# Patient Record
Sex: FEMALE | Race: WHITE | Employment: OTHER | URBAN - NONMETROPOLITAN AREA
[De-identification: names, ages, dates, MRNs, and addresses within clinical notes are randomized per-mention and may not be internally consistent; named-entity substitution may affect disease eponyms.]

---

## 2019-12-20 ENCOUNTER — HOSPITAL ENCOUNTER (EMERGENCY)
Age: 59
Discharge: HOME | End: 2019-12-20
Attending: EMERGENCY MEDICINE
Payer: MEDICARE

## 2019-12-20 ENCOUNTER — APPOINTMENT (OUTPATIENT)
Dept: GENERAL RADIOLOGY | Age: 59
End: 2019-12-20
Payer: MEDICARE

## 2019-12-20 VITALS
DIASTOLIC BLOOD PRESSURE: 95 MMHG | BODY MASS INDEX: 31.28 KG/M2 | HEART RATE: 76 BPM | SYSTOLIC BLOOD PRESSURE: 176 MMHG | TEMPERATURE: 97.3 F | HEIGHT: 62 IN | OXYGEN SATURATION: 99 % | RESPIRATION RATE: 16 BRPM | WEIGHT: 170 LBS

## 2019-12-20 DIAGNOSIS — M62.838 SPASM OF MUSCLE: ICD-10-CM
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
## 2019-12-20 PROCEDURE — 99284 EMERGENCY DEPT VISIT MOD MDM: CPT

## 2019-12-20 PROCEDURE — 72040 X-RAY EXAM NECK SPINE 2-3 VW: CPT

## 2019-12-20 PROCEDURE — 6370000000 HC RX 637 (ALT 250 FOR IP): Performed by: EMERGENCY MEDICINE

## 2019-12-20 RX ORDER — OLMESARTAN MEDOXOMIL 40 MG/1
40 TABLET ORAL DAILY
COMMUNITY
Start: 2019-08-22 | End: 2020-06-16

## 2019-12-20 RX ORDER — NAPROXEN 500 MG/1
500 TABLET ORAL ONCE
Status: COMPLETED | OUTPATIENT
Start: 2019-12-20 | End: 2019-12-20

## 2019-12-20 RX ORDER — HYDROCODONE BITARTRATE AND ACETAMINOPHEN 5; 325 MG/1; MG/1
1 TABLET ORAL EVERY 6 HOURS PRN
Qty: 10 TABLET | Refills: 0 | Status: SHIPPED | OUTPATIENT
Start: 2019-12-20 | End: 2019-12-23

## 2019-12-20 RX ORDER — VENLAFAXINE HYDROCHLORIDE 75 MG/1
75 CAPSULE, EXTENDED RELEASE ORAL DAILY
COMMUNITY
Start: 2019-02-07 | End: 2020-06-16

## 2019-12-20 RX ORDER — ALENDRONATE SODIUM 70 MG/1
70 TABLET ORAL WEEKLY
COMMUNITY
Start: 2019-10-14 | End: 2020-08-11 | Stop reason: SDUPTHER

## 2019-12-20 RX ORDER — DOCUSATE SODIUM 100 MG/1
200 CAPSULE, LIQUID FILLED ORAL NIGHTLY
COMMUNITY
Start: 2018-09-06 | End: 2020-06-16

## 2019-12-20 RX ORDER — LAMOTRIGINE 200 MG/1
200 TABLET ORAL 2 TIMES DAILY
COMMUNITY
Start: 2019-09-24 | End: 2020-08-11 | Stop reason: SDUPTHER

## 2019-12-20 RX ORDER — ARIPIPRAZOLE 10 MG/1
5 TABLET ORAL DAILY
COMMUNITY
End: 2020-06-16

## 2019-12-20 RX ORDER — BENZTROPINE MESYLATE 1 MG/1
1 TABLET ORAL DAILY
COMMUNITY
Start: 2017-01-16 | End: 2020-06-16

## 2019-12-20 RX ORDER — LIDOCAINE 50 MG/G
1 PATCH TOPICAL DAILY
Qty: 10 PATCH | Refills: 0 | Status: SHIPPED | OUTPATIENT
Start: 2019-12-20 | End: 2019-12-30

## 2019-12-20 RX ORDER — LACOSAMIDE 100 MG/1
100 TABLET ORAL 2 TIMES DAILY
COMMUNITY
Start: 2019-10-24 | End: 2020-06-16

## 2019-12-20 RX ORDER — BUDESONIDE AND FORMOTEROL FUMARATE DIHYDRATE 160; 4.5 UG/1; UG/1
2 AEROSOL RESPIRATORY (INHALATION) 2 TIMES DAILY
COMMUNITY
Start: 2019-12-20 | End: 2020-07-14 | Stop reason: SDUPTHER

## 2019-12-20 RX ORDER — CYCLOBENZAPRINE HCL 10 MG
10 TABLET ORAL ONCE
Status: COMPLETED | OUTPATIENT
Start: 2019-12-20 | End: 2019-12-20

## 2019-12-20 RX ORDER — CYCLOBENZAPRINE HCL 10 MG
10 TABLET ORAL 3 TIMES DAILY PRN
COMMUNITY
Start: 2019-09-10 | End: 2019-12-20 | Stop reason: ALTCHOICE

## 2019-12-20 RX ORDER — METHYLPREDNISOLONE 4 MG/1
TABLET ORAL
Qty: 1 KIT | Refills: 0 | Status: SHIPPED | OUTPATIENT
Start: 2019-12-20 | End: 2020-06-16

## 2019-12-20 RX ORDER — TIZANIDINE 4 MG/1
4 TABLET ORAL EVERY 6 HOURS PRN
Qty: 40 TABLET | Refills: 0 | Status: SHIPPED | OUTPATIENT
Start: 2019-12-20 | End: 2020-06-16

## 2019-12-20 RX ORDER — TOPIRAMATE 100 MG/1
50 TABLET, FILM COATED ORAL 2 TIMES DAILY
COMMUNITY
Start: 2019-10-15 | End: 2020-06-16

## 2019-12-20 RX ORDER — LAMOTRIGINE 25 MG/1
25 TABLET ORAL 2 TIMES DAILY
COMMUNITY
Start: 2019-09-24 | End: 2020-08-11 | Stop reason: SDUPTHER

## 2019-12-20 RX ORDER — HYDROCODONE BITARTRATE AND ACETAMINOPHEN 5; 325 MG/1; MG/1
1 TABLET ORAL ONCE
Status: COMPLETED | OUTPATIENT
Start: 2019-12-20 | End: 2019-12-20

## 2019-12-20 RX ORDER — MELOXICAM 7.5 MG/1
15 TABLET ORAL DAILY
COMMUNITY
Start: 2019-08-26 | End: 2020-06-16

## 2019-12-20 RX ADMIN — HYDROCODONE BITARTRATE AND ACETAMINOPHEN 1 TABLET: 5; 325 TABLET ORAL at 20:08

## 2019-12-20 RX ADMIN — NAPROXEN 500 MG: 500 TABLET ORAL at 20:08

## 2019-12-20 RX ADMIN — CYCLOBENZAPRINE 10 MG: 10 TABLET, FILM COATED ORAL at 20:08

## 2019-12-20 ASSESSMENT — PAIN DESCRIPTION - LOCATION: LOCATION: BACK;NECK

## 2019-12-20 ASSESSMENT — PAIN SCALES - GENERAL
PAINLEVEL_OUTOF10: 4
PAINLEVEL_OUTOF10: 4

## 2019-12-20 ASSESSMENT — PAIN DESCRIPTION - ORIENTATION: ORIENTATION: UPPER

## 2019-12-20 ASSESSMENT — PAIN DESCRIPTION - PAIN TYPE: TYPE: ACUTE PAIN

## 2019-12-20 ASSESSMENT — PAIN DESCRIPTION - DESCRIPTORS: DESCRIPTORS: ACHING

## 2020-05-29 ENCOUNTER — HOSPITAL ENCOUNTER (EMERGENCY)
Age: 60
Discharge: HOME | End: 2020-05-29
Attending: EMERGENCY MEDICINE
Payer: MEDICARE

## 2020-05-29 ENCOUNTER — APPOINTMENT (OUTPATIENT)
Dept: CT IMAGING | Age: 60
End: 2020-05-29
Payer: MEDICARE

## 2020-05-29 VITALS
SYSTOLIC BLOOD PRESSURE: 173 MMHG | WEIGHT: 165 LBS | HEART RATE: 74 BPM | RESPIRATION RATE: 28 BRPM | DIASTOLIC BLOOD PRESSURE: 98 MMHG | TEMPERATURE: 98.4 F | BODY MASS INDEX: 30.36 KG/M2 | HEIGHT: 62 IN | OXYGEN SATURATION: 95 %

## 2020-05-29 LAB
ALBUMIN SERPL-MCNC: 4.2 GM/DL (ref 3.4–5)
ALP BLD-CCNC: 108 IU/L (ref 40–129)
ALT SERPL-CCNC: 9 U/L (ref 10–40)
AMPHETAMINES: NEGATIVE
ANION GAP SERPL CALCULATED.3IONS-SCNC: 15 MMOL/L (ref 4–16)
AST SERPL-CCNC: 11 IU/L (ref 15–37)
BACTERIA: ABNORMAL /HPF
BARBITURATE SCREEN URINE: NEGATIVE
BASOPHILS ABSOLUTE: 0 K/CU MM
BASOPHILS RELATIVE PERCENT: 0.5 % (ref 0–1)
BENZODIAZEPINE SCREEN, URINE: NEGATIVE
BILIRUB SERPL-MCNC: 0.2 MG/DL (ref 0–1)
BILIRUBIN URINE: NEGATIVE MG/DL
BLOOD, URINE: NEGATIVE
BUN BLDV-MCNC: 8 MG/DL (ref 6–23)
CALCIUM SERPL-MCNC: 10 MG/DL (ref 8.3–10.6)
CANNABINOID SCREEN URINE: ABNORMAL
CAST TYPE: NEGATIVE /HPF
CHLORIDE BLD-SCNC: 93 MMOL/L (ref 99–110)
CLARITY: ABNORMAL
CO2: 23 MMOL/L (ref 21–32)
COCAINE METABOLITE: NEGATIVE
COLOR: YELLOW
CREAT SERPL-MCNC: 0.8 MG/DL (ref 0.6–1.1)
CRYSTAL TYPE: NEGATIVE /HPF
DIFFERENTIAL TYPE: ABNORMAL
EOSINOPHILS ABSOLUTE: 0 K/CU MM
EOSINOPHILS RELATIVE PERCENT: 0 % (ref 0–3)
EPITHELIAL CELLS, UA: ABNORMAL /HPF
GFR AFRICAN AMERICAN: >60 ML/MIN/1.73M2
GFR NON-AFRICAN AMERICAN: >60 ML/MIN/1.73M2
GLUCOSE BLD-MCNC: 110 MG/DL (ref 70–99)
GLUCOSE, URINE: NEGATIVE MG/DL
HCT VFR BLD CALC: 39 % (ref 37–47)
HEMOGLOBIN: 13.2 GM/DL (ref 12.5–16)
IMMATURE NEUTROPHIL %: 0.3 % (ref 0–0.43)
KETONES, URINE: NEGATIVE MG/DL
LEUKOCYTE ESTERASE, URINE: ABNORMAL
LIPASE: 30 IU/L (ref 13–60)
LYMPHOCYTES ABSOLUTE: 1.5 K/CU MM
LYMPHOCYTES RELATIVE PERCENT: 19.9 % (ref 24–44)
MCH RBC QN AUTO: 29.4 PG (ref 27–31)
MCHC RBC AUTO-ENTMCNC: 33.8 % (ref 32–36)
MCV RBC AUTO: 86.9 FL (ref 78–100)
MONOCYTES ABSOLUTE: 0.5 K/CU MM
MONOCYTES RELATIVE PERCENT: 6.4 % (ref 0–4)
NITRITE URINE, QUANTITATIVE: POSITIVE
OPIATES, URINE: NEGATIVE
OXYCODONE: NEGATIVE
PDW BLD-RTO: 12.3 % (ref 11.7–14.9)
PH, URINE: 6.5 (ref 5–8)
PHENCYCLIDINE, URINE: ABNORMAL
PLATELET # BLD: 301 K/CU MM (ref 140–440)
PMV BLD AUTO: 8.6 FL (ref 7.5–11.1)
POTASSIUM SERPL-SCNC: 4 MMOL/L (ref 3.5–5.1)
PROTEIN UA: ABNORMAL MG/DL
RBC # BLD: 4.49 M/CU MM (ref 4.2–5.4)
RBC URINE: NEGATIVE /HPF (ref 0–6)
SEGMENTED NEUTROPHILS ABSOLUTE COUNT: 5.5 K/CU MM
SEGMENTED NEUTROPHILS RELATIVE PERCENT: 72.9 % (ref 36–66)
SODIUM BLD-SCNC: 131 MMOL/L (ref 135–145)
SPECIFIC GRAVITY UA: 1.02 (ref 1–1.03)
TOTAL IMMATURE NEUTOROPHIL: 0.02 K/CU MM
TOTAL PROTEIN: 7.2 GM/DL (ref 6.4–8.2)
UROBILINOGEN, URINE: 0.2 MG/DL (ref 0.2–1)
WBC # BLD: 7.5 K/CU MM (ref 4–10.5)
WBC UA: ABNORMAL /HPF (ref 0–5)

## 2020-05-29 PROCEDURE — 83690 ASSAY OF LIPASE: CPT

## 2020-05-29 PROCEDURE — 96365 THER/PROPH/DIAG IV INF INIT: CPT

## 2020-05-29 PROCEDURE — 87086 URINE CULTURE/COLONY COUNT: CPT

## 2020-05-29 PROCEDURE — 70450 CT HEAD/BRAIN W/O DYE: CPT

## 2020-05-29 PROCEDURE — 85025 COMPLETE CBC W/AUTO DIFF WBC: CPT

## 2020-05-29 PROCEDURE — 2580000003 HC RX 258: Performed by: EMERGENCY MEDICINE

## 2020-05-29 PROCEDURE — 6360000002 HC RX W HCPCS: Performed by: EMERGENCY MEDICINE

## 2020-05-29 PROCEDURE — 87186 SC STD MICRODIL/AGAR DIL: CPT

## 2020-05-29 PROCEDURE — 81001 URINALYSIS AUTO W/SCOPE: CPT

## 2020-05-29 PROCEDURE — 80307 DRUG TEST PRSMV CHEM ANLYZR: CPT

## 2020-05-29 PROCEDURE — 93005 ELECTROCARDIOGRAM TRACING: CPT | Performed by: EMERGENCY MEDICINE

## 2020-05-29 PROCEDURE — 99284 EMERGENCY DEPT VISIT MOD MDM: CPT

## 2020-05-29 PROCEDURE — 87077 CULTURE AEROBIC IDENTIFY: CPT

## 2020-05-29 PROCEDURE — 80053 COMPREHEN METABOLIC PANEL: CPT

## 2020-05-29 PROCEDURE — 96375 TX/PRO/DX INJ NEW DRUG ADDON: CPT

## 2020-05-29 RX ORDER — NORTRIPTYLINE HYDROCHLORIDE 25 MG/1
50 CAPSULE ORAL NIGHTLY
COMMUNITY
End: 2020-08-11 | Stop reason: SDUPTHER

## 2020-05-29 RX ORDER — CEPHALEXIN 500 MG/1
500 CAPSULE ORAL 2 TIMES DAILY
Qty: 14 CAPSULE | Refills: 0 | Status: SHIPPED | OUTPATIENT
Start: 2020-05-29 | End: 2020-06-05

## 2020-05-29 RX ORDER — NALOXONE HYDROCHLORIDE 0.4 MG/ML
0.4 INJECTION, SOLUTION INTRAMUSCULAR; INTRAVENOUS; SUBCUTANEOUS ONCE
Status: COMPLETED | OUTPATIENT
Start: 2020-05-29 | End: 2020-05-29

## 2020-05-29 RX ORDER — MECLIZINE HYDROCHLORIDE 25 MG/1
25 TABLET ORAL 3 TIMES DAILY PRN
COMMUNITY
End: 2020-07-14 | Stop reason: SDUPTHER

## 2020-05-29 RX ORDER — ONDANSETRON 4 MG/1
4 TABLET, ORALLY DISINTEGRATING ORAL EVERY 8 HOURS PRN
Qty: 15 TABLET | Refills: 0 | Status: SHIPPED | OUTPATIENT
Start: 2020-05-29 | End: 2020-06-16

## 2020-05-29 RX ADMIN — NALOXONE HYDROCHLORIDE 0.4 MG: 0.4 INJECTION, SOLUTION INTRAMUSCULAR; INTRAVENOUS; SUBCUTANEOUS at 14:09

## 2020-05-29 RX ADMIN — CEFTRIAXONE SODIUM 1 G: 1 INJECTION, POWDER, FOR SOLUTION INTRAMUSCULAR; INTRAVENOUS at 14:13

## 2020-05-29 SDOH — HEALTH STABILITY: MENTAL HEALTH: HOW OFTEN DO YOU HAVE A DRINK CONTAINING ALCOHOL?: PATIENT DECLINED

## 2020-05-29 ASSESSMENT — ENCOUNTER SYMPTOMS
NAUSEA: 1
VOMITING: 1
DIARRHEA: 0
CONSTIPATION: 0
BACK PAIN: 0
COUGH: 0
ABDOMINAL PAIN: 0
EYE REDNESS: 0
RHINORRHEA: 0
SORE THROAT: 0
SHORTNESS OF BREATH: 0

## 2020-05-29 NOTE — ED NOTES
Pt arrived to room 2 per [employer]. Pt states she has a history of seizures. States she has been having them more often lately. States last one was about 6 weeks ago. States she normally will vomit once after a seizure but states she usually doesn't feel bad for this long. States she has been having issues with nausea for about 3 months. Saline lock LAC per squad. Squad gave Zofran. Pt states she does feel better after the Zofran. Seizure pads on side rails X 2. Pt is alert and cooperative. Respirations are even and unlabored.  Pt was placed on cardiac monitor and continuous pulse oximetry and blood pressure monitoring on arrival to room     Lee Lisa RN  05/29/20 1411

## 2020-05-29 NOTE — ED PROVIDER NOTES
Past Medical History:   Diagnosis Date    [facility]) [facility] [address]     Seizures ([facility] [address]      Past Surgical History:   Procedure Laterality Date    BRAIN SURGERY      HYSTERECTOMY       History reviewed. No pertinent family history. Social History     Socioeconomic History    Marital status:      Spouse name: Not on file    Number of children: Not on file    Years of education: Not on file    Highest education level: Not on file   Occupational History    Not on file   Social Needs    Financial resource strain: Not on file    Food insecurity     Worry: Not on file     Inability: Not on file    Transportation needs     Medical: Not on file     Non-medical: Not on file   Tobacco Use    Smoking status: Current Every Day Smoker     Packs/day: 1.00    Smokeless tobacco: Never Used   Substance and Sexual Activity    Alcohol use: No     Frequency: Patient refused    Drug use: Yes     Types: Marijuana    Sexual activity: Yes     Partners: Male   Lifestyle    Physical activity     Days per week: Not on file     Minutes per session: Not on file    Stress: Not on file   Relationships    Social connections     Talks on phone: Not on file     Gets together: Not on file     Attends Restoration service: Not on file     Active member of club or organization: Not on file     Attends meetings of clubs or organizations: Not on file     Relationship status: Not on file    Intimate partner violence     Fear of current or ex partner: Not on file     Emotionally abused: Not on file     Physically abused: Not on file     Forced sexual activity: Not on file   Other Topics Concern    Not on file   Social History Narrative    Not on file     No current facility-administered medications for this encounter.       Current Outpatient Medications   Medication Sig Dispense Refill    meclizine (ANTIVERT) 25 MG tablet Take 25 mg by mouth 3 times daily as needed      nortriptyline (PAMELOR) 25 visit (if applicable):  Results for orders placed or performed during the hospital encounter of 05/29/20   CBC Auto Differential   Result Value Ref Range    WBC 7.5 4.0 - 10.5 K/CU MM    RBC 4.49 4.2 - 5.4 M/CU MM    Hemoglobin 13.2 12.5 - 16.0 GM/DL    Hematocrit 39.0 37 - 47 %    MCV 86.9 78 - 100 FL    MCH 29.4 27 - 31 PG    MCHC 33.8 32.0 - 36.0 %    RDW 12.3 11.7 - 14.9 %    Platelets 352 699 - 981 K/CU MM    MPV 8.6 7.5 - 11.1 FL    Differential Type AUTOMATED DIFFERENTIAL     Segs Relative 72.9 (H) 36 - 66 %    Lymphocytes % 19.9 (L) 24 - 44 %    Monocytes % 6.4 (H) 0 - 4 %    Eosinophils % 0.0 0 - 3 %    Basophils % 0.5 0 - 1 %    Segs Absolute 5.5 K/CU MM    Lymphocytes Absolute 1.5 K/CU MM    Monocytes Absolute 0.5 K/CU MM    Eosinophils Absolute 0.0 K/CU MM    Basophils Absolute 0.0 K/CU MM    Immature Neutrophil % 0.3 0 - 0.43 %    Total Immature Neutrophil 0.02 K/CU MM   Comprehensive Metabolic Panel w/ Reflex to MG   Result Value Ref Range    Sodium 131 (L) 135 - 145 MMOL/L    Potassium 4.0 3.5 - 5.1 MMOL/L    Chloride 93 (L) 99 - 110 mMol/L    CO2 23 21 - 32 MMOL/L    BUN 8 6 - 23 MG/DL    CREATININE 0.8 0.6 - 1.1 MG/DL    Glucose 110 (H) 70 - 99 MG/DL    Calcium 10.0 8.3 - 10.6 MG/DL    Alb 4.2 3.4 - 5.0 GM/DL    Total Protein 7.2 6.4 - 8.2 GM/DL    Total Bilirubin 0.2 0.0 - 1.0 MG/DL    ALT 9 (L) 10 - 40 U/L    AST 11 (L) 15 - 37 IU/L    Alkaline Phosphatase 108 40 - 129 IU/L    GFR Non-African American >60 >60 mL/min/1.73m2    GFR African American >60 >60 mL/min/1.73m2    Anion Gap 15 4 - 16   Lipase   Result Value Ref Range    Lipase 30 13 - 60 IU/L   Urinalysis, reflex to microscopic   Result Value Ref Range    Color, UA YELLOW YELLOW    Clarity, UA CLOUDY CLEAR    Glucose, Urine NEGATIVE NEGATIVE MG/DL    Bilirubin Urine NEGATIVE NEGATIVE MG/DL    Ketones, Urine NEGATIVE NEGATIVE MG/DL    Specific Gravity, UA 1.020 1.001 - 1.035    Blood, Urine NEGATIVE NEGATIVE    pH, Urine 6.5 5.0 - 8.0 recognition program.  Myra Dumont made to edit the dictations, but occasional words are mis-transcribed.           Zeynep Lomas MD  05/29/20 5910

## 2020-05-30 PROCEDURE — 93010 ELECTROCARDIOGRAM REPORT: CPT | Performed by: INTERNAL MEDICINE

## 2020-06-01 LAB
CULTURE: ABNORMAL
Lab: ABNORMAL
SPECIMEN: ABNORMAL
TOTAL COLONY COUNT: ABNORMAL

## 2020-06-03 LAB
EKG ATRIAL RATE: 77 BPM
EKG DIAGNOSIS: NORMAL
EKG P AXIS: 67 DEGREES
EKG P-R INTERVAL: 154 MS
EKG Q-T INTERVAL: 410 MS
EKG QRS DURATION: 74 MS
EKG QTC CALCULATION (BAZETT): 463 MS
EKG R AXIS: 44 DEGREES
EKG T AXIS: 82 DEGREES
EKG VENTRICULAR RATE: 77 BPM

## 2020-06-16 ENCOUNTER — OFFICE VISIT (OUTPATIENT)
Dept: INTERNAL MEDICINE CLINIC | Age: 60
End: 2020-06-16
Payer: MEDICARE

## 2020-06-16 VITALS
SYSTOLIC BLOOD PRESSURE: 130 MMHG | BODY MASS INDEX: 30.73 KG/M2 | OXYGEN SATURATION: 98 % | HEART RATE: 77 BPM | DIASTOLIC BLOOD PRESSURE: 80 MMHG | HEIGHT: 62 IN | WEIGHT: 167 LBS

## 2020-06-16 PROBLEM — E78.5 DYSLIPIDEMIA: Status: ACTIVE | Noted: 2020-06-16

## 2020-06-16 PROBLEM — J43.1 PANLOBULAR EMPHYSEMA (HCC): Status: ACTIVE | Noted: 2020-06-16

## 2020-06-16 PROBLEM — Z72.0 TOBACCO USE: Status: ACTIVE | Noted: 2020-06-16

## 2020-06-16 PROBLEM — F33.1 MODERATE EPISODE OF RECURRENT MAJOR DEPRESSIVE DISORDER (HCC): Status: ACTIVE | Noted: 2020-06-16

## 2020-06-16 PROBLEM — R73.03 PREDIABETES: Status: ACTIVE | Noted: 2020-06-16

## 2020-06-16 PROBLEM — E55.9 VITAMIN D DEFICIENCY: Status: ACTIVE | Noted: 2020-06-16

## 2020-06-16 PROBLEM — I10 ESSENTIAL HYPERTENSION: Status: ACTIVE | Noted: 2020-06-16

## 2020-06-16 PROBLEM — G40.309 NONINTRACTABLE GENERALIZED IDIOPATHIC EPILEPSY WITHOUT STATUS EPILEPTICUS (HCC): Status: ACTIVE | Noted: 2020-06-16

## 2020-06-16 PROCEDURE — G8427 DOCREV CUR MEDS BY ELIG CLIN: HCPCS | Performed by: INTERNAL MEDICINE

## 2020-06-16 PROCEDURE — 4004F PT TOBACCO SCREEN RCVD TLK: CPT | Performed by: INTERNAL MEDICINE

## 2020-06-16 PROCEDURE — 99204 OFFICE O/P NEW MOD 45 MIN: CPT | Performed by: INTERNAL MEDICINE

## 2020-06-16 PROCEDURE — 3023F SPIROM DOC REV: CPT | Performed by: INTERNAL MEDICINE

## 2020-06-16 PROCEDURE — 3017F COLORECTAL CA SCREEN DOC REV: CPT | Performed by: INTERNAL MEDICINE

## 2020-06-16 PROCEDURE — G8926 SPIRO NO PERF OR DOC: HCPCS | Performed by: INTERNAL MEDICINE

## 2020-06-16 PROCEDURE — G8417 CALC BMI ABV UP PARAM F/U: HCPCS | Performed by: INTERNAL MEDICINE

## 2020-06-16 ASSESSMENT — PATIENT HEALTH QUESTIONNAIRE - PHQ9
SUM OF ALL RESPONSES TO PHQ QUESTIONS 1-9: 2
2. FEELING DOWN, DEPRESSED OR HOPELESS: 1
1. LITTLE INTEREST OR PLEASURE IN DOING THINGS: 1
SUM OF ALL RESPONSES TO PHQ QUESTIONS 1-9: 2
SUM OF ALL RESPONSES TO PHQ9 QUESTIONS 1 & 2: 2

## 2020-06-16 NOTE — PROGRESS NOTES
Other Topics Concern    Not on file   Social History Narrative    Not on file       Family History:  History reviewed. No pertinent family history. Review of Systems:  Constitutional: no fevers, no chills, no night sweats, no weight loss, no weight gain, no fatigue   Pain assessment: no pain  Head: no headaches  Ears: no hearing loss, no tinnitus, no vertigo  Eyes: no blurry vision, no diplopia, no dryness, no itchiness  Mouth: no oral ulcers, no dry mouth, no sore throat  Nose: no nasal congestion, no epistaxis  Cardiac: no chest pain, no palpitations, no leg swelling, no orthopnea, no PND, no syncope  Pulmonary: no dyspnea, no cough, no wheezing, no hemoptysis  GI: no nausea, no vomiting, no diarrhea, no constipation, no abdominal pain, no hematochezia  : no dysuria, no frequency, no urgency, no hematuria, no frothy urine, no dyspareunia, no pelvic pain, no vaginal bleeding, no abnormal vaginal discharge  MSK: no arthralgias, no myalgias, no early morning stiffness, no Raynaud's   Neuro: no focal neurological deficits, seizures  Sleep: no snoring, no daytime somnolence   Psych: depression, no suicidal ideation      Physical Exam:  VITALS:   /80   Pulse 77   Ht 5' 2\" (1.575 m)   Wt 167 lb (75.8 kg)   SpO2 98%   BMI 30.54 kg/m²     PHYSICAL EXAMINATION:  General: alert, awake, and oriented to time, place, person, and situation. Not in acute distress   Skin:  no suspicious rashes, no jaundice  Head: normocephalic/atraumatic  Eyes: anicteric sclera, well-injected conjunctiva. Pupils are equally round and reactive to light.  Extraocular movements are intact   Nose/Sinuses: no sinus tenderness, septum midline, mucosa appears normal   Oropharynx: lips, teeth, and tongue normal. Non-erythematous pharynx, no oral ulcers, moist mucous membranes, no tonsillar exudates   Ears: external ears normal  Neck: supple, no cervical lymphadenopathy, thyroid symmetric and not enlarged, no bruits   Heart: regular

## 2020-06-16 NOTE — PATIENT INSTRUCTIONS
Patient Education        Seizure: Care Instructions  Your Care Instructions     Seizures are caused by abnormal patterns of electrical signals in the brain. They are different for each person. Seizures can affect movement, speech, vision, or awareness. Some people have only slight shaking of a hand and do not pass out. Other people may pass out and have violent shaking of the whole body. Some people appear to stare into space. They are awake, but they can't respond normally. Later, they may not remember what happened. You may need tests to identify the type and cause of the seizures. A seizure may occur only once, or you may have them more than one time. Taking medicines as directed and following up with your doctor may help keep you from having more seizures. The doctor has checked you carefully, but problems can develop later. If you notice any problems or new symptoms, get medical treatment right away. Follow-up care is a key part of your treatment and safety. Be sure to make and go to all appointments, and call your doctor if you are having problems. It's also a good idea to know your test results and keep a list of the medicines you take. How can you care for yourself at home? · Be safe with medicines. Take your medicines exactly as prescribed. Call your doctor if you think you are having a problem with your medicine. · Do not do any activity that could be dangerous to you or others until your doctor says it is safe to do so. For example, do not drive a car, operate machinery, swim, or climb ladders. · Be sure that anyone treating you for any health problem knows that you have had a seizure and what medicines you are taking for it. · Identify and avoid things that may make you more likely to have a seizure. These may include lack of sleep, alcohol or drug use, stress, or not eating. · Make sure you go to your follow-up appointment. When should you call for help?    [id number] anytime you think you may seizure you have. How is it diagnosed? Diagnosing epilepsy can be hard. Your doctor will ask questions to find out what happened just before, during, and right after a seizure. Your doctor will examine you. You'll have some tests, such as an electroencephalogram. This information can help your doctor decide what kind of seizures you have and if you have epilepsy. How is it treated? You can take medicines to control and reduce seizures. Which type you use depends on the type of seizure. You and your doctor will need to find the right combination, schedule, and dose of medicine. If medicine alone doesn't help, your doctor may suggest a special diet or surgery to help reduce seizures. How can you care for yourself at home? To control your seizures, you need to follow your treatment plan. If you take medicine to control seizures, you must take it exactly as prescribed. The medicine works only if you take the right amount on the schedule your doctor sets up. Following this schedule keeps the right level of medicine in your body. Even missing just a few doses can allow seizures to happen. You might be on a special ketogenic diet. If so, you'll need to follow the diet exactly for it to help prevent seizures. As you follow your treatment plan, also try to figure out and avoid things that may make you more likely to have a seizure. These may include:  · Not getting enough sleep. · Using drugs or alcohol. · Being stressed. · Skipping meals. If you keep having seizures despite treatment, keep a record of them. Note the date, time of day, and any details about the seizure that you can remember. Your doctor can use this information to plan or adjust your medicine or other treatment. The record can also help your doctor find out what kinds of seizures you are having. If you have epilepsy:  · Be sure that any doctor who treats you knows that you have epilepsy.  And let the doctor know what medicines you take, if differently. Some people have only a few seizures. Others get them more often. If you know what triggers a seizure, you may be able to avoid having one. You can take medicines to control and reduce seizures. You and your doctor will need to find the right combination, schedule, and dose of medicine. This may take time and careful changes. Seizures may get worse and happen more often over time. Follow-up care is a key part of your treatment and safety. Be sure to make and go to all appointments, and call your doctor if you are having problems. It's also a good idea to know your test results and keep a list of the medicines you take. How can you care for yourself at home? · Be safe with medicines. Take your medicines exactly as prescribed. Call your doctor if you think you are having a problem with your medicine. · Make a treatment plan with your doctor. Be sure to follow your plan. · Try to identify and avoid things that may make you more likely to have a seizure. These may include:  ? Not getting enough sleep. ? Using drugs or alcohol. ? Being emotionally stressed. ? Skipping meals. · Keep a record of any seizures you have. Note the date, time of day, and any details about the seizure that you can remember. Your doctor can use this information to plan or adjust your medicine or other treatment. · Be sure that any doctor treating you for another condition knows that you have epilepsy. Each doctor should know what medicines you are taking, if any. · Wear a medical ID bracelet. You can buy this at most drugstores. If you have a seizure that leaves you unconscious or unable to speak for yourself, this bracelet will let those who are treating you know that you have epilepsy. · Talk to your doctor about whether it is safe for you to do certain activities, such as drive or swim. When should you call for help? [id number] anytime you think you may need emergency care.  For example, call if:  · A seizure does not stop as it normally does. · You have new symptoms such as:  ? Numbness, tingling, or weakness on one side of your body or face. ? Vision changes. ? Trouble speaking or thinking clearly. Call your doctor now or seek immediate medical care if:  · You have a fever. · You have a severe headache. Watch closely for changes in your health, and be sure to contact your doctor if:  · The normal pattern or features of your seizures change. Where can you learn more? Go to https://[employer] org and sign in to your [employer] account. Enter H216 in the [employer] box to learn more about \"Epilepsy: Care Instructions. \"     If you do not have an account, please click on the \"Sign Up Now\" link. Current as of: November 20, 2019               Content Version: 12.5  © 9479-1301 Healthwise, Incorporated. Care instructions adapted under license by [facility] ([facility]). If you have questions about a medical condition or this instruction, always ask your healthcare professional. [place] [address] any warranty or liability for your use of this information.

## 2020-06-18 ENCOUNTER — TELEPHONE (OUTPATIENT)
Dept: INTERNAL MEDICINE CLINIC | Age: 60
End: 2020-06-18

## 2020-06-30 ENCOUNTER — TELEPHONE (OUTPATIENT)
Dept: ORTHOPEDIC SURGERY | Age: 60
End: 2020-06-30

## 2020-07-01 ENCOUNTER — OFFICE VISIT (OUTPATIENT)
Dept: ORTHOPEDIC SURGERY | Age: 60
End: 2020-07-01
Payer: MEDICARE

## 2020-07-01 VITALS — RESPIRATION RATE: 16 BRPM | WEIGHT: 165 LBS | BODY MASS INDEX: 30.36 KG/M2 | HEIGHT: 62 IN

## 2020-07-01 PROCEDURE — 3017F COLORECTAL CA SCREEN DOC REV: CPT | Performed by: ORTHOPAEDIC SURGERY

## 2020-07-01 PROCEDURE — G8427 DOCREV CUR MEDS BY ELIG CLIN: HCPCS | Performed by: ORTHOPAEDIC SURGERY

## 2020-07-01 PROCEDURE — 4004F PT TOBACCO SCREEN RCVD TLK: CPT | Performed by: ORTHOPAEDIC SURGERY

## 2020-07-01 PROCEDURE — 99203 OFFICE O/P NEW LOW 30 MIN: CPT | Performed by: ORTHOPAEDIC SURGERY

## 2020-07-01 PROCEDURE — G8417 CALC BMI ABV UP PARAM F/U: HCPCS | Performed by: ORTHOPAEDIC SURGERY

## 2020-07-01 ASSESSMENT — ENCOUNTER SYMPTOMS
SHORTNESS OF BREATH: 0
COLOR CHANGE: 0

## 2020-07-01 NOTE — PROGRESS NOTES
Subjective:      Patient ID: Izabella Owens is a 62 y.o. female. Patient here for a new patient consult per Jesenia Walker MD for evaluation of left wrist pain. Onset has been about 1 year but more persistent for the last 5-6 months. She does have Epilepsy and has frequent falls and injures herself often. She was treated with an EXOS brace per a physician before moving here which has helped with her symptoms. She rates her pain a 7/10 and pain is mainly along the base of the thumb. She reports no prior known fractures and no surgeries on this wrist.     Provider needs to conduct a hands on physical today due to patients injury    She comes in today for her first visit with me in regards to her left wrist pain. She states that she has been dealing with this pain for about a year with no relief. She describes the pain as a sharp, stabbing pain along the radial aspect of her left wrist.  She is now having difficulty with her daily activities due to the pain. Patient denies any new injury to the involved extremity/ joint, denies numbness or tingling in the involved extremity and denies fever or chills. Review of Systems   Constitutional: Negative for activity change, chills and fever. Respiratory: Negative for shortness of breath. Cardiovascular: Negative for chest pain. Musculoskeletal: Positive for arthralgias and myalgias. Negative for gait problem and joint swelling. Skin: Negative for color change, pallor, rash and wound. Neurological: Positive for weakness. Negative for numbness. Past Medical History:   Diagnosis Date    [facility]ch[facility] disease) ([facility]     Seizures ([facility])        Objective:   Physical Exam  Constitutional:       Appearance: She is well-developed. HENT:      Head: Normocephalic and atraumatic. Eyes:      Pupils: Pupils are equal, round, and reactive to light. Neck:      Musculoskeletal: Normal range of motion.    Pulmonary:      Effort: Pulmonary effort is normal.   Musculoskeletal: Normal range of motion. General: Tenderness present. No deformity. Right elbow: Normal.     Left elbow: Normal.      Right wrist: She exhibits normal range of motion, no tenderness, no bony tenderness, no swelling, no effusion, no crepitus, no deformity and no laceration. Left wrist: She exhibits tenderness and bony tenderness. She exhibits normal range of motion, no swelling, no effusion, no crepitus, no deformity and no laceration. Skin:     General: Skin is warm and dry. Capillary Refill: Capillary refill takes less than 2 seconds. Coloration: Skin is not pale. Findings: No erythema or rash. Neurological:      Mental Status: She is alert and oriented to person, place, and time. Left wrist-Skin intact with no erythema, ecchymosis or lacerations present. Moderate tenderness over the radial styloid  Positive Finkelstein's test   strength 4/5. Xr Wrist Left (min 3 Views)    Result Date: 7/1/2020  XRAY X-ray 4 views of the left wrist obtained and reviewed by me today in the office demonstrates age appropriate bone density throughout with normal joint spaces and normal overall alignment, no subluxation or dislocation noted, no acute osseous abnormalities, no bony prominences, no loose bodies. Impression: Normal left wrist with no acute process. Assessment:      Left de Quervain's tendinitis      Plan:      I discussed with her today her x-ray findings which are normal.  I explained to her that she does have tendinitis in the left wrist.  I explained to her that we could either continue with a cortisone injection and bracing or could proceed with surgical treatment. She does not want to proceed with injection and would like to have definitive surgical treatment. I discussed with her today performing left first dorsal extensor compartment tendon release for de Quervain's tendinitis.   I explained risks, benefits, possible complications of the procedure and answered all questions for the patient. I explained postoperative rehabilitation protocol and expectations with the patient today. The patient understands and consents to the procedure. Patient will follow up with their primary care physician prior to surgical treatment for preoperative clearance. We will schedule surgery at soonest convenience. Continue weight-bearing as tolerated. Continue range of motion exercises as instructed. Ice and elevate as needed. Tylenol or Motrin for pain. Follow up in 2 weeks postop. She would like to have her surgery at [facility].         Tirso [address], DO

## 2020-07-01 NOTE — PATIENT INSTRUCTIONS
Left wrist tendon release Otilio Westbrook's) discussed   Will proceed with surgery   No meds list provided   Consent to be signed day of surgery   Obtain any clearances if needed, as discussed   Follow up for pre-op testing/surgery as discussed     Call office with any questions (Maryan surgery scheduler)

## 2020-07-02 ENCOUNTER — TELEPHONE (OUTPATIENT)
Dept: ORTHOPEDIC SURGERY | Age: 60
End: 2020-07-02

## 2020-07-02 NOTE — TELEPHONE ENCOUNTER
Called patient's insurance it was all automated.  No prior [place] is required for CPT code 90195 with Dx Code: M65.4  Ref# [id number]

## 2020-07-14 ENCOUNTER — TELEPHONE (OUTPATIENT)
Dept: INTERNAL MEDICINE CLINIC | Age: 60
End: 2020-07-14

## 2020-07-14 ENCOUNTER — VIRTUAL VISIT (OUTPATIENT)
Dept: INTERNAL MEDICINE CLINIC | Age: 60
End: 2020-07-14
Payer: MEDICARE

## 2020-07-14 PROBLEM — R42 VERTIGO: Status: ACTIVE | Noted: 2020-07-14

## 2020-07-14 PROBLEM — G40.319 INTRACTABLE GENERALIZED IDIOPATHIC EPILEPSY WITHOUT STATUS EPILEPTICUS (HCC): Status: ACTIVE | Noted: 2020-06-16

## 2020-07-14 PROCEDURE — 99213 OFFICE O/P EST LOW 20 MIN: CPT | Performed by: INTERNAL MEDICINE

## 2020-07-14 RX ORDER — OLMESARTAN MEDOXOMIL 40 MG/1
40 TABLET ORAL DAILY
Qty: 30 TABLET | Refills: 2
Start: 2020-07-14 | End: 2020-08-11 | Stop reason: SDUPTHER

## 2020-07-14 RX ORDER — ALBUTEROL SULFATE 90 UG/1
2 AEROSOL, METERED RESPIRATORY (INHALATION) EVERY 6 HOURS PRN
Qty: 1 INHALER | Refills: 3 | Status: SHIPPED | OUTPATIENT
Start: 2020-07-14 | End: 2020-08-03 | Stop reason: SDUPTHER

## 2020-07-14 RX ORDER — NICOTINE 21 MG/24HR
1 PATCH, TRANSDERMAL 24 HOURS TRANSDERMAL DAILY
Qty: 42 PATCH | Refills: 0 | Status: SHIPPED | OUTPATIENT
Start: 2020-07-14 | End: 2020-08-11 | Stop reason: SDUPTHER

## 2020-07-14 RX ORDER — BUDESONIDE AND FORMOTEROL FUMARATE DIHYDRATE 160; 4.5 UG/1; UG/1
2 AEROSOL RESPIRATORY (INHALATION) 2 TIMES DAILY
Qty: 1 INHALER | Refills: 3 | Status: SHIPPED | OUTPATIENT
Start: 2020-07-14 | End: 2020-08-03 | Stop reason: SDUPTHER

## 2020-07-14 RX ORDER — MECLIZINE HYDROCHLORIDE 25 MG/1
25 TABLET ORAL 3 TIMES DAILY PRN
Qty: 90 TABLET | Refills: 0 | Status: SHIPPED | OUTPATIENT
Start: 2020-07-14 | End: 2020-08-11 | Stop reason: SDUPTHER

## 2020-07-14 ASSESSMENT — PATIENT HEALTH QUESTIONNAIRE - PHQ9
SUM OF ALL RESPONSES TO PHQ QUESTIONS 1-9: 1
1. LITTLE INTEREST OR PLEASURE IN DOING THINGS: 0
SUM OF ALL RESPONSES TO PHQ QUESTIONS 1-9: 1
2. FEELING DOWN, DEPRESSED OR HOPELESS: 1
SUM OF ALL RESPONSES TO PHQ9 QUESTIONS 1 & 2: 1

## 2020-07-14 NOTE — TELEPHONE ENCOUNTER
TTC patient to schedule 1mfu with dr. Belkys Beard from today's appt  Unable to leave message due to voicemail not set up

## 2020-07-14 NOTE — PROGRESS NOTES
TELEHEALTH EVALUATION -- Audio/Visual (During BWJTV-86 public health emergency)      Génesis Banks  1960    Patient location: Patient Claudia Guillen is a 62 y.o. pleasant lady evaluated via telephone on 7/14/20      Consent:  She and/or health care decision maker is aware that that she may receive a bill for this telephone service, depending on her insurance coverage, and has provided verbal consent to proceed: Yes      History of Present Illness:  Génesis Liz is a 62 y.o. pleasant lady who has requested an audio/video evaluation for the following concern(s): seizures. She has a past medical history significant for:  HTN, on Olmesartan 40mg QD  HL (,  on 4/4/13), not on statin therapy  Prediabetes (HbA1C 6.0% on 4/4/13), not on meds   Epilepsy (s/p L anterior temporal lobectomy in 1996), on Lamotrigine 046DZ BID, Eslicarbazepine 6506AK QHS   Depression, on Nortriptyline 50mg QHS  COPD, on Albuterol PRN, Symbicort   Osteoporosis (DEXA not in chart), on Fosamax 70mg weekly  Vertigo, on Meclizine 25mg TID PRN   Vitamin D deficiency (level 27 on 4/4/13), not on supplementation   S/p hysterectomy (cervix status unknown)  Polysubstance abuse (marijuana, PCP, cocaine)   Current smoker     # Patient had seizure in May 2020 and April 2020 ~ 6 weeks apart. In the ED on 5/29/2020 [facility] unremarkable. Basic labs with mild hyponatremia (Na 131). Had UTI s/p Rocephin once then PO Keflex on discharge. UDS was positive for marijuana and PCP. Patient stopped taking Vimpat as she did not know it was an AED. Patient diagnosed with epilepsy at age 25. Used to see Epilepsy specialist at [facility] in [place]. Patient does not drive due to h/o seizures. I referred her to Neurology. She has not heard back. # Patient was on Abilify, Topamax, Venlafaxine and many other meds that she self-discontinued. She reports she is only depressed but does not have bipolar.   Patient's mood continues to be down.   ? Has tardive dyskinesia but not taking Benztropine. Has h/o non-compliance with pills. She is taking Nortriptyline only. I referred to Psychiatry. She has not heard back. She is interested in seeing someone at [facility]. # She takes Meclizine for vertigo PRN. This helps. No falls or nausea. # Patient has intention to quit smoking. Trying to quit on her own. Has smoker's cough. Willing to try patches. Her COPD is stable on her inhalers. She is tolerating Albuterol and Symbicort. # Patient denies doing any other drugs other than weed and reports that her UDS positive because it might have been mixed with her marijuana. .. # Taking Olmesartan for HTN. BP last /80. Tolerating well. Electrolytes stable. No CP, palpitations, dizziness. # Patient has not had labs done recently. She has prediabetes and dyslipidemia. Not on meds. # Patient is not on vitamin D supplements despite low vitamin D level years ago. # Patient scheduled for L sided de Quervain's tendinitis tendon release on 7/30/2020. Has pre-admission appt on 7/23. Health maintenance:   Health Maintenance Due   Topic Date Due    Hepatitis C screen  1960    Pneumococcal 0-64 years Vaccine (1 of 1 - PPSV23) 05/08/1967    HIV screen  05/08/1976    Cervical cancer screen  05/08/1982    Breast cancer screen  05/08/2011    Shingles Vaccine (1 of 2) 05/08/2011    Colon cancer screen colonoscopy  05/08/2011    Annual Wellness Visit (AWV)  12/20/2019       Past Medical History:  Past Medical History:   Diagnosis Date    [facility] obstructive [facility] disease) [facility]     Seizures ([facility]        Past Surgical History:  Past Surgical History:   Procedure Laterality Date    BRAIN SURGERY      s/p L anterior temporal lobectomy in 1996    HYSTERECTOMY         Allergies:   Allergies   Allergen Reactions    Varenicline Other (See Comments)     Having bad dreams and chest pain    Chantix [Varenicline Tartrate] Palpitations Chest pain       Medications:  Current outpatient prescriptions:  Outpatient Medications Marked as Taking for the 7/14/20 encounter (Virtual Visit) with Zen Whalen MD   Medication Sig Dispense Refill    olmesartan (BENICAR) 40 MG tablet Take 1 tablet by mouth daily 30 tablet 2    meclizine (ANTIVERT) 25 MG tablet Take 1 tablet by mouth 3 times daily as needed for Dizziness 90 tablet 0    nicotine (NICODERM CQ) 21 MG/24HR Place 1 patch onto the skin daily 42 patch 0    budesonide-formoterol (SYMBICORT) 160-4.5 MCG/ACT AERO Inhale 2 puffs into the lungs 2 times daily 1 Inhaler 3    albuterol sulfate HFA (PROVENTIL HFA) 108 (90 Base) MCG/ACT inhaler Inhale 2 puffs into the lungs every 6 hours as needed for Wheezing 1 Inhaler 3    nortriptyline (PAMELOR) 25 MG capsule Take 50 mg by mouth nightly      eslicarbazepine (APTIOM) 400 MG TABS tablet Take 400 mg by mouth daily Takes 1200 total with the 400mg and 786FK      eslicarbazepine acetate (APTIOM) 800 MG tablet Take 800 mg by mouth daily      lamoTRIgine (LAMICTAL) 200 MG tablet Take 200 mg by mouth 2 times daily      lamoTRIgine (LAMICTAL) 25 MG tablet Take 25 mg by mouth 2 times daily         Social History:  Social History     Socioeconomic History    Marital status:      Spouse name: Not on file    Number of children: Not on file    Years of education: Not on file    Highest education level: Not on file   Occupational History    Not on file   Social Needs    Financial resource strain: Not on file    Food insecurity     Worry: Not on file     Inability: Not on file   [employer] needs     Medical: Not on file     Non-medical: Not on file   Tobacco Use    Smoking status: Current Every Day Smoker     Packs/day: 1.00    Smokeless tobacco: Never Used   Substance and Sexual Activity    Alcohol use: No     Frequency: Patient refused    Drug use: Yes     Types: Marijuana, Other-see comments, Cocaine     Comment: PCP    Sexual activity: Yes     Partners: Male   Lifestyle    Physical activity     Days per week: Not on file     Minutes per session: Not on file    Stress: Not on file   Relationships    Social connections     Talks on phone: Not on file     Gets together: Not on file     Attends Hindu service: Not on file     Active member of club or organization: Not on file     Attends meetings of clubs or organizations: Not on file     Relationship status: Not on file    Intimate partner violence     Fear of current or ex partner: Not on file     Emotionally abused: Not on file     Physically abused: Not on file     Forced sexual activity: Not on file   Other Topics Concern    Not on file   Social History Narrative    Not on file       Family History:  History reviewed. No pertinent family history.       Review of Systems:  Constitutional: no fevers, no chills, no night sweats, no weight loss, no weight gain, no fatigue   Pain assessment: no pain  Head: no headaches  Ears: no hearing loss, no tinnitus, no vertigo  Eyes: no blurry vision, no diplopia, no dryness, no itchiness  Mouth: no oral ulcers, no dry mouth, no sore throat  Nose: no nasal congestion, no epistaxis  Cardiac: no chest pain, no palpitations, no leg swelling, no orthopnea, no PND, no syncope  Pulmonary: no dyspnea, no cough, no wheezing, no hemoptysis  GI: no nausea, no vomiting, no diarrhea, no constipation, no abdominal pain, no hematochezia  : no dysuria, no frequency, no urgency, no hematuria, no frothy urine, no dyspareunia, no pelvic pain, no vaginal bleeding, no abnormal vaginal discharge  MSK: no arthralgias, no myalgias, no early morning stiffness, no Raynaud's   Neuro: no focal neurological deficits, seizures  Sleep: no snoring, no daytime somnolence   Psych: depression, no suicidal ideation      Physical Exam:  Due to this being a TeleHealth encounter, evaluation of the following organ systems is limited: Vitals/Constitutional/EENT/Resp/CV/GI//MSK/Neuro/Skin/Heme-Lymph-Imm. Labs   Reviewed with patient     Imaging   Reviewed with patient      Assessment/Plan:     1. Intractable generalized idiopathic epilepsy without status epilepticus ([facility]  No seizures since May 2020  Needs to establish with Neurology as at one point she was having breakthrough seizures (not sure if due to poor medication compliance or failure of current AEDs)  Continue Lamotrigine 045MR BID and Eslicarbazepine 3384VL mg QHS for now  - External Referral To Neurology    2. Essential hypertension  Stable  Continue Olmesartan 40mg QD     3. Vertigo  Stable  Continue Meclizine 25mg TID PRN     4. Tobacco use  Smoking cessation strongly encouraged  Start nicotine patches   No Chantix due to seizures    5. Panlobular emphysema (HCC)  Stable  Continue Albuterol PRN  Continue Symbicort BID   Interested in establishing with Pulm so will send referral   - Viktor Youssef, CNP, Pulmonology, [facility]    6. Moderate episode of recurrent major depressive disorder ([facility]  Stable  However she will try establishing with [facility]  Discussed her chronic med Nortriptyline which she has been on before establishing with me, as I am concerned for decreased seizure thresholds. She wants to continue it for now until she establishes with Psych   Does not want therapy at the moment       Care discussed with patient and questions answered. Patient verbalizes understanding and agrees with plan. Discussed with patient the importance of continuity of care. I encouraged patient to schedule next appointment within 4 weeks with me. I reviewed and reconciled the medications this visit. I reviewed and updated the past medical, surgical, social, and family history during this visit.        I affirm this is a Patient Initiated Episode with an Established Patient who has not had a related appointment within my department in the past 7 days or scheduled within the next 24 hours. Total Time: minutes: 11-20 minutes    Note: not billable if this call serves to triage the patient into an appointment for the relevant concern      Pursuant to the emergency declaration under the [address] [place] [address], On license of [facility] waiver authority and the [employer] Act, this Virtual Visit was conducted, with patient's consent, to reduce the patient's risk of exposure to COVID-19 and provide continuity of care for an established patient. Services were provided through a telephone synchronous discussion virtually to substitute for in-person clinic visit.       Favio Pires MD  Internal Medicine  7/14/2020   9:55 AM

## 2020-07-16 ENCOUNTER — TELEPHONE (OUTPATIENT)
Dept: INTERNAL MEDICINE CLINIC | Age: 60
End: 2020-07-16

## 2020-07-16 NOTE — TELEPHONE ENCOUNTER
Patient called in requesting information regarding medication, patient has surgery on the 30th and would like to know if she needs to stop any of her medications.  Please advise

## 2020-07-16 NOTE — TELEPHONE ENCOUNTER
Patient states she has an appointment with Juancho Toscano on 8/4/2020 in [facility], but the patient states it is difficult for her and would like to know if the appointment can be changed to the [place] office.

## 2020-07-16 NOTE — TELEPHONE ENCOUNTER
Please schedule patient for pre-op appointment with me next week. During recent appointment she didnt mention that she needed clearance from me, and I didnt receive any paperwork for this.

## 2020-07-20 ENCOUNTER — ANESTHESIA EVENT (OUTPATIENT)
Dept: OPERATING ROOM | Age: 60
End: 2020-07-20
Payer: MEDICARE

## 2020-07-21 ASSESSMENT — COPD QUESTIONNAIRES: CAT_SEVERITY: MODERATE

## 2020-07-21 NOTE — ANESTHESIA PRE PROCEDURE
Department of Anesthesiology  Preprocedure Note       Name:  Hayley Knox   Age:  62 y.o.  :  1960                                          MRN:  [id number]         Date:  2020      Surgeon: Deanna Salinas):  Chito Painting DO    Procedure: Procedure(s):  LEFT WRIST TENDON RELEASE    Medications prior to admission:   Prior to Admission medications    Medication Sig Start Date End Date Taking?  Authorizing Provider   olmesartan (BENICAR) 40 MG tablet Take 1 tablet by mouth daily 20   Lisa Salazar MD   meclizine (ANTIVERT) 25 MG tablet Take 1 tablet by mouth 3 times daily as needed for Dizziness 20   Lisa Salazar MD   nicotine (NICODERM CQ) 21 MG/24HR Place 1 patch onto the skin daily 20  Lisa Salazar MD   budesonide-formoterol [facility]) 160-4.5 MCG/ACT AERO Inhale 2 puffs into the lungs 2 times daily 20   Lisa Salazar MD   albuterol sulfate HFA (PROVENTIL HFA) 108 (90 Base) MCG/ACT inhaler Inhale 2 puffs into the lungs every 6 hours as needed for Wheezing 20  Lisa Salazar MD   nortriptyline (PAMELOR) 25 MG capsule Take 50 mg by mouth nightly    Historical Provider, MD   alendronate (FOSAMAX) 70 MG tablet Take 70 mg by mouth once a week 10/14/19   Historical Provider, MD   eslicarbazepine (APTIOM) 400 MG TABS tablet Take 400 mg by mouth daily Takes 1200 total with the 400mg and 800mg 19   Historical Provider, MD   eslicarbazepine acetate (APTIOM) 800 MG tablet Take 800 mg by mouth daily 19   Historical Provider, MD   lamoTRIgine (LAMICTAL) 200 MG tablet Take 200 mg by mouth 2 times daily 19  Historical Provider, MD   lamoTRIgine (LAMICTAL) 25 MG tablet Take 25 mg by mouth 2 times daily 19  Historical Provider, MD       Current medications:    Current Outpatient Medications   Medication Sig Dispense Refill    olmesartan (BENICAR) 40 MG tablet Take 1 tablet by mouth daily 30 tablet 2    meclizine (ANTIVERT) 25 MG tablet Take 1 tablet by mouth 3 times daily as needed for Dizziness 90 tablet 0    nicotine (NICODERM CQ) 21 MG/24HR Place 1 patch onto the skin daily 42 patch 0    budesonide-formoterol (SYMBICORT) 160-4.5 MCG/ACT AERO Inhale 2 puffs into the lungs 2 times daily 1 Inhaler 3    albuterol sulfate HFA (PROVENTIL HFA) 108 (90 Base) MCG/ACT inhaler Inhale 2 puffs into the lungs every 6 hours as needed for Wheezing 1 Inhaler 3    nortriptyline (PAMELOR) 25 MG capsule Take 50 mg by mouth nightly      alendronate (FOSAMAX) 70 MG tablet Take 70 mg by mouth once a week      eslicarbazepine (APTIOM) 400 MG TABS tablet Take 400 mg by mouth daily Takes 1200 total with the 400mg and 214GL      eslicarbazepine acetate (APTIOM) 800 MG tablet Take 800 mg by mouth daily      lamoTRIgine (LAMICTAL) 200 MG tablet Take 200 mg by mouth 2 times daily      lamoTRIgine (LAMICTAL) 25 MG tablet Take 25 mg by mouth 2 times daily       No current facility-administered medications for this encounter. Allergies:     Allergies   Allergen Reactions    Varenicline Other (See Comments)     Having bad dreams and chest pain    Chantix [Varenicline Tartrate] Palpitations     Chest pain       Problem List:    Patient Active Problem List   Diagnosis Code    Intractable generalized idiopathic epilepsy without status epilepticus ([facility] G40.319    Prediabetes R73.03    Essential hypertension I10    [facility]lob[facility] emph[facility]sema ([facility] J43.1    Tobacco use Z72.0    Vitamin D deficiency E55.9    Moderate episode of recurrent major depressive disorder ([facility] F33.1    Dyslipidemia E78.5    Vertigo R42       Past Medical History:        Diagnosis Date    COPD (chronic obstructive [facility] disease) [facility]     Seizures ([facility]        Past Surgical History:        Procedure Laterality Date    BRAIN SURGERY      s/p L anterior temporal lobectomy in 1996    HYSTERECTOMY         Social History:    Social History     Tobacco Use    Smoking status: Current Every Day Smoker     Packs/day: 1.50     Years: 40.00     Pack years: 60.00     Types: Cigarettes    Smokeless tobacco: Never Used   Substance Use Topics    Alcohol use: No     Frequency: Patient refused     Comment: per pt on 7/23/2020\"quit drinking age 40- use to drink on weekends\"                                Ready to quit: Not Answered  Counseling given: Not Answered      Vital Signs (Current): There were no vitals filed for this visit.                                            BP Readings from Last 3 Encounters:   06/16/20 130/80   05/29/20 (!) 173/98   12/20/19 (!) 176/95       NPO Status:                                                                                 BMI:   Wt Readings from Last 3 Encounters:   07/01/20 165 lb (74.8 kg)   06/16/20 167 lb (75.8 kg)   05/29/20 165 lb (74.8 kg)     There is no height or weight on file to calculate BMI.    CBC:   Lab Results   Component Value Date    WBC 6.5 07/07/2020    RBC 4.41 07/07/2020    HGB 13.5 07/07/2020    HCT 39.1 07/07/2020    MCV 88.6 07/07/2020    RDW 14.2 07/07/2020     07/07/2020       CMP:   Lab Results   Component Value Date     07/23/2020    K 4.8 07/23/2020    CL 89 07/23/2020    CO2 25 07/23/2020    BUN 11 07/07/2020    CREATININE 0.8 07/07/2020    GFRAA >60 07/07/2020    AGRATIO 1.6 07/07/2020    LABGLOM >60 07/07/2020    GLUCOSE 97 07/07/2020    PROT 7.4 07/07/2020    CALCIUM 9.8 07/07/2020    BILITOT 0.3 07/07/2020    ALKPHOS 128 07/07/2020    AST 16 07/07/2020    ALT 13 07/07/2020         COVID-19 Screening (If Applicable): No results found for: COVID19      Anesthesia Evaluation  Patient summary reviewed and Nursing notes reviewed  Airway:         Dental:          Pulmonary:   (+) pneumonia:  COPD: moderate,  shortness of breath:  current smoker (1.5 ppd cigarettes and daily marijuana)                          ROS comment:   PFT's 2/2020  Pre  FEV1,  1.81L  Post FEV1, 1.55L,  65% of predicted Lung diffusion capacity 2020  There is no significant bronchodilator response. The TLC is normal. The RV is elevated and RV/TLC is borderline/elevated  indicating air trapping. The diffusing capacity is mildly reduced (79%). Smoker cigarettes and marijuana  Counseled on smoking cessation. PAT Airway. Limited exam. COVID 19 precautions. Patient wearing mask  Head/Neck movement: full  History of difficult intubation:  None  Teeth: Upper denture  Able to lie flat     LUNGS:  Diminished matty LL, no crackles or wheezing      COVID 19 screening  Denies recent fever or known COVID 19 exposure. Instructed to quarantine until surgery and report any new respiratory or fever symptoms to surgeon. Aware COVID testing must be completed before DOS. COVID swab:  2020       Cardiovascular:    (+) hypertension:,       ECG reviewed                     ROS comment:  CARDIOVASCULAR:  Normal apical impulse, regular rate and rhythm, normal S1 and S2, no S3 or S4, and no murmur noted    CP or SOB: NO   Exercise: NO       Stress test 2019 evaluation for CP  EF 82%  1. SPECT Perfusion Study: Normal.   2. There is no scintigraphic evidence for inducible ischemia. 3. No evidence of scarred myocardium. 4. Left ventricle is normal in size. The left ventricle systolic function is hyperdynamic. 5. Right ventricle is normal in size. The right ventricle systolic function is normal.   6. This is a low risk scan. Echo 2019  EF 54%  LEFT VENTRICLE  The left ventricle is small. Left ventricular systolic function is normal.  Normal left ventricular diastolic function. Mitral annular lateral E/e': 16.2. Mitral annular septal E/e': 13.6. Wall Motion:  All scored segments are normal.    EK2020  Normal sinus rhythm   Septal infarct , age undetermined    Denies hx MI       Neuro/Psych:   (+) seizures (Epilepsy, onset . type: GM,  last:  2020. Medications: aptiom 1200 mg q daily, lamictal 225 mg bid. Reports smokes marijuana daily for seizure control  ):, neuromuscular disease (Hx radiculopathy, Lumbosacral Region. Left wrist DeQuervains dz, wearing brace):, psychiatric history ( Hx cocaine use, UDS + marijuana, 7/23/2020 . Reports quit drinking age 40. ):depression/anxiety              ROS comment: Seizures temporal lobe seizures- left anterior temporal lobectomy underwent in 1996      Dizziness on antivert  GI/Hepatic/Renal:   (+) renal disease (hx UTI, last 5/2020):,           Endo/Other:    (+) electrolyte abnormalities (hx hyponatremia prob 2/2 seizure meds. Baseline 120-133MMOL/L,   PAT NA+ 124, recheck ordered DOS), . Pt had PAT visit.         ROS comment: S/p hysterectomy Abdominal:           Vascular:                              Anesthesia Plan        STEWART Bell - CNP   7/21/2020

## 2020-07-22 ENCOUNTER — TELEPHONE (OUTPATIENT)
Dept: INTERNAL MEDICINE CLINIC | Age: 60
End: 2020-07-22

## 2020-07-22 NOTE — PROGRESS NOTES
Attempted to call pt to remind of pretesting for tomorrow- voice mailbox not set up and office did not have another # for her

## 2020-07-23 ENCOUNTER — HOSPITAL ENCOUNTER (OUTPATIENT)
Dept: PREADMISSION TESTING | Age: 60
Discharge: HOME | End: 2020-07-27
Payer: MEDICARE

## 2020-07-23 VITALS
OXYGEN SATURATION: 100 % | TEMPERATURE: 97.3 F | DIASTOLIC BLOOD PRESSURE: 104 MMHG | BODY MASS INDEX: 30.91 KG/M2 | HEIGHT: 62 IN | SYSTOLIC BLOOD PRESSURE: 178 MMHG | RESPIRATION RATE: 16 BRPM | HEART RATE: 78 BPM | WEIGHT: 168 LBS

## 2020-07-23 LAB
AMPHETAMINES: NEGATIVE
ANION GAP SERPL CALCULATED.3IONS-SCNC: 10 MMOL/L (ref 4–16)
BARBITURATE SCREEN URINE: NEGATIVE
BENZODIAZEPINE SCREEN, URINE: NEGATIVE
CANNABINOID SCREEN URINE: ABNORMAL
CHLORIDE BLD-SCNC: 89 MMOL/L (ref 99–110)
CO2: 25 MMOL/L (ref 21–32)
COCAINE METABOLITE: NEGATIVE
OPIATES, URINE: NEGATIVE
OXYCODONE: NEGATIVE
PHENCYCLIDINE, URINE: NEGATIVE
POTASSIUM SERPL-SCNC: 4.8 MMOL/L (ref 3.5–5.1)
SODIUM BLD-SCNC: 124 MMOL/L (ref 135–145)

## 2020-07-23 PROCEDURE — U0002 COVID-19 LAB TEST NON-CDC: HCPCS

## 2020-07-23 PROCEDURE — 80307 DRUG TEST PRSMV CHEM ANLYZR: CPT

## 2020-07-23 PROCEDURE — 36415 COLL VENOUS BLD VENIPUNCTURE: CPT

## 2020-07-23 PROCEDURE — 80051 ELECTROLYTE PANEL: CPT

## 2020-07-24 RX ORDER — CEPHALEXIN 250 MG/1
250 CAPSULE ORAL 4 TIMES DAILY
Qty: 4 CAPSULE | Refills: 0 | OUTPATIENT
Start: 2020-07-24 | End: 2020-07-24 | Stop reason: SDUPTHER

## 2020-07-24 RX ORDER — SODIUM CHLORIDE, SODIUM LACTATE, POTASSIUM CHLORIDE, CALCIUM CHLORIDE 600; 310; 30; 20 MG/100ML; MG/100ML; MG/100ML; MG/100ML
INJECTION, SOLUTION INTRAVENOUS CONTINUOUS
Status: CANCELLED | OUTPATIENT
Start: 2020-07-24

## 2020-07-24 RX ORDER — HYDROCODONE BITARTRATE AND ACETAMINOPHEN 5; 325 MG/1; MG/1
1 TABLET ORAL EVERY 4 HOURS PRN
Qty: 5 TABLET | Refills: 0 | Status: SHIPPED | OUTPATIENT
Start: 2020-07-24 | End: 2020-07-27

## 2020-07-24 RX ORDER — HYDROCODONE BITARTRATE AND ACETAMINOPHEN 5; 325 MG/1; MG/1
1 TABLET ORAL EVERY 4 HOURS PRN
Qty: 5 TABLET | Refills: 0 | OUTPATIENT
Start: 2020-07-24 | End: 2020-07-24 | Stop reason: SDUPTHER

## 2020-07-24 RX ORDER — CEPHALEXIN 250 MG/1
250 CAPSULE ORAL 4 TIMES DAILY
Qty: 4 CAPSULE | Refills: 0 | Status: SHIPPED | OUTPATIENT
Start: 2020-07-24 | End: 2020-07-25

## 2020-07-24 ASSESSMENT — LIFESTYLE VARIABLES: SMOKING_STATUS: 1

## 2020-07-24 ASSESSMENT — ENCOUNTER SYMPTOMS: SHORTNESS OF BREATH: 1

## 2020-07-28 LAB
SARS-COV-2: NOT DETECTED
SOURCE: NORMAL

## 2020-07-29 NOTE — PROGRESS NOTES
Spoke with pt. Via telephone. Pt. will arrive at the ER entrance at 0730 on 7/30/2020. Pt.informed that they may have one visitor come with them. The visitor must be free of covid symptoms. Both of them must wear a mask upon entering the hospital.  If they do not have a mask, one will be given to them at the . The masks must be worn the whole time they are in the building. The visitor must stay in the assigned room in Same Day Surgery. The visitor may not go to the vending machines, cafeteria, or walk around in the hospital. Pt. Will be NPO after MN tonight, including no gum, candy, or nicotine products. Pt. will take Lamectol  with a tiny sip of water the morning of the procedure. If the patient uses inhalers or cpap, they will bring them with them to the hospital.  Pt. will shower with soap and water and will not use any lotions, creams, ointments on their skin. Pt. will wear no jewelry or metal. Covid-19 test was completed on 7/23/2020  and results are negative. Pt. Has been self-quarantining since their Covid-19 testing. Pt. Has had no cough, sore throat, fever, or any other unusual s/s that the physician should be made aware of before surgery. Pt. Had no further questions and/or concerns at this time. [facility] phone number was given should any further questions arise. [phone].

## 2020-07-30 ENCOUNTER — ANESTHESIA (OUTPATIENT)
Dept: OPERATING ROOM | Age: 60
End: 2020-07-30
Payer: MEDICARE

## 2020-07-30 ENCOUNTER — HOSPITAL ENCOUNTER (OUTPATIENT)
Age: 60
Setting detail: OUTPATIENT SURGERY
Discharge: HOME | End: 2020-07-30
Attending: ORTHOPAEDIC SURGERY | Admitting: ORTHOPAEDIC SURGERY
Payer: MEDICARE

## 2020-07-30 VITALS
TEMPERATURE: 97.1 F | HEART RATE: 72 BPM | BODY MASS INDEX: 28.71 KG/M2 | DIASTOLIC BLOOD PRESSURE: 95 MMHG | HEIGHT: 62 IN | SYSTOLIC BLOOD PRESSURE: 166 MMHG | WEIGHT: 156 LBS | RESPIRATION RATE: 14 BRPM | OXYGEN SATURATION: 100 %

## 2020-07-30 VITALS
RESPIRATION RATE: 22 BRPM | SYSTOLIC BLOOD PRESSURE: 117 MMHG | OXYGEN SATURATION: 100 % | DIASTOLIC BLOOD PRESSURE: 79 MMHG

## 2020-07-30 LAB
AMPHETAMINES: NEGATIVE
ANION GAP SERPL CALCULATED.3IONS-SCNC: 10 MMOL/L (ref 4–16)
BARBITURATE SCREEN URINE: NEGATIVE
BENZODIAZEPINE SCREEN, URINE: NEGATIVE
CANNABINOID SCREEN URINE: NEGATIVE
CHLORIDE BLD-SCNC: 94 MMOL/L (ref 99–110)
CO2: 24 MMOL/L (ref 21–32)
COCAINE METABOLITE: NEGATIVE
OPIATES, URINE: NEGATIVE
OXYCODONE: NEGATIVE
PHENCYCLIDINE, URINE: NEGATIVE
POTASSIUM SERPL-SCNC: 4.3 MMOL/L (ref 3.5–5.1)
SODIUM BLD-SCNC: 128 MMOL/L (ref 135–145)

## 2020-07-30 PROCEDURE — 2580000003 HC RX 258: Performed by: ORTHOPAEDIC SURGERY

## 2020-07-30 PROCEDURE — 7100000010 HC PHASE II RECOVERY - FIRST 15 MIN: Performed by: ORTHOPAEDIC SURGERY

## 2020-07-30 PROCEDURE — 80051 ELECTROLYTE PANEL: CPT

## 2020-07-30 PROCEDURE — 7100000011 HC PHASE II RECOVERY - ADDTL 15 MIN: Performed by: ORTHOPAEDIC SURGERY

## 2020-07-30 PROCEDURE — 6360000002 HC RX W HCPCS: Performed by: ORTHOPAEDIC SURGERY

## 2020-07-30 PROCEDURE — 80307 DRUG TEST PRSMV CHEM ANLYZR: CPT

## 2020-07-30 PROCEDURE — 6360000002 HC RX W HCPCS: Performed by: NURSE ANESTHETIST, CERTIFIED REGISTERED

## 2020-07-30 PROCEDURE — 3700000001 HC ADD 15 MINUTES (ANESTHESIA): Performed by: ORTHOPAEDIC SURGERY

## 2020-07-30 PROCEDURE — 2500000003 HC RX 250 WO HCPCS: Performed by: ORTHOPAEDIC SURGERY

## 2020-07-30 PROCEDURE — 2709999900 HC NON-CHARGEABLE SUPPLY: Performed by: ORTHOPAEDIC SURGERY

## 2020-07-30 PROCEDURE — 3600000013 HC SURGERY LEVEL 3 ADDTL 15MIN: Performed by: ORTHOPAEDIC SURGERY

## 2020-07-30 PROCEDURE — 3600000003 HC SURGERY LEVEL 3 BASE: Performed by: ORTHOPAEDIC SURGERY

## 2020-07-30 PROCEDURE — 6370000000 HC RX 637 (ALT 250 FOR IP): Performed by: ORTHOPAEDIC SURGERY

## 2020-07-30 PROCEDURE — 25000 INCISION OF TENDON SHEATH: CPT | Performed by: ORTHOPAEDIC SURGERY

## 2020-07-30 PROCEDURE — 25118 EXCISE WRIST TENDON SHEATH: CPT | Performed by: ORTHOPAEDIC SURGERY

## 2020-07-30 PROCEDURE — 3700000000 HC ANESTHESIA ATTENDED CARE: Performed by: ORTHOPAEDIC SURGERY

## 2020-07-30 RX ORDER — HYDROCODONE BITARTRATE AND ACETAMINOPHEN 5; 325 MG/1; MG/1
1 TABLET ORAL ONCE
Status: COMPLETED | OUTPATIENT
Start: 2020-07-30 | End: 2020-07-30

## 2020-07-30 RX ORDER — CEFAZOLIN SODIUM 2 G/50ML
2 SOLUTION INTRAVENOUS
Status: COMPLETED | OUTPATIENT
Start: 2020-07-30 | End: 2020-07-30

## 2020-07-30 RX ORDER — OXYCODONE HYDROCHLORIDE 5 MG/1
5 TABLET ORAL EVERY 4 HOURS PRN
Status: DISCONTINUED | OUTPATIENT
Start: 2020-07-30 | End: 2020-07-30 | Stop reason: HOSPADM

## 2020-07-30 RX ORDER — PROPOFOL 10 MG/ML
INJECTION, EMULSION INTRAVENOUS PRN
Status: DISCONTINUED | OUTPATIENT
Start: 2020-07-30 | End: 2020-07-30 | Stop reason: SDUPTHER

## 2020-07-30 RX ORDER — SODIUM CHLORIDE 0.9 % (FLUSH) 0.9 %
10 SYRINGE (ML) INJECTION EVERY 12 HOURS SCHEDULED
Status: DISCONTINUED | OUTPATIENT
Start: 2020-07-30 | End: 2020-07-30 | Stop reason: HOSPADM

## 2020-07-30 RX ORDER — OXYCODONE HYDROCHLORIDE 5 MG/1
10 TABLET ORAL EVERY 4 HOURS PRN
Status: DISCONTINUED | OUTPATIENT
Start: 2020-07-30 | End: 2020-07-30 | Stop reason: HOSPADM

## 2020-07-30 RX ORDER — SODIUM CHLORIDE 9 MG/ML
INJECTION, SOLUTION INTRAVENOUS CONTINUOUS
Status: DISCONTINUED | OUTPATIENT
Start: 2020-07-30 | End: 2020-07-30 | Stop reason: HOSPADM

## 2020-07-30 RX ORDER — SODIUM CHLORIDE 0.9 % (FLUSH) 0.9 %
10 SYRINGE (ML) INJECTION PRN
Status: DISCONTINUED | OUTPATIENT
Start: 2020-07-30 | End: 2020-07-30 | Stop reason: HOSPADM

## 2020-07-30 RX ORDER — LIDOCAINE HYDROCHLORIDE 20 MG/ML
INJECTION, SOLUTION INTRAVENOUS PRN
Status: DISCONTINUED | OUTPATIENT
Start: 2020-07-30 | End: 2020-07-30 | Stop reason: SDUPTHER

## 2020-07-30 RX ORDER — LIDOCAINE HYDROCHLORIDE 10 MG/ML
INJECTION, SOLUTION EPIDURAL; INFILTRATION; INTRACAUDAL; PERINEURAL
Status: COMPLETED | OUTPATIENT
Start: 2020-07-30 | End: 2020-07-30

## 2020-07-30 RX ORDER — MIDAZOLAM HYDROCHLORIDE 1 MG/ML
INJECTION INTRAMUSCULAR; INTRAVENOUS PRN
Status: DISCONTINUED | OUTPATIENT
Start: 2020-07-30 | End: 2020-07-30 | Stop reason: SDUPTHER

## 2020-07-30 RX ORDER — SODIUM CHLORIDE, SODIUM LACTATE, POTASSIUM CHLORIDE, CALCIUM CHLORIDE 600; 310; 30; 20 MG/100ML; MG/100ML; MG/100ML; MG/100ML
INJECTION, SOLUTION INTRAVENOUS CONTINUOUS
Status: DISCONTINUED | OUTPATIENT
Start: 2020-07-30 | End: 2020-07-30 | Stop reason: HOSPADM

## 2020-07-30 RX ORDER — ACETAMINOPHEN 500 MG
1000 TABLET ORAL ONCE
Status: COMPLETED | OUTPATIENT
Start: 2020-07-30 | End: 2020-07-30

## 2020-07-30 RX ADMIN — ACETAMINOPHEN 1000 MG: 500 TABLET, FILM COATED ORAL at 08:21

## 2020-07-30 RX ADMIN — SODIUM CHLORIDE: 9 INJECTION, SOLUTION INTRAVENOUS at 08:25

## 2020-07-30 RX ADMIN — PROPOFOL 50 MG: 10 INJECTION, EMULSION INTRAVENOUS at 11:10

## 2020-07-30 RX ADMIN — PROPOFOL 50 MG: 10 INJECTION, EMULSION INTRAVENOUS at 11:06

## 2020-07-30 RX ADMIN — MIDAZOLAM 2 MG: 1 INJECTION INTRAMUSCULAR; INTRAVENOUS at 11:03

## 2020-07-30 RX ADMIN — HYDROCODONE BITARTRATE AND ACETAMINOPHEN 1 TABLET: 5; 325 TABLET ORAL at 12:06

## 2020-07-30 RX ADMIN — LIDOCAINE HYDROCHLORIDE 100 MG: 20 INJECTION, SOLUTION INTRAVENOUS at 11:06

## 2020-07-30 RX ADMIN — PROPOFOL 50 MG: 10 INJECTION, EMULSION INTRAVENOUS at 11:16

## 2020-07-30 RX ADMIN — Medication 10 ML: at 08:25

## 2020-07-30 RX ADMIN — SODIUM CHLORIDE: 9 INJECTION, SOLUTION INTRAVENOUS at 11:03

## 2020-07-30 RX ADMIN — CEFAZOLIN SODIUM 2 G: 2 SOLUTION INTRAVENOUS at 11:03

## 2020-07-30 RX ADMIN — PROPOFOL 50 MG: 10 INJECTION, EMULSION INTRAVENOUS at 11:13

## 2020-07-30 ASSESSMENT — PULMONARY FUNCTION TESTS
PIF_VALUE: 1
PIF_VALUE: 0
PIF_VALUE: 1
PIF_VALUE: 0
PIF_VALUE: 1

## 2020-07-30 ASSESSMENT — LIFESTYLE VARIABLES: SMOKING_STATUS: 1

## 2020-07-30 ASSESSMENT — PAIN SCALES - GENERAL
PAINLEVEL_OUTOF10: 1
PAINLEVEL_OUTOF10: 10
PAINLEVEL_OUTOF10: 0

## 2020-07-30 ASSESSMENT — COPD QUESTIONNAIRES: CAT_SEVERITY: MILD

## 2020-07-30 ASSESSMENT — ENCOUNTER SYMPTOMS: SHORTNESS OF BREATH: 1

## 2020-07-30 NOTE — H&P
alendronate (FOSAMAX) 70 MG tablet Take 70 mg by mouth once a week      eslicarbazepine (APTIOM) 400 MG TABS tablet Take 400 mg by mouth nightly Takes 1200 total with the 400mg and 814QS      eslicarbazepine acetate (APTIOM) 800 MG tablet Take 800 mg by mouth nightly       lamoTRIgine (LAMICTAL) 200 MG tablet Take 200 mg by mouth 2 times daily      lamoTRIgine (LAMICTAL) 25 MG tablet Take 25 mg by mouth 2 times daily       Allergies   Allergen Reactions    Varenicline Other (See Comments)     Having bad dreams and chest pain( Chantix)    Chantix [Varenicline Tartrate] Palpitations     Chest pain     Past Surgical History:   Procedure Laterality Date    BRAIN SURGERY      s/p L anterior temporal lobectomy in 1996\"did surgery to try an treat the seizures\"   [address]    \"not sure if they took the ovaries\"   [facility]     . [facility]  Family History   Problem Relation Age of Onset    Heart Disease Father         cabg          Objective:   Physical Exam  Constitutional:       Appearance: She is well-developed. HENT:      Head: Normocephalic and atraumatic. Eyes:      Pupils: Pupils are equal, round, and reactive to light. Neck:      Musculoskeletal: Normal range of motion. Pulmonary:      Effort: Pulmonary effort is normal.   Musculoskeletal: Normal range of motion. General: Tenderness present. No deformity. Right elbow: Normal.     Left elbow: Normal.      Right wrist: She exhibits normal range of motion, no tenderness, no bony tenderness, no swelling, no effusion, no crepitus, no deformity and no laceration. Left wrist: She exhibits tenderness and bony tenderness. She exhibits normal range of motion, no swelling, no effusion, no crepitus, no deformity and no laceration. Skin:     General: Skin is warm and dry. Capillary Refill: Capillary refill takes less than 2 seconds. Coloration: Skin is not pale. Findings: No erythema or rash.    Neurological: Mental Status: She is alert and oriented to person, place, and time. Left wrist-Skin intact with no erythema, ecchymosis or lacerations present. Moderate tenderness over the radial styloid  Positive Finkelstein's test   strength 4/5.     Xr Wrist Left (min 3 Views)     Result Date: 7/1/2020  XRAY X-ray 4 views of the left wrist obtained and reviewed by me today in the office demonstrates age appropriate bone density throughout with normal joint spaces and normal overall alignment, no subluxation or dislocation noted, no acute osseous abnormalities, no bony prominences, no loose bodies. Impression: Normal left wrist with no acute process.      BP (!) 157/94   Pulse 74   Temp 97.6 °F (36.4 °C) (Temporal)   Resp 16   Ht 5' 2\" (1.575 m)   Wt 156 lb (70.8 kg)   SpO2 100%   BMI 28.53 kg/m²        Assessment:      Left de Quervain's tendinitis                Plan:      I discussed with her today her x-ray findings which are normal.  I explained to her that she does have tendinitis in the left wrist.  I explained to her that we could either continue with a cortisone injection and bracing or could proceed with surgical treatment. She does not want to proceed with injection and would like to have definitive surgical treatment. I discussed with her today performing left first dorsal extensor compartment tendon release for de Quervain's tendinitis. I explained risks, benefits, possible complications of the procedure and answered all questions for the patient. I explained postoperative rehabilitation protocol and expectations with the patient today. The patient understands and consents to the procedure. Patient will follow up with their primary care physician prior to surgical treatment for preoperative clearance. We will schedule surgery at soonest convenience. Continue weight-bearing as tolerated. Continue range of motion exercises as instructed. Ice and elevate as needed.   Tylenol or Motrin for pain.  Follow up in 2 weeks postop. She would like to have her surgery at [place]. The patient was counseled at length about the risks of richard Covid-19 during their perioperative period and any recovery window from their procedure. The patient was made aware that richard Covid-19  may worsen their prognosis for recovering from their procedure  and lend to a higher morbidity and/or mortality risk. All material risks, benefits, and reasonable alternatives including postponing the procedure were discussed. The patient does wish to proceed with the procedure at this time.       Pt seen and examined, No change in H+P.       ROSAURA OLIVEIRA DO

## 2020-07-30 NOTE — ANESTHESIA PRE PROCEDURE
Department of Anesthesiology  Preprocedure Note       Name:  Richard Pérez   Age:  62 y.o.  :  1960                                          MRN:  [id number]         Date:  2020      Surgeon: Jeannine Ga):  Kayla Mao DO    Procedure: Procedure(s):  LEFT WRIST TENDON RELEASE    Medications prior to admission:   Prior to Admission medications    Medication Sig Start Date End Date Taking?  Authorizing Provider   olmesartan (BENICAR) 40 MG tablet Take 1 tablet by mouth daily 20  Yes Mag Luevano MD   meclizine (ANTIVERT) 25 MG tablet Take 1 tablet by mouth 3 times daily as needed for Dizziness 20  Yes Mag Luevano MD   budesonide-formoterol [facility]) 160-4.5 MCG/ACT AERO Inhale 2 puffs into the lungs 2 times daily 20  Yes Mag Luevano MD   nortriptyline (PAMELOR) 25 MG capsule Take 50 mg by mouth nightly   Yes Historical Provider, MD   alendronate (FOSAMAX) 70 MG tablet Take 70 mg by mouth once a week 10/14/19  Yes Historical Provider, MD   eslicarbazepine (APTIOM) 400 MG TABS tablet Take 400 mg by mouth nightly Takes 1200 total with the 400mg and 800mg 19  Yes Historical Provider, MD   eslicarbazepine acetate (APTIOM) 800 MG tablet Take 800 mg by mouth nightly  19  Yes Historical Provider, MD   nicotine (NICODERM CQ) 21 MG/24HR Place 1 patch onto the skin daily 20  Mag Luevano MD   albuterol sulfate HFA (PROVENTIL HFA) 108 (90 Base) MCG/ACT inhaler Inhale 2 puffs into the lungs every 6 hours as needed for Wheezing 20  Mag Luevano MD   lamoTRIgine (LAMICTAL) 200 MG tablet Take 200 mg by mouth 2 times daily 19  Historical Provider, MD   lamoTRIgine (LAMICTAL) 25 MG tablet Take 25 mg by mouth 2 times daily 19  Historical Provider, MD       Current medications:    Current Facility-Administered Medications   Medication Dose Route Frequency Provider Last Rate Last Dose    sodium chloride flush 0.9 % injection 10 mL  10 mL Intravenous 2 times per day Serena Dress, DO   10 mL at 07/30/20 0825    sodium chloride flush 0.9 % injection 10 mL  10 mL Intravenous PRN Serena Dress, DO        ceFAZolin (ANCEF) 2 g in dextrose 3 % 50 mL IVPB (duplex)  2 g Intravenous On Call to Angel Jasen 10, DO        0.9 % sodium chloride infusion   Intravenous Continuous Serena Dress,  mL/hr at 07/30/20 0825         Allergies:     Allergies   Allergen Reactions    Varenicline Other (See Comments)     Having bad dreams and chest pain( Chantix)    Chantix [Varenicline Tartrate] Palpitations     Chest pain       Problem List:    Patient Active Problem List   Diagnosis Code    Intractable generalized idiopathic epilepsy without status epilepticus ([facility] [address] G40.319    Prediabetes R73.03    Essential hypertension I10    Panlobular emphysema ([facility] [address] J43.1    Tobacco use Z72.0    Vitamin D deficiency E55.9    Moderate episode of recurrent major depressive disorder ([facility] [address] [facility] F33.1    Dyslipidemia E78.5    Vertigo R42       Past Medical History:        Diagnosis Date    COPD (chronic obstructive pulmonary disease) ([facility] [address] [facility]     Full dentures     upper plate only    H/O dizziness     \"take Meclazine at least twice a day for this\"    History of drug abuse ([facility]     History of UTI     \"last one May 2020\"    Hypertension     \"the top # use to be over 200 \"- on medication since 2018\"    Seizures ([facility] [address]     hx of epilepsy- last seizure week 7/13/2020- hx grand mal seizures    Wears glasses        Past Surgical History:        Procedure Laterality Date    BRAIN SURGERY      s/p L anterior temporal lobectomy in 1996\"did surgery to try an treat the seizures\"   [address]    \"not sure if they took the ovaries\"    TUBAL LIGATION  1995       Social History:    Social History     Tobacco Use    Smoking status: Current Every Day Smoker     Packs/day: 1.50     Years: 40.00     Pack years: 60.00     Types: Cigarettes    Smokeless tobacco: Never Used   Substance Use Topics    Alcohol use: No     Frequency: Patient refused     Comment: per pt on 7/23/2020\"quit drinking age 40- use to drink on weekends\"                                Ready to quit: Not Answered  Counseling given: Not Answered      Vital Signs (Current):   Vitals:    07/30/20 0738 07/30/20 0755   BP:  (!) 157/94   Pulse:  74   Resp:  16   Temp:  36.4 °C (97.6 °F)   TempSrc:  Temporal   SpO2:  100%   Weight: 156 lb (70.8 kg)    Height: 5' 2\" (1.575 m)                                               BP Readings from Last 3 Encounters:   07/30/20 (!) 157/94   07/23/20 (!) 178/104   06/16/20 130/80       NPO Status: Time of last liquid consumption: 2059                        Time of last solid consumption: 1900                        Date of last liquid consumption: 07/29/20                        Date of last solid food consumption: 07/29/20    BMI:   Wt Readings from Last 3 Encounters:   07/30/20 156 lb (70.8 kg)   07/23/20 168 lb (76.2 kg)   07/01/20 165 lb (74.8 kg)     Body mass index is 28.53 kg/m².     CBC:   Lab Results   Component Value Date    WBC 6.5 07/07/2020    RBC 4.41 07/07/2020    HGB 13.5 07/07/2020    HCT 39.1 07/07/2020    MCV 88.6 07/07/2020    RDW 14.2 07/07/2020     07/07/2020       CMP:   Lab Results   Component Value Date     07/30/2020    K 4.3 07/30/2020    CL 94 07/30/2020    CO2 24 07/30/2020    BUN 11 07/07/2020    CREATININE 0.8 07/07/2020    GFRAA >60 07/07/2020    AGRATIO 1.6 07/07/2020    LABGLOM >60 07/07/2020    GLUCOSE 97 07/07/2020    PROT 7.4 07/07/2020    CALCIUM 9.8 07/07/2020    BILITOT 0.3 07/07/2020    ALKPHOS 128 07/07/2020    AST 16 07/07/2020    ALT 13 07/07/2020         COVID-19 Screening (If Applicable):   Lab Results   Component Value Date    COVID19 NOT DETECTED 07/23/2020         Anesthesia Evaluation  Patient summary reviewed and Nursing notes reviewed  Airway: Mallampati: II  TM distance: <3 FB     Mouth opening: > = 3 FB Dental:    (+) upper dentures      Pulmonary:   (+) pneumonia: no interval change and resolved,  COPD: mild,  shortness of breath:  current smoker (1.5 ppd cigarettes and daily marijuana)          Patient did not smoke on day of surgery. ROS comment:   PFT's 2/2020  Pre  FEV1,  1.81L  Post FEV1, 1.55L,  65% of predicted                  Lung diffusion capacity 2/2020  There is no significant bronchodilator response. The TLC is normal. The RV is elevated and RV/TLC is borderline/elevated  indicating air trapping. The diffusing capacity is mildly reduced (79%). Smoker cigarettes and marijuana  Counseled on smoking cessation. PAT Airway. Limited exam. COVID 19 precautions. Patient wearing mask  Head/Neck movement: full  History of difficult intubation:  None  Teeth: Upper denture  Able to lie flat     LUNGS:  Diminished matty LL, no crackles or wheezing      COVID 19 screening  Denies recent fever or known COVID 19 exposure. Instructed to quarantine until surgery and report any new respiratory or fever symptoms to surgeon. Aware COVID testing must be completed before DOS. COVID swab:  7/23/2020       Cardiovascular:  Exercise tolerance: good (>4 METS),   (+) hypertension: mild,       ECG reviewed               Beta Blocker:  Not on Beta Blocker      ROS comment:  CARDIOVASCULAR:  Normal apical impulse, regular rate and rhythm, normal S1 and S2, no S3 or S4, and no murmur noted    CP or SOB: NO   Exercise: NO       Stress test 1/2019 evaluation for CP  EF 82%  1. SPECT Perfusion Study: Normal.   2. There is no scintigraphic evidence for inducible ischemia. 3. No evidence of scarred myocardium. 4. Left ventricle is normal in size. The left ventricle systolic function is hyperdynamic. 5. Right ventricle is normal in size. The right ventricle systolic function is normal.   6. This is a low risk scan. Echo 1/2019  EF 54%  LEFT VENTRICLE  The left ventricle is small.   Left ventricular systolic function is normal.  Normal left ventricular diastolic function. Mitral annular lateral E/e': 16.2. Mitral annular septal E/e': 13.6. Wall Motion:  All scored segments are normal.    EK2020  Normal sinus rhythm   Septal infarct , age undetermined    Denies hx MI       Neuro/Psych:   (+) seizures (Epilepsy, onset . type: GM,  last:  2020. Medications: aptiom 1200 mg q daily, lamictal 225 mg bid. Reports smokes marijuana daily for seizure control  ): poorly controlled and no interval change, neuromuscular disease (Hx radiculopathy, Lumbosacral Region. Left wrist DeQuervains dz, wearing brace):, psychiatric history ( Hx cocaine use, UDS + marijuana, 2020 . Reports quit drinking age 40. ):depression/anxiety              ROS comment: Seizures temporal lobe seizures- left anterior temporal lobectomy underwent in       Dizziness on antivert     \"small seizure 2 weeks ago\" GI/Hepatic/Renal:   (+) renal disease (hx UTI, last 2020):,           Endo/Other:    (+) electrolyte abnormalities (hx hyponatremia prob 2/2 seizure meds. Baseline 120-133MMOL/L,   PAT NA+ 124, recheck ordered DOS), . Pt had PAT visit. ROS comment: S/p hysterectomy Abdominal:           Vascular:                                        Anesthesia Plan      MAC     ASA 3     (Covid -     Na 128+ uds -)  Induction: intravenous. MIPS: Postoperative opioids intended and Prophylactic antiemetics administered. Anesthetic plan and risks discussed with patient. Plan discussed with CRNA.     Attending anesthesiologist reviewed and agrees with Pre Eval content            STEWART Guillaume - CRNA   2020

## 2020-07-30 NOTE — BRIEF OP NOTE
Brief Postoperative Note      Patient: Nelda Muhammad  YOB: 1960  MRN: [id number]    Date of Procedure: 7/30/2020    Pre-Op Diagnosis: Brandie Jef Quervain's disease    Post-Op Diagnosis: Same       Procedure(s):  LEFT WRIST TENDON RELEASE    Surgeon(s):  Tressa Rowan DO    Assistant:  First Assistant: Nicola Madrid RN    Anesthesia: Monitor Anesthesia Care    Estimated Blood Loss (mL): Minimal    Complications: None    Specimens:   * No specimens in log *    Implants:  * No implants in log *      Drains: * No LDAs found *    Findings: L dequervains tenosynovitis    Electronically signed by Jeannette Urias DO on 7/30/2020 at 11:38 AM

## 2020-07-30 NOTE — ANESTHESIA POSTPROCEDURE EVALUATION
Department of Anesthesiology  Postprocedure Note    Patient: Dhara Weiss  MRN: [id number]  YOB: 1960  Date of evaluation: 7/30/2020  Time:  2:09 PM     Procedure Summary     Date:  07/30/20 Room / Location:  [address]    Anesthesia Start:  1103 Anesthesia Stop:  1143    Procedure:  LEFT WRIST TENDON RELEASE (Left Wrist) Diagnosis:  Otilio Alter Quervain's disease)    Surgeon:  Kishan Mena DO Responsible Provider:  STEWART Linn CRNA    Anesthesia Type:  MAC ASA Status:  3          Anesthesia Type: MAC    Vianey Phase I: Vianey Score: 10    Vianey Phase II: Vianey Score: 10    Last vitals: Reviewed and per EMR flowsheets.        Anesthesia Post Evaluation    Patient location during evaluation: bedside  Patient participation: complete - patient participated  Level of consciousness: awake and alert  Pain score: 0  Airway patency: patent  Nausea & Vomiting: no nausea and no vomiting  Complications: no  Cardiovascular status: blood pressure returned to baseline  Respiratory status: acceptable  Hydration status: euvolemic

## 2020-07-31 NOTE — OP NOTE
this point, a  timeout was performed and all in attendance were in agreement. I marked out the planned surgical incision in longitudinal fashion  centered over the first dorsal extensor compartment, centered over the  APL tendon approximately 2 cm proximal to the radial styloid. I then  injected approximately 3 mL of 1% plain lidocaine around the incision  site. I then exsanguinate the left upper extremity with the use of an  Esmarch and tourniquet was inflated to 200 mmHg. I then made a  longitudinal incision overlying the previously marked incision. I then  carried gentle dissection through the subcutaneous tissue identifying  the radial sensory nerve which was retracted dorsal and then placed a  Weitlaner retractor for further visualization. I did encounter a  significant amount of inflamed synovium overlying the extensor  retinaculum of the first dorsal compartment. I then used scissors and  pickups to perform a tenosynovectomy removing all inflamed tissue off of  the extensor retinaculum. Once I identified the APL tendon entering and exiting through the  extensor retinaculum. I then incised through the retinaculum with the  use of scissors exposing the APL tendon. I then found that the APL and  EPB tendons basically converged together within the extensor  retinaculum. I then used scissors to debride the dorsal and volar lips  off of the radius ensuring to protect the radial sensory nerve as well  as the radial artery. Once I completely released the first dorsal  extensor compartment, I was able to flex and extend the thumb with  excellent excursion of both the APL and EPB tendons with no longer any  impingement. The wound was then irrigated with sterile normal saline. I then closed  subcutaneous tissue using 2-0 Vicryl suture, followed by skin closure  with 3-0 nylon. Tourniquet was then deflated for a total of 20 minutes,  and adequate hemostasis was maintained.   I then applied a sterile soft  dressing to the left upper extremity. The patient was then awakened  from anesthesia and transported to PACU in stable condition. She  appeared to have tolerated the procedure well. PROGNOSIS:  At this point, she will be discharged to home with a short  course of oral antibiotics as well as pain medication. She can have  immediate range of motion of the left hand, but is to have no lifting,  pushing or pulling with the left hand. I will see her back in the  office in two weeks for suture removal and continue to monitor her  progress in the outpatient setting for resolution of her symptoms.         Tirso 97, DO    D: 07/30/2020 11:48:09       T: 07/30/2020 11:58:54     [email or web address]  Job#: [id number]     Doc#: [id number]    CC:

## 2020-08-03 ENCOUNTER — TELEPHONE (OUTPATIENT)
Dept: ORTHOPEDIC SURGERY | Age: 60
End: 2020-08-03

## 2020-08-03 ENCOUNTER — INITIAL CONSULT (OUTPATIENT)
Dept: PULMONOLOGY | Age: 60
End: 2020-08-03
Payer: MEDICARE

## 2020-08-03 VITALS
DIASTOLIC BLOOD PRESSURE: 72 MMHG | HEART RATE: 81 BPM | SYSTOLIC BLOOD PRESSURE: 146 MMHG | OXYGEN SATURATION: 100 % | TEMPERATURE: 98.3 F | WEIGHT: 158 LBS | HEIGHT: 62 IN | BODY MASS INDEX: 29.08 KG/M2

## 2020-08-03 PROCEDURE — G8427 DOCREV CUR MEDS BY ELIG CLIN: HCPCS | Performed by: NURSE PRACTITIONER

## 2020-08-03 PROCEDURE — 3023F SPIROM DOC REV: CPT | Performed by: NURSE PRACTITIONER

## 2020-08-03 PROCEDURE — 99203 OFFICE O/P NEW LOW 30 MIN: CPT | Performed by: NURSE PRACTITIONER

## 2020-08-03 PROCEDURE — G8417 CALC BMI ABV UP PARAM F/U: HCPCS | Performed by: NURSE PRACTITIONER

## 2020-08-03 PROCEDURE — G8926 SPIRO NO PERF OR DOC: HCPCS | Performed by: NURSE PRACTITIONER

## 2020-08-03 RX ORDER — ALBUTEROL SULFATE 90 UG/1
2 AEROSOL, METERED RESPIRATORY (INHALATION) EVERY 6 HOURS PRN
Qty: 1 INHALER | Refills: 3 | Status: SHIPPED | OUTPATIENT
Start: 2020-08-03 | End: 2020-08-11 | Stop reason: SDUPTHER

## 2020-08-03 RX ORDER — BUDESONIDE AND FORMOTEROL FUMARATE DIHYDRATE 160; 4.5 UG/1; UG/1
2 AEROSOL RESPIRATORY (INHALATION) 2 TIMES DAILY
Qty: 1 INHALER | Refills: 5 | Status: SHIPPED | OUTPATIENT
Start: 2020-08-03 | End: 2020-08-11 | Stop reason: SDUPTHER

## 2020-08-03 ASSESSMENT — SLEEP AND FATIGUE QUESTIONNAIRES
HOW LIKELY ARE YOU TO NOD OFF OR FALL ASLEEP WHEN YOU ARE A PASSENGER IN A CAR FOR AN HOUR WITHOUT A BREAK: 0
HOW LIKELY ARE YOU TO NOD OFF OR FALL ASLEEP WHILE SITTING AND TALKING TO SOMEONE: 0
NECK CIRCUMFERENCE (INCHES): 12.5
HOW LIKELY ARE YOU TO NOD OFF OR FALL ASLEEP IN A CAR, WHILE STOPPED FOR A FEW MINUTES IN TRAFFIC: 0
HOW LIKELY ARE YOU TO NOD OFF OR FALL ASLEEP WHILE SITTING AND READING: 1
HOW LIKELY ARE YOU TO NOD OFF OR FALL ASLEEP WHILE SITTING INACTIVE IN A PUBLIC PLACE: 0
HOW LIKELY ARE YOU TO NOD OFF OR FALL ASLEEP WHILE SITTING QUIETLY AFTER LUNCH WITHOUT ALCOHOL: 0
ESS TOTAL SCORE: 4
HOW LIKELY ARE YOU TO NOD OFF OR FALL ASLEEP WHILE WATCHING TV: 1
HOW LIKELY ARE YOU TO NOD OFF OR FALL ASLEEP WHILE LYING DOWN TO REST IN THE AFTERNOON WHEN CIRCUMSTANCES PERMIT: 2

## 2020-08-03 NOTE — PROGRESS NOTES
Subjective:   CHIEF COMPLAINT / HPI:       Max Camacho is a 62 y.o. female with history of COPD, polysubstance abuse, HTN, seizure disorder, depression, presents to the pulmonary clinic today for evaluation of COPD at request of her PCP, Dr. Roberta Arredondo. She is currently on Symbicort which she is using 2 puffs twice day and rinsing her mouth out after use. She has an albuterol rescue inhaler which she uses rarely. She denies ant recent hospitalizations, visits to ED or urgent  systems for respiratory illness in the pas year. Her former pulmonologist was at [facility], Dr. Noemy Guy. Last PFT was 2020 at [facility] demonstrated moderate obstructive disease with no significant response to bronchodilators, however notation from RT states difficulty in repeating second half of testing due to patient coughing and cooperation. She states that she is social distancing when in public and wearing her mask when she remembers - has short term memory loss. She is using hand  or washing hands frequently. She denies any known recent contacts with person with respiratory infection, positive for COVID-19 or under investigation for possible COVID-19 exposure.     Influenza immunization: refuses  Pneumococcal immunization: refuses  Smoker 1.5 packs/day for 40 years, quit 3 days ago   PCP Ann Koo MD    Past Medical History:  Past Medical History:   Diagnosis Date    COPD (chronic obstructive pulmonary disease) ([facility] [address]     Full dentures     upper plate only    H/O dizziness     \"take Meclazine at least twice a day for this\"    History of drug abuse ([address]     History of UTI     \"last one May 2020\"    Hypertension     \"the top # use to be over 200 \"- on medication since 2018\"    Seizures ([address]     hx of epilepsy- last seizure week 2020- hx grand mal seizures    Wears glasses        Current Medications:      Current Outpatient Medications:     SYMBICORT 160-4.5 MCG/ACT AERO, Inhale 2 puffs into the lungs 2 times daily, Disp: 1 Inhaler, Rfl: 5    albuterol sulfate HFA (PROVENTIL HFA) 108 (90 Base) MCG/ACT inhaler, Inhale 2 puffs into the lungs every 6 hours as needed for Wheezing, Disp: 1 Inhaler, Rfl: 3    olmesartan (BENICAR) 40 MG tablet, Take 1 tablet by mouth daily, Disp: 30 tablet, Rfl: 2    meclizine (ANTIVERT) 25 MG tablet, Take 1 tablet by mouth 3 times daily as needed for Dizziness, Disp: 90 tablet, Rfl: 0    nicotine (NICODERM CQ) 21 MG/24HR, Place 1 patch onto the skin daily, Disp: 42 patch, Rfl: 0    nortriptyline (PAMELOR) 25 MG capsule, Take 50 mg by mouth nightly, Disp: , Rfl:     alendronate (FOSAMAX) 70 MG tablet, Take 70 mg by mouth once a week, Disp: , Rfl:     eslicarbazepine (APTIOM) 400 MG TABS tablet, Take 400 mg by mouth nightly Takes 1200 total with the 400mg and 800mg, Disp: , Rfl:     eslicarbazepine acetate (APTIOM) 800 MG tablet, Take 800 mg by mouth nightly , Disp: , Rfl:     lamoTRIgine (LAMICTAL) 200 MG tablet, Take 200 mg by mouth 2 times daily, Disp: , Rfl:     lamoTRIgine (LAMICTAL) 25 MG tablet, Take 25 mg by mouth 2 times daily, Disp: , Rfl:     Allergies   Allergen Reactions    Varenicline Other (See Comments)     Having bad dreams and chest pain( Chantix)    Chantix [Varenicline Tartrate] Palpitations     Chest pain       Social History:    Social History     Socioeconomic History    Marital status:      Spouse name: None    Number of children: None    Years of education: None    Highest education level: None   Occupational History    None   Social Needs    Financial resource strain: None    Food insecurity     Worry: None     Inability: None    Transportation needs     Medical: None     Non-medical: None   Tobacco Use    Smoking status: Current Every Day Smoker     Packs/day: 1.50     Years: 40.00     Pack years: 60.00     Types: Cigarettes    Smokeless tobacco: Never Used    Tobacco comment: On patch since 07/31/20    Substance and Sexual Activity    Alcohol use: No     Frequency: Patient refused     Comment: per pt on 7/23/2020\"quit drinking age 40- use to drink on weekends\"    Drug use: Yes     Types: Marijuana, Other-see comments, Cocaine     Comment: PCP/ per pt on 7/23/2020\"last used marijuana 7/22/2020, last did cocaine  spring 2020\"    Sexual activity: Yes     Partners: Male   Lifestyle    Physical activity     Days per week: None     Minutes per session: None    Stress: None   Relationships    Social connections     Talks on phone: None     Gets together: None     Attends Restorationist service: None     Active member of club or organization: None     Attends meetings of clubs or organizations: None     Relationship status: None    Intimate partner violence     Fear of current or ex partner: None     Emotionally abused: None     Physically abused: None     Forced sexual activity: None   Other Topics Concern    None   Social History Narrative    None       Family History:    Family History   Problem Relation Age of Onset    Heart Disease Father         cabg         REVIEW OF SYSTEMS:    CONSTITUTIONAL:  negative for fevers, chills, diaphoresis, activity change, appetite change, night sweats and unexpected weight change.    HEENT:  negative for hearing loss,  sinus pressure, nasal congestion, epistaxis and snoring  RESPIRATORY: + dry cough, negative wheeze, negative SOB  CARDIOVASCULAR:  Negative for chest pain, palpitations, exertional chest pressure/discomfort, edema, syncope  GASTROINTESTINAL: negative for nausea, vomiting, diarrhea, constipation, blood in stool and abdominal pain  GENITOURINARY:  negative for frequency, dysuria and hematuria  HEMATOLOGIC/LYMPHATIC:  negative for easy bruising, bleeding and lymphadenopathy  ALLERGIC/IMMUNOLOGIC:  negative for recurrent infections, angioedema, anaphylaxis and drug reaction  MUSCULOSKELETAL:  negative for  pain, joint swelling, decreased range of motion and muscle weakness  NEURO: negative for headache, AMS, decrease sensations  SKIN: Negative for rashes or lesions      Objective:   VITALS:   Vitals:    08/03/20 1351   BP: (!) 146/72   Pulse: 81   Temp: 98.3 °F (36.8 °C)   SpO2: 100%   Weight: 158 lb (71.7 kg)   Height: 5' 2\" (1.575 m)     Neck circumference: 12.5(in)  Inches  [place] - Total score: 4  MALLAMPATI: 2  Body mass index is 28.9 kg/m². PHYSICAL EXAM:    CONSTITUTIONAL:  awake, alert, cooperative, no apparent distress, and appears stated age  HEENT:  Supple and nontender,  trachea midline, no adenopathy, thyroid normal, no JVD, no wheezing or stridor over neck  CHEST: Chest expansion equal and symmetrical, no intercostal retraction, no increased work of breathing  LUNGS: Bilateral breath sounds clear and equal with good air movement, no forced expiratory wheeze, no rales, no rhonchi. Cough present. CARDIOVASCULAR: Normal S1 and S2 , no murmurs or gallops ,no pericardial rubs  ABDOMEN:  normal bowel sounds, non-distended and no masses palpated, and no tenderness to palpation. LYMPHADENOPATHY:  no axillary or supraclavicular adenopathy.  No cervical adenopathy  EXTREMITIES: No edema, no inflammation, no tenderness, no clubbing of digits  NEURO: Oriented X 3, No focal deficits  SKIN: warm and dry    LAB:CBC with Differential:    Lab Results   Component Value Date    WBC 6.5 07/07/2020    RBC 4.41 07/07/2020    HGB 13.5 07/07/2020    HCT 39.1 07/07/2020     07/07/2020    MCV 88.6 07/07/2020    MCH 30.7 07/07/2020    MCHC 34.6 07/07/2020    RDW 14.2 07/07/2020    SEGSPCT 72.9 05/29/2020    LYMPHOPCT 19.9 05/29/2020    MONOPCT 6.4 05/29/2020    BASOPCT 0.5 05/29/2020    MONOSABS 0.5 05/29/2020    LYMPHSABS 1.5 05/29/2020    EOSABS 0.0 05/29/2020    BASOSABS 0.0 05/29/2020    DIFFTYPE AUTOMATED DIFFERENTIAL 05/29/2020     BMP:    Lab Results   Component Value Date     07/30/2020    K 4.3 07/30/2020    CL 94 07/30/2020    CO2 24 07/30/2020    BUN 11 07/07/2020    CREATININE 0.8 07/07/2020    CALCIUM 9.8 07/07/2020    GFRAA >60 07/07/2020    LABGLOM >60 07/07/2020    GLUCOSE 97 07/07/2020     Hepatic Function Panel:    Lab Results   Component Value Date    ALKPHOS 128 07/07/2020    ALT 13 07/07/2020    AST 16 07/07/2020    PROT 7.4 07/07/2020    BILITOT 0.3 07/07/2020     ABG:  No results found for: Soniya Lombard, PHART, THGBART, LXD5PKM, PO2ART, QQE0TDA    RADIOLOGY:  1/10/2020 ribs and CXR [facility]  No rib fractures, no pneumothorax, no acute process    1/7/2020 - portable CXR [facility]  No acute process    PFT: 2/5/2020 [facility]  FVC 84 % predicted, FEV1 65 % predicted, FEV1/FVC 77% predicted, FEF 25-75% 35% predicted, , , DLCO 79  Following administration of bronchodilator there was no significant response to bronchodilator. Overall testing indicates moderate obstructive disease with decreased diffusion capacity and air trapping    Assessment      1. Panlobular emphysema ([address]  PFT completed in [facility] 2/5/2020, demonstrates moderate obstructive disease with air trapping. She states that sometimes she will forget to take her Symbicort in the morning and does not take until the afternoon. We set a timer on her phone to remind her to take her Symbicort. I will make no other changes in her bronchodilator therapy as she appears to be doing well on current medications. I will refill both her Symbicort and her Albuterol rescue inhaler. I will repeat her PFT in Feb 2020. 2. Personal history of nicotine dependence   Prior CT completed at [facility] clinic according to [facility], no known results in charting in recent year. We will repeat CT chest when we get PFT. She is currently using nicotine patches and has not had cigarette in 3 days. She states this time she is more motivate to quit. I reviewed with her the risks to worsening cardiopulmonary disease if she continues to smoke    3.  Preop testing  She is aware that she will need to have pre testing 7 days prior to his PFT    4. Healthcare maintenance  She has refused  in the past. We have discussed the need to maintain yearly flu immunization, pneumococcal vaccination. We have discussed Coronavirus precaution including: social distancing when needing to be in public, handwashing practice, wiping items touched in public such as gas pumps, door handles, shopping carts, etc. Self monitoring for infection - fever, chills, cough, SOB. Should they develop symptoms they should call office for further instructions. Return in about 6 months (around 2/3/2021) for CT Chest, PFT, Follow Up. This dictation was performed with a verbal recognition program and it was checked for errors. It is possible that there are still dictated errors within this office note. Any errors should be brought immediately to my attention for correction. All efforts were made to ensure that this office note is accurate.        Electronically signed by STEWART Zelaya CNP on 8/3/2020 at 2:40 PM

## 2020-08-03 NOTE — TELEPHONE ENCOUNTER
Pt lvm at our office stating that she would like to get a pain med refill. She states she is in a lot of pain. Please advise.      Pt phone [phone]

## 2020-08-03 NOTE — PATIENT INSTRUCTIONS
Patient Education        Chronic Obstructive Pulmonary Disease (COPD): Care Instructions  Your Care Instructions     Chronic obstructive pulmonary disease (COPD) is a general term for a group of lung diseases, including emphysema and chronic bronchitis. People with COPD have decreased airflow in and out of the lungs, which makes it hard to breathe. The airways also can get clogged with thick mucus. Cigarette smoking is a major cause of COPD. Although there is no cure for COPD, you can slow its progress. Following your treatment plan and taking care of yourself can help you feel better and live longer. Follow-up care is a key part of your treatment and safety. Be sure to make and go to all appointments, and call your doctor if you are having problems. It's also a good idea to know your test results and keep a list of the medicines you take. How can you care for yourself at home? Staying healthy  · Do not smoke. This is the most important step you can take to prevent more damage to your lungs. If you need help quitting, talk to your doctor about stop-smoking programs and medicines. These can increase your chances of quitting for good. · Avoid colds and flu. Get a pneumococcal vaccine shot. If you have had one before, ask your doctor whether you need a second dose. Get the flu vaccine every fall. If you must be around people with colds or the flu, wash your hands often. · Avoid secondhand smoke, air pollution, and high altitudes. Also avoid cold, dry air and hot, humid air. Stay at home with your windows closed when air pollution is bad. Medicines and oxygen therapy  · Take your medicines exactly as prescribed. Call your doctor if you think you are having a problem with your medicine. You may be taking medicines such as:  ? Bronchodilators. These help open your airways and make breathing easier. They are either short-acting (work for 6 to 9 hours) or long-acting (work for 24 hours).  You inhale most bronchodilators, so they start to act quickly. Always carry your quick-relief inhaler with you in case you need it while you are away from home. ? Corticosteroids (prednisone, budesonide). These reduce airway inflammation. They come in pill or inhaled form. You must take these medicines every day for them to work well. · Ask your doctor or pharmacist if a spacer is right for you. A spacer may help you get more inhaled medicine to your lungs. If you use one, ask how to use it properly. · Do not take any vitamins, over-the-counter medicine, or herbal products without talking to your doctor first.  · If your doctor prescribed antibiotics, take them as directed. Do not stop taking them just because you feel better. You need to take the full course of antibiotics. · If you use oxygen therapy, use the flow rate your doctor has recommended. Don't change it without talking to your doctor first. Oxygen therapy boosts the amount of oxygen in your blood and helps you breathe easier. Activity  · Get regular exercise. Walking is an easy way to get exercise. Start out slowly, and walk a little more each day. · Pay attention to your breathing. You are exercising too hard if you can't talk while you exercise. · Take short rest breaks when doing household chores and other activities. · Learn breathing methods--such as breathing through pursed lips--to help you become less short of breath. · If your doctor has not set you up with a pulmonary rehabilitation program, ask if rehab is right for you. Rehab includes exercise programs, education about your disease and how to manage it, help with diet and other changes, and emotional support. Diet  · Eat regular, healthy meals. Use bronchodilators about 1 hour before you eat to make it easier to eat. Eat several small meals instead of three large ones. Drink beverages at the end of the meal. Avoid foods that are hard to chew.   · Eat foods that contain protein so you don't lose muscle mass. · Talk with your doctor if you gain too much weight or if you lose weight without trying. Mental health  · Talk to your family, friends, or a therapist about your feelings. Some people feel frightened, angry, hopeless, helpless, and even guilty. Talking openly about bad feelings can help you cope. If these feelings last, talk to your doctor. When should you call for help? [id number] anytime you think you may need emergency care. For example, call if:  · You have severe trouble breathing. Call your doctor now or seek immediate medical care if:  · You have new or worse trouble breathing. · You cough up blood. · You have a fever. Watch closely for changes in your health, and be sure to contact your doctor if:  · You cough more deeply or more often, especially if you notice more mucus or a change in the color of your mucus. · You have new or worse swelling in your legs or belly. · You are not getting better as expected. Where can you learn more? Go to https://[employer]. org and sign in to your [employer] account. Enter R326 in the Potential box to learn more about \"Chronic Obstructive Pulmonary Disease (COPD): Care Instructions. \"     If you do not have an account, please click on the \"Sign Up Now\" link. Current as of: February 24, 2020               Content Version: 12.5  © 4840-2059 [employer], [employer]. Care instructions adapted under license by [facility] ([facility]). If you have questions about a medical condition or this instruction, always ask your healthcare professional. [place] [address] any warranty or liability for your use of this information.

## 2020-08-11 ENCOUNTER — OFFICE VISIT (OUTPATIENT)
Dept: INTERNAL MEDICINE CLINIC | Age: 60
End: 2020-08-11
Payer: MEDICARE

## 2020-08-11 ENCOUNTER — HOSPITAL ENCOUNTER (OUTPATIENT)
Age: 60
Discharge: HOME | End: 2020-08-11
Payer: MEDICARE

## 2020-08-11 VITALS
TEMPERATURE: 96.9 F | HEIGHT: 62 IN | SYSTOLIC BLOOD PRESSURE: 120 MMHG | BODY MASS INDEX: 32.39 KG/M2 | DIASTOLIC BLOOD PRESSURE: 70 MMHG | WEIGHT: 176 LBS

## 2020-08-11 PROBLEM — M81.0 AGE-RELATED OSTEOPOROSIS WITHOUT CURRENT PATHOLOGICAL FRACTURE: Status: ACTIVE | Noted: 2020-08-11

## 2020-08-11 LAB
ALBUMIN SERPL-MCNC: 4.5 GM/DL (ref 3.4–5)
ANION GAP SERPL CALCULATED.3IONS-SCNC: 13 MMOL/L (ref 4–16)
BUN BLDV-MCNC: 16 MG/DL (ref 6–23)
CALCIUM SERPL-MCNC: 9.8 MG/DL (ref 8.3–10.6)
CHLORIDE BLD-SCNC: 94 MMOL/L (ref 99–110)
CO2: 24 MMOL/L (ref 21–32)
CREAT SERPL-MCNC: 0.9 MG/DL (ref 0.6–1.1)
GFR AFRICAN AMERICAN: >60 ML/MIN/1.73M2
GFR NON-AFRICAN AMERICAN: >60 ML/MIN/1.73M2
GLUCOSE BLD-MCNC: 102 MG/DL (ref 70–99)
PHOSPHORUS: 4.3 MG/DL (ref 2.5–4.9)
POTASSIUM SERPL-SCNC: 4.5 MMOL/L (ref 3.5–5.1)
SODIUM BLD-SCNC: 131 MMOL/L (ref 135–145)
T4 FREE: 0.85 NG/DL (ref 0.9–1.8)
TSH HIGH SENSITIVITY: 2.58 UIU/ML (ref 0.27–4.2)

## 2020-08-11 PROCEDURE — G8926 SPIRO NO PERF OR DOC: HCPCS | Performed by: INTERNAL MEDICINE

## 2020-08-11 PROCEDURE — 36415 COLL VENOUS BLD VENIPUNCTURE: CPT

## 2020-08-11 PROCEDURE — 1036F TOBACCO NON-USER: CPT | Performed by: INTERNAL MEDICINE

## 2020-08-11 PROCEDURE — 99214 OFFICE O/P EST MOD 30 MIN: CPT | Performed by: INTERNAL MEDICINE

## 2020-08-11 PROCEDURE — G8427 DOCREV CUR MEDS BY ELIG CLIN: HCPCS | Performed by: INTERNAL MEDICINE

## 2020-08-11 PROCEDURE — 3023F SPIROM DOC REV: CPT | Performed by: INTERNAL MEDICINE

## 2020-08-11 PROCEDURE — 84439 ASSAY OF FREE THYROXINE: CPT

## 2020-08-11 PROCEDURE — 80069 RENAL FUNCTION PANEL: CPT

## 2020-08-11 PROCEDURE — 84443 ASSAY THYROID STIM HORMONE: CPT

## 2020-08-11 PROCEDURE — G8417 CALC BMI ABV UP PARAM F/U: HCPCS | Performed by: INTERNAL MEDICINE

## 2020-08-11 PROCEDURE — 3017F COLORECTAL CA SCREEN DOC REV: CPT | Performed by: INTERNAL MEDICINE

## 2020-08-11 RX ORDER — OLMESARTAN MEDOXOMIL 40 MG/1
40 TABLET ORAL DAILY
Qty: 30 TABLET | Refills: 2 | Status: SHIPPED | OUTPATIENT
Start: 2020-08-11 | End: 2020-10-19 | Stop reason: SDUPTHER

## 2020-08-11 RX ORDER — ALENDRONATE SODIUM 70 MG/1
70 TABLET ORAL WEEKLY
Qty: 12 TABLET | Refills: 1 | Status: SHIPPED | OUTPATIENT
Start: 2020-08-11 | End: 2020-10-19 | Stop reason: SDUPTHER

## 2020-08-11 RX ORDER — NORTRIPTYLINE HYDROCHLORIDE 25 MG/1
50 CAPSULE ORAL NIGHTLY
Qty: 60 CAPSULE | Refills: 2 | Status: SHIPPED | OUTPATIENT
Start: 2020-08-11 | End: 2020-10-06 | Stop reason: SDUPTHER

## 2020-08-11 RX ORDER — LAMOTRIGINE 200 MG/1
200 TABLET ORAL 2 TIMES DAILY
Qty: 60 TABLET | Refills: 2 | Status: SHIPPED | OUTPATIENT
Start: 2020-08-11 | End: 2020-10-19 | Stop reason: SDUPTHER

## 2020-08-11 RX ORDER — MECLIZINE HYDROCHLORIDE 25 MG/1
25 TABLET ORAL 3 TIMES DAILY PRN
Qty: 90 TABLET | Refills: 2 | Status: SHIPPED | OUTPATIENT
Start: 2020-08-11 | End: 2020-10-19 | Stop reason: SDUPTHER

## 2020-08-11 RX ORDER — NICOTINE 21 MG/24HR
1 PATCH, TRANSDERMAL 24 HOURS TRANSDERMAL DAILY
Qty: 42 PATCH | Refills: 0 | Status: SHIPPED | OUTPATIENT
Start: 2020-08-11 | End: 2020-12-18

## 2020-08-11 RX ORDER — BUDESONIDE AND FORMOTEROL FUMARATE DIHYDRATE 160; 4.5 UG/1; UG/1
2 AEROSOL RESPIRATORY (INHALATION) 2 TIMES DAILY
Qty: 1 INHALER | Refills: 5 | Status: SHIPPED | OUTPATIENT
Start: 2020-08-11 | End: 2020-11-24 | Stop reason: SDUPTHER

## 2020-08-11 RX ORDER — ALBUTEROL SULFATE 90 UG/1
2 AEROSOL, METERED RESPIRATORY (INHALATION) EVERY 6 HOURS PRN
Qty: 1 INHALER | Refills: 5 | Status: SHIPPED | OUTPATIENT
Start: 2020-08-11 | End: 2021-02-08 | Stop reason: SDUPTHER

## 2020-08-11 RX ORDER — ESLICARBAZEPINE ACETATE 400 MG/1
400 TABLET ORAL NIGHTLY
Qty: 30 TABLET | Refills: 2 | Status: SHIPPED | OUTPATIENT
Start: 2020-08-11 | End: 2020-10-19 | Stop reason: SDUPTHER

## 2020-08-11 RX ORDER — LAMOTRIGINE 25 MG/1
25 TABLET ORAL 2 TIMES DAILY
Qty: 60 TABLET | Refills: 2 | Status: SHIPPED | OUTPATIENT
Start: 2020-08-11 | End: 2020-10-19 | Stop reason: SDUPTHER

## 2020-08-11 ASSESSMENT — PATIENT HEALTH QUESTIONNAIRE - PHQ9
SUM OF ALL RESPONSES TO PHQ QUESTIONS 1-9: 1
2. FEELING DOWN, DEPRESSED OR HOPELESS: 1
SUM OF ALL RESPONSES TO PHQ9 QUESTIONS 1 & 2: 1
1. LITTLE INTEREST OR PLEASURE IN DOING THINGS: 0
SUM OF ALL RESPONSES TO PHQ QUESTIONS 1-9: 1

## 2020-08-11 NOTE — PROGRESS NOTES
at [facility]. Waiting to hear back. # She takes Meclizine for vertigo PRN. This helps. No falls or nausea. # Patient has intention to quit smoking. Trying to quit on her own. Has smoker's cough. She is on patches now. # Her COPD is stable on her inhalers. She is tolerating Albuterol and Symbicort. No SOB, cough, wheezing. # Patient denies doing any other drugs other than weed and reports that her UDS positive because it might have been mixed with her marijuana. Ricka Distad Ricka Distad ? ? ? # Taking Olmesartan for HTN. BP last /80. Tolerating well. Electrolytes stable. No CP, palpitations, dizziness. # Patient has not had labs done recently. She has prediabetes and dyslipidemia. Not on meds. # Patient is not on vitamin D supplements despite low vitamin D level years ago. Taking Fosamax for OP. DEXA not in chart. # Patient is s/p L carpal tunnel release on 7/30. Patient with hyponatremia at the time. # Has difficulty losing weight. She is gaining weight. # Complaining of SOB. TTE at [facility] in 2019 unremarkable. Stress test unremarkable. PFT shows obstruction wo BD response.        Health maintenance:   Health Maintenance Due   Topic Date Due    Hepatitis C screen  1960    Pneumococcal 0-64 years Vaccine (1 of 1 - PPSV23) 05/08/1967    HIV screen  05/08/1976    Cervical cancer screen  05/08/1982    Breast cancer screen  05/08/2011    Shingles Vaccine (1 of 2) 05/08/2011    Colon cancer screen colonoscopy  05/08/2011    Annual Wellness Visit (AWV)  12/20/2019       Past Medical History:  Past Medical History:   Diagnosis Date    COPD (chronic obstructive pulmonary disease) ([address]     Full dentures     upper plate only    H/O dizziness     \"take Meclazine at least twice a day for this\"    History of drug abuse ([address]     History of UTI     \"last one May 2020\"    Hypertension     \"the top # use to be over 200 \"- on medication since 2018\"    Seizures ([facility]     hx of epilepsy- last seizure week 7/13/2020- hx grand mal seizures    Wears glasses        Past Surgical History:  Past Surgical History:   Procedure Laterality Date    BRAIN SURGERY      s/p L anterior temporal lobectomy in 1996\"did surgery to try an treat the seizures\"   [address]    \"not sure if they took the ovaries\"   [facility] WRIST SURGERY Left 7/30/2020    LEFT WRIST TENDON RELEASE performed by Serena Welch DO at  [address]:   Allergies   Allergen Reactions    Varenicline Other (See Comments)     Having bad dreams and chest pain( Chantix)    Chantix [Varenicline Tartrate] Palpitations     Chest pain       Medications:  Current outpatient prescriptions:  Outpatient Medications Marked as Taking for the 8/11/20 encounter (Office Visit) with Tish Mccarthy MD   Medication Sig Dispense Refill    lamoTRIgine (LAMICTAL) 25 MG tablet Take 1 tablet by mouth 2 times daily 60 tablet 2    lamoTRIgine (LAMICTAL) 200 MG tablet Take 1 tablet by mouth 2 times daily 60 tablet 2    eslicarbazepine acetate (APTIOM) 800 MG tablet Take 800 mg by mouth nightly 30 tablet 2    eslicarbazepine (APTIOM) 400 MG TABS tablet Take 1 tablet by mouth nightly Takes 1200 total with the 400mg and 800mg 30 tablet 2    alendronate (FOSAMAX) 70 MG tablet Take 1 tablet by mouth once a week 12 tablet 1    nortriptyline (PAMELOR) 25 MG capsule Take 2 capsules by mouth nightly 60 capsule 2    meclizine (ANTIVERT) 25 MG tablet Take 1 tablet by mouth 3 times daily as needed for Dizziness 90 tablet 2    olmesartan (BENICAR) 40 MG tablet Take 1 tablet by mouth daily 30 tablet 2    albuterol sulfate HFA (PROVENTIL HFA) 108 (90 Base) MCG/ACT inhaler Inhale 2 puffs into the lungs every 6 hours as needed for Wheezing 1 Inhaler 5    SYMBICORT 160-4.5 MCG/ACT AERO Inhale 2 puffs into the lungs 2 times daily 1 Inhaler 5    nicotine (NICODERM CQ) 21 MG/24HR Place 1 patch onto the skin daily 42 patch 0       Social History:  Social History Socioeconomic History    Marital status:      Spouse name: Not on file    Number of children: Not on file    Years of education: Not on file    Highest education level: Not on file   Occupational History    Not on file   Social Needs    Financial resource strain: Not on file    Food insecurity     Worry: Not on file     Inability: Not on file    Transportation needs     Medical: Not on file     Non-medical: Not on file   Tobacco Use    Smoking status: Former Smoker     Packs/day: 1.50     Years: 40.00     Pack years: 60.00     Types: Cigarettes    Smokeless tobacco: Never Used    Tobacco comment: On patch since 07/31/20    Substance and Sexual Activity    Alcohol use: No     Frequency: Patient refused     Comment: per pt on 7/23/2020\"quit drinking age 40- use to drink on weekends\"    Drug use: Yes     Types: Marijuana, Other-see comments, Cocaine     Comment: PCP/ per pt on 7/23/2020\"last used marijuana 7/22/2020, last did cocaine  spring 2020\"    Sexual activity: Yes     Partners: Male   Lifestyle    Physical activity     Days per week: Not on file     Minutes per session: Not on file    Stress: Not on file   Relationships    Social connections     Talks on phone: Not on file     Gets together: Not on file     Attends Tenriism service: Not on file     Active member of club or organization: Not on file     Attends meetings of clubs or organizations: Not on file     Relationship status: Not on file    Intimate partner violence     Fear of current or ex partner: Not on file     Emotionally abused: Not on file     Physically abused: Not on file     Forced sexual activity: Not on file   Other Topics Concern    Not on file   Social History Narrative    Not on file       Family History:  Family History   Problem Relation Age of Onset    Heart Disease Father         cabg         Review of Systems:  Constitutional: no fevers, no chills, no night sweats, no weight loss, no weight gain, no fatigue   Pain assessment: no pain  Head: no headaches  Ears: no hearing loss, no tinnitus, no vertigo  Eyes: no blurry vision, no diplopia, no dryness, no itchiness  Mouth: no oral ulcers, no dry mouth, no sore throat  Nose: no nasal congestion, no epistaxis  Cardiac: no chest pain, no palpitations, no leg swelling, no orthopnea, no PND, no syncope  Pulmonary: no dyspnea, no cough, no wheezing, no hemoptysis  GI: no nausea, no vomiting, no diarrhea, no constipation, no abdominal pain, no hematochezia  : no dysuria, no frequency, no urgency, no hematuria, no frothy urine, no dyspareunia, no pelvic pain, no vaginal bleeding, no abnormal vaginal discharge  MSK: no arthralgias, no myalgias, no early morning stiffness, no Raynaud's   Neuro: no focal neurological deficits, seizures  Sleep: no snoring, no daytime somnolence   Psych: depression, no suicidal ideation      Physical Exam:  VITALS:   /70   Temp 96.9 °F (36.1 °C) (Infrared)   Ht 5' 2\" (1.575 m)   Wt 176 lb (79.8 kg)   BMI 32.19 kg/m²     PHYSICAL EXAMINATION:  General: alert, awake, and oriented to time, place, person, and situation. Not in acute distress   Skin: LUE sutures CDI wo wound dehiscence, but having keloid   Head: normocephalic/atraumatic  Eyes: anicteric sclera, well-injected conjunctiva. Pupils are equally round and reactive to light. Extraocular movements are intact   Nose: no septal deviation evident  Sinuses: no sinus tenderness  Ears: external ears normal  Neck: supple, no cervical lymphadenopathy, thyroid symmetric and not enlarged, no bruits   Heart: regular rate and rhythm, regular S1/S2, no S3/S4, no audible murmurs, no audible friction rub  Lungs: clear to auscultation bilaterally, no audible crackles, no audible wheezes, no audible rhonchi    Abdomen: normal bowel sounds, soft abdomen, non-tender, no palpable masses  Extremities: no edema, warm, no cyanosis, no clubbing.  Good capillary refill   MSK: no tenderness across spinous processes, full ROM in all 4 extremities. No joint swelling or tenderness   Peripheral vascular: 2+ pulses symmetric (radial)  Neuro: gait normal, CN II-XII intact, motor power 5/5 in all 4 extremities, sensation intact and symmetric    Labs   Reviewed with patient     Imaging   Reviewed with patient      Assessment/Plan:     1. Intractable generalized idiopathic epilepsy without status epilepticus ([facility]  Having breakthrough seizures  Needs to set up with Neurology ASAP. She is to call them again, if to no avail I will send new referral different office  Continue for now Lamotrigine 337JD BID and Eslicarbazepine [id number] QHS     2. Moderate episode of recurrent major depressive disorder (HCC)  Stable  Discussed her chronic med Nortriptyline which she has been on before establishing with me, as I am concerned for decreased seizure thresholds. She wants to continue it for now until she establishes with Psych   Does not want therapy at the moment   Continue Nortriptyline 50mg QHS   - External Referral To Psychiatry    3. Vertigo  Stable  Continue Meclizine 25mg TID PRN     4. Essential hypertension  Stable  Continue Olmesartan 40mg QD     5. Panlobular emphysema (HCC)  Stable   Continue Albuterol PRN  Continue Symbicort BID     6. Age-related osteoporosis without current pathological fracture  No DEXA on records  Continue Fosamax 70mg weekly     7. Tobacco use  Smoking cessation strongly encouraged  Continue nicotine patches     8. Weight gain  - TSH without Reflex; Future  - FREE T4; Future    9. Hyponatremia  ? 2/2 AEDs  - RENAL FUNCTION PANEL; Future      Care discussed with patient and questions answered. Patient verbalizes understanding and agrees with plan. Discussed with patient the importance of continuity of care. I encouraged patient to schedule next appointment within 2 months with me. Patient prefers to be reached by Phone call at [phone] for future medical correspondence.   Encouraged to

## 2020-08-12 ENCOUNTER — TELEPHONE (OUTPATIENT)
Dept: INTERNAL MEDICINE CLINIC | Age: 60
End: 2020-08-12

## 2020-08-12 NOTE — TELEPHONE ENCOUNTER
Please inform patient that her thyroid seems to be underactive based on 1 out of 2 blood tests. Will not start meds as of yet, but will place order for repeat prior to next appt in 2 months (please help schedule lab appt as well). Also, sodium level has improved but continues to be lower than normal range. No changes to be made at the moment to her meds.       Zen Whalen MD  Internal Medicine  8/12/2020  12:55 PM

## 2020-08-18 ENCOUNTER — TELEPHONE (OUTPATIENT)
Dept: INTERNAL MEDICINE CLINIC | Age: 60
End: 2020-08-18

## 2020-08-18 NOTE — TELEPHONE ENCOUNTER
I heard back from Dr. Deonte Arguelles office.  Patient needs referral to Epileptologist. Will send new referral

## 2020-08-24 ENCOUNTER — OFFICE VISIT (OUTPATIENT)
Dept: PSYCHOLOGY | Age: 60
End: 2020-08-24
Payer: MEDICARE

## 2020-08-24 PROCEDURE — 1036F TOBACCO NON-USER: CPT | Performed by: PSYCHOLOGIST

## 2020-08-24 PROCEDURE — 90791 PSYCH DIAGNOSTIC EVALUATION: CPT | Performed by: PSYCHOLOGIST

## 2020-08-24 NOTE — PROGRESS NOTES
Behavioral Health Consultation  Trever White Psy.D. Psychologist    Time spent with Patient: 30 minutes  Visit number: 1  Reason for Consult:  depression  Referring Provider: Danuta Richardson MD  [facility] [facility] [address] [facility], [address] [place] [address]    Informed consent:  Pt provided informed consent for the behavioral health program. Discussed with patient model of service to include the limits of confidentiality (i.e. abuse reporting, suicide intervention, etc.) and focused intervention approach. Pt indicated understanding. S:  ----------------------------------------------------------------------------------------------------------------------    Presenting problem:  depression  \"I had surgery brain surgery in  and ever since then I've been depressed and angry. \" 6 mos ago \"I just quit all my meds because I was feeling weird. \"  Stated, \"I'm having these things and they keep telling me it's seizures, but it's completely different. \" Explained she is now \"remembering everything\" through the seizures. Also reported, \"I cry real easily and that's how I know I need to get back on my pills. \"       Endorsed depressed mood, anhedonia, anergia, amotivation, poor sleep, fluctuating appetite, diminished concentration, and corrosive self-image. \"A lot of things bother me from a long time ago still. \"  Denied any SI/HI, planning, or intent. Social:   Moved to [address] [facility] with her boyfriend    Oldest of 4 children born to her parents. Parents are both . Has three adult children. Substance Use:   EtOH:   Cannabis:   Tobacco:    Other:     Psychiatric tx hx:    Psych meds: lamictal 225mg bid  Outpatient psychotherapy: was being seen for 3 years, last seen 4 mos ago    Inpatient psych hospitalizations: denied     Goals:  \"I don't want to be so depressed. \"     O:  ----------------------------------------------------------------------------------------------------------------------  MSE:    Orientation:  oriented to person, place, time, and general circumstances  Appearance and behavior:  alert, cooperative  Speech:  spontaneous, normal rate and normal volume  Mood: depressed and anxious   Thought Content:  intact, hopelessness and helplessness  Thought Process:  linear, goal directed and coherent  Interest/Pleasure: Loss of Pleasure/Fun  Sleep disturbance: Yes  Motivation: Poor  Energy: Tired/Fatigued  Morbid ideation No  Suicide Assessment: no suicidal ideation    A:  ----------------------------------------------------------------------------------------------------------------------  Diagnosis:    1. Moderate episode of recurrent major depressive disorder (HCC)         PHQ Scores 8/11/2020 7/14/2020 6/16/2020   PHQ2 Score 1 1 2   PHQ9 Score 1 1 2     Interpretation of Total Score Depression Severity: 1-4 = Minimal depression, 5-9 = Mild depression, 10-14 = Moderate depression, 15-19 = Moderately severe depression, 20-27 = Severe depression    P:  ----------------------------------------------------------------------------------------------------------------------     [x]Discussed potential treatments for   1. Moderate episode of recurrent major depressive disorder (HCC)       [x]Conducted functional assessment  [x]Established rapport  [x]Supportive interventions   [x][place]-setting to identify pt's primary goals for [facility] visit / overall health  [x]Provided psychoeducation/handout on   1. Moderate episode of recurrent major depressive disorder ([facility]            Other:   []     Pt Behavioral Change Plan: 1. Return to Dr. Stover May in 2 week(s)    2.                 Feedback provided to pt's PCP via EPIC and/or oral report

## 2020-09-01 ENCOUNTER — HOSPITAL ENCOUNTER (OUTPATIENT)
Age: 60
Discharge: HOME | End: 2020-09-01
Payer: MEDICARE

## 2020-09-01 ENCOUNTER — VIRTUAL VISIT (OUTPATIENT)
Dept: INTERNAL MEDICINE CLINIC | Age: 60
End: 2020-09-01
Payer: MEDICARE

## 2020-09-01 LAB
ALBUMIN SERPL-MCNC: 4.6 GM/DL (ref 3.4–5)
ANION GAP SERPL CALCULATED.3IONS-SCNC: 11 MMOL/L (ref 4–16)
BASOPHILS ABSOLUTE: 0.1 K/CU MM
BASOPHILS RELATIVE PERCENT: 0.9 % (ref 0–1)
BUN BLDV-MCNC: 15 MG/DL (ref 6–23)
CALCIUM SERPL-MCNC: 9.9 MG/DL (ref 8.3–10.6)
CHLORIDE BLD-SCNC: 86 MMOL/L (ref 99–110)
CO2: 25 MMOL/L (ref 21–32)
CREAT SERPL-MCNC: 0.8 MG/DL (ref 0.6–1.1)
DIFFERENTIAL TYPE: ABNORMAL
EOSINOPHILS ABSOLUTE: 0 K/CU MM
EOSINOPHILS RELATIVE PERCENT: 0.1 % (ref 0–3)
GFR AFRICAN AMERICAN: >60 ML/MIN/1.73M2
GFR NON-AFRICAN AMERICAN: >60 ML/MIN/1.73M2
GLUCOSE BLD-MCNC: 106 MG/DL (ref 70–99)
HCT VFR BLD CALC: 36.8 % (ref 37–47)
HEMOGLOBIN: 12.8 GM/DL (ref 12.5–16)
IMMATURE NEUTROPHIL %: 1.2 % (ref 0–0.43)
IRON: 81 UG/DL (ref 37–145)
LYMPHOCYTES ABSOLUTE: 2.2 K/CU MM
LYMPHOCYTES RELATIVE PERCENT: 30.1 % (ref 24–44)
MAGNESIUM: 1.8 MG/DL (ref 1.8–2.4)
MCH RBC QN AUTO: 30.5 PG (ref 27–31)
MCHC RBC AUTO-ENTMCNC: 34.8 % (ref 32–36)
MCV RBC AUTO: 87.6 FL (ref 78–100)
MONOCYTES ABSOLUTE: 0.9 K/CU MM
MONOCYTES RELATIVE PERCENT: 11.4 % (ref 0–4)
PCT TRANSFERRIN: 26 % (ref 10–44)
PDW BLD-RTO: 12.2 % (ref 11.7–14.9)
PHOSPHORUS: 4.3 MG/DL (ref 2.5–4.9)
PLATELET # BLD: 269 K/CU MM (ref 140–440)
PMV BLD AUTO: 8.1 FL (ref 7.5–11.1)
POTASSIUM SERPL-SCNC: 5.2 MMOL/L (ref 3.5–5.1)
RBC # BLD: 4.2 M/CU MM (ref 4.2–5.4)
SEGMENTED NEUTROPHILS ABSOLUTE COUNT: 4.2 K/CU MM
SEGMENTED NEUTROPHILS RELATIVE PERCENT: 56.3 % (ref 36–66)
SODIUM BLD-SCNC: 122 MMOL/L (ref 135–145)
TOTAL IMMATURE NEUTOROPHIL: 0.09 K/CU MM
TOTAL IRON BINDING CAPACITY: 309 UG/DL (ref 250–450)
UNSATURATED IRON BINDING CAPACITY: 228 UG/DL (ref 110–370)
WBC # BLD: 7.4 K/CU MM (ref 4–10.5)

## 2020-09-01 PROCEDURE — 99442 PR PHYS/QHP TELEPHONE EVALUATION 11-20 MIN: CPT | Performed by: INTERNAL MEDICINE

## 2020-09-01 PROCEDURE — 83550 IRON BINDING TEST: CPT

## 2020-09-01 PROCEDURE — 80069 RENAL FUNCTION PANEL: CPT

## 2020-09-01 PROCEDURE — 85025 COMPLETE CBC W/AUTO DIFF WBC: CPT

## 2020-09-01 PROCEDURE — 83735 ASSAY OF MAGNESIUM: CPT

## 2020-09-01 PROCEDURE — 36415 COLL VENOUS BLD VENIPUNCTURE: CPT

## 2020-09-01 PROCEDURE — 83540 ASSAY OF IRON: CPT

## 2020-09-01 RX ORDER — LANOLIN ALCOHOL/MO/W.PET/CERES
400 CREAM (GRAM) TOPICAL 2 TIMES DAILY
Qty: 60 TABLET | Refills: 0 | Status: SHIPPED | OUTPATIENT
Start: 2020-09-01 | End: 2020-10-19

## 2020-09-01 ASSESSMENT — PATIENT HEALTH QUESTIONNAIRE - PHQ9
SUM OF ALL RESPONSES TO PHQ QUESTIONS 1-9: 0
SUM OF ALL RESPONSES TO PHQ9 QUESTIONS 1 & 2: 0
SUM OF ALL RESPONSES TO PHQ QUESTIONS 1-9: 0
2. FEELING DOWN, DEPRESSED OR HOPELESS: 0
1. LITTLE INTEREST OR PLEASURE IN DOING THINGS: 0

## 2020-09-01 NOTE — PROGRESS NOTES
specialist at [facility] in [facility] [address] does not [address] due to h/o seizures. I referred her to Neurology. She has heard back but waiting for appt (Khadra Cabral). She is now only taking Lamotrigine and Eslicarbazepine. # Patient was on Abilify, Topamax, Venlafaxine and many other meds that she self-discontinued. She reports she is only depressed but does not have bipolar. Patient's mood continues to be down. ? Has tardive dyskinesia but not taking Benztropine. Has h/o non-compliance with pills. She is taking Nortriptyline only. I referred to Psychiatry. She is interested in seeing someone at [facility]. Waiting to hear back. # She takes Meclizine for vertigo PRN. This helps. No falls or nausea.   # Patient has intention to quit smoking. Trying to quit on her own. Has smoker's cough. She is on patches now. # Her COPD is stable on her inhalers. She is tolerating Albuterol and Symbicort. No SOB, cough, wheezing. # Patient denies doing any other drugs other than weed and reports that her UDS positive because it might have been mixed with her marijuana. [place] Sorrow ? ? ? # Taking Olmesartan for HTN. BP last /80. Tolerating well. Electrolytes stable. No CP, palpitations, dizziness.   # Patient has not had labs done recently. She has prediabetes and dyslipidemia. Not on meds. # Patient is not on vitamin D supplements despite low vitamin D level years ago. Taking Fosamax for OP. DEXA not in chart. # Patient is s/p L carpal tunnel release on 7/30. Patient with hyponatremia at the time. # Has difficulty losing weight. She is gaining weight. # Complaining of SOB. TTE at [facility] in 2019 unremarkable. Stress test unremarkable. PFT shows obstruction wo BD response.        Health maintenance:   Health Maintenance Due   Topic Date Due    Hepatitis C screen  1960    Pneumococcal 0-64 years Vaccine (1 of 1 - PPSV23) 05/08/1967    HIV screen  05/08/1976    Cervical cancer screen  05/08/1982    Breast cancer screen 05/08/2011    Shingles Vaccine (1 of 2) 05/08/2011    Colon cancer screen colonoscopy  05/08/2011    Annual Wellness Visit (AWV)  12/20/2019    Flu vaccine (1) 09/01/2020       Past Medical History:  Past Medical History:   Diagnosis Date    COPD (chronic obstructive pulmonary disease) ([facility]     Full dentures     upper plate only    H/O dizziness     \"take Meclazine at least twice a day for this\"    History of drug abuse ([facility]     History of UTI     \"last one May 2020\"    Hypertension     \"the top # use to be over 200 \"- on medication since 2018\"    Seizures ([facility]     hx of epilepsy- last seizure week 7/13/2020- hx grand mal seizures    Wears glasses        Past Surgical History:  Past Surgical History:   Procedure Laterality Date    BRAIN SURGERY      s/p L anterior temporal lobectomy in 1996\"did surgery to try an treat the seizures\"   [address]    \"not sure if they took the ovaries\"   [address] [facility] Left 7/30/2020    LEFT WRIST TENDON RELEASE performed by [facility]  at [address] [facility]       Allergies:   Allergies   Allergen Reactions    Varenicline Other (See Comments)     Having bad dreams and chest pain( Chantix)    Chantix [Varenicline Tartrate] Palpitations     Chest pain       Medications:  Current outpatient prescriptions:  Outpatient Medications Marked as Taking for the 9/1/20 encounter (Virtual Visit) with Herman Keen MD   Medication Sig Dispense Refill    magnesium oxide (MAG-OX) 400 (240 Mg) MG tablet Take 1 tablet by mouth 2 times daily 60 tablet 0    lamoTRIgine (LAMICTAL) 25 MG tablet Take 1 tablet by mouth 2 times daily 60 tablet 2    lamoTRIgine (LAMICTAL) 200 MG tablet Take 1 tablet by mouth 2 times daily 60 tablet 2    eslicarbazepine acetate (APTIOM) 800 MG tablet Take 800 mg by mouth nightly 30 tablet 2    eslicarbazepine (APTIOM) 400 MG TABS tablet Take 1 tablet by mouth nightly Takes 1200 total with the 400mg and 800mg 30 tablet 2  alendronate (FOSAMAX) 70 MG tablet Take 1 tablet by mouth once a week 12 tablet 1    nortriptyline (PAMELOR) 25 MG capsule Take 2 capsules by mouth nightly 60 capsule 2    meclizine (ANTIVERT) 25 MG tablet Take 1 tablet by mouth 3 times daily as needed for Dizziness 90 tablet 2    olmesartan (BENICAR) 40 MG tablet Take 1 tablet by mouth daily 30 tablet 2    albuterol sulfate HFA (PROVENTIL HFA) 108 (90 Base) MCG/ACT inhaler Inhale 2 puffs into the lungs every 6 hours as needed for Wheezing 1 Inhaler 5    SYMBICORT 160-4.5 MCG/ACT AERO Inhale 2 puffs into the lungs 2 times daily 1 Inhaler 5    nicotine (NICODERM CQ) 21 MG/24HR Place 1 patch onto the skin daily 42 patch 0       Social History:  Social History     Socioeconomic History    Marital status:      Spouse name: Not on file    Number of children: Not on file    Years of education: Not on file    Highest education level: Not on file   Occupational History    Not on file   Social Needs    Financial resource strain: Not on file    Food insecurity     Worry: Not on file     Inability: Not on file    Transportation needs     Medical: Not on file     Non-medical: Not on file   Tobacco Use    Smoking status: Former Smoker     Packs/day: 1.50     Years: 40.00     Pack years: 60.00     Types: Cigarettes    Smokeless tobacco: Never Used    Tobacco comment: On patch since 07/31/20    Substance and Sexual Activity    Alcohol use: No     Frequency: Patient refused     Comment: per pt on 7/23/2020\"quit drinking age 40- use to drink on weekends\"    Drug use: Yes     Types: Marijuana, Other-see comments, Cocaine     Comment: PCP/ per pt on 7/23/2020\"last used marijuana 7/22/2020, last did cocaine  spring 2020\"    Sexual activity: Yes     Partners: Male   Lifestyle    Physical activity     Days per week: Not on file     Minutes per session: Not on file    Stress: Not on file   Relationships    Social connections     Talks on phone: Not on file     Gets together: Not on file     Attends Sabianism service: Not on file     Active member of club or organization: Not on file     Attends meetings of clubs or organizations: Not on file     Relationship status: Not on file    Intimate partner violence     Fear of current or ex partner: Not on file     Emotionally abused: Not on file     Physically abused: Not on file     Forced sexual activity: Not on file   Other Topics Concern    Not on file   Social History Narrative    Not on file       Family History:  Family History   Problem Relation Age of Onset    Heart Disease Father         cabg         Review of Systems:  Constitutional: no fevers, no chills, no night sweats, no weight loss, no weight gain, no fatigue   Pain assessment: no pain  Head: no headaches  Ears: no hearing loss, no tinnitus, no vertigo  Eyes: no blurry vision, no diplopia, no dryness, no itchiness  Mouth: no oral ulcers, no dry mouth, no sore throat  Nose: no nasal congestion, no epistaxis  Cardiac: no chest pain, no palpitations, no leg swelling, no orthopnea, no PND, no syncope  Pulmonary: no dyspnea, no cough, no wheezing, no hemoptysis  GI: no nausea, no vomiting, no diarrhea, no constipation, no abdominal pain, no hematochezia  : no dysuria, no frequency, no urgency, no hematuria, no frothy urine, no dyspareunia, no pelvic pain, no vaginal bleeding, no abnormal vaginal discharge  MSK: no arthralgias, leg cramps, no early morning stiffness, no Raynaud's   Neuro: no focal neurological deficits, seizures  Sleep: no snoring, no daytime somnolence   Psych: depression, no suicidal ideation      Physical Exam:  Due to this being a TeleHealth encounter, evaluation of the following organ systems is limited: Vitals/Constitutional/EENT/Resp/CV/GI//MSK/Neuro/Skin/Heme-Lymph-Imm. Labs   Reviewed with patient     Imaging   Reviewed with patient      Assessment/Plan:     1.  Leg cramps  Will treat with MagOx BID for now as patient wanting something to help until labs are done   - CBC Auto Differential; Future  - IRON AND TIBC; Future  - RENAL FUNCTION PANEL; Future  - MAGNESIUM; Future  - CALCIUM IONIZED SERUM; Future    2. Iron deficiency  ? cause for RLS   - IRON AND TIBC; Future      Care discussed with patient and questions answered. Patient verbalizes understanding and agrees with plan. Discussed with patient the importance of continuity of care. I encouraged patient to schedule next appointment within 1 month (already scheduled) with me. I reviewed and reconciled the medications this visit. I reviewed and updated the past medical, surgical, social, and family history during this visit. I affirm this is a Patient Initiated Episode with an Established Patient who has not had a related appointment within my department in the past 7 days or scheduled within the next 24 hours. Total Time: minutes: 11-20 minutes    Note: not billable if this call serves to triage the patient into an appointment for the relevant concern      Pursuant to the emergency declaration under the [facility], [facility] waiver authority and the [employer] and [employer] Act, this Virtual Visit was conducted, with patient's consent, to reduce the patient's risk of exposure to COVID-19 and provide continuity of care for an established patient. Services were provided through a telephone synchronous discussion virtually to substitute for in-person clinic visit.       Kaitlynn Odell MD  Internal Medicine  9/1/2020   11:09 AM

## 2020-09-02 ENCOUNTER — TELEPHONE (OUTPATIENT)
Dept: INTERNAL MEDICINE CLINIC | Age: 60
End: 2020-09-02

## 2020-09-02 NOTE — TELEPHONE ENCOUNTER
No major abnormalities other than low sodium and potassium slightly elevated (but these are not new for her). Continue magnesium supplements. She can also start vitamin B complex over the counter. She can also start Benadryl 25mg over the counter at bedtime which can help with muscle cramps. She is to drink plenty of fluid with electrolytes in it. She can apply heating pad OR ice to her legs. Try these measures for 2-3 nights. If no improvement, she is to let me know.        Doe Ferrer MD  Internal Medicine  9/2/2020  2:36 PM

## 2020-09-02 NOTE — TELEPHONE ENCOUNTER
Pt wants to know results of blood work and wants to let you know she is having trouble sleeping. Pt stated she \"cant stand it\" Please review and advise.

## 2020-09-14 ENCOUNTER — OFFICE VISIT (OUTPATIENT)
Dept: PSYCHOLOGY | Age: 60
End: 2020-09-14
Payer: MEDICARE

## 2020-09-14 PROCEDURE — 1036F TOBACCO NON-USER: CPT | Performed by: PSYCHOLOGIST

## 2020-09-14 PROCEDURE — 90832 PSYTX W PT 30 MINUTES: CPT | Performed by: PSYCHOLOGIST
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
## 2020-09-16 ENCOUNTER — OFFICE VISIT (OUTPATIENT)
Dept: ORTHOPEDIC SURGERY | Age: 60
End: 2020-09-16

## 2020-09-16 VITALS
OXYGEN SATURATION: 90 % | RESPIRATION RATE: 15 BRPM | BODY MASS INDEX: 32.39 KG/M2 | HEART RATE: 87 BPM | HEIGHT: 62 IN | WEIGHT: 176 LBS

## 2020-09-16 PROCEDURE — 99024 POSTOP FOLLOW-UP VISIT: CPT | Performed by: ORTHOPAEDIC SURGERY

## 2020-09-16 ASSESSMENT — ENCOUNTER SYMPTOMS
COLOR CHANGE: 0
SHORTNESS OF BREATH: 0

## 2020-09-16 NOTE — PROGRESS NOTES
Subjective:      Patient ID: Noa Harding is a 62 y.o. female. Pt returns to the office today for post op check of the left wrist tendon release DOS 7/30/20. Pt states with in the last 3 days she has noticed a pain increase on the lateral aspect of the left wrist. Pain increase when lifting objects. She notices the pain more when she is cooking. Denies any numbness or tingling in the left wrist.     She comes in today for her 6-week postop recheck after left de Quervain's release. She states that overall she is feeling much better since the surgery. She states that she no longer has any pain along the radial aspect of her left wrist.  She states that she has been having some pain along the ulnar aspect since she has been using her left arm more now. Patient denies any new injury to the involved extremity/ joint, denies numbness or tingling in the involved extremity and denies fever or chills. Review of Systems   Constitutional: Negative for activity change, chills and fever. Respiratory: Negative for shortness of breath. Cardiovascular: Negative for chest pain. Musculoskeletal: Negative for arthralgias, gait problem, joint swelling and myalgias. Skin: Negative for color change, pallor, rash and wound. Neurological: Negative for weakness and numbness. Past Medical History:   Diagnosis Date    [facility]chronic obstructive pulmonary disease) [facility]     Full dentures     upper plate only    H/O dizziness     \"take Meclazine at least twice a day for this\"    History of drug abuse ([facility]     History of UTI     \"last one May 2020\"    Hypertension     \"the top # use to be over 200 \"- on medication since 2018\"    Seizures ([address]     hx of epilepsy- last seizure week 7/13/2020- hx grand mal seizures    Wears glasses        Objective:   Physical Exam  Constitutional:       Appearance: She is well-developed. HENT:      Head: Normocephalic and atraumatic.    Eyes:      Pupils: Pupils are equal, round, and reactive to light. Neck:      Musculoskeletal: Normal range of motion. Pulmonary:      Effort: Pulmonary effort is normal.   Musculoskeletal: Normal range of motion. General: No tenderness or deformity. Right elbow: Normal.     Left elbow: Normal.      Right wrist: She exhibits normal range of motion, no tenderness, no bony tenderness, no swelling, no effusion, no crepitus, no deformity and no laceration. Left wrist: She exhibits normal range of motion, no tenderness, no bony tenderness, no swelling, no effusion, no crepitus, no deformity and no laceration. Skin:     General: Skin is warm and dry. Capillary Refill: Capillary refill takes less than 2 seconds. Coloration: Skin is not pale. Findings: No erythema or rash. Neurological:      Mental Status: She is alert and oriented to person, place, and time. Left wrist-Incision clean, dry, intact, with no erythema, no drainage, and no signs of infection. Negative Finkelstein's test   strength 5/5. Assessment:      Left de Quervain's release, 6 weeks      Plan:       I discussed with her today that she is progressing extremely well. I discussed with the patient today that their are symptoms are normal and should improve with time. Continue weight-bearing as tolerated. Continue range of motion exercises as instructed. Ice and elevate as needed. Tylenol or Motrin for pain. Follow up as needed.             Kali Graham,

## 2020-09-16 NOTE — PATIENT INSTRUCTIONS
Continue weight-bearing as tolerated. Continue range of motion exercises as instructed. Ice and elevate as needed. Tylenol or Motrin for pain.   Follow up as needed

## 2020-09-23 ENCOUNTER — OFFICE VISIT (OUTPATIENT)
Dept: ORTHOPEDIC SURGERY | Age: 60
End: 2020-09-23

## 2020-09-23 VITALS
WEIGHT: 176 LBS | HEIGHT: 62 IN | RESPIRATION RATE: 15 BRPM | BODY MASS INDEX: 32.39 KG/M2 | OXYGEN SATURATION: 97 % | HEART RATE: 74 BPM

## 2020-09-23 PROCEDURE — 99024 POSTOP FOLLOW-UP VISIT: CPT | Performed by: ORTHOPAEDIC SURGERY

## 2020-09-23 ASSESSMENT — ENCOUNTER SYMPTOMS
SHORTNESS OF BREATH: 0
COLOR CHANGE: 0

## 2020-09-23 NOTE — PROGRESS NOTES
Pt is here today for left wrist tendon release DOS 7/30/20. Pt states she is having pain along the lateal aspect of her wrist. Pt states pain increase anytime she tries to lift anything up. Pt states she has a difficult time scrubbing the dishes. Pt denies doing anything to help with the pain.  Pt denies any injury to the left wrist.

## 2020-09-23 NOTE — PATIENT INSTRUCTIONS
Continue weight-bearing as tolerated. Continue range of motion exercises as instructed. Ice and elevate as needed. Tylenol or Motrin for pain. Follow up as needed    Voltaren cream to use over the counter  Take more potassium to help with leg pain   Take calcium and vitamin D     Patient Education        Blasfarooq Jaramilloeman Disease: Exercises  Introduction  Here are some examples of exercises for you to try. The exercises may be suggested for a condition or for rehabilitation. Start each exercise slowly. Ease off the exercises if you start to have pain. You will be told when to start these exercises and which ones will work best for you. How to do the exercises  Thumb lifts   1. Place your hand on a flat surface, with your palm up. 2. Lift your thumb away from your palm to make a \"C\" shape. 3. Hold for about 6 seconds. 4. Repeat 8 to 12 times. Passive thumb MP flexion   1. Hold your hand in front of you, and turn your hand so your little finger faces down and your thumb faces up. (Your hand should be in the position used for shaking someone's hand.) You may also rest your hand on a flat surface. 2. Use the fingers on your other hand to bend your thumb down at the point where your thumb connects to your palm. 3. Hold for at least 15 to 30 seconds. 4. Repeat 2 to 4 times. Finkelstein stretch   1. Hold your arms out in front of you. (Your hand should be in the position used for shaking someone's hand.)  2. Bend your thumb toward your palm. 3. Use your other hand to gently stretch your thumb and wrist downward until you feel the stretch on the thumb side of your wrist.  4. Hold for at least 15 to 30 seconds. 5. Repeat 2 to 4 times. Resisted ulnar deviation   For this exercise, you will need elastic exercise material, such as surgical tubing or Thera-Band. 1. Sit leaning forward with your legs slightly spread and your elbow on your thigh.   2. Grasp one end of the band with your palm down, and step on the other end with the foot opposite the hand holding the band. 3. Slowly bend your wrist sideways and away from your knee. 4. Repeat 8 to 12 times. Follow-up care is a key part of your treatment and safety. Be sure to make and go to all appointments, and call your doctor if you are having problems. It's also a good idea to know your test results and keep a list of the medicines you take. Where can you learn more? Go to https://FashionspacepeRuxter.CultureIQ. org and sign in to your [employer]Fig account. Enter W815 in the MyAcademicProgram box to learn more about \"De Quervain's Disease: Exercises. \"     If you do not have an account, please click on the \"Sign Up Now\" link. Current as of: March 2, 2020               Content Version: 12.5  © 7523-7596 [employer]. Care instructions adapted under license by [facility] ([facility]). If you have questions about a medical condition or this instruction, always ask your healthcare professional. [place] [address] any warranty or liability for your use of this information. Patient Education        De Quervain's Tendon Release: What to Expect at [address] (say \"Gayathri\") tendon release is surgery to reduce pressure on a tendon that runs along the side of the wrist near the thumb. The doctor made a cut, called an incision, in the skin on the side of your wrist near the base of your thumb. The surgery opens the tissue over the swollen part of the tendon. This allows the tendon to move freely without pain. Your wrist and thumb will be sore and swollen at first. You may feel numbness or tingling near the incision that the doctor made. This feeling will probably start to get better in a few days, but it may take several months to go away. Your doctor will take out your stitches 1 to 2 weeks after surgery.  Your doctor or your physical or occupational therapist may recommend that you wear a splint on your hand for 1 to 4 weeks after surgery. It may take 6 to 12 weeks for your hand to heal completely. After you heal, you may be able to move your wrist and thumb without pain. How soon you can return to work depends on your job. If you can do your job without using your hand, you may be able to go back after 1 or 2 days. But if your job requires you to do repeated hand or wrist movements, put pressure on your hand or wrist, or lift things, you may need to take 6 to 12 weeks off work. Your doctor can help you decide how much time you will need to take off work. This care sheet gives you a general idea about how long it will take for you to recover. But each person recovers at a different pace. Follow the steps below to get better as quickly as possible. How can you care for yourself at home? Activity  · Rest when you feel tired. Getting enough sleep will help you recover. · Try to walk each day. Start by walking a little more than you did the day before. Bit by bit, increase the amount you walk. · For 1 to 2 weeks after surgery, avoid using your hand. This includes lifting things heavier than 1 to 2 pounds or doing repeated arm or hand movements, such as typing, using a computer mouse, washing windows, vacuuming, or chopping food. Do not use power tools, and avoid other activities that make your hand vibrate. · Ask your doctor when you can drive again. · You may be able to go back to work 1 or 2 days after surgery. It depends on the type of work you do and how you feel. · You may shower, but do not get your hand wet until your doctor says it is okay. Keep the bandage dry by covering it with plastic. Do not take a bath, swim, use a hot tub, or soak your hand until your doctor says it is okay. Diet  · You can eat your normal diet. If your stomach is upset, try bland, low-fat foods like plain rice, broiled chicken, toast, and yogurt. Medicines  · Your doctor will tell you if and when you can restart your medicines.  He or she will also give you instructions about taking any new medicines. · If you take aspirin or some other blood thinner, ask your doctor if and when to start taking it again. Make sure that you understand exactly what your doctor wants you to do. · Take pain medicines exactly as directed. ? If the doctor gave you a prescription medicine for pain, take it as prescribed. ? If you are not taking a prescription pain medicine, ask your doctor if you can take an over-the-counter medicine. · If you think your pain medicine is making you sick to your stomach:  ? Take your medicine after meals (unless your doctor has told you not to). ? Ask your doctor for a different pain medicine. · If your doctor prescribed antibiotics, take them as directed. Do not stop taking them just because you feel better. You need to take the full course of antibiotics. Incision care  · Leave the bandage on your hand until your doctor says it is okay to remove it. · After your doctor says you can take off your bandage, wash the area daily with warm, soapy water, and pat it dry. Don't use hydrogen peroxide or alcohol, which can slow healing. You may cover the area with a gauze bandage if it weeps or rubs against clothing. Change the bandage every day. · Keep the area clean and dry. Exercise  · Gently bend and straighten your fingers throughout the day to keep them flexible and help reduce swelling. · You may need wrist and hand therapy. This can help you regain movement, strength, and  in your wrist and hand. To get the best results, you need to do the exercises correctly and as often and as long as your doctor or your physical or occupational therapist tells you to. Ice and elevation  · Put ice or a cold pack on your hand and wrist for 10 to 20 minutes at a time. Try to do this every 1 to 2 hours for the next 3 days (when you are awake) or until the swelling goes down. Put a thin cloth between the ice and your skin.   · Prop up your wrist on a pillow anytime you sit or lie down for the first 2 or 3 days. Try to keep your wrist above the level of your heart. This will help reduce swelling. · Wear a sling to support your hand, if directed by your doctor. Make sure to move your arm and shoulder often if you wear a sling. This will help prevent your elbow and shoulder from getting stiff. Follow-up care is a key part of your treatment and safety. Be sure to make and go to all appointments, and call your doctor if you are having problems. It's also a good idea to know your test results and keep a list of the medicines you take. When should you call for help? [id number] anytime you think you may need emergency care. For example, call if:  · You passed out (lost consciousness). · You have chest pain, are short of breath, or cough up blood. Call your doctor now or seek immediate medical care if:  · You have pain that does not get better after you take pain medicine. · You have loose stitches, or your incision comes open. · Bright red blood has soaked through the bandage over your incision. · You have signs of infection, such as:  ? Increased pain, swelling, warmth, or redness. ? Red streaks leading from the incision. ? Pus draining from the incision. ? A fever. · Your hand or fingers are cool or pale or change color. · You have tingling or numbness in your hand or fingers. · You cannot move your fingers. · Your cast or splint feels too tight. Watch closely for any changes in your health, and be sure to contact your doctor if:  · You do not get better as expected. Where can you learn more? Go to https://EmpiriboxpeCoveroo.Pulse. org and sign in to your [employer] account. Enter G293 in the [employer] box to learn more about \"De Quervain's Tendon Release: What to Expect at Home. \"     If you do not have an account, please click on the \"Sign Up Now\" link.   Current as of: March 2, 2020               Content Version: 12.5  © 3575-1482 [employer]. Care instructions adapted under license by [facility] ([facility]). If you have questions about a medical condition or this instruction, always ask your healthcare professional. [address] any warranty or liability for your use of this information.

## 2020-09-23 NOTE — PROGRESS NOTES
Subjective:      Patient ID: Clare Potts is a 62 y.o. female. Pt is here today for left wrist tendon release DOS 7/30/20. Pt states she is having pain along the lateal aspect of her wrist. Pt states pain increase anytime she tries to lift anything up. Pt states she has a difficult time scrubbing the dishes. Pt denies doing anything to help with the pain. Pt denies any injury to the left wrist.    She comes in today for her 7-week postop recheck after left de Quervain's release. She states that overall she is feeling much better since the surgery. She states that she no longer has any pain along the radial aspect of her left wrist.  She states that she has been having some pain along the ulnar aspect since she has been using her left arm more now. Patient denies any new injury to the involved extremity/ joint, denies numbness or tingling in the involved extremity and denies fever or chills. She comes in today for recheck of her left wrist pain. She states that she is not currently having any pain with motion of the left wrist but at the end of the day she will get continued dull and aching pain along the ulnar aspect of her left wrist.          Review of Systems   Constitutional: Negative for activity change, chills and fever. Respiratory: Negative for shortness of breath. Cardiovascular: Negative for chest pain. Musculoskeletal: Negative for arthralgias, gait problem, joint swelling and myalgias. Skin: Negative for color change, pallor, rash and wound. Neurological: Negative for weakness and numbness.        Past Medical History:   Diagnosis Date    [facility]     Full dentures     upper plate only    H/O dizziness     \"take Meclazine at least twice a day for this\"    History of drug abuse ([facility]     History of UTI     \"last one May 2020\"    Hypertension     \"the top # use to be over 200 \"- on medication since 2018\"    Seizures ([facility]     hx of epilepsy- last seizure week 7/13/2020- hx grand mal seizures    Wears glasses        Objective:   Physical Exam  Constitutional:       Appearance: She is well-developed. HENT:      Head: Normocephalic and atraumatic. Eyes:      Pupils: Pupils are equal, round, and reactive to light. Neck:      Musculoskeletal: Normal range of motion. Pulmonary:      Effort: Pulmonary effort is normal.   Musculoskeletal: Normal range of motion. General: No tenderness or deformity. Right elbow: Normal.     Left elbow: Normal.      Right wrist: She exhibits normal range of motion, no tenderness, no bony tenderness, no swelling, no effusion, no crepitus, no deformity and no laceration. Left wrist: She exhibits normal range of motion, no tenderness, no bony tenderness, no swelling, no effusion, no crepitus, no deformity and no laceration. Skin:     General: Skin is warm and dry. Capillary Refill: Capillary refill takes less than 2 seconds. Coloration: Skin is not pale. Findings: No erythema or rash. Neurological:      Mental Status: She is alert and oriented to person, place, and time. Left wrist-Incision clean, dry, intact, with no erythema, no drainage, and no signs of infection. Negative Finkelstein's test   strength 5/5. Assessment:      Left de Quervain's release, 7 weeks  Left distal ulna pain      Plan:       I discussed with her today that she is progressing extremely well. I discussed with the patient today that their are symptoms are normal and should improve with time. I reassured her again today that she does not appear to have tendinitis in the ulnar aspect of her left wrist but appears to have vague pain as she is beginning to lift with her left hand again.   At this point I recommend that she try over-the-counter Voltaren cream, continue with Advil and try heat versus ice on the left wrist.  She is to try this for the next month if this does not help we could consider a compounded cream.  The last resort would be to consider MRI for further evaluation. Continue weight-bearing as tolerated. Continue range of motion exercises as instructed. Ice and elevate as needed. Tylenol or Motrin for pain. Follow up as needed.             Yoko Del Rosario DO

## 2020-10-05 ENCOUNTER — OFFICE VISIT (OUTPATIENT)
Dept: PSYCHOLOGY | Age: 60
End: 2020-10-05
Payer: MEDICARE

## 2020-10-05 PROCEDURE — 1036F TOBACCO NON-USER: CPT | Performed by: PSYCHOLOGIST

## 2020-10-05 PROCEDURE — 90832 PSYTX W PT 30 MINUTES: CPT | Performed by: PSYCHOLOGIST

## 2020-10-05 NOTE — PROGRESS NOTES
Behavioral Health Consultation  Lidia Groves Psy.D. Psychologist      Time spent with Patient: 30 minutes  Visit number: 3  Reason for Consult:  depression and anxiety  Referring Provider: Ej Lockett MD  [address] [facility] [place]  [facility] [address] [facility], [address] [place] [address]    S:  ----------------------------------------------------------------------------------------------------------------------  depression and anxiety  \"Not much has changed. \" Endorsed depressed mood, anhedonia, anergia, amotivation, poor sleep, fluctuating appetite, diminished concentration, and corrosive self-image. Continues to ruminate on family and social situations. \"I think a lot of this is that I'm worried about my epilepsy. \"     O:  ----------------------------------------------------------------------------------------------------------------------  MSE:  Orientation:  oriented to person, place, time, and general circumstances  Appearance and behavior:  alert, cooperative  Speech:  spontaneous, normal rate and normal volume  Mood: depressed and anxious   Thought Content:  intact, hopelessness and helplessness  Thought Process:  linear, goal directed and coherent  Interest/Pleasure: Loss of Pleasure/Fun  Sleep disturbance: Yes  Motivation: Poor  Energy: Tired/Fatigued  Morbid ideation No  Suicide Assessment: no suicidal ideation    A:  ----------------------------------------------------------------------------------------------------------------------  Diagnosis:    1.  Moderate episode of recurrent major depressive disorder (HCC)         PHQ Scores 9/1/2020 8/11/2020 7/14/2020 6/16/2020   PHQ2 Score 0 1 1 2   PHQ9 Score 0 1 1 2     Interpretation of Total Score Depression Severity: 1-4 = Minimal depression, 5-9 = Mild depression, 10-14 = Moderate depression, 15-19 = Moderately severe depression, 20-27 = Severe depression    P:  ----------------------------------------------------------------------------------------------------------------------    General:   [x] Gleason-setting to identify pt's primary goals for [facility] visit / overall health   [x] Provided psychoeducation/handout on:   1. Moderate episode of recurrent major depressive disorder (HCC)        [x]  Supportive interventions    Cognitive:   [x] Trained in strategies for increasing balanced thinking   [x] Cognitive strategies to target current mental health sx    [x] Identified and challenged maladaptive thoughts    Behavioral:   [x] Discussed and set plan for behavioral activation   [x] Discussed and problem-solved barriers in adhering to behavioral change plan   [x] Motivational Interviewing to increase patient confidence and compliance with       adhering to behavioral change plan   [x] Discussed potential barriers to change   [x] Discussed self-care (sleep, nutrition, rewarding activities, social support, exercise)    Other:   []   []   []   []    Recommendations to patient:    1. Return to Dr. Junaid Petersen in 2-3 week(s)    2.                Feedback provided to pt's PCP via [employer] and/or oral report

## 2020-10-06 RX ORDER — NORTRIPTYLINE HYDROCHLORIDE 25 MG/1
50 CAPSULE ORAL NIGHTLY
Qty: 60 CAPSULE | Refills: 2 | Status: SHIPPED | OUTPATIENT
Start: 2020-10-06 | End: 2020-11-24 | Stop reason: SDUPTHER

## 2020-10-19 ENCOUNTER — TELEPHONE (OUTPATIENT)
Dept: INTERNAL MEDICINE CLINIC | Age: 60
End: 2020-10-19

## 2020-10-19 ENCOUNTER — OFFICE VISIT (OUTPATIENT)
Dept: INTERNAL MEDICINE CLINIC | Age: 60
End: 2020-10-19
Payer: MEDICARE

## 2020-10-19 VITALS
WEIGHT: 183 LBS | SYSTOLIC BLOOD PRESSURE: 170 MMHG | HEART RATE: 81 BPM | TEMPERATURE: 97.3 F | OXYGEN SATURATION: 94 % | DIASTOLIC BLOOD PRESSURE: 108 MMHG | BODY MASS INDEX: 33.47 KG/M2

## 2020-10-19 DIAGNOSIS — R42 VERTIGO: ICD-10-CM

## 2020-10-19 DIAGNOSIS — M81.0 AGE-RELATED OSTEOPOROSIS WITHOUT CURRENT PATHOLOGICAL FRACTURE: ICD-10-CM

## 2020-10-19 PROBLEM — R06.09 EXERTIONAL DYSPNEA: Status: ACTIVE | Noted: 2020-10-19

## 2020-10-19 PROBLEM — G25.81 RESTLESS LEGS: Status: ACTIVE | Noted: 2020-10-19

## 2020-10-19 PROCEDURE — 99214 OFFICE O/P EST MOD 30 MIN: CPT | Performed by: INTERNAL MEDICINE

## 2020-10-19 PROCEDURE — G8427 DOCREV CUR MEDS BY ELIG CLIN: HCPCS | Performed by: INTERNAL MEDICINE

## 2020-10-19 PROCEDURE — 1036F TOBACCO NON-USER: CPT | Performed by: INTERNAL MEDICINE

## 2020-10-19 PROCEDURE — G8417 CALC BMI ABV UP PARAM F/U: HCPCS | Performed by: INTERNAL MEDICINE

## 2020-10-19 PROCEDURE — 3017F COLORECTAL CA SCREEN DOC REV: CPT | Performed by: INTERNAL MEDICINE

## 2020-10-19 PROCEDURE — G8484 FLU IMMUNIZE NO ADMIN: HCPCS | Performed by: INTERNAL MEDICINE

## 2020-10-19 RX ORDER — OLMESARTAN MEDOXOMIL 40 MG/1
40 TABLET ORAL DAILY
Qty: 30 TABLET | Refills: 2 | Status: SHIPPED | OUTPATIENT
Start: 2020-10-19 | End: 2020-11-03

## 2020-10-19 RX ORDER — LAMOTRIGINE 25 MG/1
25 TABLET ORAL 2 TIMES DAILY
Qty: 60 TABLET | Refills: 2 | Status: SHIPPED | OUTPATIENT
Start: 2020-10-19 | End: 2021-02-23

## 2020-10-19 RX ORDER — ALENDRONATE SODIUM 70 MG/1
70 TABLET ORAL WEEKLY
Qty: 12 TABLET | Refills: 1 | Status: SHIPPED | OUTPATIENT
Start: 2020-10-19 | End: 2020-11-24 | Stop reason: SDUPTHER

## 2020-10-19 RX ORDER — MECLIZINE HYDROCHLORIDE 25 MG/1
25 TABLET ORAL 3 TIMES DAILY PRN
Qty: 90 TABLET | Refills: 2 | Status: SHIPPED | OUTPATIENT
Start: 2020-10-19 | End: 2020-11-24 | Stop reason: SDUPTHER

## 2020-10-19 RX ORDER — ESLICARBAZEPINE ACETATE 400 MG/1
400 TABLET ORAL NIGHTLY
Qty: 30 TABLET | Refills: 2 | Status: SHIPPED | OUTPATIENT
Start: 2020-10-19 | End: 2021-09-09

## 2020-10-19 RX ORDER — LANOLIN ALCOHOL/MO/W.PET/CERES
400 CREAM (GRAM) TOPICAL 2 TIMES DAILY
Qty: 60 TABLET | Refills: 3 | Status: CANCELLED | OUTPATIENT
Start: 2020-10-19

## 2020-10-19 RX ORDER — ROPINIROLE 0.25 MG/1
0.25 TABLET, FILM COATED ORAL NIGHTLY
Qty: 30 TABLET | Refills: 3 | Status: SHIPPED | OUTPATIENT
Start: 2020-10-19 | End: 2020-11-03

## 2020-10-19 RX ORDER — LAMOTRIGINE 200 MG/1
200 TABLET ORAL 2 TIMES DAILY
Qty: 60 TABLET | Refills: 2 | Status: SHIPPED | OUTPATIENT
Start: 2020-10-19 | End: 2021-09-09 | Stop reason: ALTCHOICE

## 2020-10-19 ASSESSMENT — PATIENT HEALTH QUESTIONNAIRE - PHQ9
SUM OF ALL RESPONSES TO PHQ QUESTIONS 1-9: 0
1. LITTLE INTEREST OR PLEASURE IN DOING THINGS: 0
SUM OF ALL RESPONSES TO PHQ QUESTIONS 1-9: 0
SUM OF ALL RESPONSES TO PHQ QUESTIONS 1-9: 0
2. FEELING DOWN, DEPRESSED OR HOPELESS: 0
SUM OF ALL RESPONSES TO PHQ9 QUESTIONS 1 & 2: 0

## 2020-10-19 NOTE — PROGRESS NOTES
10/19/20    Génesis Banks  1960    Chief Complaint   Patient presents with    Seizures     2 Month follow up. History of Present Illness:  Melody Gloriajean Boxer is a 62 y.o. pleasant lady presenting today with a chief complaint of restless legs. She has a past medical history significant for:  HTN, on Olmesartan 40mg QD  HL (LDL 87, TG 83 on 7/7/2020), not on statin therapy  Prediabetes (HbA1C 5.4% on 7/7/2020), not on meds   Epilepsy (s/p L anterior temporal lobectomy in 1996), on Lamotrigine 157HA BID, Eslicarbazepine [id number] QHS   Depression, on Nortriptyline 50mg QHS   COPD, on Albuterol PRN, Symbicort   Osteoporosis (Tharon Flax in chart), on Fosamax 70mg weekly  Vertigo, on Meclizine 25mg TID PRN   Vitamin D deficiency (level 27 on 4/4/13), not on supplementation   S/p hysterectomy (cervix status unknown)  S/p L carpal tunnel release (7/30/2020)   Polysubstance abuse (marijuana, PCP, cocaine)   Current smoker     # Over the past few weeks, patient has been having leg cramps that improve with walking. Stopping her from sleep. Mostly what's bothersome is restless legs. No diarrhea or vomiting. No change in medications lately. Labs recently unremarkable except slightly low free T4. K 5.2. She is taking OTC RLS meds. Mag did not help. # Patient had seizure in May 2020 and April 2020 ~ 6 weeks apart. In the ED on 5/29/2020 [facility] unremarkable. Basic labs with mild hyponatremia (Na 131). Had UTI s/p Rocephin once then PO Keflex on discharge. UDS was positive for marijuana and PCP. Patient had another seizure in June.   Patient had stopped taking Vimpat as she did not know it was an AED. Patient was diagnosed with epilepsy at age 25. Used to see Epilepsy specialist at [facility] in [facility] [address] does not drive due to h/o seizures.  I referred her to Neurology but she does not have an appt until Dec.   She is now only taking Lamotrigine and Eslicarbazepine.   # Patient was on Abilify, Topamax, Venlafaxine and many other meds that she self-discontinued. She reports she is only depressed but does not have bipolar. Patient's mood continues to be down. ? Has tardive dyskinesia but not taking Benztropine. Has h/o non-compliance with pills. She is taking Nortriptyline only. I referred to Psychiatry. She is interested in seeing someone at [facility]. Waiting to hear back. She is seeing Dr. Lady Mcclendon. # She takes Meclizine for vertigo PRN. This helps. No falls or nausea.   # Patient has intention to quit smoking. Trying to quit on her own. Has smoker's cough. She is on patches now but they sometimes give her local reaction. # COPD: she is tolerating Albuterol and Symbicort. Has some baseline SOB, but no cough or wheezing. Albuterol does not help with her SOB. Has difficulty climbing a flight of stairs. # Patient denies doing any other drugs other than weed and reports that her UDS positive because it might have been mixed with her marijuana as per patient. Gio Rathke Gio Rathke ? ? ? # Taking Olmesartan for HTN. BP last /80. Today /108. No CP, palpitations, dizziness.   # Patient has not had labs done recently. She has prediabetes and dyslipidemia. Not on meds. # Patient is not on vitamin D supplements despite low vitamin D level years ago. Taking Fosamax for OP. DEXA not in chart.   # Patient is s/p L carpal tunnel release on 7/30.   Patient with hyponatremia at the time. Chronic due to AEDs. # Has difficulty losing weight. She is gaining weight. # Complaining of worsening exertional dyspnea. TTE at [facility] in 2019 unremarkable. Stress test unremarkable. PFT shows obstruction wo BD response.       Health maintenance:   Health Maintenance Due   Topic Date Due    Hepatitis C screen  1960    Pneumococcal 0-64 years Vaccine (1 of 1 - PPSV23) 05/08/1967    HIV screen  05/08/1976    Cervical cancer screen  05/08/1982    Breast cancer screen  05/08/2011    Shingles Vaccine (1 of 2) 05/08/2011    Colon cancer screen colonoscopy  05/08/2011    Annual Wellness Visit (AWV)  12/20/2019    Flu vaccine (1) 09/01/2020       Past Medical History:  Past Medical History:   Diagnosis Date    COPD [facility]ch[facility] obstructive pulmonary disease) ([facility]     Full dentures     upper plate only    H/O dizziness     \"take Meclazine at least twice a day for this\"    History of drug abuse ([facility]     History of UTI     \"last one May 2020\"    Hypertension     \"the top # use to be over 200 \"- on medication since 2018\"    Seizures ([facility]     hx of epilepsy- last seizure week 7/13/2020- hx grand mal seizures    Wears glasses        Past Surgical History:  Past Surgical History:   Procedure Laterality Date    BRAIN SURGERY      s/p L anterior temporal lobectomy in 1996\"did surgery to try an treat the seizures\"   [address]    \"not sure if they took the ovaries\"   [facility] Left 7/30/2020    LEFT WRIST TENDON RELEASE performed by David Jennings DO at  [facility] [address]:   Allergies   Allergen Reactions    Varenicline Other (See Comments)     Having bad dreams and chest pain( Chantix)    Chantix [Varenicline Tartrate] Palpitations     Chest pain       Medications:  Current outpatient prescriptions:  Outpatient Medications Marked as Taking for the 10/19/20 encounter (Office Visit) with Phan Mckeon MD   Medication Sig Dispense Refill    eslicarbazepine (APTIOM) 400 MG TABS tablet Take 1 tablet by mouth nightly Takes 1200 total with the 400mg and 800mg 30 tablet 2    eslicarbazepine acetate (APTIOM) 800 MG tablet Take 800 mg by mouth nightly 30 tablet 2    lamoTRIgine (LAMICTAL) 200 MG tablet Take 1 tablet by mouth 2 times daily 60 tablet 2    lamoTRIgine (LAMICTAL) 25 MG tablet Take 1 tablet by mouth 2 times daily 60 tablet 2    olmesartan (BENICAR) 40 MG tablet Take 1 tablet by mouth daily 30 tablet 2    rOPINIRole (REQUIP) 0.25 MG tablet Take 1 tablet by mouth nightly 30 tablet 3    nortriptyline (PAMELOR) 25 MG capsule Take 2 capsules by mouth nightly 60 capsule 2    meclizine (ANTIVERT) 25 MG tablet Take 1 tablet by mouth 3 times daily as needed for Dizziness 90 tablet 2    albuterol sulfate HFA (PROVENTIL HFA) 108 (90 Base) MCG/ACT inhaler Inhale 2 puffs into the lungs every 6 hours as needed for Wheezing 1 Inhaler 5    SYMBICORT 160-4.5 MCG/ACT AERO Inhale 2 puffs into the lungs 2 times daily 1 Inhaler 5       Social History:  Social History     Socioeconomic History    Marital status:      Spouse name: Not on file    Number of children: Not on file    Years of education: Not on file    Highest education level: Not on file   Occupational History    Not on file   Social Needs    Financial resource strain: Not on file    Food insecurity     Worry: Not on file     Inability: Not on file    Transportation needs     Medical: Not on file     Non-medical: Not on file   Tobacco Use    Smoking status: Former Smoker     Packs/day: 1.50     Years: 40.00     Pack years: 60.00     Types: Cigarettes    Smokeless tobacco: Never Used    Tobacco comment: On patch since 07/31/20    Substance and Sexual Activity    Alcohol use: No     Frequency: Patient refused     Comment: per pt on 7/23/2020\"quit drinking age 40- use to drink on weekends\"    Drug use: Yes     Types: Marijuana, Other-see comments, Cocaine     Comment: PCP/ per pt on 7/23/2020\"last used marijuana 7/22/2020, last did cocaine  spring 2020\"    Sexual activity: Yes     Partners: Male   Lifestyle    Physical activity     Days per week: Not on file     Minutes per session: Not on file    Stress: Not on file   Relationships    Social connections     Talks on phone: Not on file     Gets together: Not on file     Attends Mandaen service: Not on file     Active member of club or organization: Not on file     Attends meetings of clubs or organizations: Not on file     Relationship status: Not on file    Intimate partner violence     Fear of current or ex partner: Not on file     Emotionally abused: Not on file     Physically abused: Not on file     Forced sexual activity: Not on file   Other Topics Concern    Not on file   Social History Narrative    Not on file       Family History:  Family History   Problem Relation Age of Onset    Heart Disease Father         cabg         Review of Systems:  Constitutional: no fevers, no chills, no night sweats, no weight loss, no weight gain, no fatigue   Pain assessment: no pain  Head: no headaches  Ears: no hearing loss, no tinnitus, no vertigo  Eyes: no blurry vision, no diplopia, no dryness, no itchiness  Mouth: no oral ulcers, no dry mouth, no sore throat  Nose: no nasal congestion, no epistaxis  Cardiac: no chest pain, no palpitations, no leg swelling, no orthopnea, no PND, no syncope  Pulmonary: no dyspnea, no cough, no wheezing, no hemoptysis  GI: no nausea, no vomiting, no diarrhea, no constipation, no abdominal pain, no hematochezia  : no dysuria, no frequency, no urgency, no hematuria, no frothy urine, no dyspareunia, no pelvic pain, no vaginal bleeding, no abnormal vaginal discharge  MSK: no arthralgias, leg cramps, no early morning stiffness, no Raynaud's   Neuro: no focal neurological deficits, seizures  Sleep: no snoring, no daytime somnolence   Psych: depression, no suicidal ideation      Physical Exam:  VITALS:   BP (!) 170/108   Pulse 81   Temp 97.3 °F (36.3 °C)   Wt 183 lb (83 kg)   SpO2 94%   BMI 33.47 kg/m²     PHYSICAL EXAMINATION:  General: alert, awake, and oriented to time, place, person, and situation. Not in acute distress   Skin:  no suspicious rashes, no jaundice  Head: normocephalic/atraumatic  Eyes: anicteric sclera, well-injected conjunctiva. Pupils are equally round and reactive to light.  Extraocular movements are intact   Nose: no septal deviation evident  Sinuses: no sinus tenderness  Ears: external ears normal  Neck: supple, no cervical lymphadenopathy, thyroid symmetric and not enlarged, no bruits   Heart: regular rate and rhythm, regular S1/S2, no S3/S4, no audible murmurs, no audible friction rub  Lungs: clear to auscultation bilaterally, no audible crackles, no audible wheezes, no audible rhonchi    Abdomen: normal bowel sounds, soft abdomen, non-tender, no palpable masses  Extremities: no edema, warm, no cyanosis, no clubbing. Good capillary refill   MSK: no tenderness across spinous processes, full ROM in all 4 extremities. No joint swelling or tenderness   Peripheral vascular: 2+ pulses symmetric (radial)  Neuro: gait normal, CN II-XII intact, motor power 5/5 in all 4 extremities, sensation intact and symmetric    Labs   Reviewed with patient     Imaging   Reviewed with patient      Assessment/Plan:     1. Exertional dyspnea  Work up done at [facility]: TTE, stress test unremarkable  PFT with obstruction wo BD response  Not improving with inhalers  Cardiac work up first. Will be getting CT and PFT   She follows with Pulmonology   - [facility], Anitha kay    2. Restless legs  Start Ropinirole 0.25mg QHS     3. Intractable generalized idiopathic epilepsy without status epilepticus ([address]  Stable  Continue Aptiom 1200mg QHS  Continue Lamotrigine 225mg BID  Dec has first appt with Neurology   I have previously discussed that TCA can lower seizure threshold     4. Essential hypertension  Uncontrolled  DASH diet discussed  FU in 2 weeks  Continue Olmesartan 40mg QD for now     5. Abnormal thyroid function test  Free T4 low, TSH normal Aug 2020  - TSH without Reflex; Future  - FREE T4; Future    6. Hyperkalemia  K 5.2 last month  Recommended she DC OTC Potassium gluconate   - RENAL FUNCTION PANEL; Future      Care discussed with patient and questions answered. Patient verbalizes understanding and agrees with plan. Discussed with patient the importance of continuity of care. I encouraged patient to schedule next appointment within 2 weeks with me. Patient prefers to be reached by Phone call at [phone] for future medical correspondence. Encouraged to activate MyChart. I reviewed and reconciled the medications this visit. I reviewed and updated the past medical, surgical, social, and family history during this visit. After visit summary provided.        Tico Bonilla MD  Internal Medicine  10/19/2020   11:25 AM

## 2020-10-19 NOTE — TELEPHONE ENCOUNTER
Patient called and stated she thought you were going to send all of her active medications to the pharmacy, so they would be under your name. Patient stated the following refills were not sent. Meclizine 25 mg which she reports taking 3 x's per day and Fosamax 70 mg which she takes 1 x per week. If you want to send these scripts please send them to [facility].

## 2020-10-19 NOTE — TELEPHONE ENCOUNTER
She did not inform me that she needed refills on those 2 but I will send them to [address] [place] [address]

## 2020-10-20 ENCOUNTER — TELEPHONE (OUTPATIENT)
Dept: INTERNAL MEDICINE CLINIC | Age: 60
End: 2020-10-20

## 2020-10-20 NOTE — TELEPHONE ENCOUNTER
Patient called and states she was seen for today and that BP was high. Patient scheduled the 1 month follow up but is unsure what other changes that she is to do between now and the follow up apt.

## 2020-11-03 ENCOUNTER — OFFICE VISIT (OUTPATIENT)
Dept: INTERNAL MEDICINE CLINIC | Age: 60
End: 2020-11-03
Payer: MEDICARE

## 2020-11-03 ENCOUNTER — TELEPHONE (OUTPATIENT)
Dept: BARIATRICS/WEIGHT MGMT | Age: 60
End: 2020-11-03

## 2020-11-03 VITALS
HEART RATE: 75 BPM | TEMPERATURE: 97.2 F | BODY MASS INDEX: 33.47 KG/M2 | DIASTOLIC BLOOD PRESSURE: 108 MMHG | OXYGEN SATURATION: 100 % | WEIGHT: 183 LBS | SYSTOLIC BLOOD PRESSURE: 168 MMHG

## 2020-11-03 PROBLEM — E66.9 OBESITY (BMI 30-39.9): Status: ACTIVE | Noted: 2020-11-03

## 2020-11-03 PROCEDURE — 99214 OFFICE O/P EST MOD 30 MIN: CPT | Performed by: INTERNAL MEDICINE

## 2020-11-03 PROCEDURE — 3017F COLORECTAL CA SCREEN DOC REV: CPT | Performed by: INTERNAL MEDICINE

## 2020-11-03 PROCEDURE — 90686 IIV4 VACC NO PRSV 0.5 ML IM: CPT | Performed by: INTERNAL MEDICINE

## 2020-11-03 PROCEDURE — G8427 DOCREV CUR MEDS BY ELIG CLIN: HCPCS | Performed by: INTERNAL MEDICINE

## 2020-11-03 PROCEDURE — G8482 FLU IMMUNIZE ORDER/ADMIN: HCPCS | Performed by: INTERNAL MEDICINE

## 2020-11-03 PROCEDURE — G0008 ADMIN INFLUENZA VIRUS VAC: HCPCS | Performed by: INTERNAL MEDICINE

## 2020-11-03 PROCEDURE — G8417 CALC BMI ABV UP PARAM F/U: HCPCS | Performed by: INTERNAL MEDICINE

## 2020-11-03 PROCEDURE — 1036F TOBACCO NON-USER: CPT | Performed by: INTERNAL MEDICINE

## 2020-11-03 RX ORDER — OLMESARTAN MEDOXOMIL AND HYDROCHLOROTHIAZIDE 40/25 40; 25 MG/1; MG/1
1 TABLET ORAL DAILY
Qty: 30 TABLET | Refills: 0 | Status: SHIPPED | OUTPATIENT
Start: 2020-11-03 | End: 2020-11-24 | Stop reason: SDUPTHER

## 2020-11-03 RX ORDER — ROPINIROLE 0.5 MG/1
0.5 TABLET, FILM COATED ORAL EVERY EVENING
Qty: 30 TABLET | Refills: 3 | Status: SHIPPED | OUTPATIENT
Start: 2020-11-03 | End: 2020-11-24 | Stop reason: SDUPTHER

## 2020-11-03 ASSESSMENT — PATIENT HEALTH QUESTIONNAIRE - PHQ9
1. LITTLE INTEREST OR PLEASURE IN DOING THINGS: 0
SUM OF ALL RESPONSES TO PHQ QUESTIONS 1-9: 0
SUM OF ALL RESPONSES TO PHQ QUESTIONS 1-9: 0
2. FEELING DOWN, DEPRESSED OR HOPELESS: 0
SUM OF ALL RESPONSES TO PHQ9 QUESTIONS 1 & 2: 0
SUM OF ALL RESPONSES TO PHQ QUESTIONS 1-9: 0

## 2020-11-03 NOTE — PROGRESS NOTES
11/3/20    Génesis Banks  1960    Chief Complaint   Patient presents with    2 Week Follow-Up       History of Present Illness:  Génesis Juarez is a 62 y.o. pleasant lady presenting today with a chief complaint of uncontrolled HTN, RLS. She has a past medical history significant for:  HTN, on Olmesartan 40mg QD  HL (LDL 87, TG 83 on 7/7/2020), not on statin therapy  Prediabetes (HbA1C 5.4% on 7/7/2020), not on meds   Epilepsy (s/p L anterior temporal lobectomy in 1996), on Lamotrigine 587KF BID, Eslicarbazepine 4780GS QHS   Depression, on Nortriptyline 50mg QHS   COPD, on Albuterol PRN, Symbicort   Osteoporosis (Oscar Moreno in chart), on Fosamax 70mg weekly  Vertigo, on Meclizine 25mg TID PRN   Vitamin D deficiency (level 27 on 4/4/13), not on supplementation   RLS, on Ropinirole 0.25mg QHS   S/p hysterectomy (cervix status unknown)  S/p L carpal tunnel release (7/30/2020)   Polysubstance abuse (marijuana, PCP, cocaine)   Current smoker     # Over the past few weeks, patient has been having leg cramps that improve with walking. Stopping her from sleep. Mostly what's bothersome is restless legs. No diarrhea or vomiting. No change in medications lately. Labs recently unremarkable except slightly low free T4. K 5.2. She is taking OTC RLS meds. Mag did not help. I started her on low dose Ropinirole and reports the last few days have been worsening after it was working initially. # Patient had seizure in May 2020 and April 2020 ~ 6 weeks apart. In the ED on 5/29/2020 [facility] unremarkable. Basic labs with mild hyponatremia (Na 131). Had UTI s/p Rocephin once then PO Keflex on discharge. UDS was positive for marijuana and PCP. Patient had another seizure in June.   Patient had stopped taking Vimpat as she did not know it was an AED. Patient was diagnosed with epilepsy at age 25. Used to see Epilepsy specialist at [facility] in [facility] [address] does not drive due to h/o seizures.  I referred her to Neurology to flu shot today. # Patient yesterday started to have nausea, vomiting, diarrhea, abdominal cramps. Today feels better. Health maintenance:   Health Maintenance Due   Topic Date Due    Hepatitis C screen  1960    Pneumococcal 0-64 years Vaccine (1 of 1 - PPSV23) 05/08/1967    HIV screen  05/08/1976    Cervical cancer screen  05/08/1982    Breast cancer screen  05/08/2011    Shingles Vaccine (1 of 2) 05/08/2011    Colon cancer screen colonoscopy  05/08/2011    Annual Wellness Visit (AWV)  12/20/2019    Flu vaccine (1) 09/01/2020       Past Medical History:  Past Medical History:   Diagnosis Date    [facility]) [facility]     Full dentures     upper plate only    H/O dizziness     \"take Meclazine at least twice a day for this\"    History of drug abuse ([facility]     History of UTI     \"last one May 2020\"    Hypertension     \"the top # use to be over 200 \"- on medication since 2018\"    Seizures ([facility] [address] [facility]     hx of epilepsy- last seizure week 7/13/2020- hx grand mal seizures    Wears glasses        Past Surgical History:  Past Surgical History:   Procedure Laterality Date    BRAIN SURGERY      s/p L anterior temporal lobectomy in 1996\"did surgery to try an treat the seizures\"   [address]    \"not sure if they took the ovaries\"   [address]    WRIST SURGERY Left 7/30/2020    LEFT [facility]       Allergies:   Allergies   Allergen Reactions    Varenicline Other (See Comments)     Having bad dreams and chest pain( Chantix)    Chantix [Varenicline Tartrate] Palpitations     Chest pain       Medications:  Current outpatient prescriptions:  Outpatient Medications Marked as Taking for the 11/3/20 encounter (Office Visit) with Mary Adler MD   Medication Sig Dispense Refill    olmesartan-hydroCHLOROthiazide (BENICAR HCT) 40-25 MG per tablet Take 1 tablet by mouth daily 30 tablet 0    rOPINIRole (REQUIP) 0.5 MG tablet Take 1 tablet by mouth every evening 30 tablet 3    eslicarbazepine (APTIOM) 400 MG TABS tablet Take 1 tablet by mouth nightly Takes 1200 total with the 400mg and 800mg 30 tablet 2    eslicarbazepine acetate (APTIOM) 800 MG tablet Take 800 mg by mouth nightly 30 tablet 2    lamoTRIgine (LAMICTAL) 200 MG tablet Take 1 tablet by mouth 2 times daily 60 tablet 2    lamoTRIgine (LAMICTAL) 25 MG tablet Take 1 tablet by mouth 2 times daily 60 tablet 2    meclizine (ANTIVERT) 25 MG tablet Take 1 tablet by mouth 3 times daily as needed for Dizziness 90 tablet 2    alendronate (FOSAMAX) 70 MG tablet Take 1 tablet by mouth once a week 12 tablet 1    nortriptyline (PAMELOR) 25 MG capsule Take 2 capsules by mouth nightly 60 capsule 2    SYMBICORT 160-4.5 MCG/ACT AERO Inhale 2 puffs into the lungs 2 times daily 1 Inhaler 5       Social History:  Social History     Socioeconomic History    Marital status:      Spouse name: Not on file    Number of children: Not on file    Years of education: Not on file    Highest education level: Not on file   Occupational History    Not on file   Social Needs    Financial resource strain: Not on file    Food insecurity     Worry: Not on file     Inability: Not on file    Transportation needs     Medical: Not on file     Non-medical: Not on file   Tobacco Use    Smoking status: Former Smoker     Packs/day: 1.50     Years: 40.00     Pack years: 60.00     Types: Cigarettes    Smokeless tobacco: Never Used    Tobacco comment: On patch since 07/31/20    Substance and Sexual Activity    Alcohol use: No     Frequency: Patient refused     Comment: per pt on 7/23/2020\"quit drinking age 40- use to drink on weekends\"    Drug use: Yes     Types: Marijuana, Other-see comments, Cocaine     Comment: PCP/ per pt on 7/23/2020\"last used marijuana 7/22/2020, last did cocaine  spring 2020\"    Sexual activity: Yes     Partners: Male   Lifestyle    Physical activity     Days per week: Not on file     Minutes per session: Not on file    Stress: Not on file   Relationships    Social connections     Talks on phone: Not on file     Gets together: Not on file     Attends Shinto service: Not on file     Active member of club or organization: Not on file     Attends meetings of clubs or organizations: Not on file     Relationship status: Not on file    Intimate partner violence     Fear of current or ex partner: Not on file     Emotionally abused: Not on file     Physically abused: Not on file     Forced sexual activity: Not on file   Other Topics Concern    Not on file   Social History Narrative    Not on file       Family History:  Family History   Problem Relation Age of Onset    Heart Disease Father         cabg         Review of Systems:  Constitutional: no fevers, no chills, no night sweats, no weight loss, no weight gain, no fatigue   Pain assessment: no pain  Head: no headaches  Ears: no hearing loss, no tinnitus, no vertigo  Eyes: no blurry vision, no diplopia, no dryness, no itchiness  Mouth: no oral ulcers, no dry mouth, no sore throat  Nose: no nasal congestion, no epistaxis  Cardiac: no chest pain, no palpitations, no leg swelling, no orthopnea, no PND, no syncope  Pulmonary: no dyspnea, no cough, no wheezing, no hemoptysis  GI: no nausea, no vomiting, no diarrhea, no constipation, no abdominal pain, no hematochezia  : no dysuria, no frequency, no urgency, no hematuria, no frothy urine, no dyspareunia, no pelvic pain, no vaginal bleeding, no abnormal vaginal discharge  MSK: no arthralgias, leg cramps, no early morning stiffness, no Raynaud's   Neuro: no focal neurological deficits, seizures  Sleep: no snoring, no daytime somnolence   Psych: depression, no suicidal ideation      Physical Exam:  VITALS:   BP (!) 168/108   Pulse 75   Temp 97.2 °F (36.2 °C) (Infrared)   Wt 183 lb (83 kg)   SpO2 100%   BMI 33.47 kg/m²     PHYSICAL EXAMINATION:  General: alert, awake, and oriented to time, place, person, and situation. Not in acute distress   Skin:  no suspicious rashes, no jaundice  Head: normocephalic/atraumatic  Eyes: anicteric sclera, well-injected conjunctiva. Pupils are equally round and reactive to light. Extraocular movements are intact   Nose: no septal deviation evident  Sinuses: no sinus tenderness  Ears: external ears normal  Neck: supple, no cervical lymphadenopathy, thyroid symmetric and not enlarged, no bruits   Heart: regular rate and rhythm, regular S1/S2, no S3/S4, no audible murmurs, no audible friction rub  Lungs: clear to auscultation bilaterally, no audible crackles, no audible wheezes, no audible rhonchi    Abdomen: normal bowel sounds, soft abdomen, non-tender, no palpable masses  Extremities: no edema, warm, no cyanosis, no clubbing. Good capillary refill   MSK: no tenderness across spinous processes, full ROM in all 4 extremities. No joint swelling or tenderness   Peripheral vascular: 2+ pulses symmetric (radial)  Neuro: gait normal, CN II-XII intact, motor power 5/5 in all 4 extremities, sensation intact and symmetric    Labs   Reviewed with patient     Imaging   Reviewed with patient      Assessment/Plan:     1. Essential hypertension  Uncontrolled  Continue DASH diet  DC Olmesartan 40mg QD and switch to Olemsartan/HCTZ 40/25mg QD  Recheck RFP in 2 weeks due to h/o hyponatremia due to AEDs    2. Restless legs  Uncontrolled  Increase Ropinirole to 0.5mg QHS    3. Obesity (BMI 30-39. 9)  I declined prescribing Adipex   Patient to see [address] [place] [address] Weight Management [facility]    4. Need for influenza vaccination  - INFLUENZA, QUADV, 3 YRS AND OLDER, IM PF, PREFILL SYR OR SDV, 0.5ML (AFLURIA QUADV, PF)      Care discussed with patient and questions answered. Patient verbalizes understanding and agrees with plan. Discussed with patient the importance of continuity of care.  I encouraged patient to schedule

## 2020-11-03 NOTE — PROGRESS NOTES
Vaccine Information Sheet, \"Influenza - Inactivated\"  given to Clementina Lofton, or parent/legal guardian of  Clementina Lofton and verbalized understanding. Patient responses:    Have you ever had a reaction to a flu vaccine? No  Do you have any current illness? No  Have you ever had [place] Syndrome? No  Do you have a serious allergy to any of the follow: Neomycin, Polymyxin, Thimerosal, eggs or egg products? No    Flu vaccine given per order. Please see immunization tab. Risks and benefits explained. Current VIS given.

## 2020-11-09 ENCOUNTER — OFFICE VISIT (OUTPATIENT)
Dept: PSYCHOLOGY | Age: 60
End: 2020-11-09
Payer: MEDICARE

## 2020-11-09 PROCEDURE — 90832 PSYTX W PT 30 MINUTES: CPT | Performed by: PSYCHOLOGIST

## 2020-11-09 PROCEDURE — 1036F TOBACCO NON-USER: CPT | Performed by: PSYCHOLOGIST

## 2020-11-09 NOTE — PROGRESS NOTES
Behavioral Health Consultation  Delia Carr Psy.D. Psychologist      Time spent with Patient: 30 minutes  Visit number: 4  Reason for Consult:  depression and anxiety  Referring Provider: Karel Keen MD  [facility]  [facility], [address] [place] [address]    S:  ----------------------------------------------------------------------------------------------------------------------  depression and anxiety  \"It's all the same still. \" Feeling increasingly lonely and isolated. Considering moving back home and out of her boyfriend's home. Endorsed depressed mood, anhedonia, anergia, amotivation, poor sleep, fluctuating appetite, diminished concentration, and corrosive self-image. Feeling ambivalent about wether or not to move back home. O:  ----------------------------------------------------------------------------------------------------------------------  MSE:  Orientation:  oriented to person, place, time, and general circumstances  Appearance and behavior:  alert, cooperative  Speech:  spontaneous, normal rate and normal volume  Mood: depressed and anxious   Thought Content:  intact, hopelessness and helplessness  Thought Process:  linear, goal directed and coherent  Interest/Pleasure: Loss of Pleasure/Fun  Sleep disturbance: Yes  Motivation: Poor  Energy: Tired/Fatigued  Morbid ideation No  Suicide Assessment: no suicidal ideation    A:  ----------------------------------------------------------------------------------------------------------------------  Diagnosis:    1.  Moderate episode of recurrent major depressive disorder [facility])         PHQ Scores 11/3/2020 10/19/2020 9/1/2020 8/11/2020 7/14/2020 6/16/2020   PHQ2 Score 0 0 0 1 1 2   PHQ9 Score 0 0 0 1 1 2     Interpretation of Total Score Depression Severity: 1-4 = Minimal depression, 5-9 = Mild depression, 10-14 = Moderate depression, 15-19 = Moderately severe depression, 20-27 = Severe depression    P:  ----------------------------------------------------------------------------------------------------------------------    General:   [x] [place]-setting to identify pt's primary goals for [facility] visit / overall health   [x] Provided psychoeducation/handout on:   1. Moderate episode of recurrent major depressive disorder (HCC)        [x]  Supportive interventions    Cognitive:   [x] Trained in strategies for increasing balanced thinking   [x] Cognitive strategies to target current mental health sx    [x] Identified and challenged maladaptive thoughts    Behavioral:   [x] Discussed and set plan for behavioral activation   [x] Discussed and problem-solved barriers in adhering to behavioral change plan   [x] Motivational Interviewing to increase patient confidence and compliance with       adhering to behavioral change plan   [x] Discussed potential barriers to change   [x] Discussed self-care (sleep, nutrition, rewarding activities, social support, exercise)    Other:   []   []   []   []    Recommendations to patient:    1. Return to Dr. Junaid Petersen in 2-3 week(s)    2. This note will not be viewable in [facility] for the following reason(s). This is a Psychotherapy Note.           Feedback provided to pt's PCP via EPIC and/or oral report

## 2020-11-17 ENCOUNTER — INITIAL CONSULT (OUTPATIENT)
Dept: CARDIOLOGY CLINIC | Age: 60
End: 2020-11-17
Payer: MEDICARE

## 2020-11-17 ENCOUNTER — TELEPHONE (OUTPATIENT)
Dept: CARDIOLOGY CLINIC | Age: 60
End: 2020-11-17

## 2020-11-17 VITALS
SYSTOLIC BLOOD PRESSURE: 122 MMHG | BODY MASS INDEX: 33.86 KG/M2 | TEMPERATURE: 97.1 F | OXYGEN SATURATION: 99 % | HEIGHT: 62 IN | DIASTOLIC BLOOD PRESSURE: 82 MMHG | WEIGHT: 184 LBS | HEART RATE: 81 BPM

## 2020-11-17 PROBLEM — R06.02 SHORTNESS OF BREATH: Status: ACTIVE | Noted: 2020-10-19

## 2020-11-17 PROBLEM — E66.09 CLASS 1 OBESITY DUE TO EXCESS CALORIES WITH BODY MASS INDEX (BMI) OF 33.0 TO 33.9 IN ADULT: Status: ACTIVE | Noted: 2020-11-17

## 2020-11-17 PROBLEM — Z72.0 NICOTINE ABUSE: Status: ACTIVE | Noted: 2020-11-17

## 2020-11-17 PROBLEM — I73.9 INTERMITTENT CLAUDICATION (HCC): Status: ACTIVE | Noted: 2020-11-17

## 2020-11-17 PROCEDURE — 99204 OFFICE O/P NEW MOD 45 MIN: CPT | Performed by: INTERNAL MEDICINE

## 2020-11-17 NOTE — PATIENT INSTRUCTIONS
Nuclear Stress Test-This is a 2 part test you will come in the morning and they will give you a time to return in the afternoon for the 2nd part. WHAT TO EXPECT:   ? You will need to confirm the test or it could be cancelled. ? This test will take approximately 2 hours: 1 hour in the AM &    1 hour in the PM. You will be given a time by the Technologist after the first part is completed to come back. ? You will be given a medication, through an IV in the hand, this will safely simulate exercise. This IV is also needed to inject the radioactive isotope unless you are able toe walk the treadmill. ? You will receive an injection in the AM & PM before the pictures. ? Using a special camera, you will have one set of pictures of your heart taken in the AM and a set of pictures in the PM.     PREPARATION FOR TEST:  ? Eat a light breakfast such as water or juice and toast.  ? If you are DIABETIC: Eat a normal breakfast with NO CAFFEINE and take your insulin as normal.   ? AVOID ALL FOODS & DRINKS containing CAFFEINE 12 HOURS PRIOR TO THE TEST: Including coffee, Tea, Robert and other soft drinks even those labeled  caffeine free or decaffeinated.  ? HOLD THESE MEDICATIONS Persantine & Theophylline (Theodur)  24 hours prior & bring your inhaler with you. Please hold on to these instructions the  will call you within 1-9 business days when we receive authorization from your insurance. Echocardiogram    WHAT TO EXPECT:   ? This test will take approximately 45 minutes. ? It is an ultrasound of the heart. ? It can look at the valves and chambers inside the heart   ? There is no special instructions for this test.     If you are unable to keep this appointment, please notify us 24 hours prior to test at [phone].

## 2020-11-17 NOTE — PROGRESS NOTES
Chen De Luna MD                                  CARDIOLOGY  NOTE       Chief Complaint:    Chief Complaint   Patient presents with    New Patient     Referral from Dr. Lois Caruso for dyspnea. Patient admits SOB with exertion, denies chest pain, palpitations, edema and dizziness. HPI:     Génesis is a 62y.o. year old female who presents to establish care for shortness of breath with exertion. Patient has a history of hypertension, diet-controlled hyperlipidemia, chronic nicotine abuse, recently diagnosed COPD, chronic dizziness. As per patient she has been feeling fatigued and short of breath for the past several months. Patient denies any dependent edema, denies any PND or orthopnea. She mentions that she can walk up to half a block before she gets short of breath. She can hardly ambulate 1 flight of stairs before she has to catch breath. She denies any sick contacts fever chills. Patient also complains of bilateral lower extremity intermittent claudication with ambulation. He denies any history of open ulcers. Patient denies any chest pain  No Palpitations     EK2020 sinus rhythm without any ST-T changes.       Current Outpatient Medications   Medication Sig Dispense Refill    olmesartan-hydroCHLOROthiazide (BENICAR HCT) 40-25 MG per tablet Take 1 tablet by mouth daily 30 tablet 0    rOPINIRole (REQUIP) 0.5 MG tablet Take 1 tablet by mouth every evening 30 tablet 3    magnesium oxide (MAG-OX) 400 (241.3 Mg) MG TABS tablet TAKE ONE TABLET BY MOUTH TWO TIMES A DAY 60 tablet 0    eslicarbazepine (APTIOM) 400 MG TABS tablet Take 1 tablet by mouth nightly Takes 1200 total with the 400mg and 800mg 30 tablet 2    eslicarbazepine acetate (APTIOM) 800 MG tablet Take 800 mg by mouth nightly 30 tablet 2    lamoTRIgine (LAMICTAL) 200 MG tablet Take 1 tablet by mouth 2 times daily 60 tablet 2    lamoTRIgine (LAMICTAL) 25 MG tablet Take 1 tablet by mouth 2 times daily 60 tablet 2    meclizine (ANTIVERT) 25 MG tablet Take 1 tablet by mouth 3 times daily as needed for Dizziness 90 tablet 2    alendronate (FOSAMAX) 70 MG tablet Take 1 tablet by mouth once a week 12 tablet 1    nortriptyline (PAMELOR) 25 MG capsule Take 2 capsules by mouth nightly 60 capsule 2    SYMBICORT 160-4.5 MCG/ACT AERO Inhale 2 puffs into the lungs 2 times daily 1 Inhaler 5    albuterol sulfate HFA (PROVENTIL HFA) 108 (90 Base) MCG/ACT inhaler Inhale 2 puffs into the lungs every 6 hours as needed for Wheezing 1 Inhaler 5    nicotine (NICODERM CQ) 21 MG/24HR Place 1 patch onto the skin daily 42 patch 0     No current facility-administered medications for this visit. Allergies:     Varenicline and Chantix [varenicline tartrate]    Patient History:    Past Medical History:   Diagnosis Date    COPD [facility]chronic obstructive [facility] disease) ([facility] [address]     Full dentures     upper plate only    H/O dizziness     \"take Meclazine at least twice a day for this\"    History of drug abuse ([facility] [facility]     History of UTI     \"last one May 2020\"    Hypertension     \"the top # use to be over 200 \"- on medication since 2018\"    Seizures ([facility] [address]     hx of epilepsy- last seizure week 7/13/2020- hx grand mal seizures    Wears glasses      Past Surgical History:   Procedure Laterality Date    BRAIN SURGERY      s/p L anterior temporal lobectomy in 1996\"did surgery to try an treat the seizures\"   [address]    \"not sure if they took the ovaries\"   [address]    WRIST SURGERY Left 7/30/2020    LEFT WRIST TENDON RELEASE performed [facility] Daria [facility]     Family History   Problem Relation Age of Onset    Heart Disease Father         cabg     Social History     Tobacco Use    Smoking status: Former Smoker     Packs/day: 1.50     Years: 40.00     Pack years: 60.00     Types: Cigarettes    Smokeless tobacco: Never Used    Tobacco comment: On patch since 07/31/20    Substance Use Topics    Alcohol use:  No Frequency: Patient refused     Comment: per pt on 7/23/2020\"quit drinking age 40- use to drink on weekends\"        Review of Systems:     · Constitutional:  No Fever or Weight Loss + intermittent claudication  · Eyes: No Decreased Vision  · ENT: No Headaches, Hearing Loss or Vertigo  · Cardiovascular: No Chest Pain,  +++ Shortness of breath, No Palpitations. No Edema   · Respiratory: No cough or wheezing . No Respiratory distress   · Gastrointestinal: No abdominal pain, appetite loss, blood in stools, constipation, diarrhea or heartburn  · Genitourinary: No dysuria, trouble voiding, or hematuria  · Musculoskeletal:  denies any new  joint aches , or pain   · Integumentary: No rash or pruritis  · Neurological: No TIA or stroke symptoms  · Psychiatric: No anxiety or depression  · Endocrine: No malaise, fatigue or temperature intolerance  · Hematologic/Lymphatic: No bleeding problems, blood clots or swollen lymph nodes  · Allergic/Immunologic: No nasal congestion or hives        Objective:      Physical Exam:    /82 (Site: Left Upper Arm, Position: Sitting, Cuff Size: Medium Adult)   Pulse 81   Temp 97.1 °F (36.2 °C) (Temporal)   Ht 5' 2\" (1.575 m)   Wt 184 lb (83.5 kg)   SpO2 99%   BMI 33.65 kg/m²   Wt Readings from Last 3 Encounters:   11/17/20 184 lb (83.5 kg)   11/03/20 183 lb (83 kg)   10/19/20 183 lb (83 kg)     Body mass index is 33.65 kg/m². Vitals:    11/17/20 1050   BP: 122/82   Pulse: 81   Temp: 97.1 °F (36.2 °C)   SpO2: 99%        General Appearance and Constitutional: Conversant, Well developed, Well nourished, No acute distress, Non-toxic appearance. HEENT:  Normocephalic, Atraumatic, Bilateral external ears normal, Oropharynx moist, No oral exudates,   Nose normal.   Neck- Normal range of motion, No tenderness, Supple  Eyes:  Conjunctiva normal, No discharge. Respiratory:  Normal breath sounds, No respiratory distress, No wheezing, No Rales, No Ronchi. No chest tenderness. Cardiovascular: S1-S2, no added heart sounds, No Mumurs appreciated. No gallops, rubs. No Pedal Edema   GI:  Bowel sounds normal, Soft, No tenderness,  :  No costovertebral angle tenderness   Musculoskeletal:  No gross deformities. Back- No tenderness  Integument:  Well hydrated, no rash   Lymphatic:  No lymphadenopathy noted   Neurologic:  Alert & oriented x 3, Normal motor function, normal sensory function, no focal deficits noted   Psychiatric:  Speech and behavior appropriate       Medical decision making and Data review:    DATA:    No results found for: TROPONINT  BNP:  No results found for: PROBNP  PT/INR:  No results found for: PTINR  Lab Results   Component Value Date    LABA1C 5.4 07/07/2020    LABA1C 6.0 04/04/2013     Lab Results   Component Value Date    CHOL 196 04/04/2013    TRIG 126 04/04/2013    HDL 98 (H) 07/07/2020    LDLCALC 87 07/07/2020     Lab Results   Component Value Date    WBC 7.4 09/01/2020    HGB 12.8 09/01/2020    HCT 36.8 (L) 09/01/2020    MCV 87.6 09/01/2020     09/01/2020     TSH:   Lab Results   Component Value Date    TSH 3.367 04/04/2013     Lab Results   Component Value Date    AST 16 07/07/2020    ALT 13 07/07/2020    BILITOT 0.3 07/07/2020    ALKPHOS 128 07/07/2020         All labs, medications and tests reviewed by myself including data and history from outside source , patient and available family . 1. Shortness of breath    2. Intermittent claudication ([address]    3. Nicotine abuse    4. Essential hypertension    5. Class 1 obesity due to excess calories with body mass index (BMI) of 33.0 to 33.9 in adult, unspecified whether serious comorbidity present         Impression and Plan:      1. Shortness of breath: ?  Angina equivalent. Possible COPD normal ischemic heart disease. Will obtain echocardiogram to rule out any structural heart disease. Will obtain Lexiscan stress MPI to rule out ischemic heart disease.   2. Essential hypertension: Blood pressures well

## 2020-11-17 NOTE — LETTER
Lauren Sanderson  1960  [id number]    Have you had any Chest Pain that is not new? - No    Have you had any Shortness of Breath - Yes  If Yes - When on exertion    Have you had any dizziness - No    Have you had any palpitations that are not new?  - No     Do you have any edema - denies    Do you have a surgery or procedure scheduled in the near future - No

## 2020-11-18 ENCOUNTER — NURSE ONLY (OUTPATIENT)
Dept: INTERNAL MEDICINE CLINIC | Age: 60
End: 2020-11-18
Payer: MEDICARE

## 2020-11-18 LAB
ALBUMIN SERPL-MCNC: 4.3 G/DL (ref 3.4–5)
ANION GAP SERPL CALCULATED.3IONS-SCNC: 11 MMOL/L (ref 3–16)
BASOPHILS ABSOLUTE: 0.1 K/UL (ref 0–0.2)
BASOPHILS RELATIVE PERCENT: 1.2 %
BUN BLDV-MCNC: 15 MG/DL (ref 7–20)
CALCIUM SERPL-MCNC: 9.7 MG/DL (ref 8.3–10.6)
CHLORIDE BLD-SCNC: 94 MMOL/L (ref 99–110)
CO2: 27 MMOL/L (ref 21–32)
CREAT SERPL-MCNC: 0.9 MG/DL (ref 0.6–1.1)
EOSINOPHILS ABSOLUTE: 0.1 K/UL (ref 0–0.6)
EOSINOPHILS RELATIVE PERCENT: 1 %
GFR AFRICAN AMERICAN: >60
GFR NON-AFRICAN AMERICAN: >60
GLUCOSE BLD-MCNC: 106 MG/DL (ref 70–99)
HCT VFR BLD CALC: 36.3 % (ref 36–48)
HEMOGLOBIN: 12.4 G/DL (ref 12–16)
IRON SATURATION: 31 % (ref 15–50)
IRON: 79 UG/DL (ref 37–145)
LYMPHOCYTES ABSOLUTE: 1.9 K/UL (ref 1–5.1)
LYMPHOCYTES RELATIVE PERCENT: 29.6 %
MAGNESIUM: 2.1 MG/DL (ref 1.8–2.4)
MCH RBC QN AUTO: 30.7 PG (ref 26–34)
MCHC RBC AUTO-ENTMCNC: 34.3 G/DL (ref 31–36)
MCV RBC AUTO: 89.6 FL (ref 80–100)
MONOCYTES ABSOLUTE: 0.5 K/UL (ref 0–1.3)
MONOCYTES RELATIVE PERCENT: 7.5 %
NEUTROPHILS ABSOLUTE: 3.9 K/UL (ref 1.7–7.7)
NEUTROPHILS RELATIVE PERCENT: 60.7 %
PDW BLD-RTO: 12.2 % (ref 12.4–15.4)
PHOSPHORUS: 4 MG/DL (ref 2.5–4.9)
PLATELET # BLD: 315 K/UL (ref 135–450)
PMV BLD AUTO: 6.6 FL (ref 5–10.5)
POTASSIUM SERPL-SCNC: 4.7 MMOL/L (ref 3.5–5.1)
RBC # BLD: 4.05 M/UL (ref 4–5.2)
SODIUM BLD-SCNC: 132 MMOL/L (ref 136–145)
TOTAL IRON BINDING CAPACITY: 257 UG/DL (ref 260–445)
TSH SERPL DL<=0.05 MIU/L-ACNC: 1.7 UIU/ML (ref 0.27–4.2)
WBC # BLD: 6.5 K/UL (ref 4–11)

## 2020-11-18 PROCEDURE — 36415 COLL VENOUS BLD VENIPUNCTURE: CPT | Performed by: INTERNAL MEDICINE

## 2020-11-20 LAB
CALCIUM IONIZED, CALC AT PH 7.4: 1.27 MMOL/L (ref 1.11–1.3)
CALCIUM IONIZED: 1.25 MMOL/L (ref 1.11–1.3)

## 2020-11-24 ENCOUNTER — OFFICE VISIT (OUTPATIENT)
Dept: INTERNAL MEDICINE CLINIC | Age: 60
End: 2020-11-24
Payer: MEDICARE

## 2020-11-24 VITALS
DIASTOLIC BLOOD PRESSURE: 86 MMHG | OXYGEN SATURATION: 100 % | TEMPERATURE: 96.8 F | BODY MASS INDEX: 34.02 KG/M2 | SYSTOLIC BLOOD PRESSURE: 125 MMHG | WEIGHT: 186 LBS | HEART RATE: 72 BPM

## 2020-11-24 PROCEDURE — 3017F COLORECTAL CA SCREEN DOC REV: CPT | Performed by: INTERNAL MEDICINE

## 2020-11-24 PROCEDURE — 3023F SPIROM DOC REV: CPT | Performed by: INTERNAL MEDICINE

## 2020-11-24 PROCEDURE — 99214 OFFICE O/P EST MOD 30 MIN: CPT | Performed by: INTERNAL MEDICINE

## 2020-11-24 PROCEDURE — 1036F TOBACCO NON-USER: CPT | Performed by: INTERNAL MEDICINE

## 2020-11-24 PROCEDURE — G8417 CALC BMI ABV UP PARAM F/U: HCPCS | Performed by: INTERNAL MEDICINE

## 2020-11-24 PROCEDURE — G8926 SPIRO NO PERF OR DOC: HCPCS | Performed by: INTERNAL MEDICINE

## 2020-11-24 PROCEDURE — G8482 FLU IMMUNIZE ORDER/ADMIN: HCPCS | Performed by: INTERNAL MEDICINE

## 2020-11-24 PROCEDURE — G8427 DOCREV CUR MEDS BY ELIG CLIN: HCPCS | Performed by: INTERNAL MEDICINE

## 2020-11-24 RX ORDER — ROPINIROLE 0.5 MG/1
0.5 TABLET, FILM COATED ORAL EVERY EVENING
Qty: 30 TABLET | Refills: 3 | Status: SHIPPED | OUTPATIENT
Start: 2020-11-24 | End: 2021-03-02 | Stop reason: SDUPTHER

## 2020-11-24 RX ORDER — ALENDRONATE SODIUM 70 MG/1
70 TABLET ORAL WEEKLY
Qty: 12 TABLET | Refills: 1 | Status: SHIPPED | OUTPATIENT
Start: 2020-11-24 | End: 2021-03-02 | Stop reason: SDUPTHER

## 2020-11-24 RX ORDER — OLMESARTAN MEDOXOMIL AND HYDROCHLOROTHIAZIDE 40/25 40; 25 MG/1; MG/1
1 TABLET ORAL DAILY
Qty: 30 TABLET | Refills: 3 | Status: SHIPPED | OUTPATIENT
Start: 2020-11-24 | End: 2021-02-23

## 2020-11-24 RX ORDER — NORTRIPTYLINE HYDROCHLORIDE 25 MG/1
50 CAPSULE ORAL NIGHTLY
Qty: 60 CAPSULE | Refills: 3 | Status: SHIPPED | OUTPATIENT
Start: 2020-11-24 | End: 2021-03-02 | Stop reason: SDUPTHER

## 2020-11-24 RX ORDER — MECLIZINE HYDROCHLORIDE 25 MG/1
25 TABLET ORAL 3 TIMES DAILY PRN
Qty: 90 TABLET | Refills: 3 | Status: SHIPPED | OUTPATIENT
Start: 2020-11-24 | End: 2021-03-02 | Stop reason: SDUPTHER

## 2020-11-24 RX ORDER — BUDESONIDE AND FORMOTEROL FUMARATE DIHYDRATE 160; 4.5 UG/1; UG/1
2 AEROSOL RESPIRATORY (INHALATION) 2 TIMES DAILY
Qty: 1 INHALER | Refills: 5 | Status: SHIPPED | OUTPATIENT
Start: 2020-11-24 | End: 2021-03-05 | Stop reason: ALTCHOICE

## 2020-11-24 ASSESSMENT — PATIENT HEALTH QUESTIONNAIRE - PHQ9
2. FEELING DOWN, DEPRESSED OR HOPELESS: 0
SUM OF ALL RESPONSES TO PHQ9 QUESTIONS 1 & 2: 0
SUM OF ALL RESPONSES TO PHQ QUESTIONS 1-9: 0
1. LITTLE INTEREST OR PLEASURE IN DOING THINGS: 0

## 2020-11-24 NOTE — PROGRESS NOTES
11/24/20    Génesis Banks  1960    Chief Complaint   Patient presents with    Hypertension       History of Present Illness:  Génesis Velazquez is a 62 y.o. pleasant lady presenting today with a chief complaint of HTN. She has a past medical history significant for:  HTN, on Olmesartan/HCTZ 40/25mg QD  HL (LDL 87, TG 83 on 7/7/2020), not on statin therapy  Prediabetes (HbA1C 5.4% on 7/7/2020), not on meds   Epilepsy (s/p L anterior temporal lobectomy in 1996), on Lamotrigine 056AK BID, Eslicarbazepine 8130OI QHS   Depression, on Nortriptyline 50mg QHS   COPD, on Albuterol PRN, Symbicort   Osteoporosis (Rani Alilson in chart), on Fosamax 70mg weekly  Vertigo, on Meclizine 25mg TID PRN   Vitamin D deficiency (level 27 on 4/4/13), not on supplementation   RLS, on Ropinirole 0.5mg QHS   S/p hysterectomy (cervix status unknown)  S/p L carpal tunnel release (7/30/2020)   Polysubstance abuse (marijuana, PCP, cocaine)   Current smoker     # Over the past few weeks, patient has been having leg cramps that improve with walking. Stopping her from sleep. Mostly what's bothersome is restless legs.   No diarrhea or vomiting. No change in medications lately. Labs recently unremarkable  She is taking OTC RLS meds. Mag did not help. I started her on low dose Ropinirole and increased it and reports improvement. # Patient had seizure in May 2020 and April 2020 ~ 6 weeks apart. In the ED on 5/29/2020 [facility] unremarkable. Basic labs with mild hyponatremia (Na 131). Had UTI s/p Rocephin once then PO Keflex on discharge. UDS was positive for marijuana and PCP. Patient had another seizure in June.   Patient had stopped taking Vimpat as she did not know it was an AED. Patient was diagnosed with epilepsy at age 25. Used to see Epilepsy specialist at [facility] in [facility] [address] does not drive due to h/o seizures. I referred her to Neurology Epilepsy but she does not have an appt until Dec 9.    She is now only taking Lamotrigine and Eslicarbazepine.   Na has improved. Seeing Epilepsy specialist in December. # Patient was on Abilify, Topamax, Venlafaxine and many other meds that she self-discontinued. She reports she is only depressed but does not have bipolar. Patient's mood continues to be down. ? Has tardive dyskinesia but not taking Benztropine. Has h/o non-compliance with pills. She is taking Nortriptyline only. I referred to Psychiatry. She is interested in seeing someone at [facility]. Waiting to hear back. She is seeing Dr. Paul Garcia  # She takes Meclizine for vertigo PRN. This helps. No falls or nausea.   # Patient has intention to quit smoking. Trying to quit on her own. Has smoker's cough. She is on patches now but they sometimes give her local reaction.   # COPD: she is tolerating Albuterol and Symbicort. Has some baseline SOB, but no cough or wheezing. Albuterol does not help with her SOB. Has difficulty climbing a flight of stairs.   # Patient denies doing any other drugs other than weed and reports that her UDS positive because it might have been mixed with her marijuana as per patient. Rawleigh Billing Rawleigh Billing ? ? ? # Taking Olmesartan for HTN. BP last /108. She has tried to cut back on salt. No CP, palpitations, dizziness. Increased her BP meds. BP at [facility] office was controlled, and today in my office 125/86. # Patient has not had labs done recently. She has prediabetes and dyslipidemia. Not on meds. # Patient is not on vitamin D supplements despite low vitamin D level years ago. Taking Fosamax for OP. DEXA not in chart.   # Patient is s/p L carpal tunnel release on 7/30.   Patient with hyponatremia at the time. Chronic due to AEDs.   # Has difficulty losing weight. She is gaining weight. Wanting to be placed on weight loss pills. She is agreeable to being evaluated by [facility]. # Complaining of worsening exertional dyspnea. TTE at [facility] in 2019 unremarkable. Stress test unremarkable. PFT shows obstruction wo BD response.  She is seeing  Nelson Conklin from Cardiology. # UTD on flu shot this season. Health maintenance:   Health Maintenance Due   Topic Date Due    Hepatitis C screen  1960    HIV screen  05/08/1976    Cervical cancer screen  05/08/1982    Breast cancer screen  05/08/2011    Shingles Vaccine (1 of 2) 05/08/2011    Colon cancer screen colonoscopy  05/08/2011    Annual Wellness Visit (AWV)  12/20/2019       Past Medical History:  Past Medical History:   Diagnosis Date    COPD [facility] obstructive [facility] disease) [facility]     Full dentures     upper plate only    H/O dizziness     \"take Meclazine at least twice a day for this\"    History of drug abuse ([facility]     History of UTI     \"last one May 2020\"    Hypertension     \"the top # use to be over 200 \"- on medication since 2018\"    Seizures ([facility] [address]     hx of epilepsy- last seizure week 7/13/2020- hx grand mal seizures    Wears glasses        Past Surgical History:  Past Surgical History:   Procedure Laterality Date    BRAIN SURGERY      s/p L anterior temporal lobectomy in 1996\"did surgery to try an treat the seizures\"   [address]    \"not sure if they took the ovaries\"   [facility] Left 7/30/2020    LEFT WRIST TENDON RELEASE [facility]       Allergies:   Allergies   Allergen Reactions    Varenicline Other (See Comments)     Having bad dreams and chest pain( Chantix)    Chantix [Varenicline Tartrate] Palpitations     Chest pain       Medications:  Current outpatient prescriptions:  Outpatient Medications Marked as Taking for the 11/24/20 encounter (Office Visit) with Olaf Pate MD   Medication Sig Dispense Refill    olmesartan-hydroCHLOROthiazide (BENICAR HCT) 40-25 MG per tablet Take 1 tablet by mouth daily 30 tablet 3    rOPINIRole (REQUIP) 0.5 MG tablet Take 1 tablet by mouth every evening 30 tablet 3    meclizine (ANTIVERT) 25 MG tablet Take 1 tablet by mouth 3 times daily as needed for Dizziness 90 tablet 3    alendronate (FOSAMAX) 70 MG tablet Take 1 tablet by mouth once a week 12 tablet 1    nortriptyline (PAMELOR) 25 MG capsule Take 2 capsules by mouth nightly 60 capsule 3    SYMBICORT 160-4.5 MCG/ACT AERO Inhale 2 puffs into the lungs 2 times daily 1 Inhaler 5    eslicarbazepine (APTIOM) 400 MG TABS tablet Take 1 tablet by mouth nightly Takes 1200 total with the 400mg and 800mg 30 tablet 2    eslicarbazepine acetate (APTIOM) 800 MG tablet Take 800 mg by mouth nightly 30 tablet 2    lamoTRIgine (LAMICTAL) 200 MG tablet Take 1 tablet by mouth 2 times daily 60 tablet 2    lamoTRIgine (LAMICTAL) 25 MG tablet Take 1 tablet by mouth 2 times daily 60 tablet 2       Social History:  Social History     Socioeconomic History    Marital status:      Spouse name: Not on file    Number of children: Not on file    Years of education: Not on file    Highest education level: Not on file   Occupational History    Not on file   Social Needs    Financial resource strain: Not on file    Food insecurity     Worry: Not on file     Inability: Not on file    Transportation needs     Medical: Not on file     Non-medical: Not on file   Tobacco Use    Smoking status: Former Smoker     Packs/day: 1.50     Years: 40.00     Pack years: 60.00     Types: Cigarettes    Smokeless tobacco: Never Used    Tobacco comment: On patch since 07/31/20    Substance and Sexual Activity    Alcohol use: No     Frequency: Patient refused     Comment: per pt on 7/23/2020\"quit drinking age 40- use to drink on weekends\"    Drug use: Yes     Types: Marijuana, Other-see comments, Cocaine     Comment: PCP/ per pt on 7/23/2020\"last used marijuana 7/22/2020, last did cocaine  spring 2020\"    Sexual activity: Yes     Partners: Male   Lifestyle    Physical activity     Days per week: Not on file     Minutes per session: Not on file    Stress: Not on file   Relationships    Social connections     Talks on phone: Not on file     Gets together: Not on file     Attends Scientologist service: Not on file     Active member of club or organization: Not on file     Attends meetings of clubs or organizations: Not on file     Relationship status: Not on file    Intimate partner violence     Fear of current or ex partner: Not on file     Emotionally abused: Not on file     Physically abused: Not on file     Forced sexual activity: Not on file   Other Topics Concern    Not on file   Social History Narrative    Not on file       Family History:  Family History   Problem Relation Age of Onset    Heart Disease Father         cabg         Review of Systems:  Constitutional: no fevers, no chills, no night sweats, no weight loss, no weight gain, no fatigue   Pain assessment: no pain  Head: no headaches  Ears: no hearing loss, no tinnitus, no vertigo  Eyes: no blurry vision, no diplopia, no dryness, no itchiness  Mouth: no oral ulcers, no dry mouth, no sore throat  Nose: no nasal congestion, no epistaxis  Cardiac: no chest pain, no palpitations, no leg swelling, no orthopnea, no PND, no syncope  Pulmonary: no dyspnea, no cough, no wheezing, no hemoptysis  GI: no nausea, no vomiting, no diarrhea, no constipation, no abdominal pain, no hematochezia  : no dysuria, no frequency, no urgency, no hematuria, no frothy urine, no dyspareunia, no pelvic pain, no vaginal bleeding, no abnormal vaginal discharge  MSK: no arthralgias, leg cramps, no early morning stiffness, no Raynaud's   Neuro: no focal neurological deficits, seizures  Sleep: no snoring, no daytime somnolence   Psych: depression, no suicidal ideation      Physical Exam:  VITALS:   /86 (Site: Right Upper Arm)   Pulse 72   Temp 96.8 °F (36 °C) (Infrared)   Wt 186 lb (84.4 kg)   SpO2 100%   BMI 34.02 kg/m²     PHYSICAL EXAMINATION:  General: alert, awake, and oriented to time, place, person, and situation.  Not in acute distress   Skin:  no suspicious rashes, no jaundice  Head: normocephalic/atraumatic  Eyes: anicteric sclera, well-injected conjunctiva. Pupils are equally round and reactive to light. Extraocular movements are intact   Nose: no septal deviation evident  Sinuses: no sinus tenderness  Ears: external ears normal  Neck: supple, no cervical lymphadenopathy, thyroid symmetric and not enlarged, no bruits   Heart: regular rate and rhythm, regular S1/S2, no S3/S4, no audible murmurs, no audible friction rub  Lungs: clear to auscultation bilaterally, no audible crackles, no audible wheezes, no audible rhonchi    Abdomen: normal bowel sounds, soft abdomen, non-tender, no palpable masses  Extremities: no edema, warm, no cyanosis, no clubbing. Good capillary refill   MSK: no tenderness across spinous processes, full ROM in all 4 extremities. No joint swelling or tenderness   Peripheral vascular: 2+ pulses symmetric (radial)  Neuro: gait normal, CN II-XII intact, motor power 5/5 in all 4 extremities, sensation intact and symmetric    Labs   Reviewed with patient     Imaging   Reviewed with patient      Assessment/Plan:     1. Essential hypertension  Stable  Continue Olmesartan/HCTZ 40/25mg QD    2. Restless legs  Stable  Continue Ropinirole 0.5mg QHS     3. Intractable generalized idiopathic epilepsy without status epilepticus ([facility] [address]  Stable  Following with Epilepsy soon  Continue for now Lamotrigine 834CO BID, Eslicarbazepine 5151BC QHS     4. Vertigo  Stable  Continue Meclizine 25mg TID PRN     5. Age-related osteoporosis without current pathological fracture  DEXA not in chart  Stable   Continue Fosamax 70mg weekly    6. Moderate episode of recurrent major depressive disorder (HCC)  Stable  Continue Nortriptyline 50mg QHS  Patient aware of low seizure threshold     7. Panlobular emphysema (HCC)  Stable  Continue Symbicort     8. Obesity (BMI 30-39.9)  - [facility] [employer], [facility] discussed with patient and questions answered.   Patient verbalizes understanding and agrees with plan. Discussed with patient the importance of continuity of care. I encouraged patient to schedule next appointment within 3 months with me. Patient prefers to be reached by Phone call at [phone] for future medical correspondence. Encouraged to activate MyChart. I reviewed and reconciled the medications this visit. I reviewed and updated the past medical, surgical, social, and family history during this visit. After visit summary provided.        Latrice Snow MD  Internal Medicine  11/24/2020   11:03 AM

## 2020-11-30 ENCOUNTER — PROCEDURE VISIT (OUTPATIENT)
Dept: CARDIOLOGY CLINIC | Age: 60
End: 2020-11-30
Payer: MEDICARE

## 2020-11-30 LAB
LV EF: 75 %
LVEF MODALITY: NORMAL

## 2020-11-30 PROCEDURE — 93018 CV STRESS TEST I&R ONLY: CPT | Performed by: INTERNAL MEDICINE

## 2020-11-30 PROCEDURE — 93016 CV STRESS TEST SUPVJ ONLY: CPT | Performed by: INTERNAL MEDICINE

## 2020-11-30 PROCEDURE — A9500 TC99M SESTAMIBI: HCPCS | Performed by: INTERNAL MEDICINE

## 2020-11-30 PROCEDURE — 93017 CV STRESS TEST TRACING ONLY: CPT | Performed by: INTERNAL MEDICINE

## 2020-11-30 PROCEDURE — 78452 HT MUSCLE IMAGE SPECT MULT: CPT | Performed by: INTERNAL MEDICINE

## 2020-12-01 ENCOUNTER — PROCEDURE VISIT (OUTPATIENT)
Dept: CARDIOLOGY CLINIC | Age: 60
End: 2020-12-01
Payer: MEDICARE

## 2020-12-01 LAB
LV EF: 58 %
LVEF MODALITY: NORMAL

## 2020-12-01 PROCEDURE — 93306 TTE W/DOPPLER COMPLETE: CPT | Performed by: INTERNAL MEDICINE

## 2020-12-01 PROCEDURE — 93922 UPR/L XTREMITY ART 2 LEVELS: CPT | Performed by: INTERNAL MEDICINE

## 2020-12-02 ENCOUNTER — TELEPHONE (OUTPATIENT)
Dept: CARDIOLOGY CLINIC | Age: 60
End: 2020-12-02

## 2020-12-02 NOTE — TELEPHONE ENCOUNTER
Notified pt of results.        [facility] physician Dr. Alvy Bamberger .     No infarct or ischemia noted.     Normal tracer uptake in all segments of myocardium on stress and rest     images. Mild breast tissue attenuation noted     No infarct or ischemia noted.     Normal EF 75 % with normal ventricular contractility. Summary   Left ventricular systolic function is normal with an ejection fraction of   55-60%. Grade I diastolic dysfunction. No significant valvular disease. Normal pulmonary artery pressure with a RVSP of 30mmHg   No evidence of pericardial effusion. Summary          No evidence of significant occlusive arterial disease.     Normal bilateral lower extremity ankle brachial indices.

## 2020-12-03 ENCOUNTER — TELEPHONE (OUTPATIENT)
Dept: CARDIOLOGY CLINIC | Age: 60
End: 2020-12-03

## 2020-12-03 ENCOUNTER — TELEPHONE (OUTPATIENT)
Dept: INTERNAL MEDICINE CLINIC | Age: 60
End: 2020-12-03

## 2020-12-03 NOTE — TELEPHONE ENCOUNTER
I will take it off her list. Please make sure pharmacy is aware that patient is not currently on magnesium. Please inform patient that she is OK without magnesium since her level normalized.  Also, she needs appt with me in 3 months

## 2020-12-03 NOTE — TELEPHONE ENCOUNTER
Patient thought we discontinued magnesium. Please review. She has not been taking it for apx one month but pharmacy recently refilled. I do not see anything in the chart stating that we discontinued it.

## 2020-12-03 NOTE — TELEPHONE ENCOUNTER
No evidence of significant occlusive arterial disease.     Normal bilateral lower extremity ankle brachial indices. ECHO     Left ventricular systolic function is normal with an ejection fraction of   55-60%. Grade I diastolic dysfunction. No significant valvular disease. Normal pulmonary artery pressure with a RVSP of 30mmHg   No evidence of pericardial effusion. Pt notified of test results; pt voiced understanding.

## 2020-12-07 ENCOUNTER — OFFICE VISIT (OUTPATIENT)
Dept: PSYCHOLOGY | Age: 60
End: 2020-12-07
Payer: MEDICARE

## 2020-12-07 PROCEDURE — 90832 PSYTX W PT 30 MINUTES: CPT | Performed by: PSYCHOLOGIST

## 2020-12-07 PROCEDURE — 1036F TOBACCO NON-USER: CPT | Performed by: PSYCHOLOGIST

## 2020-12-07 NOTE — PROGRESS NOTES
= Mild depression, 10-14 = Moderate depression, 15-19 = Moderately severe depression, 20-27 = Severe depression    P:  ----------------------------------------------------------------------------------------------------------------------    General:   [x] Atlas-setting to identify pt's primary goals for [facility] visit / overall health   [x] Provided psychoeducation/handout on:   1. Moderate episode of recurrent major depressive disorder (HCC)        [x]  Supportive interventions    Cognitive:   [x] Trained in strategies for increasing balanced thinking   [x] Cognitive strategies to target current mental health sx    [x] Identified and challenged maladaptive thoughts    Behavioral:   [x] Discussed and set plan for behavioral activation   [x] Discussed and problem-solved barriers in adhering to behavioral change plan   [x] Motivational Interviewing to increase patient confidence and compliance with       adhering to behavioral change plan   [x] Discussed potential barriers to change   [x] Discussed self-care (sleep, nutrition, rewarding activities, social support, exercise)    Other:   []   []   []   []    Recommendations to patient:     1. Return to Dr. Valery Polk in 2-3 week(s)    2. This note will not be viewable in [facility] for the following reason(s). This is a Psychotherapy Note.             Feedback provided to pt's PCP via EPIC and/or oral report

## 2020-12-14 PROBLEM — E87.1 CHRONIC HYPONATREMIA: Status: ACTIVE | Noted: 2017-02-22

## 2020-12-14 PROBLEM — H53.2 DIPLOPIA: Status: ACTIVE | Noted: 2020-03-13

## 2020-12-14 PROBLEM — R56.9 SEIZURE (HCC): Status: ACTIVE | Noted: 2018-08-30

## 2020-12-14 PROBLEM — G43.809 VESTIBULAR MIGRAINE: Status: ACTIVE | Noted: 2020-04-21

## 2020-12-16 ENCOUNTER — TELEPHONE (OUTPATIENT)
Dept: FAMILY MEDICINE CLINIC | Age: 60
End: 2020-12-16

## 2020-12-17 ENCOUNTER — OFFICE VISIT (OUTPATIENT)
Dept: BARIATRICS/WEIGHT MGMT | Age: 60
End: 2020-12-17
Payer: MEDICARE

## 2020-12-17 VITALS
HEART RATE: 74 BPM | HEIGHT: 62 IN | TEMPERATURE: 97.4 F | DIASTOLIC BLOOD PRESSURE: 80 MMHG | BODY MASS INDEX: 33.64 KG/M2 | WEIGHT: 182.8 LBS | SYSTOLIC BLOOD PRESSURE: 122 MMHG | OXYGEN SATURATION: 98 %

## 2020-12-17 PROCEDURE — G8427 DOCREV CUR MEDS BY ELIG CLIN: HCPCS | Performed by: NURSE PRACTITIONER

## 2020-12-17 PROCEDURE — 1036F TOBACCO NON-USER: CPT | Performed by: NURSE PRACTITIONER

## 2020-12-17 PROCEDURE — 3017F COLORECTAL CA SCREEN DOC REV: CPT | Performed by: NURSE PRACTITIONER

## 2020-12-17 PROCEDURE — 99203 OFFICE O/P NEW LOW 30 MIN: CPT | Performed by: NURSE PRACTITIONER

## 2020-12-17 PROCEDURE — G8417 CALC BMI ABV UP PARAM F/U: HCPCS | Performed by: NURSE PRACTITIONER

## 2020-12-17 PROCEDURE — G8482 FLU IMMUNIZE ORDER/ADMIN: HCPCS | Performed by: NURSE PRACTITIONER

## 2020-12-17 NOTE — PROGRESS NOTES
The patient first recognized that she had a weight problem about 3 years ago. The patient's lowest weight in the last five years was 130 lbs, and the highest weight in the last five years was 182.8 lbs. Weight Loss Program History:  The patient states she has tried many. Subjective     Chief Complaint   Patient presents with    New Patient     NP New medication has surgical benefits       Vitals:    12/17/20 1042   BP: 122/80   Pulse: 74   Temp: 97.4 °F (36.3 °C)   SpO2: 98%     Wt Readings from Last 3 Encounters:   12/17/20 182 lb 12.8 oz (82.9 kg)   11/24/20 186 lb (84.4 kg)   11/17/20 184 lb (83.5 kg)     The patient first recognized that she had a weight problem about 3 years ago. The patient's lowest weight in the last five years was 130 lbs, and the highest weight in the last five years was 182.8 lbs. Weight Loss Program History:  The patient states she has tried many. The most weight lost ever with diet and exercise was 0 Lbs, but unfortunately only kept them of for none at all. These attempts have been temporarily successful with weight loss, but have failed to sustain adequate weight loss. No prior weight loss medications. Hx of seizures, on lamacital and aptiom. Hx of drug use but states only marijuana, however cocaine in drug screen. Water intake is low. Pop 3-4 cans per day. Exercise, none. Génesis's life is affected by weight related to below comorbid conditions. The patient has also tried self directed diet and exercise in attempts to sustain weight loss.      Comorbid Conditions:  Significant diseases effecting this patient are   Past Medical History:   Diagnosis Date    COPD (chronic obstructive pulmonary disease) ([address]     Full dentures     upper plate only    H/O dizziness     \"take Meclazine at least twice a day for this\"    H/O Doppler JERMAINE's ultrasound 12/01/2020     No evidence of significant occlusive arterial disease of lower EXT.    H/O echocardiogram 12/01/2020    EF 55-60%, Normal study.  History of drug abuse ([facility] [address]     History of nuclear stress test 11/30/2020    Normal study.  History of UTI     \"last one May 2020\"    Hypertension     \"the top # use to be over 200 \"- on medication since 2018\"    Seizures ([facility] [address]     hx of epilepsy- last seizure week 7/13/2020- hx grand mal seizures    Wears glasses    . Thoroughly reviewed the patient's medical history, family history, social history and review of systems with the patient today in the office. Please see medical record for pertinent positives. Allergies:   Allergies   Allergen Reactions    Varenicline Other (See Comments)     Having bad dreams and chest pain( Chantix)    Chantix [Varenicline Tartrate] Palpitations     Chest pain       Past Surgical History:  Past Surgical History:   Procedure Laterality Date    BRAIN SURGERY      s/p L anterior temporal lobectomy in 1996\"did surgery to try an treat the seizures\"   [address]    \"not sure if they took the ovaries\"   [address]    WRIST SURGERY Left 7/30/2020    LEFT WRIST TENDON RELEASE performed by Harvel Olszewski, DO at [address] [place] [address] History:  Family History   Problem Relation Age of Onset    Heart Disease Father         cabg       Social History:  Social History     Socioeconomic History    Marital status:      Spouse name: Not on file    Number of children: Not on file    Years of education: Not on file    Highest education level: Not on file   Occupational History    Not on file   Social Needs    Financial resource strain: Not on file    Food insecurity     Worry: Not on file     Inability: Not on file    Transportation needs     Medical: Not on file     Non-medical: Not on file   Tobacco Use    Smoking status: Former Smoker     Packs/day: 1.50     Years: 40.00     Pack years: 60.00     Types: Cigarettes    Smokeless tobacco: Never Used    Tobacco comment: On patch since 07/31/20 Substance and Sexual Activity    Alcohol use: No     Frequency: Patient refused     Comment: per pt on 7/23/2020\"quit drinking age 40- use to drink on weekends\"    Drug use: Yes     Types: Marijuana, Other-see comments, Cocaine     Comment: PCP/ per pt on 7/23/2020\"last used marijuana 7/22/2020, last did cocaine  spring 2020\"    Sexual activity: Yes     Partners: Male   Lifestyle    Physical activity     Days per week: Not on file     Minutes per session: Not on file    Stress: Not on file   Relationships    Social connections     Talks on phone: Not on file     Gets together: Not on file     Attends Faith service: Not on file     Active member of club or organization: Not on file     Attends meetings of clubs or organizations: Not on file     Relationship status: Not on file    Intimate partner violence     Fear of current or ex partner: Not on file     Emotionally abused: Not on file     Physically abused: Not on file     Forced sexual activity: Not on file   Other Topics Concern    Not on file   Social History Narrative    Not on file       Review of Systems - History obtained from the patient.     Review of Systems - General ROS: negative for - chills, fever, hot flashes or night sweats  ENT ROS: negative for - headaches, oral lesions, sore throat, vertigo or visual changes  Allergy and Immunology ROS: negative for - hives or nasal congestion  Endocrine ROS: negative for - hair pattern changes, mood swings or polydipsia/polyuria  Respiratory ROS: no cough, shortness of breath, or wheezing  Cardiovascular ROS: no chest pain or dyspnea on exertion  Gastrointestinal ROS: no abdominal pain, change in bowel habits, or black or bloody stools  Genito-Urinary ROS: no dysuria, incontinence, trouble voiding, or hematuria  Musculoskeletal ROS: Negative for joint pain or myalgia  Neurological ROS: negative for - behavioral changes, dizziness, headaches, impaired coordination/balance, numbness/tingling or seizures  Dermatological ROS: negative for - eczema or nail changes  Psychological ROS: negative for - anxiety, depression or suicidal ideation     Objective     Physical Exam   Constitutional: Oriented to person, place, and time and well-developed, well-nourished, and in no distress. No distress. Obese   HENT:   Head: Normocephalic and atraumatic. Eyes: Conjunctivae are normal. Pupils are equal, round, and reactive to light. Neck: Normal range of motion. Neck supple. Cardiovascular: Normal rate, regular rhythm, normal heart sounds and intact distal pulses. No murmur heard. Pulmonary/Chest: Effort normal and breath sounds normal. No respiratory distress. Abdominal: Soft. Bowel sounds are normal. Exhibits no distension. There is no tenderness. Large. Musculoskeletal: Exhibits no edema or tenderness. Lymphadenopathy: Deferred. Neurological: She is alert and oriented to person, place, and time. No cranial nerve deficit. Skin: Skin is warm. She is not diaphoretic. Psychiatric: Mood, memory, affect and judgment normal.     Assessment  and Plan    Plan    1. Morbid obesity due to excess calories ([address]  - See below. 2. Seizure ([address]  - Hx of seizures, weight loss medications contraindicated. - Ultimately will need to change overall eating behaviors to have success in continued management with weight loss. - Will continue to journal food items and discussed the below recommendations to patient. - All questions answered and overall appears very receptive.   - No weight loss medications offered given seizure hx. Recommend non-surgical diet program discussed in detail. Patient was encouraged to journal all food intake and meet with RD. I spent 30 minutes with patient and more than 50% of the office visit today was spent in face to face counseling regarding diet and exercise, and program options in detail. No orders of the defined types were placed in this encounter.     No orders of the defined types were placed in this encounter. Follow Up:  No follow-ups on file. Bala Lowery CNPThe most weight lost ever with diet and exercise was 0 Lbs, but unfortunately only kept them of for none at all. These attempts have been temporarily successful with weight loss, but have failed to sustain adequate weight loss.

## 2020-12-18 ENCOUNTER — OFFICE VISIT (OUTPATIENT)
Dept: CARDIOLOGY CLINIC | Age: 60
End: 2020-12-18
Payer: MEDICARE

## 2020-12-18 VITALS
WEIGHT: 184 LBS | HEIGHT: 62 IN | BODY MASS INDEX: 33.86 KG/M2 | RESPIRATION RATE: 18 BRPM | SYSTOLIC BLOOD PRESSURE: 128 MMHG | TEMPERATURE: 97.3 F | DIASTOLIC BLOOD PRESSURE: 84 MMHG | HEART RATE: 68 BPM

## 2020-12-18 PROCEDURE — 1036F TOBACCO NON-USER: CPT | Performed by: INTERNAL MEDICINE

## 2020-12-18 PROCEDURE — 3017F COLORECTAL CA SCREEN DOC REV: CPT | Performed by: INTERNAL MEDICINE

## 2020-12-18 PROCEDURE — 99213 OFFICE O/P EST LOW 20 MIN: CPT | Performed by: INTERNAL MEDICINE

## 2020-12-18 PROCEDURE — G8482 FLU IMMUNIZE ORDER/ADMIN: HCPCS | Performed by: INTERNAL MEDICINE

## 2020-12-18 PROCEDURE — G8417 CALC BMI ABV UP PARAM F/U: HCPCS | Performed by: INTERNAL MEDICINE

## 2020-12-18 PROCEDURE — G8427 DOCREV CUR MEDS BY ELIG CLIN: HCPCS | Performed by: INTERNAL MEDICINE

## 2020-12-18 NOTE — PROGRESS NOTES
Tara Pinto MD                                  CARDIOLOGY  NOTE       Chief Complaint:    Chief Complaint   Patient presents with   Jahaira Solum Results     Patient here for results. She denies chest pain, dizziness, edema or palpitations. She states she has shortness of breath with exertion.  Shortness of Breath    Hypertension      Denies chest pain  Denies palpitations  Ch dyspnea     MPI 11/30/2020    Nina Nguyen .    No infarct or ischemia noted.    Normal tracer uptake in all segments of myocardium on stress and rest    images. Mild breast tissue attenuation noted    No infarct or ischemia noted.    Normal EF 75 % with normal ventricular contractility.        Recommendation    Medical Management    Follow up     Echocardiogram 12/1/2020     Summary   Left ventricular systolic function is normal with an ejection fraction of   55-60%. Grade I diastolic dysfunction. No significant valvular disease. Normal pulmonary artery pressure with a RVSP of 30mmHg   No evidence of pericardial effusion. JERMAINE 12/1/2020      Procedure       Type of Study:        Extremities Arteries:Lower Extremities Arterial Duplex, VL LOWER EXTREMITY    ARTERIES BILATERAL, Lower Arterial Plethysmography, VL ANKLE ART BRACHIAL    INDICES EXTREMITY BILATERAL.       Indications for Study:Claudication.       Conclusions        Summary        No evidence of significant occlusive arterial disease.    Normal bilateral lower extremity ankle brachial indices. Recent HPI:     Génesis is a 62y.o. year old female who presents to establish care for shortness of breath with exertion. Patient has a history of hypertension, diet-controlled hyperlipidemia, chronic nicotine abuse, recently diagnosed COPD, chronic dizziness. As per patient she has been feeling fatigued and short of breath for the past several months. Patient denies any dependent edema, denies any PND or orthopnea.   She mentions that she can walk up to half a block before she gets short of breath. She can hardly ambulate 1 flight of stairs before she has to catch breath. She denies any sick contacts fever chills. Patient also complains of bilateral lower extremity intermittent claudication with ambulation. He denies any history of open ulcers. Patient denies any chest pain  No Palpitations     EK2020 sinus rhythm without any ST-T changes. Current Outpatient Medications   Medication Sig Dispense Refill    olmesartan-hydroCHLOROthiazide (BENICAR HCT) 40-25 MG per tablet Take 1 tablet by mouth daily 30 tablet 3    rOPINIRole (REQUIP) 0.5 MG tablet Take 1 tablet by mouth every evening 30 tablet 3    meclizine (ANTIVERT) 25 MG tablet Take 1 tablet by mouth 3 times daily as needed for Dizziness 90 tablet 3    alendronate (FOSAMAX) 70 MG tablet Take 1 tablet by mouth once a week 12 tablet 1    nortriptyline (PAMELOR) 25 MG capsule Take 2 capsules by mouth nightly 60 capsule 3    SYMBICORT 160-4.5 MCG/ACT AERO Inhale 2 puffs into the lungs 2 times daily 1 Inhaler 5    eslicarbazepine (APTIOM) 400 MG TABS tablet Take 1 tablet by mouth nightly Takes 1200 total with the 400mg and 800mg 30 tablet 2    eslicarbazepine acetate (APTIOM) 800 MG tablet Take 800 mg by mouth nightly 30 tablet 2    lamoTRIgine (LAMICTAL) 200 MG tablet Take 1 tablet by mouth 2 times daily 60 tablet 2    lamoTRIgine (LAMICTAL) 25 MG tablet Take 1 tablet by mouth 2 times daily 60 tablet 2    albuterol sulfate HFA (PROVENTIL HFA) 108 (90 Base) MCG/ACT inhaler Inhale 2 puffs into the lungs every 6 hours as needed for Wheezing 1 Inhaler 5     No current facility-administered medications for this visit.         Allergies:     Varenicline and Chantix [varenicline tartrate]    Patient History:    Past Medical History:   Diagnosis Date    COPD (chronic obstructive pulmonary disease) ([address]     Full dentures     upper plate only    H/O dizziness     \"take Meclazine at least twice a day for this\"    H/O Doppler JERMAINE's ultrasound 12/01/2020     No evidence of significant occlusive arterial disease of lower EXT.    H/O echocardiogram 12/01/2020    EF 55-60%, Normal study.  History of drug abuse ([facility]     History of nuclear stress test 11/30/2020    Normal study.  History of UTI     \"last one May 2020\"    Hypertension     \"the top # use to be over 200 \"- on medication since 2018\"    Seizures ([facility]     hx of epilepsy- last seizure week 7/13/2020- hx grand mal seizures    Wears glasses      Past Surgical History:   Procedure Laterality Date    BRAIN SURGERY      s/p L anterior temporal lobectomy in 1996\"did surgery to try an treat the seizures\"   [address]    \"not sure if they took the ovaries\"   [address]    WRIST SURGERY Left 7/30/2020    LEFT WRIST TENDON RELEASE performed [facility] Darcie Tirado DO [facility]     Family History   Problem Relation Age of Onset    Heart Disease Father         cabg     Social History     Tobacco Use    Smoking status: Former Smoker     Packs/day: 1.50     Years: 40.00     Pack years: 60.00     Types: Cigarettes    Smokeless tobacco: Never Used   Substance Use Topics    Alcohol use: No     Frequency: Patient refused     Comment: per pt on 7/23/2020\"quit drinking age 40- use to drink on weekends\"        Review of Systems:     · Constitutional:  No Fever or Weight Loss + intermittent claudication  · Eyes: No Decreased Vision  · ENT: No Headaches, Hearing Loss or Vertigo  · Cardiovascular: No Chest Pain,  + Shortness of breath, No Palpitations. No Edema   · Respiratory: No cough or wheezing .  No Respiratory distress   · Gastrointestinal: No abdominal pain, appetite loss, blood in stools, constipation, diarrhea or heartburn  · Genitourinary: No dysuria, trouble voiding, or hematuria  · Musculoskeletal:  denies any new  joint aches , or pain   · Integumentary: No rash or pruritis  · Neurological: No TIA or stroke symptoms  · Psychiatric: No anxiety or depression  · Endocrine: No malaise, fatigue or temperature intolerance  · Hematologic/Lymphatic: No bleeding problems, blood clots or swollen lymph nodes  · Allergic/Immunologic: No nasal congestion or hives        Objective:      Physical Exam:    /84 (Site: Left Upper Arm, Position: Sitting, Cuff Size: Medium Adult)   Pulse 68   Temp 97.3 °F (36.3 °C)   Resp 18   Ht 5' 2\" (1.575 m)   Wt 184 lb (83.5 kg)   BMI 33.65 kg/m²   Wt Readings from Last 3 Encounters:   12/18/20 184 lb (83.5 kg)   12/17/20 182 lb 12.8 oz (82.9 kg)   11/24/20 186 lb (84.4 kg)     Body mass index is 33.65 kg/m². Vitals:    12/18/20 1027   BP: 128/84   Pulse: 68   Resp: 18   Temp: 97.3 °F (36.3 °C)        General Appearance and Constitutional: Conversant, Well developed, Well nourished, No acute distress, Non-toxic appearance. HEENT:  Normocephalic, Atraumatic, Bilateral external ears normal, Oropharynx moist, No oral exudates,   Nose normal.   Neck- Normal range of motion, No tenderness, Supple  Eyes:  Conjunctiva normal, No discharge. Respiratory:  Normal breath sounds, No respiratory distress, No wheezing, No Rales, No Ronchi. No chest tenderness. Cardiovascular: S1-S2, no added heart sounds, No Mumurs appreciated. No gallops, rubs. No Pedal Edema   GI:  Bowel sounds normal, Soft, No tenderness,  :  No costovertebral angle tenderness   Musculoskeletal:  No gross deformities.  Back- No tenderness  Integument:  Well hydrated, no rash   Lymphatic:  No lymphadenopathy noted   Neurologic:  Alert & oriented x 3, Normal motor function, normal sensory function, no focal deficits noted   Psychiatric:  Speech and behavior appropriate       Medical decision making and Data review:    DATA:    No results found for: TROPONINT  BNP:  No results found for: PROBNP  PT/INR:  No results found for: PTINR  Lab Results   Component Value Date    LABA1C 5.4 07/07/2020    LABA1C 6.0 04/04/2013     Lab and patient verbalizes understanding. Call for any problems, questions, or concerns.

## 2021-01-06 ENCOUNTER — TELEPHONE (OUTPATIENT)
Dept: BARIATRICS/WEIGHT MGMT | Age: 61
End: 2021-01-06

## 2021-01-06 NOTE — TELEPHONE ENCOUNTER
[facility] - Non-Surgical Weight Loss Program    REASON FOR VISIT: New Patient    Chief Complaint:  No chief complaint on file. SUBJECTIVE:  Pt was called to start Non-Surgical [facility] to Success Program 55-65 gm net carb per day. Pt instructed on label reading, healthy food choices, meal planning. All handouts mailed to pt. Verbalized limited understanding. Will need reinforcement. The patient is a 62 y.o. female being seen for obesity, enrolled in Non-Surgical Weight Loss Program; Génesis's,  ,  , Current There is no height or weight on file to calculate BMI. The patient's PCP is Liat Pereira MD   The patient first recognized that she had a weight problem about  5 years ago. The patient's lowest weight in the last five years was 130 lbs, and the highest weightin the last five years was 180 lbs. Comorbid Conditions:  Significant diseases affecting this patient are   Past Medical History:   Diagnosis Date    COPD (chronic obstructive pulmonary disease) ([address]     Full dentures     upper plate only    H/O dizziness     \"take Meclazine at least twice a day for this\"    H/O Doppler JERMAINE's ultrasound 12/01/2020     No evidence of significant occlusive arterial disease of lower EXT.    H/O echocardiogram 12/01/2020    EF 55-60%, Normal study.  History of drug abuse ([address]     History of nuclear stress test 11/30/2020    Normal study.  History of UTI     \"last one May 2020\"    Hypertension     \"the top # use to be over 200 \"- on medication since 2018\"    Seizures ([facility]     hx of epilepsy- last seizure week 7/13/2020- hx grand mal seizures    Wears glasses    . Review of Systems - [unfilled]  Otherwise per HPI. Allergies:   Allergies   Allergen Reactions    Varenicline Other (See Comments)     Having bad dreams and chest pain( Chantix)    Chantix [Varenicline Tartrate] Palpitations     Chest pain       Past Surgical History:     Past Surgical History: Procedure Laterality Date    BRAIN SURGERY      s/p L anterior temporal lobectomy in 1996\"did surgery to try an treat the seizures\"   [address]    \"not sure if they took the ovaries\"   [address]    WRIST SURGERY Left 7/30/2020    LEFT WRIST TENDON RELEASE performed by Krzysztof Nuñez DO at [facility]       Family History:  Family History   Problem Relation Age of Onset    Heart Disease Father         cabg       Social History:  Social History     Socioeconomic History    Marital status:      Spouse name: Not on file    Number of children: Not on file    Years of education: Not on file    Highest education level: Not on file   Occupational History    Not on file   Social Needs    Financial resource strain: Not on file    Food insecurity     Worry: Not on file     Inability: Not on file    Transportation needs     Medical: Not on file     Non-medical: Not on file   Tobacco Use    Smoking status: Former Smoker     Packs/day: 1.50     Years: 40.00     Pack years: 60.00     Types: Cigarettes    Smokeless tobacco: Never Used   Substance and Sexual Activity    Alcohol use: No     Frequency: Patient refused     Comment: per pt on 7/23/2020\"quit drinking age 40- use to drink on weekends\"    Drug use: Yes     Types: Marijuana, Other-see comments, Cocaine     Comment: PCP/ per pt on 7/23/2020\"last used marijuana 7/22/2020, last did cocaine  spring 2020\"    Sexual activity: Yes     Partners: Male   Lifestyle    Physical activity     Days per week: Not on file     Minutes per session: Not on file    Stress: Not on file   Relationships    Social connections     Talks on phone: Not on file     Gets together: Not on file     Attends Confucianist service: Not on file     Active member of club or organization: Not on file     Attends meetings of clubs or organizations: Not on file     Relationship status: Not on file    Intimate partner violence     Fear of current or ex partner: Not on file     Emotionally abused: Not on file     Physically abused: Not on file     Forced sexual activity: Not on file   Other Topics Concern    Not on file   Social History Narrative    Not on file         OBJECTIVE:  Physical Exam   @VS@       NUTRITION DIAGNOSIS: Overweight / Obesity   Problem: Increased adiposity compared to reference standard orestablished norm   Etiology: Excess intake compared to output over time   S/S: Ht: 62\" Wt: 184 lbs BMI: 33.65    NUTRITION INTERVENTIONS:    Individualized treatment goals to address nutrition diagnosis:   Instructed on [facility] to Success diet for weight loss   Provided sample menus, food lists and On-line Modules   Encouraged Physical activity as approved by physician    MONITORING/ EVALUATION/ PLAN:   Pt verbalized understanding of all materials covered   Pt asked pertinent questions throughout the session - expectcompliance with nutrition guidelines presented   Provided pt with contact information should questions arise prior to next visit   Will f/u with pt weekly  Xin Braden MS, RDN, LD  1/6/2021

## 2021-01-08 ENCOUNTER — HOSPITAL ENCOUNTER (OUTPATIENT)
Age: 61
Discharge: HOME | End: 2021-01-08
Payer: MEDICARE

## 2021-01-08 PROCEDURE — U0002 COVID-19 LAB TEST NON-CDC: HCPCS

## 2021-01-08 PROCEDURE — C9803 HOPD COVID-19 SPEC COLLECT: HCPCS

## 2021-01-10 LAB
SARS-COV-2: NOT DETECTED
SOURCE: NORMAL

## 2021-01-11 ENCOUNTER — OFFICE VISIT (OUTPATIENT)
Dept: PSYCHOLOGY | Age: 61
End: 2021-01-11
Payer: MEDICARE

## 2021-01-11 DIAGNOSIS — F33.1 MODERATE EPISODE OF RECURRENT MAJOR DEPRESSIVE DISORDER (HCC): Primary | ICD-10-CM

## 2021-01-11 PROCEDURE — 1036F TOBACCO NON-USER: CPT | Performed by: PSYCHOLOGIST

## 2021-01-11 PROCEDURE — 90832 PSYTX W PT 30 MINUTES: CPT | Performed by: PSYCHOLOGIST

## 2021-01-11 NOTE — PROGRESS NOTES
Behavioral Health Consultation  Lidia Land Psy.D. Psychologist      Time spent with Patient: 30 minutes  Visit number: 6  Reason for Consult:  depression and anxiety  Referring Provider: Jemal Wan MD  [address]  [facility] [address] [facility],  [address] [place] [address]    S:  ----------------------------------------------------------------------------------------------------------------------  depression and anxiety  \"Things are pretty terrible. \" Explained holidays were \"really bad. \" Stated boyfriend did not give her any gifts for [employer]. Endorsed depressed mood, anhedonia, anergia, amotivation, poor sleep, fluctuating appetite, diminished concentration, and corrosive self-image. O:  ----------------------------------------------------------------------------------------------------------------------  MSE:  Orientation:  oriented to person, place, time, and general circumstances  Appearance and behavior:  alert, cooperative  Speech:  spontaneous, normal rate and normal volume  Mood: depressed and anxious   Thought Content:  intact, hopelessness and helplessness  Thought Process:  linear, goal directed and coherent  Interest/Pleasure: Loss of Pleasure/Fun  Sleep disturbance: Yes  Motivation: Poor  Energy: Tired/Fatigued  Morbid ideation No  Suicide Assessment: no suicidal ideation    A:  ----------------------------------------------------------------------------------------------------------------------  Diagnosis:    1.  Moderate episode of recurrent major depressive disorder [facility])         PHQ Scores 11/24/2020 11/3/2020 10/19/2020 9/1/2020 8/11/2020 7/14/2020 6/16/2020   PHQ2 Score 0 0 0 0 1 1 2   PHQ9 Score 0 0 0 0 1 1 2     Interpretation of Total Score Depression Severity: 1-4 = Minimal depression, 5-9 = Mild depression, 10-14 = Moderate depression, 15-19 = Moderately severe depression, 20-27 = Severe depression    P:  ----------------------------------------------------------------------------------------------------------------------    General:   [x] [place]-setting to identify pt's primary goals for [facility] visit / overall health   [x] Provided psychoeducation/handout on:   1. Moderate episode of recurrent major depressive disorder (HCC)        [x]  Supportive interventions    Cognitive:   [x] Trained in strategies for increasing balanced thinking   [x] Cognitive strategies to target current mental health sx    [x] Identified and challenged maladaptive thoughts    Behavioral:   [x] Discussed and set plan for behavioral activation   [x] Discussed and problem-solved barriers in adhering to behavioral change plan   [x] Motivational Interviewing to increase patient confidence and compliance with       adhering to behavioral change plan   [x] Discussed potential barriers to change   [x] Discussed self-care (sleep, nutrition, rewarding activities, social support, exercise)    Other:   []   []   []   []    Recommendations to patient:     1. Return to Dr. Nathalie Doyle in 2-3 week(s)    2. This note will not be viewable in [facility] for the following reason(s). This is a Psychotherapy Note.           Feedback provided to pt's PCP via EPIC and/or oral report

## 2021-01-14 ENCOUNTER — TELEPHONE (OUTPATIENT)
Dept: BARIATRICS/WEIGHT MGMT | Age: 61
End: 2021-01-14

## 2021-01-15 ENCOUNTER — HOSPITAL ENCOUNTER (OUTPATIENT)
Dept: CARDIAC REHAB | Age: 61
Discharge: HOME | End: 2021-01-15
Payer: MEDICARE

## 2021-01-15 ENCOUNTER — HOSPITAL ENCOUNTER (OUTPATIENT)
Dept: CT IMAGING | Age: 61
Discharge: HOME | End: 2021-01-15
Payer: MEDICARE

## 2021-01-15 DIAGNOSIS — J43.1 PANLOBULAR EMPHYSEMA (HCC): ICD-10-CM

## 2021-01-15 DIAGNOSIS — Z87.891 PERSONAL HISTORY OF NICOTINE DEPENDENCE: ICD-10-CM

## 2021-01-15 PROCEDURE — 94727 GAS DIL/WSHOT DETER LNG VOL: CPT

## 2021-01-15 PROCEDURE — 71271 CT THORAX LUNG CANCER SCR C-: CPT

## 2021-01-15 PROCEDURE — 94729 DIFFUSING CAPACITY: CPT

## 2021-01-15 PROCEDURE — 94060 EVALUATION OF WHEEZING: CPT

## 2021-01-22 ENCOUNTER — TELEPHONE (OUTPATIENT)
Dept: BARIATRICS/WEIGHT MGMT | Age: 61
End: 2021-01-22

## 2021-01-22 NOTE — TELEPHONE ENCOUNTER
Pt called to f/u on progress in NS program. No answer and no VM to leave message. Pt will need to reschedule apt if desired.   Tiffany Ashley MS, RDN, LD  1/22/2021

## 2021-01-22 NOTE — TELEPHONE ENCOUNTER
Génesis Nava  called saying she was suppose to have received a call this afternoon from Tan Quiles, she did have an appt and in the notes it states they could not leave a vm. She doesn't have vm set up on her phone but this number is her correct number and she would like to hear back from Tan Quiles. She isnt sure why the call didn't go through as she was not on the phone all afternoon.

## 2021-02-05 ENCOUNTER — TELEPHONE (OUTPATIENT)
Dept: BARIATRICS/WEIGHT MGMT | Age: 61
End: 2021-02-05

## 2021-02-05 NOTE — TELEPHONE ENCOUNTER
Pt called to f/u on progress in NS program. Pt has no understanding of meal plan. Multiple questions about why she is required to eat foods she can't. Pt reinstructed on healthy food choices, focus on lean proteins, vegetables, minimal starchy foods. Pt still does not seem to grasp any basic concepts of food choices. Perseverating on having eat foods she can't chew. Multiple attempts to explain. Meal plan provided and reviewed. Will f/u in 2 weeks for further instruction and questions.   Keturah Ortega MS, RDN, LD  2/5/2021

## 2021-02-06 NOTE — PROGRESS NOTES
SUBJECTIVE:    Lorrie Mike presents to the pulmonary clinic today for follow-up, she was last seen in clinic for consultation on 2/8/2021. Since she was last seen in clinic she is also seeing cardiology and her PCP both who have requested that she be evaluated for possible sleep apnea. Génesis states that he usually goes to bed around 10 Pm, sleeps generally 4-6 hours a night. States awakens during the night occasionally due to nocturia or restlessness, and falls back to sleep within 10 minutes after getting up. States in the morning she is fatigued and does not feel like she has slept well. She is sleepy during the day but does not nap. Family reports snoring. Denies any symptoms of CHF. States she does not have restless leg syndrome. Sleep is not interrupted due to chronic pain. There has no been witness apnea. Iverson Denver did recently complete a pulmonary function test on 1/15/2021 which demonstrated moderate obstructive disease, possible restrictive disease and mild diffusion defect. She is currently on Symbicort 2 puffs twice a day with spacer along with an albuterol rescue inhaler. She states sometimes she uses her rescue inhaler more than others. She states at most she uses her inhaler 2-3 times a day but most times uses once or twice. She denies any recent bronchial infections, she has not required visits to the emergency room, walk-in clinic, urgent care or hospitalization for respiratory related illness since she was last seen in the clinic. She states overall she feels she is doing very well with the use of current bronchodilator therapy. She states when she is out from home she has been social distancing wherever able, wearing a mask in public, washing her hands frequently or using hand . She denies any fever, chills, lightheadedness, dizziness, sinus pressure or pain, loss of sensation of taste or smell, nausea vomiting or abdominal complaints.   She denies any recent contact with persons known COVID-19 or under investigation for possible COVID-19 exposure. She did complete a COVID-19 prescreening testing on 1/8/2021 which was negative. Past medical history and medications have been reviewed  Family and social history have been reviewed     ROS:  Constitution:  HEENT: Negative for ear, throat pain  Cardiovascular: Negative for chest pain, syncope, edema  Pulmonary: No cough, negative shortness of breath, negative wheeze  Musculoskeletal: Negative for DVT, myalgias, arthralgias    OBJECTIVE:  /78   Pulse 76   Temp 97.9 °F (36.6 °C)   Ht 5' 2\" (1.575 m)   Wt 190 lb (86.2 kg)   SpO2 99%   BMI 34.75 kg/m²      Physical Exam:  Constitutional:  She appears well developed and well-nourished. Neck:  Supple, No palpable lymphadenopathy, No JVD  Cardiovascular:  S1, S2 Normal, Regular rhythm, no murmurs or gallops, No pericardial  rubs. Pulmonary: Bilateral breath sounds clear and equal to auscultation, good air movement, no forced expiratory wheeze, no rales, no rhonchi, no cough noted during assessment, no use of accessory muscles to support respiratory effort  Abdomen: Not examined  Extremities: no edema, No DVT  Neurologic: Oriented x3, No focal deficits    Radiology:     1/15/2021 CT lung screening  0.4 cm perifissural lymph node along the right major fissure, mild to moderate emphysema, minimal to mild bronchial wall thickening potentially due to reactive airways disease of bronchitis, minimal coronary artherosclerotic calcifications      PFT:   1/15/2021  FVC 74% predicted/76% predicted, FEV1 63% / 64% predicted, FEF 25 to 75% 46% predicted/74% predicted, % predicted, TLC 91% predicted, DLCO 49% predicted.   No significant response noted to bronchodilator administration post test was noted  Overall testing shows moderate obstructive airway disease, probable restrictive disease, moderate to severe diffusion defect    ESS: 5  Neck circumstance: 15  Mallampati: 2    ASSESSMENT:    1. Fatigue, unspecified type    2. Hypersomnia    3. Snoring    4. Panlobular emphysema ([address]    5. Personal history of nicotine dependence     6. SOBOE (shortness of breath on exertion)          PLAN:   Dwayne Escobedo has done very well since I last saw her on her initial consult 8/3/2020. She continues to use her Symbicort twice a day and rinsing her mouth well after use. There are no indications of oral candidiasis on exam.  She is using her albuterol inhaler 2-3 times a day 7 days other days she uses only once to twice. Her pulmonary function test was completed 1/15/2021 which demonstrated moderate obstructive disease with possible restrictive disease. At this time I will make no changes to her bronchodilator therapy we will repeat her pulmonary function test in January 2022. Based on assessment and exam patient demonstrates high pretest probability of having KYLE. We will precede with sleep study. I have discussed with Génesis:   [x]  Sleep hygiene/ relaxation methods & CBTi principles review with patient   [x]  Avoid supine/back sleep until sleep study   [x]  Driving precautions   [x]  Medical consequences of untreated KYLE   [x]  Weight loss recommendations   [x]  Diet recommendations   [x]  Exercise    We have discussed the need to maintain yearly flu immunization, pneumococcal vaccination. We have discussed Coronavirus precaution including handwashing practice, wiping items touched in public such as gas pumps, door handles, shopping carts, etc. Self monitoring for infection - fever, chills, cough, SOB. Should they develop symptoms they should call office for further instructions. Return in about 3 months (around 5/8/2021) for Follow Up COPD, 6 Minute Walk Test.      This dictation was performed with a verbal recognition program and it was checked for errors. It is possible that there are still dictated errors within this office note.   Any errors should be brought immediately to my attention for correction. All efforts were made to ensure that this office note is accurate.

## 2021-02-08 ENCOUNTER — OFFICE VISIT (OUTPATIENT)
Dept: PULMONOLOGY | Age: 61
End: 2021-02-08
Payer: MEDICARE

## 2021-02-08 VITALS
OXYGEN SATURATION: 99 % | BODY MASS INDEX: 34.96 KG/M2 | WEIGHT: 190 LBS | SYSTOLIC BLOOD PRESSURE: 124 MMHG | HEART RATE: 76 BPM | HEIGHT: 62 IN | TEMPERATURE: 97.9 F | DIASTOLIC BLOOD PRESSURE: 78 MMHG

## 2021-02-08 DIAGNOSIS — G47.10 HYPERSOMNIA: ICD-10-CM

## 2021-02-08 DIAGNOSIS — R06.02 SOBOE (SHORTNESS OF BREATH ON EXERTION): ICD-10-CM

## 2021-02-08 DIAGNOSIS — J43.1 PANLOBULAR EMPHYSEMA (HCC): ICD-10-CM

## 2021-02-08 DIAGNOSIS — R53.83 FATIGUE, UNSPECIFIED TYPE: Primary | ICD-10-CM

## 2021-02-08 DIAGNOSIS — Z87.891 PERSONAL HISTORY OF NICOTINE DEPENDENCE: ICD-10-CM

## 2021-02-08 DIAGNOSIS — R06.83 SNORING: ICD-10-CM

## 2021-02-08 PROCEDURE — 99214 OFFICE O/P EST MOD 30 MIN: CPT | Performed by: NURSE PRACTITIONER

## 2021-02-08 PROCEDURE — 3017F COLORECTAL CA SCREEN DOC REV: CPT | Performed by: NURSE PRACTITIONER

## 2021-02-08 PROCEDURE — G8926 SPIRO NO PERF OR DOC: HCPCS | Performed by: NURSE PRACTITIONER

## 2021-02-08 PROCEDURE — 1036F TOBACCO NON-USER: CPT | Performed by: NURSE PRACTITIONER

## 2021-02-08 PROCEDURE — G8417 CALC BMI ABV UP PARAM F/U: HCPCS | Performed by: NURSE PRACTITIONER

## 2021-02-08 PROCEDURE — G8482 FLU IMMUNIZE ORDER/ADMIN: HCPCS | Performed by: NURSE PRACTITIONER

## 2021-02-08 PROCEDURE — 3023F SPIROM DOC REV: CPT | Performed by: NURSE PRACTITIONER

## 2021-02-08 PROCEDURE — G8427 DOCREV CUR MEDS BY ELIG CLIN: HCPCS | Performed by: NURSE PRACTITIONER

## 2021-02-08 RX ORDER — MAGNESIUM OXIDE 400 MG/1
400 TABLET ORAL 2 TIMES DAILY
COMMUNITY
End: 2021-03-02 | Stop reason: SDUPTHER

## 2021-02-08 RX ORDER — MECLIZINE HYDROCHLORIDE 25 MG/1
TABLET ORAL
COMMUNITY
End: 2021-03-02

## 2021-02-08 RX ORDER — CLOBAZAM 10 MG/1
TABLET ORAL
COMMUNITY
Start: 2021-01-27 | End: 2021-06-09

## 2021-02-08 NOTE — PATIENT INSTRUCTIONS
Stop using Symbicort    Start using Breztri with spacer as I showed you 2 puffs in morning and 2 puffs in the evening    Continue using Albuterol 2 puffs with spacer as demonstrated to you today    Current weight loss management

## 2021-02-09 RX ORDER — ALBUTEROL SULFATE 90 UG/1
2 AEROSOL, METERED RESPIRATORY (INHALATION) EVERY 6 HOURS PRN
Qty: 1 INHALER | Refills: 5 | Status: SHIPPED | OUTPATIENT
Start: 2021-02-09 | End: 2021-02-23

## 2021-02-11 ENCOUNTER — HOSPITAL ENCOUNTER (OUTPATIENT)
Dept: CARDIAC REHAB | Age: 61
Discharge: HOME | End: 2021-02-11
Payer: MEDICARE

## 2021-02-11 ENCOUNTER — HOSPITAL ENCOUNTER (OUTPATIENT)
Age: 61
Discharge: HOME | End: 2021-02-11
Payer: MEDICARE

## 2021-02-11 LAB
ALBUMIN SERPL-MCNC: 4.4 GM/DL (ref 3.4–5)
ALP BLD-CCNC: 107 IU/L (ref 40–129)
ALT SERPL-CCNC: 12 U/L (ref 10–40)
ANION GAP SERPL CALCULATED.3IONS-SCNC: 4 MMOL/L (ref 4–16)
AST SERPL-CCNC: 14 IU/L (ref 15–37)
BILIRUB SERPL-MCNC: 0.2 MG/DL (ref 0–1)
BUN BLDV-MCNC: 28 MG/DL (ref 6–23)
CALCIUM SERPL-MCNC: 10 MG/DL (ref 8.3–10.6)
CHLORIDE BLD-SCNC: 97 MMOL/L (ref 99–110)
CO2: 34 MMOL/L (ref 21–32)
CREAT SERPL-MCNC: 1.1 MG/DL (ref 0.6–1.1)
GFR AFRICAN AMERICAN: >60 ML/MIN/1.73M2
GFR NON-AFRICAN AMERICAN: 51 ML/MIN/1.73M2
GLUCOSE FASTING: 112 MG/DL (ref 70–99)
POTASSIUM SERPL-SCNC: 4.7 MMOL/L (ref 3.5–5.1)
SODIUM BLD-SCNC: 135 MMOL/L (ref 135–145)
TOTAL PROTEIN: 7.6 GM/DL (ref 6.4–8.2)

## 2021-02-11 PROCEDURE — 36415 COLL VENOUS BLD VENIPUNCTURE: CPT

## 2021-02-11 PROCEDURE — 80175 DRUG SCREEN QUAN LAMOTRIGINE: CPT

## 2021-02-11 PROCEDURE — 94761 N-INVAS EAR/PLS OXIMETRY MLT: CPT

## 2021-02-11 PROCEDURE — 80053 COMPREHEN METABOLIC PANEL: CPT

## 2021-02-11 NOTE — PROGRESS NOTES
RESP CARE 6\" WALK TEST NOTE: PT ON ROOM AIR @ REST Sa02 95% AMBULATED IN HALLWAY AFTER 3\" Sa02 80% PLACED ON 2L NC Sa02 85% INCREASED TO 3L NC CONTINUED AMBULATING Sa02 MAINTAINED 90'S

## 2021-02-12 LAB — LAMOTRIGINE LEVEL: 5.1 UG/ML (ref 2.5–15)

## 2021-02-14 ENCOUNTER — HOSPITAL ENCOUNTER (OUTPATIENT)
Dept: MRI IMAGING | Age: 61
Discharge: HOME | End: 2021-02-14
Payer: MEDICARE

## 2021-02-14 DIAGNOSIS — R42 DIZZINESS AND GIDDINESS: ICD-10-CM

## 2021-02-14 PROCEDURE — 70553 MRI BRAIN STEM W/O & W/DYE: CPT

## 2021-02-14 PROCEDURE — 6360000004 HC RX CONTRAST MEDICATION: Performed by: INTERNAL MEDICINE

## 2021-02-14 PROCEDURE — A9579 GAD-BASE MR CONTRAST NOS,1ML: HCPCS | Performed by: INTERNAL MEDICINE

## 2021-02-14 RX ADMIN — GADOTERIDOL 14 ML: 279.3 INJECTION, SOLUTION INTRAVENOUS at 10:14

## 2021-02-18 ENCOUNTER — VIRTUAL VISIT (OUTPATIENT)
Dept: BARIATRICS/WEIGHT MGMT | Age: 61
End: 2021-02-18

## 2021-02-18 DIAGNOSIS — E66.9 OBESITY (BMI 30.0-34.9): Primary | ICD-10-CM

## 2021-02-18 PROCEDURE — 99999 PR OFFICE/OUTPT VISIT,PROCEDURE ONLY: CPT

## 2021-02-18 NOTE — PROGRESS NOTES
Pt was called to f/u on progress in NS program. Pt reports she has lost 5 lbs down to 185 lbs. Mainly working to reduce portions of all foods - especially potatoes. Is planning trip with brother. Not sure how long she will be gone.  Encouraged pt to call the office when she returns for f/u in program.  Jacobo Kim MS, RDN, LD  2/18/2021

## 2021-02-23 DIAGNOSIS — G40.319 INTRACTABLE GENERALIZED IDIOPATHIC EPILEPSY WITHOUT STATUS EPILEPTICUS (HCC): ICD-10-CM

## 2021-02-23 DIAGNOSIS — G25.81 RESTLESS LEGS: ICD-10-CM

## 2021-02-23 DIAGNOSIS — J43.1 PANLOBULAR EMPHYSEMA (HCC): ICD-10-CM

## 2021-02-23 DIAGNOSIS — I10 ESSENTIAL HYPERTENSION: ICD-10-CM

## 2021-02-23 RX ORDER — LAMOTRIGINE 25 MG/1
25 TABLET ORAL 2 TIMES DAILY
Qty: 60 TABLET | Refills: 3 | Status: SHIPPED | OUTPATIENT
Start: 2021-02-23 | End: 2021-06-09

## 2021-02-23 RX ORDER — OLMESARTAN MEDOXOMIL AND HYDROCHLOROTHIAZIDE 40/25 40; 25 MG/1; MG/1
TABLET ORAL
Qty: 30 TABLET | Refills: 3 | Status: SHIPPED | OUTPATIENT
Start: 2021-02-23 | End: 2021-03-02 | Stop reason: SDUPTHER

## 2021-02-23 RX ORDER — ROPINIROLE 0.5 MG/1
0.5 TABLET, FILM COATED ORAL 3 TIMES DAILY
Qty: 30 TABLET | Refills: 3 | Status: SHIPPED | OUTPATIENT
Start: 2021-02-23 | End: 2021-03-02

## 2021-02-23 RX ORDER — ALBUTEROL SULFATE 90 UG/1
2 AEROSOL, METERED RESPIRATORY (INHALATION) EVERY 6 HOURS PRN
Qty: 18 G | Refills: 5 | Status: SHIPPED | OUTPATIENT
Start: 2021-02-23 | End: 2021-05-10 | Stop reason: SDUPTHER

## 2021-02-27 ASSESSMENT — SLEEP AND FATIGUE QUESTIONNAIRES
HOW LIKELY ARE YOU TO NOD OFF OR FALL ASLEEP WHILE WATCHING TV: 2
HOW LIKELY ARE YOU TO NOD OFF OR FALL ASLEEP WHILE LYING DOWN TO REST IN THE AFTERNOON WHEN CIRCUMSTANCES PERMIT: 0
HOW LIKELY ARE YOU TO NOD OFF OR FALL ASLEEP WHILE SITTING INACTIVE IN A PUBLIC PLACE: 0
HOW LIKELY ARE YOU TO NOD OFF OR FALL ASLEEP IN A CAR, WHILE STOPPED FOR A FEW MINUTES IN TRAFFIC: 0
HOW LIKELY ARE YOU TO NOD OFF OR FALL ASLEEP WHILE SITTING QUIETLY AFTER LUNCH WITHOUT ALCOHOL: 0
HOW LIKELY ARE YOU TO NOD OFF OR FALL ASLEEP WHEN YOU ARE A PASSENGER IN A CAR FOR AN HOUR WITHOUT A BREAK: 1
ESS TOTAL SCORE: 5

## 2021-03-01 ENCOUNTER — OFFICE VISIT (OUTPATIENT)
Dept: PSYCHOLOGY | Age: 61
End: 2021-03-01
Payer: MEDICARE

## 2021-03-01 DIAGNOSIS — F33.1 MODERATE EPISODE OF RECURRENT MAJOR DEPRESSIVE DISORDER (HCC): Primary | ICD-10-CM

## 2021-03-01 PROCEDURE — 1036F TOBACCO NON-USER: CPT | Performed by: PSYCHOLOGIST

## 2021-03-01 PROCEDURE — 90832 PSYTX W PT 30 MINUTES: CPT | Performed by: PSYCHOLOGIST

## 2021-03-01 NOTE — PROGRESS NOTES
Behavioral Health Consultation  Lidia Lira Psy.D. Psychologist      Time spent with Patient: 30 minutes  Visit number: 7  Reason for Consult:  depression and anxiety  Referring Provider: Denton Ny MD  [address]  [address] [facility],  [address] [facility]    S:  ----------------------------------------------------------------------------------------------------------------------  depression and anxiety  Today pt presented with slurred and slow speech. Poor ambulation. Poor balance w assistance of walker, requiring assistance from provider and ultimately wheelchair. Dropped purse while searching for paperwork. Oriented to person, place, time, day of week, month, and date. Unable to ID president. Unable to ID year on first trial.     \"I'm just falling everywhere. \" Started Clobazam 10mg 3 weeks ago from [address] at [facility]. Stated has fallen 3x in past 2 weeks. Endorsed depressed mood, anhedonia, anergia, amotivation, poor sleep, fluctuating appetite, diminished concentration, and corrosive self-image. O:  ----------------------------------------------------------------------------------------------------------------------  MSE:  Orientation:  Oriented to person, place, time, day of week, month, and date. Unable to ID president. Unable to ID year on first trial.   Appearance and behavior:  ambulation was poor and imbalanced. Requiring assistnace of waslker and then wheelchair to enter exam room. Speech: slurred, spontaneous, normal rate and normal volume  Mood: depressed and anxious   Thought Content:  intact, hopelessness and helplessness  Thought Process:  linear, goal directed and coherent  Interest/Pleasure:  Loss of Pleasure/Fun  Sleep disturbance: Yes  Motivation: Poor  Energy: Tired/Fatigued  Morbid ideation No  Suicide Assessment: no suicidal ideation    A:  ----------------------------------------------------------------------------------------------------------------------  Diagnosis:    1. Moderate episode of recurrent major depressive disorder [facility])         PHQ Scores 11/24/2020 11/3/2020 10/19/2020 9/1/2020 8/11/2020 7/14/2020 6/16/2020   PHQ2 Score 0 0 0 0 1 1 2   PHQ9 Score 0 0 0 0 1 1 2     Interpretation of Total Score Depression Severity: 1-4 = Minimal depression, 5-9 = Mild depression, 10-14 = Moderate depression, 15-19 = Moderately severe depression, 20-27 = Severe depression    P:  ----------------------------------------------------------------------------------------------------------------------    Called neurologist to discuss presentation today. Suspect d/t Clobazam. Left message w clinical staff and return number given. Ensured pt was not driving. General:   [x] [place]-setting to identify pt's primary goals for [facility] visit / overall health   [x] Provided psychoeducation/handout on:   1. Moderate episode of recurrent major depressive disorder (HCC)        [x]  Supportive interventions    Cognitive:   [x] Trained in strategies for increasing balanced thinking   [x] Cognitive strategies to target current mental health sx    [x] Identified and challenged maladaptive thoughts    Behavioral:   [x] Discussed and set plan for behavioral activation   [x] Discussed and problem-solved barriers in adhering to behavioral change plan   [x] Motivational Interviewing to increase patient confidence and compliance with       adhering to behavioral change plan   [x] Discussed potential barriers to change   [x] Discussed self-care (sleep, nutrition, rewarding activities, social support, exercise)    Other:   []   []   []   []    Recommendations to patient:     1. Return to Dr. Jovana Lira in 2-3 week(s)    2. This note will not be viewable in [employer] for the following reason(s). This is a Psychotherapy Note.         Feedback provided to pt's PCP via [facility] and/or oral report

## 2021-03-01 NOTE — Clinical Note
I called neurologist to discuss presentation today. Suspect d/t Clobazam. Left message w clinical staff and return number given. I've been seeing Génesis for some time now and she has never presented in this way.

## 2021-03-02 ENCOUNTER — OFFICE VISIT (OUTPATIENT)
Dept: INTERNAL MEDICINE CLINIC | Age: 61
End: 2021-03-02
Payer: MEDICARE

## 2021-03-02 VITALS
HEIGHT: 62 IN | DIASTOLIC BLOOD PRESSURE: 80 MMHG | BODY MASS INDEX: 34.04 KG/M2 | TEMPERATURE: 97.8 F | SYSTOLIC BLOOD PRESSURE: 130 MMHG | WEIGHT: 185 LBS | OXYGEN SATURATION: 97 % | HEART RATE: 76 BPM

## 2021-03-02 DIAGNOSIS — G25.81 RESTLESS LEGS: ICD-10-CM

## 2021-03-02 DIAGNOSIS — G40.319 INTRACTABLE GENERALIZED IDIOPATHIC EPILEPSY WITHOUT STATUS EPILEPTICUS (HCC): ICD-10-CM

## 2021-03-02 DIAGNOSIS — I10 ESSENTIAL HYPERTENSION: ICD-10-CM

## 2021-03-02 DIAGNOSIS — R26.89 IMBALANCE: Primary | ICD-10-CM

## 2021-03-02 DIAGNOSIS — F33.1 MODERATE EPISODE OF RECURRENT MAJOR DEPRESSIVE DISORDER (HCC): ICD-10-CM

## 2021-03-02 DIAGNOSIS — R42 VERTIGO: ICD-10-CM

## 2021-03-02 DIAGNOSIS — M81.0 AGE-RELATED OSTEOPOROSIS WITHOUT CURRENT PATHOLOGICAL FRACTURE: ICD-10-CM

## 2021-03-02 PROCEDURE — G8427 DOCREV CUR MEDS BY ELIG CLIN: HCPCS | Performed by: INTERNAL MEDICINE

## 2021-03-02 PROCEDURE — G8482 FLU IMMUNIZE ORDER/ADMIN: HCPCS | Performed by: INTERNAL MEDICINE

## 2021-03-02 PROCEDURE — G8417 CALC BMI ABV UP PARAM F/U: HCPCS | Performed by: INTERNAL MEDICINE

## 2021-03-02 PROCEDURE — 99214 OFFICE O/P EST MOD 30 MIN: CPT | Performed by: INTERNAL MEDICINE

## 2021-03-02 PROCEDURE — 1036F TOBACCO NON-USER: CPT | Performed by: INTERNAL MEDICINE

## 2021-03-02 PROCEDURE — 3017F COLORECTAL CA SCREEN DOC REV: CPT | Performed by: INTERNAL MEDICINE

## 2021-03-02 RX ORDER — ROPINIROLE 0.5 MG/1
0.5 TABLET, FILM COATED ORAL EVERY EVENING
Qty: 30 TABLET | Refills: 3 | Status: SHIPPED | OUTPATIENT
Start: 2021-03-02 | End: 2021-06-09 | Stop reason: SDUPTHER

## 2021-03-02 RX ORDER — MAGNESIUM OXIDE 400 MG/1
400 TABLET ORAL DAILY
Qty: 30 TABLET | Refills: 3 | Status: SHIPPED | OUTPATIENT
Start: 2021-03-02 | End: 2021-09-03

## 2021-03-02 RX ORDER — OLMESARTAN MEDOXOMIL AND HYDROCHLOROTHIAZIDE 40/25 40; 25 MG/1; MG/1
TABLET ORAL
Qty: 30 TABLET | Refills: 3 | Status: SHIPPED | OUTPATIENT
Start: 2021-03-02 | End: 2021-06-09 | Stop reason: SDUPTHER

## 2021-03-02 RX ORDER — MECLIZINE HYDROCHLORIDE 25 MG/1
25 TABLET ORAL 3 TIMES DAILY PRN
Qty: 90 TABLET | Refills: 3 | Status: SHIPPED | OUTPATIENT
Start: 2021-03-02 | End: 2021-08-13

## 2021-03-02 RX ORDER — NORTRIPTYLINE HYDROCHLORIDE 25 MG/1
50 CAPSULE ORAL NIGHTLY
Qty: 60 CAPSULE | Refills: 3 | Status: SHIPPED | OUTPATIENT
Start: 2021-03-02 | End: 2021-06-09 | Stop reason: SDUPTHER

## 2021-03-02 RX ORDER — ALENDRONATE SODIUM 70 MG/1
70 TABLET ORAL WEEKLY
Qty: 12 TABLET | Refills: 1 | Status: SHIPPED | OUTPATIENT
Start: 2021-03-02 | End: 2021-06-09 | Stop reason: SDUPTHER

## 2021-03-02 ASSESSMENT — PATIENT HEALTH QUESTIONNAIRE - PHQ9
SUM OF ALL RESPONSES TO PHQ QUESTIONS 1-9: 1
1. LITTLE INTEREST OR PLEASURE IN DOING THINGS: 0
SUM OF ALL RESPONSES TO PHQ QUESTIONS 1-9: 1
SUM OF ALL RESPONSES TO PHQ9 QUESTIONS 1 & 2: 1

## 2021-03-02 NOTE — PROGRESS NOTES
3/2/21    Génesis Banks  1960    Chief Complaint   Patient presents with    3 Month Follow-Up       History of Present Illness:  Génesis Patiño is a 62 y.o. pleasant lady presenting today with a chief complaint of epilepsy, imbalance. She has a past medical history significant for:  HTN, on Olmesartan/HCTZ 40/25mg QD  HL (LDL 87, TG 83 on 7/7/2020), not on statin therapy  Prediabetes (HbA1C 5.4% on 7/7/2020), not on meds   HFpEF (G1DD, EF 55-60% on TTE 12/1/2020)  Epilepsy (s/p L anterior temporal lobectomy in 1996), on Lamotrigine 672HH BID, Eslicarbazepine 2693FV QHS, Onfi 10mg QD  Depression, on Nortriptyline 50mg QHS   COPD, on Albuterol PRN, Symbicort   Osteoporosis (DEXA not in chart), on Fosamax 70mg weekly  Vertigo, on Meclizine 25mg TID PRN   Vitamin D deficiency (level 27 on 4/4/13), not on supplementation   RLS, on Ropinirole 0.5mg QHS   S/p hysterectomy (cervix status unknown)  S/p L carpal tunnel release (7/30/2020)   Polysubstance abuse (marijuana, PCP, cocaine)   Former smoker (quit 2020)     # Recently, patient has been having leg cramps that improve with walking. Stopping her from sleep. Mostly what's bothersome is restless legs.   No diarrhea or vomiting. No change in medications lately. Labs recently unremarkable  She is taking OTC RLS meds. Mag did not help. I started her on low dose Ropinirole and increased it and reports improvement. # Patient had seizure in May 2020 and April 2020 ~ 6 weeks apart. In the ED on 5/29/2020 [facility] unremarkable. Basic labs with mild hyponatremia (Na 131). Had UTI s/p Rocephin once then PO Keflex on discharge. UDS was positive for marijuana and PCP. Patient had another seizure in June.   Patient had stopped taking Vimpat as she did not know it was an AED. Na has improved. Patient was diagnosed with epilepsy at age 25. Used to see Epilepsy specialist at [facility] in [address] due to h/o seizures.  I referred her to Neurology Epilepsy. Seeing Dr. Purdy Show  She is now taking Vimpat, Lamotrigine. MRIb Feb 2021 grossly unremarkable other than post-surgical changes. 3 days ago started to have imbalance. Not to one specific side. Stopped Onfi due to imbalance 2 days ago. She is in contact with Dr. Melissa Kirk office. # Patient was on Abilify, Topamax, Venlafaxine and many other meds that she self-discontinued. She reports she is only depressed but does not have bipolar. Patient's mood continues to be down. ? Has tardive dyskinesia but not taking Benztropine. Has h/o non-compliance with pills. She is taking Nortriptyline only. I referred to Psychiatry. She is interested in seeing someone at [facility]. Waiting to hear back. She is seeing Dr. Fer Montes  # She takes Meclizine for vertigo PRN. This helps. No falls or nausea.   # Quit smoking recently through Nicotine patch. # COPD: she is tolerating Albuterol and Symbicort. Has some baseline SOB, but no cough or wheezing. Albuterol does not help with her SOB. Has difficulty climbing a flight of stairs.   # Patient denies doing any other drugs other than weed and reports that her UDS positive because it might have been mixed with her marijuana as per patient. Giuseppe Gonsalves ? ? ? # BP today 130/80. Taking Olmesartan for HTN. Tries to watch salt intake. # Patient has not had labs done recently. She has prediabetes and dyslipidemia. Not on meds. # Patient is not on vitamin D supplements despite low vitamin D level years ago. Taking Fosamax for OP. DEXA not in chart.   # Patient is s/p L carpal tunnel release on 7/30.   Patient with hyponatremia at the time. Chronic due to AEDs.   # Has difficulty losing weight. She is gaining weight. Wanting to be placed on weight loss pills. She is seeing [facility]. She is on non-surgical diet program.   # Complaining of worsening exertional dyspnea. TTE at [facility] in 2019 unremarkable. Stress test unremarkable.  PFT shows obstruction wo BD response. She is seeing Dr. Kisha Joseph from Cardiology. # UTD on flu shot this season. Health maintenance:   Health Maintenance Due   Topic Date Due    Hepatitis C screen  Never done    Pneumococcal 0-64 years Vaccine (1 of 1 - PPSV23) Never done    HIV screen  Never done    COVID-19 Vaccine (1 of 2) Never done    Cervical cancer screen  Never done    Breast cancer screen  Never done    Shingles Vaccine (1 of 2) Never done    Colon cancer screen colonoscopy  Never done    Annual Wellness Visit (AWV)  Never done         Review of Systems:  Constitutional: no fevers, no chills, no night sweats, no weight loss, no weight gain, no fatigue   Pain assessment: no pain  Head: no headaches  Ears: no hearing loss, no tinnitus, no vertigo  Eyes: no blurry vision, no diplopia, no dryness, no itchiness  Mouth: no oral ulcers, no dry mouth, no sore throat  Nose: no nasal congestion, no epistaxis  Cardiac: no chest pain, no palpitations, no leg swelling, no orthopnea, no PND, no syncope  Pulmonary: no dyspnea, no cough, no wheezing, no hemoptysis  GI: no nausea, no vomiting, no diarrhea, no constipation, no abdominal pain, no hematochezia  : no dysuria, no frequency, no urgency, no hematuria, no frothy urine, no dyspareunia, no pelvic pain, no vaginal bleeding, no abnormal vaginal discharge  MSK: no arthralgias, leg cramps, no early morning stiffness, no Raynaud's   Neuro: no focal neurological deficits, seizures  Sleep: no snoring, no daytime somnolence   Psych: depression, no suicidal ideation      Physical Exam:  VITALS:   /80   Pulse 76   Temp 97.8 °F (36.6 °C)   Ht 5' 2\" (1.575 m)   Wt 185 lb (83.9 kg) Comment: per patient unable to weigh due to patient mobility  SpO2 97%   BMI 33.84 kg/m²     PHYSICAL EXAMINATION:  General: alert, awake, and oriented to time, place, person, and situation. Not in acute distress   Skin:  no suspicious rashes, no jaundice  Head: normocephalic/atraumatic  Eyes: anicteric sclera, well-injected conjunctiva. Meclizine PRN     6. Age-related osteoporosis without current pathological fracture  DEXA not in chart  Continue Fosamax 70mg weekly and vitamin D    7. Moderate episode of recurrent major depressive disorder (HCC)  Stable  Continue Nortriptyline 50mg QHS      Care discussed with patient and questions answered. Patient verbalizes understanding and agrees with plan. Discussed with patient the importance of continuity of care. I encouraged patient to schedule next appointment within 3 months with me. Patient prefers to be reached by Phone call at [phone] for future medical correspondence. Encouraged to activate [employer]. I reviewed and reconciled the medications this visit. I reviewed and updated the past medical, surgical, social, and family history during this visit. After visit summary provided.        Ricardo Rock MD  Internal Medicine  3/2/2021   11:09 AM

## 2021-03-02 NOTE — PROGRESS NOTES
Could not get patient weight due to pateint could not stand up straight or walk with out almost falling - patient stated it was the medication

## 2021-03-05 ENCOUNTER — TELEPHONE (OUTPATIENT)
Dept: PULMONOLOGY | Age: 61
End: 2021-03-05

## 2021-03-05 ENCOUNTER — TELEPHONE (OUTPATIENT)
Dept: INTERNAL MEDICINE CLINIC | Age: 61
End: 2021-03-05

## 2021-03-05 RX ORDER — BUDESONIDE, GLYCOPYRROLATE, AND FORMOTEROL FUMARATE 160; 9; 4.8 UG/1; UG/1; UG/1
2 AEROSOL, METERED RESPIRATORY (INHALATION) 2 TIMES DAILY
Qty: 1 INHALER | Refills: 3 | Status: SHIPPED | OUTPATIENT
Start: 2021-03-05 | End: 2021-04-04

## 2021-03-05 NOTE — TELEPHONE ENCOUNTER
Spoke with Aleixs Hutchinson she has done well on the breast 3 samples that she was given from the office. I will submit a prescription to her pharmacy of choice to continue her respiratory treatment. I have reminded her that she may still continue using her albuterol rescue inhaler as she needs 2 puffs every 4-6 hours as needed for shortness of breath. She is to use her spacer with her albuterol rescue inhaler. I reminded her to not use her Symbicort.   I have reminded her for follow-up appointment with me for 5/10/2021

## 2021-03-05 NOTE — TELEPHONE ENCOUNTER
Pt lvm about some breathing thing and medication she needs prescribed. I had return her call and leave her a VM.

## 2021-03-09 ENCOUNTER — HOSPITAL ENCOUNTER (OUTPATIENT)
Age: 61
Discharge: HOME | End: 2021-03-09
Payer: MEDICARE

## 2021-03-09 LAB
ESTIMATED AVERAGE GLUCOSE: 117 MG/DL
FOLATE: 11.7 NG/ML (ref 3.1–17.5)
HBA1C MFR BLD: 5.7 % (ref 4.2–6.3)
TSH HIGH SENSITIVITY: 1.37 UIU/ML (ref 0.27–4.2)
VITAMIN B-12: 855.7 PG/ML (ref 211–911)
VITAMIN D 25-HYDROXY: 31.05 NG/ML

## 2021-03-09 PROCEDURE — 82746 ASSAY OF FOLIC ACID SERUM: CPT

## 2021-03-09 PROCEDURE — 82607 VITAMIN B-12: CPT

## 2021-03-09 PROCEDURE — 84443 ASSAY THYROID STIM HORMONE: CPT

## 2021-03-09 PROCEDURE — 82306 VITAMIN D 25 HYDROXY: CPT

## 2021-03-09 PROCEDURE — 83036 HEMOGLOBIN GLYCOSYLATED A1C: CPT

## 2021-03-09 PROCEDURE — 84165 PROTEIN E-PHORESIS SERUM: CPT

## 2021-03-09 PROCEDURE — 36415 COLL VENOUS BLD VENIPUNCTURE: CPT

## 2021-03-12 LAB
ALBUMIN ELP: 4.1 GM/DL (ref 3.2–5.6)
ALPHA-1-GLOBULIN: 0.3 GM/DL (ref 0.1–0.4)
ALPHA-2-GLOBULIN: 0.7 GM/DL (ref 0.4–1.2)
BETA GLOBULIN: 1.2 GM/DL (ref 0.5–1.3)
GAMMA GLOBULIN: 1 GM/DL (ref 0.5–1.6)
SPEP INTERPRETATION: NORMAL
TOTAL PROTEIN: 7.3 GM/DL (ref 6.4–8.2)

## 2021-04-02 ENCOUNTER — TELEPHONE (OUTPATIENT)
Dept: INTERNAL MEDICINE CLINIC | Age: 61
End: 2021-04-02

## 2021-04-07 ENCOUNTER — TELEPHONE (OUTPATIENT)
Dept: INTERNAL MEDICINE CLINIC | Age: 61
End: 2021-04-07

## 2021-04-07 NOTE — TELEPHONE ENCOUNTER
Patient called reference to bill she received from appointment with Dr. Luis Miguel Hamilton  Patient was given billing number

## 2021-05-10 ENCOUNTER — OFFICE VISIT (OUTPATIENT)
Dept: PULMONOLOGY | Age: 61
End: 2021-05-10
Payer: MEDICARE

## 2021-05-10 VITALS
HEART RATE: 83 BPM | HEIGHT: 62 IN | BODY MASS INDEX: 33.68 KG/M2 | SYSTOLIC BLOOD PRESSURE: 120 MMHG | OXYGEN SATURATION: 97 % | DIASTOLIC BLOOD PRESSURE: 80 MMHG | TEMPERATURE: 97.3 F | WEIGHT: 183 LBS

## 2021-05-10 DIAGNOSIS — Z01.818 PREOP TESTING: ICD-10-CM

## 2021-05-10 DIAGNOSIS — Z00.00 HEALTHCARE MAINTENANCE: ICD-10-CM

## 2021-05-10 DIAGNOSIS — R09.02 HYPOXIA: ICD-10-CM

## 2021-05-10 DIAGNOSIS — R91.1 LUNG NODULE: Primary | ICD-10-CM

## 2021-05-10 DIAGNOSIS — J43.1 PANLOBULAR EMPHYSEMA (HCC): ICD-10-CM

## 2021-05-10 DIAGNOSIS — Z87.891 FORMER SMOKER: ICD-10-CM

## 2021-05-10 PROCEDURE — G8926 SPIRO NO PERF OR DOC: HCPCS | Performed by: NURSE PRACTITIONER

## 2021-05-10 PROCEDURE — 99214 OFFICE O/P EST MOD 30 MIN: CPT | Performed by: NURSE PRACTITIONER

## 2021-05-10 PROCEDURE — 3017F COLORECTAL CA SCREEN DOC REV: CPT | Performed by: NURSE PRACTITIONER

## 2021-05-10 PROCEDURE — G8417 CALC BMI ABV UP PARAM F/U: HCPCS | Performed by: NURSE PRACTITIONER

## 2021-05-10 PROCEDURE — 1036F TOBACCO NON-USER: CPT | Performed by: NURSE PRACTITIONER

## 2021-05-10 PROCEDURE — 3023F SPIROM DOC REV: CPT | Performed by: NURSE PRACTITIONER

## 2021-05-10 PROCEDURE — G8427 DOCREV CUR MEDS BY ELIG CLIN: HCPCS | Performed by: NURSE PRACTITIONER

## 2021-05-10 RX ORDER — ALBUTEROL SULFATE 90 UG/1
2 AEROSOL, METERED RESPIRATORY (INHALATION) EVERY 6 HOURS PRN
Qty: 18 G | Refills: 5 | Status: SHIPPED | OUTPATIENT
Start: 2021-05-10 | End: 2022-04-25 | Stop reason: ALTCHOICE

## 2021-05-10 RX ORDER — BUDESONIDE, GLYCOPYRROLATE, AND FORMOTEROL FUMARATE 160; 9; 4.8 UG/1; UG/1; UG/1
2 AEROSOL, METERED RESPIRATORY (INHALATION) 2 TIMES DAILY
Qty: 1 INHALER | Refills: 3 | Status: SHIPPED | OUTPATIENT
Start: 2021-05-10 | End: 2021-06-09

## 2021-05-10 NOTE — PROGRESS NOTES
Pulmonary Clinic Office Follow up     Cesar Sharpe is a 62 y.o. female with history of pan lobar emphysema, shortness of breath on exertion, chronic hyponatremia, HLD, HTN, former smoker, obesity, vestibular migraine, presents today to the pulmonary clinic for 3 month follow up. I last saw Austin Garcia in the pulmonary clinic on 2/8/2021. She states she feels she is done well since I last saw her but occasionally does have increased use of her albuterol rescue inhaler. She states some days she uses once other days she will need to use 3 times depending upon activity. She did complete a pulmonary function test 1/5/2021 which demonstrate an EF V1 of 63% predicted and 64% predicted postbronchodilator therapy. She does have a moderate to severe diffusion defect with a DLCO noted of 49%. She has been using Symbicort however at last visit we changed her maintenance bronchodilator to [facility] and gave her samples from the office to try. She states she is tolerating the medication well and denies any incidence of oral candidal infection. She did complete a 6-minute walk test since I last saw her in clinic. Testing noted her room air O2 sat at rest was 95%. After ambulating in the [place] approximately 3 minutes O2 sat was noted to drop to 80% and she was placed on 2 L nasal cannula. Her O2 sat remained at 85% so she was increased then to 3 L nasal cannula. She then continued ambulating O2 sat maintained above 90%. Unfortunately she is currently not on oxygen as there appears to be a miscommunication between respiratory therapy department who did 6-minute walk test in the clinic. It was not until review of her chart before today's visit that it was noted that she should be on 3 L supplemental oxygen via nasal cannula.      She denies any recent bronchial infections, she is not required visits Negative for hearing loss, sinus pressure, nasal congestion, epistaxis, snoring  RESPIRATORY: Positive shortness of breath on exertion, negative wheeze, positive rare nonproductive cough  CARDIOVASCULAR:  Negative for chest pain, palpitations, exertional chest pressure/discomfort, edema, syncope  GASTROINTESTINAL: Negative for nausea, vomiting, diarrhea, constipation, blood in stool and abdominal pain  GENITOURINARY:  Negative for frequency, dysuria and hematuria  HEMATOLOGIC/LYMPHATIC:  Negative for easy bruising, bleeding and lymphadenopathy  ALLERGIC/IMMUNOLOGIC:  Negative for recurrent infections, angioedema, anaphylaxis and drug reaction  MUSCULOSKELETAL:  Negative for pain, joint swelling, decreased range of motion and muscle weakness  NEURO: Negative for headache, AMS, decrease sensations  SKIN: Negative for rashes or lesions      Physical Exam:  /80   Pulse 83   Temp 97.3 °F (36.3 °C)   Ht 5' 2\" (1.575 m)   Wt 183 lb (83 kg)   SpO2 97%   BMI 33.47 kg/m²    Body mass index is 33.47 kg/m². Constitutional:  She appears well developed and well-nourished. Neck:  Supple, no palpable lymphadenopathy, no JVD  Cardiovascular:  S1, S2 Normal, Regular rhythm, no murmurs or gallops, no pericardial  rubs. Pulmonary: Lateral breath sounds clear and equal to auscultation, good air movement, no use of accessory muscles to support respiratory effort. O2 sat is noted 97% at rest in room air. No forced expiratory wheeze, no rales, no rhonchi, no cough noted during assessment.   Abdomen: No epigastric pain, no distention, + bowel sounds   Extremities: No edema, no calf tenderness, no clubbing of digits  Neurologic:  Awake and alert, no focal deficits  Skin: warm and dry    Radiology:   1/15/2021 CT lung screening  0.4 cm perifissural lymph node along the right major fissure, mild to moderate emphysema, minimal to mild bronchial wall thickening potentially due to reactive airways disease of bronchitis, minimal coronary artherosclerotic calcifications     PFT:   1/15/2021  FVC 74% predicted/76% predicted, FEV1 63% / 64% predicted, FEF 25 to 75% 46% predicted/74% predicted, % predicted, TLC 91% predicted, DLCO 49% predicted. No significant response noted to bronchodilator administration post test was noted  Overall testing shows moderate obstructive airway disease, probable restrictive disease, moderate to severe diffusion defect    ASSESSMENT/PLAN:  1. Lung nodule    2. Panlobular emphysema ([address]    3. Hypoxia    4. Former smoker    5. Preop testing    6. Healthcare maintenance      She is currently on breztri and tolerating well. I will refill her prescription at this time. Her pulmonary function test in January 2021 did demonstrate moderate obstructive disease with a restrictive process along with moderate to severe diffusion defect. We will repeat pulmonary function test in January 2022. We will place order for Covid testing however it is unknown if she will still need to have Covid test prior to her next pulmonary function test.  She is requesting a refill of her albuterol rescue inhaler which I will send to the pharmacy along with her respiratory. She did complete a 6-minute walk test on 2/11/2021 which demonstrated need for supplemental oxygen. It required 3 L per nasal cannula to maintain his sat above 90% with ambulation. She has chosen [employer] equipment for her DME we will order oxygen and also request testing for OCD as I feel she would significantly benefit from a portable concentrator due to the multiple physician appointments and treatments that she has so that she can maintain compliancy with wearing her oxygen. CT lung screening demonstrated 0.4 cm lymph node noted in the perifissural lymph node along the right major fissure.   Appears benign, will repeat annual lung screening with low-dose CT January 2022    She has not completed her sleep study that was ordered at last visit in February. She states she has been very busy with other doctors appointments testing and treatment to do the sleep study. We have discussed consequences of untreated KYLE should her testing show positive for sleep apnea. She has been given the number for sleep lab to call and arrange for a sleep study. She is aware that we currently have a sleep center in [address] if in lab sleep study is needed. We have discussed the need to maintain yearly flu immunization, pneumococcal vaccination. We have discussed Coronavirus precaution including: social distancing when needing to be in public, handwashing practice, wiping items touched in public such as gas pumps, door handles, shopping carts, etc. Self monitoring for infection - fever, chills, cough, SOB. Should they develop symptoms they should call office for further instructions. Return in about 4 months (around 9/10/2021) for Follow Up. This dictation was performed with a verbal recognition program and it was checked for errors. It is possible that there are still dictated errors within this office note. Any errors should be brought immediately to my attention for correction. All efforts were made to ensure that this office note is accurate.

## 2021-05-24 ENCOUNTER — HOSPITAL ENCOUNTER (OUTPATIENT)
Dept: NEUROLOGY | Age: 61
Discharge: HOME | End: 2021-05-24
Payer: MEDICARE

## 2021-05-24 DIAGNOSIS — G60.9 HEREDITARY AND IDIOPATHIC NEUROPATHY: ICD-10-CM

## 2021-05-24 PROCEDURE — 95910 NRV CNDJ TEST 7-8 STUDIES: CPT | Performed by: PHYSICAL MEDICINE & REHABILITATION

## 2021-05-24 PROCEDURE — 95910 NRV CNDJ TEST 7-8 STUDIES: CPT

## 2021-05-24 PROCEDURE — 95886 MUSC TEST DONE W/N TEST COMP: CPT

## 2021-05-24 PROCEDURE — 95886 MUSC TEST DONE W/N TEST COMP: CPT | Performed by: PHYSICAL MEDICINE & REHABILITATION

## 2021-05-24 NOTE — PROCEDURES
Risks and benefits of study discussed. Specific and common risks of pain and bleeding, as well as uncommon side effects of infection, hematoma, vasovagal episodes. Patient agreeable to testing and consents to such. Clinical: Leg pains, worse with walking, ataxia, 1 year. Motor NCS:  Tibial amplitudes are normal as well as distal latencies. Proximal stimulations could not be tolerated for conduction velocity calculation  Peroneal amplitude is moderately depressed on the right absent on the left; latency on the right is severely prolonged and velocity is mildly slow on the right    Sensory NCS:  Sural amplitudes and latencies normal bilateral  Peroneal responses absent bilateral.    Needle EMG: Limited study secondary to extremely painful. Mild distal enlargement of motor units bilateral.    Impression:    #1  Mildly limited study secondary to poorly tolerated recordings. #2  Mild, chronic, patchy distal axon loss. Most likely early changes of length dependent peripheral neuropathy, and less likely peroneal neuropathies given that her symptoms of ataxia without prominent numbness or dorsiflexion weakness would be consistent with this.

## 2021-05-28 ENCOUNTER — TELEPHONE (OUTPATIENT)
Dept: PULMONOLOGY | Age: 61
End: 2021-05-28

## 2021-06-09 ENCOUNTER — OFFICE VISIT (OUTPATIENT)
Dept: INTERNAL MEDICINE CLINIC | Age: 61
End: 2021-06-09
Payer: MEDICARE

## 2021-06-09 VITALS
OXYGEN SATURATION: 99 % | WEIGHT: 187 LBS | DIASTOLIC BLOOD PRESSURE: 80 MMHG | BODY MASS INDEX: 34.2 KG/M2 | HEART RATE: 86 BPM | SYSTOLIC BLOOD PRESSURE: 120 MMHG

## 2021-06-09 DIAGNOSIS — Z12.11 COLON CANCER SCREENING: ICD-10-CM

## 2021-06-09 DIAGNOSIS — J96.11 CHRONIC HYPOXEMIC RESPIRATORY FAILURE (HCC): ICD-10-CM

## 2021-06-09 DIAGNOSIS — I10 ESSENTIAL HYPERTENSION: ICD-10-CM

## 2021-06-09 DIAGNOSIS — Z12.31 ENCOUNTER FOR SCREENING MAMMOGRAM FOR BREAST CANCER: ICD-10-CM

## 2021-06-09 DIAGNOSIS — F33.1 MODERATE EPISODE OF RECURRENT MAJOR DEPRESSIVE DISORDER (HCC): ICD-10-CM

## 2021-06-09 DIAGNOSIS — G40.319 INTRACTABLE GENERALIZED IDIOPATHIC EPILEPSY WITHOUT STATUS EPILEPTICUS (HCC): Primary | ICD-10-CM

## 2021-06-09 DIAGNOSIS — R73.03 PREDIABETES: ICD-10-CM

## 2021-06-09 DIAGNOSIS — L73.9 FOLLICULITIS: ICD-10-CM

## 2021-06-09 DIAGNOSIS — J43.1 PANLOBULAR EMPHYSEMA (HCC): ICD-10-CM

## 2021-06-09 DIAGNOSIS — M81.0 AGE-RELATED OSTEOPOROSIS WITHOUT CURRENT PATHOLOGICAL FRACTURE: ICD-10-CM

## 2021-06-09 DIAGNOSIS — G25.81 RESTLESS LEGS: ICD-10-CM

## 2021-06-09 DIAGNOSIS — Z23 NEED FOR PROPHYLACTIC VACCINATION AGAINST STREPTOCOCCUS PNEUMONIAE (PNEUMOCOCCUS): ICD-10-CM

## 2021-06-09 PROCEDURE — G8926 SPIRO NO PERF OR DOC: HCPCS | Performed by: INTERNAL MEDICINE

## 2021-06-09 PROCEDURE — G8417 CALC BMI ABV UP PARAM F/U: HCPCS | Performed by: INTERNAL MEDICINE

## 2021-06-09 PROCEDURE — 90732 PPSV23 VACC 2 YRS+ SUBQ/IM: CPT | Performed by: INTERNAL MEDICINE

## 2021-06-09 PROCEDURE — 1036F TOBACCO NON-USER: CPT | Performed by: INTERNAL MEDICINE

## 2021-06-09 PROCEDURE — G8427 DOCREV CUR MEDS BY ELIG CLIN: HCPCS | Performed by: INTERNAL MEDICINE

## 2021-06-09 PROCEDURE — 3023F SPIROM DOC REV: CPT | Performed by: INTERNAL MEDICINE

## 2021-06-09 PROCEDURE — 99214 OFFICE O/P EST MOD 30 MIN: CPT | Performed by: INTERNAL MEDICINE

## 2021-06-09 PROCEDURE — 3017F COLORECTAL CA SCREEN DOC REV: CPT | Performed by: INTERNAL MEDICINE

## 2021-06-09 PROCEDURE — G0009 ADMIN PNEUMOCOCCAL VACCINE: HCPCS | Performed by: INTERNAL MEDICINE

## 2021-06-09 RX ORDER — CLINDAMYCIN PHOSPHATE 10 MG/G
GEL TOPICAL
Qty: 30 G | Refills: 0 | Status: SHIPPED | OUTPATIENT
Start: 2021-06-09 | End: 2021-06-16

## 2021-06-09 RX ORDER — NORTRIPTYLINE HYDROCHLORIDE 25 MG/1
50 CAPSULE ORAL NIGHTLY
Qty: 60 CAPSULE | Refills: 3 | Status: SHIPPED | OUTPATIENT
Start: 2021-06-09 | End: 2021-09-09 | Stop reason: SDUPTHER

## 2021-06-09 RX ORDER — OLMESARTAN MEDOXOMIL AND HYDROCHLOROTHIAZIDE 40/25 40; 25 MG/1; MG/1
TABLET ORAL
Qty: 30 TABLET | Refills: 3 | Status: SHIPPED | OUTPATIENT
Start: 2021-06-09 | End: 2021-09-09 | Stop reason: SDUPTHER

## 2021-06-09 RX ORDER — ALENDRONATE SODIUM 70 MG/1
70 TABLET ORAL WEEKLY
Qty: 12 TABLET | Refills: 1 | Status: SHIPPED | OUTPATIENT
Start: 2021-06-09 | End: 2021-09-09 | Stop reason: SDUPTHER

## 2021-06-09 RX ORDER — ROPINIROLE 0.5 MG/1
0.5 TABLET, FILM COATED ORAL EVERY EVENING
Qty: 30 TABLET | Refills: 3 | Status: SHIPPED | OUTPATIENT
Start: 2021-06-09 | End: 2021-09-09 | Stop reason: SDUPTHER

## 2021-06-09 NOTE — PROGRESS NOTES
6/9/21    Génesis Banks  1960    Chief Complaint   Patient presents with    3 Month Follow-Up       History of Present Illness:  Génesis Jacob is a 62 y.o. pleasant lady presenting today with a chief complaint of epilepsy, COPD/CHRF. She has a past medical history significant for:  HTN, on Olmesartan/HCTZ 40/25mg QD  HL (LDL 87, TG 83 on 7/7/2020), not on statin therapy  Prediabetes (HbA1C 5.7% on 3/9/2021), not on meds   HFpEF (G1DD, EF 55-60% on TTE 12/1/2020)  Epilepsy (s/p L anterior temporal lobectomy in 1996), on Lamotrigine 564XM BID, Eslicarbazepine 8264VE QHS  Depression, on Nortriptyline 50mg QHS   COPD, on Albuterol PRN, Breztri  Chronic hypoxemic respiratory failure, on 3L O2   Osteoporosis (DEXA not in chart), on Fosamax 70mg weekly  Vertigo, on Meclizine 25mg TID PRN   Vitamin D deficiency (level 27 on 4/4/13), not on supplementation   RLS, on Ropinirole 0.5mg QHS   S/p hysterectomy (cervix status unknown)  S/p L carpal tunnel release (7/30/2020)   Polysubstance abuse (marijuana, PCP, cocaine)   Former smoker (quit 2020)     # Recently, patient has been having leg cramps that improve with walking. Stopping her from sleep. Mostly what's bothersome is restless legs.   No diarrhea or vomiting. No change in medications lately. Labs recently unremarkable  She is taking OTC RLS meds. Mag did not help. I started her on low dose Ropinirole and increased it and reports improvement.   # Patient had seizure in May 2020 and April 2020 ~ 6 weeks apart. In the ED on 5/29/2020 [facility] unremarkable. Basic labs with mild hyponatremia (Na 131). Had UTI s/p Rocephin once then PO Keflex on discharge. UDS was positive for marijuana and PCP. Patient had another seizure in June 2020. Patient had stopped taking Vimpat as she did not know it was an AED. Na has improved. Patient was diagnosed with epilepsy at age 25.  Used to see Epilepsy specialist at [facility] in [facility] [address] does not drive due to h/o seizures. I referred her to Neurology Epilepsy. Seeing Dr. Elvia White  She is now taking Vimpat, Lamotrigine. MRIb Feb 2021 grossly unremarkable other than post-surgical changes. Off Onfi. She needs paperwork filled for BMV regarding driving privileges. # Patient was on Abilify, Topamax, Venlafaxine and many other meds that she self-discontinued. She reports she is only depressed but does not have bipolar. Patient's mood continues to be down. ? Has tardive dyskinesia but not taking Benztropine. Has h/o non-compliance with pills. She is taking Nortriptyline only. I referred to Psychiatry. She is interested in seeing someone at [facility]. Waiting to hear back. She is seeing Dr. Ene Cowart  # She takes Meclizine for vertigo PRN. This helps. No falls or nausea.   # Quit smoking recently through Nicotine patch. # Patient denies doing any other drugs other than weed and reports that her UDS positive because it might have been mixed with her marijuana as per patient. Ambrocio Neighbours Ambrocio Neighbours ? ? ? # BP today 120/80. Taking Benicar for HTN. Tries to watch salt intake. # She has prediabetes and dyslipidemia. Not on meds. # Patient is not on vitamin D supplements despite low vitamin D level years ago. Taking Fosamax for OP. DEXA not in chart.   # Patient is s/p L carpal tunnel release on 7/30.   Patient with hyponatremia at the time. Chronic due to AEDs.   # Has difficulty losing weight. She is gaining weight. Wanting to be placed on weight loss pills. She is seeing [facility]. She is on non-surgical diet program.   # Complaining of worsening exertional dyspnea. TTE at [facility] in 2019 unremarkable. Stress test unremarkable. PFT shows obstruction wo BD response. She is seeing Dr. Debra Dutta Cardiology. She has COPD. Following Pulm for BD. 6mwt showed requirement for O2. She is on 3L O2.   CT lung screening with 4mm LN being followed. Did not get sleep study done. # UTD on flu shot this season.   # UTD on COVID-19 vaccination.    # Agreeable to PPSV-23 today.   # Not UTD on breast or colon cancer screening. Health maintenance:   Health Maintenance Due   Topic Date Due    Hepatitis C screen  Never done    HIV screen  Never done    Cervical cancer screen  Never done    Breast cancer screen  Never done    Shingles Vaccine (1 of 2) Never done    Colon cancer screen colonoscopy  Never done    Annual Wellness Visit (AWV)  Never done         Review of Systems:  Constitutional: no fevers, no chills, no night sweats, no weight loss, no weight gain, no fatigue   Pain assessment: no pain  Head: no headaches  Ears: no hearing loss, no tinnitus, no vertigo  Eyes: no blurry vision, no diplopia, no dryness, no itchiness  Mouth: no oral ulcers, no dry mouth, no sore throat  Nose: no nasal congestion, no epistaxis  Cardiac: no chest pain, no palpitations, no leg swelling, no orthopnea, no PND, no syncope  Pulmonary: no dyspnea, no cough, no wheezing, no hemoptysis  GI: no nausea, no vomiting, no diarrhea, no constipation, no abdominal pain, no hematochezia  : no dysuria, no frequency, no urgency, no hematuria, no frothy urine, no dyspareunia, no pelvic pain, no vaginal bleeding, no abnormal vaginal discharge  MSK: no arthralgias, leg cramps, no early morning stiffness, no Raynaud's   Neuro: no focal neurological deficits, seizures  Sleep: no snoring, no daytime somnolence   Psych: depression, no suicidal ideation      Physical Exam:  VITALS:   /80   Pulse 86   Wt 187 lb (84.8 kg)   SpO2 99%   BMI 34.20 kg/m²     PHYSICAL EXAMINATION:  General: alert, awake, and oriented to time, place, person, and situation. Not in acute distress, O2 by NC  Skin: R hip folliculitis   Head: normocephalic/atraumatic  Eyes: anicteric sclera, well-injected conjunctiva. Pupils are equally round and reactive to light.  Extraocular movements are intact   Nose: no septal deviation evident  Sinuses: no sinus tenderness  Ears: external ears normal  Neck: supple, no cervical lymphadenopathy, thyroid symmetric and not enlarged, no bruits   Heart: regular rate and rhythm, regular S1/S2, no S3/S4, no audible murmurs, no audible friction rub  Lungs: clear to auscultation bilaterally, no audible crackles, no audible wheezes, no audible rhonchi    Abdomen: normal bowel sounds, soft abdomen, non-tender, no palpable masses  Extremities: no edema, warm, no cyanosis, no clubbing. Good capillary refill   MSK: no tenderness across spinous processes, full ROM in all 4 extremities. No joint swelling or tenderness   Peripheral vascular: 2+ pulses symmetric (radial)  Neuro: gait unsteady, CN II-XII intact, motor power 5/5 in all 4 extremities, sensation intact and symmetric. Cerebellar signs intact    Labs   I have personally reviewed labs, and discussed pertinent findings with patient on this date 6/9/2021     Imaging   I have personally reviewed imaging, and discussed pertinent findings with patient on this date 6/9/2021     Other notes  I have personally reviewed other notes, and discussed pertinent findings with patient on this date 6/9/2021       Assessment/Plan:     1. Intractable generalized idiopathic epilepsy without status epilepticus ([facility] [address]  Seizure free for ~ 1 year  Will fill BMV paperwork that she cannot drive but can keep her 's license  Following with Dr. Jazmin Craig  Continue Lamotrigine 023FQ BID, Eslicarbazepine 1724ZZ QHS    2. Essential hypertension  Stable  Continue Olmesartan/HCTZ 40/25mg QD     3. Prediabetes  A1C 5.7% in March 2021  Not on meds    4. Restless legs  Stable  Continue Ropinirole 0.5mg QHS     5. Age-related osteoporosis without current pathological fracture  No DEXA on record  Continue Fosamax 70mg weekly     6. Moderate episode of recurrent major depressive disorder (HCC)  Stable  Continue Nortriptyline 50mg QHS  Low seizure threshold on TCA     7. Panlobular emphysema (HCC)  Stable  Continue Breztri and Albuterol inh PRN     8.  Chronic hypoxemic respiratory

## 2021-06-11 ENCOUNTER — TELEPHONE (OUTPATIENT)
Dept: INTERNAL MEDICINE CLINIC | Age: 61
End: 2021-06-11

## 2021-06-14 ENCOUNTER — TELEPHONE (OUTPATIENT)
Dept: PULMONOLOGY | Age: 61
End: 2021-06-14

## 2021-06-14 NOTE — TELEPHONE ENCOUNTER
Notified pt that she needed to continue with her OCD assessment in order to qualify for a POC. Gave pt the number to [facility] to schedule for that.

## 2021-07-19 ENCOUNTER — OFFICE VISIT (OUTPATIENT)
Dept: PSYCHOLOGY | Age: 61
End: 2021-07-19
Payer: MEDICARE

## 2021-07-19 DIAGNOSIS — F33.1 MODERATE EPISODE OF RECURRENT MAJOR DEPRESSIVE DISORDER (HCC): Primary | ICD-10-CM

## 2021-07-19 PROCEDURE — 1036F TOBACCO NON-USER: CPT | Performed by: PSYCHOLOGIST

## 2021-07-19 PROCEDURE — 90832 PSYTX W PT 30 MINUTES: CPT | Performed by: PSYCHOLOGIST

## 2021-08-01 PROBLEM — G47.10 HYPERSOMNIA: Status: ACTIVE | Noted: 2021-08-01

## 2021-08-01 PROBLEM — R06.83 SNORING: Status: ACTIVE | Noted: 2021-08-01

## 2021-08-01 PROBLEM — R09.02 HYPOXIA: Status: ACTIVE | Noted: 2021-08-01

## 2021-08-01 PROBLEM — R53.83 FATIGUE: Status: ACTIVE | Noted: 2021-08-01

## 2021-08-01 NOTE — PROGRESS NOTES
Pulmonary Clinic Office Follow up     Bill Melvin is a 62 y.o. female with history of history of emphysema, shortness of breath on exertion, chronic hyponatremia, HLD, HTN, former smoker, obesity, vestibular migraine, presents today to the pulmonary clinic for follow up and evaluation of continued supplemental oxygen need. Génesis states she has not required hospitalization, visits to the emergency room, walk-in clinic, urgent care for respiratory related illness since last visit on 5/10/2021. She was changed to [place] at last visit. She was started on supplemental oxygen after O2 sat dropping into low 80's in RA with ambulation at 3 minutes with noted significant difficulty breathing. She required 3 LPM to achieve a O2 sat above 90% with activity. She presently has a small portable unit that she is using, along with her large concentrator at home. Herber Ayon states she can definitely tell a difference when she is not wearing her oxygen. She attempted to go out and work in a flower bed over the weekend without her oxygen. When she attempted to walk up a slight incline back to her house she became short of breath and was unable to continue until her oxygen was brought to her. She states \"I learned my lesson then that I truly needs this\". Génesis was to have completed a sleep study, ordered February 8, 2021, but has not as of today's visit. Génesis states that he usually goes to bed around 10 p.m., sleeps generally 4-6 hours a night. States awakens during the night occasionally due to nocturia or restlessness, and falls back to sleep within 10 minutes after getting up. States in the morning she is fatigued and does not feel like she has slept well. She is sleepy during the day but does not nap. Family reports snoring. Denies any symptoms of CHF.  States she does not have restless leg syndrome. Sleep is not interrupted due to chronic pain. There has no been witness apnea. Génesis states they are practicing social distancing, wearing a mask when out in public, washing hands frequently or using hand . Denies any fever, chills, malaise, change in sensation of taste or smell, headache or lightheadedness. Denies any known contacts with persons with respiratory infection, positive for coronavirus or under investigation for possible coronavirus exposure. Influenza immunization: 11/3/2020  Pneumococcal immunization: had not received  COVID-19 immunization:  4/15/2021, 3/18/2021 - Ramonita Cox  Smoking history: former smoker, 60 pack year history, quit 8/2020  PCP: Lizeth Lai MD    Past Medical History:  Past Medical History:   Diagnosis Date    COPD (chronic obstructive pulmonary disease) ([facility] [address]     Full dentures     upper plate only    H/O dizziness     \"take Meclazine at least twice a day for this\"    H/O Doppler JERMAINE's ultrasound 12/01/2020     No evidence of significant occlusive arterial disease of lower EXT.    H/O echocardiogram 12/01/2020    EF 55-60%, Normal study.  History of drug abuse ([address]     History of nuclear stress test 11/30/2020    Normal study.  History of UTI     \"last one May 2020\"    Hypertension     \"the top # use to be over 200 \"- on medication since 2018\"    Seizures ([facility] [address]     hx of epilepsy- last seizure week 7/13/2020- hx grand mal seizures    Wears glasses        Current medications and allergies have been reviewed    Social and family history unchanged from initial consult      REVIEW OF SYSTEMS:    CONSTITUTIONAL:  negative for fevers, chills, diaphoresis, activity change, appetite change, night sweats and unexpected weight change.    HEENT:  Negative for hearing loss, sinus pressure, nasal congestion, epistaxis, snoring  RESPIRATORY:  + SOBOE, + non productive cough, + wheeze  CARDIOVASCULAR:  Negative for chest pain, palpitations, exertional chest pressure/discomfort, edema, syncope  GASTROINTESTINAL: Negative for nausea, vomiting, diarrhea, constipation, blood in stool and abdominal pain  GENITOURINARY:  Negative for frequency, dysuria and hematuria  HEMATOLOGIC/LYMPHATIC:  Negative for easy bruising, bleeding and lymphadenopathy  ALLERGIC/IMMUNOLOGIC:  Negative for recurrent infections, angioedema, anaphylaxis and drug reaction  MUSCULOSKELETAL:  Negative for pain, joint swelling, decreased range of motion and muscle weakness  NEURO: Negative for headache, AMS, decrease sensations  SKIN: Negative for rashes or lesions      Physical Exam:  /89   Pulse 89   Ht 5' 2\" (1.575 m)   Wt 187 lb (84.8 kg)   SpO2 94%   BMI 34.20 kg/m²    Body mass index is 34.2 kg/m². Constitutional:  She appears well developed and well-nourished. Neck:  Supple, no palpable lymphadenopathy, no JVD  Cardiovascular:  S1, S2 Normal, Regular rhythm, no murmurs or gallops, no pericardial  rubs. Pulmonary: Bilateral breath sounds clear and equal to auscultation, good air movement, no use of accessory muscles to support respiratory effort. No forced expiratory wheeze, no rales, no rhonchi, no cough noted during assessment.   O2 sat at rest is 94% with 2 LPM supplemental oxygen  Abdomen: No epigastric pain, no distention, + bowel sounds   Extremities: No edema, no calf tenderness, no clubbing of digits  Neurologic:  Awake and alert, no focal deficits  Skin: warm and dry    Radiology:   1/5/2021 CT lung screening    Lack of intravenous contrast administration, partially limiting evaluation of   the soft tissues and vasculature.       MEDIASTINUM:  Normal heart size.  No pericardial effusion.  Aortic valve   annular calcifications.  Minimal coronary and systemic atherosclerotic   calcifications.  No mediastinal nor obvious hilar lymphadenopathy.       LUNGS/PLEURA:  Patent central airways.  Minimal to mild bronchial wall   thickening most prominent centrally.  Mild to moderate emphysema with   centrilobular, paraseptal, and possible panacinar components.  0.4 cm   perifissural noncalcified solid nodule along the lateral right major fissure   near the junction with the right minor fissure.  Tiny calcified granuloma in   the right upper lobe.  Linear opacities near the bases, likely scarring or   atelectasis.  No pleural effusions.       UPPER ABDOMEN:  Mild to moderate pancreatic atrophy.       SOFT TISSUES/BONES:   No supraclavicular nor axillary lymphadenopathy.  0.7   cm epidermal inclusion cyst in the right paramedian mid back.  No acute   fractures nor suspicious osseous lesions.  Nearly completely healed   nondisplaced fractures of the anterior right 4th, 5th, and 6th ribs. PFT:   1/15/2021  FVC 74%/ 76% predicted, FEV1 63%/ 64% predicted, FEF 25-75%  46%/48% predicted , TLC 91% predicted, % predicted, DLCO 49% predicted  Following administration of bronchodilator there was no significant response to bronchodilator. Overall testing indicates moderate obstructive disease, moderate restrictive disease, moderate/severe diffusion defect    ESS: 5  Neck circumstance: 15  Mallampati: 2    ASSESSMENT/PLAN:  1. Centrilobular emphysema ([address]    2. Hypoxia    3. Lung nodule    4. Fatigue, unspecified type    5. Hypersomnia    6. Snoring    7. Personal history of nicotine dependence       With worsening lung function based on change in PFT I am starting her on Breztri, 2 puffs twice a day with spacer. I will repeat PFT in January 2022. Génesis was taken off her oxygen for 10 minutes and then attempted to ambulate in room air for a 6 minute walk test in the clinic. Initial O2sat was 92% in room air at rest, at 3 minutes O2 sat was 85% in room with ambulation. She was noted to have difficulty breathing and complained of severe SOB at 3 minutes of ambulation. She was placed back on supplemental oxygen and required 3 LPM to bring O2 sat above 90% with ambulation.  She was brought back to exam room, at rest O2 sat 94% in 3 LPM supplemental oxygen via nasal cannula. She will need to continue her oxygen for life time. She has small portable concentrator that she currently has with her and large concentrator at home. She has not completed her sleep study. I feel it in important to complete testing. I am resubmitting order for testing. She is aware of the importance of completing testing and health concerns with untreated KYLE that could be affecting her. Based on assessment and exam patient demonstrates high pretest probability of having KYLE. We will precede with sleep study. I have discussed with Génesis Banks:   [x]  Sleep hygiene/ relaxation methods & CBTi principles review with patient   [x]  Avoid supine/back sleep until sleep study   [x]  Driving precautions   [x]  Medical consequences of untreated KYLE   [x]  Weight loss recommendations   [x]  Diet recommendations   [x]  Exercise     We have discussed the need to maintain yearly flu immunization, pneumococcal vaccination. We have discussed Coronavirus precaution including: social distancing when needing to be in public, handwashing practice, wiping items touched in public such as gas pumps, door handles, shopping carts, etc. Self monitoring for infection - fever, chills, cough, SOB. Should they develop symptoms they should call office for further instructions. Return in about 3 months (around 11/2/2021). This dictation was performed with a verbal recognition program and it was checked for errors. It is possible that there are still dictated errors within this office note. Any errors should be brought immediately to my attention for correction. All efforts were made to ensure that this office note is accurate.

## 2021-08-02 ENCOUNTER — OFFICE VISIT (OUTPATIENT)
Dept: PULMONOLOGY | Age: 61
End: 2021-08-02
Payer: MEDICARE

## 2021-08-02 VITALS
SYSTOLIC BLOOD PRESSURE: 118 MMHG | HEART RATE: 89 BPM | DIASTOLIC BLOOD PRESSURE: 89 MMHG | HEIGHT: 62 IN | OXYGEN SATURATION: 94 % | BODY MASS INDEX: 34.41 KG/M2 | WEIGHT: 187 LBS

## 2021-08-02 DIAGNOSIS — R53.83 FATIGUE, UNSPECIFIED TYPE: ICD-10-CM

## 2021-08-02 DIAGNOSIS — J43.2 CENTRILOBULAR EMPHYSEMA (HCC): Primary | ICD-10-CM

## 2021-08-02 DIAGNOSIS — R06.83 SNORING: ICD-10-CM

## 2021-08-02 DIAGNOSIS — R91.1 LUNG NODULE: ICD-10-CM

## 2021-08-02 DIAGNOSIS — G47.10 HYPERSOMNIA: ICD-10-CM

## 2021-08-02 DIAGNOSIS — Z87.891 PERSONAL HISTORY OF NICOTINE DEPENDENCE: ICD-10-CM

## 2021-08-02 DIAGNOSIS — R09.02 HYPOXIA: ICD-10-CM

## 2021-08-02 PROCEDURE — 1036F TOBACCO NON-USER: CPT | Performed by: NURSE PRACTITIONER

## 2021-08-02 PROCEDURE — 3017F COLORECTAL CA SCREEN DOC REV: CPT | Performed by: NURSE PRACTITIONER

## 2021-08-02 PROCEDURE — G8417 CALC BMI ABV UP PARAM F/U: HCPCS | Performed by: NURSE PRACTITIONER

## 2021-08-02 PROCEDURE — 99214 OFFICE O/P EST MOD 30 MIN: CPT | Performed by: NURSE PRACTITIONER

## 2021-08-02 PROCEDURE — 3023F SPIROM DOC REV: CPT | Performed by: NURSE PRACTITIONER

## 2021-08-02 PROCEDURE — G8926 SPIRO NO PERF OR DOC: HCPCS | Performed by: NURSE PRACTITIONER

## 2021-08-02 PROCEDURE — G8427 DOCREV CUR MEDS BY ELIG CLIN: HCPCS | Performed by: NURSE PRACTITIONER

## 2021-08-02 RX ORDER — BUDESONIDE, GLYCOPYRROLATE, AND FORMOTEROL FUMARATE 160; 9; 4.8 UG/1; UG/1; UG/1
2 AEROSOL, METERED RESPIRATORY (INHALATION) 2 TIMES DAILY
Qty: 1 INHALER | Refills: 3 | Status: SHIPPED | OUTPATIENT
Start: 2021-08-02 | End: 2021-09-01

## 2021-08-02 RX ORDER — BUDESONIDE, GLYCOPYRROLATE, AND FORMOTEROL FUMARATE 160; 9; 4.8 UG/1; UG/1; UG/1
AEROSOL, METERED RESPIRATORY (INHALATION)
COMMUNITY
Start: 2021-07-12 | End: 2021-08-02 | Stop reason: SDUPTHER

## 2021-08-12 ENCOUNTER — TELEPHONE (OUTPATIENT)
Dept: PULMONOLOGY | Age: 61
End: 2021-08-12

## 2021-08-13 DIAGNOSIS — R42 VERTIGO: ICD-10-CM

## 2021-08-13 RX ORDER — MECLIZINE HYDROCHLORIDE 25 MG/1
25 TABLET ORAL 3 TIMES DAILY PRN
Qty: 90 TABLET | Refills: 3 | Status: SHIPPED | OUTPATIENT
Start: 2021-08-13 | End: 2021-09-09

## 2021-08-24 ENCOUNTER — HOSPITAL ENCOUNTER (OUTPATIENT)
Age: 61
Discharge: HOME | End: 2021-08-24
Payer: MEDICARE

## 2021-08-24 ENCOUNTER — HOSPITAL ENCOUNTER (OUTPATIENT)
Dept: GENERAL RADIOLOGY | Age: 61
Discharge: HOME | End: 2021-08-24
Payer: MEDICARE

## 2021-08-24 ENCOUNTER — OFFICE VISIT (OUTPATIENT)
Dept: INTERNAL MEDICINE CLINIC | Age: 61
End: 2021-08-24
Payer: MEDICARE

## 2021-08-24 VITALS
DIASTOLIC BLOOD PRESSURE: 80 MMHG | HEIGHT: 62 IN | WEIGHT: 185 LBS | BODY MASS INDEX: 34.04 KG/M2 | SYSTOLIC BLOOD PRESSURE: 130 MMHG

## 2021-08-24 DIAGNOSIS — M54.50 LUMBAR PAIN: ICD-10-CM

## 2021-08-24 DIAGNOSIS — M54.50 LUMBAR PAIN: Primary | ICD-10-CM

## 2021-08-24 PROCEDURE — G8417 CALC BMI ABV UP PARAM F/U: HCPCS | Performed by: NURSE PRACTITIONER

## 2021-08-24 PROCEDURE — G8427 DOCREV CUR MEDS BY ELIG CLIN: HCPCS | Performed by: NURSE PRACTITIONER

## 2021-08-24 PROCEDURE — 1036F TOBACCO NON-USER: CPT | Performed by: NURSE PRACTITIONER

## 2021-08-24 PROCEDURE — 72100 X-RAY EXAM L-S SPINE 2/3 VWS: CPT

## 2021-08-24 PROCEDURE — 3017F COLORECTAL CA SCREEN DOC REV: CPT | Performed by: NURSE PRACTITIONER

## 2021-08-24 PROCEDURE — 99213 OFFICE O/P EST LOW 20 MIN: CPT | Performed by: NURSE PRACTITIONER

## 2021-08-24 RX ORDER — BACLOFEN 10 MG/1
10 TABLET ORAL 3 TIMES DAILY
Qty: 21 TABLET | Refills: 0 | Status: SHIPPED | OUTPATIENT
Start: 2021-08-24 | End: 2021-09-24

## 2021-08-24 RX ORDER — OXYBUTYNIN CHLORIDE 10 MG/1
TABLET, EXTENDED RELEASE ORAL
COMMUNITY
Start: 2021-08-18 | End: 2021-09-09 | Stop reason: SDUPTHER

## 2021-08-24 RX ORDER — PREDNISONE 20 MG/1
TABLET ORAL
Qty: 12 TABLET | Refills: 0 | Status: SHIPPED | OUTPATIENT
Start: 2021-08-24 | End: 2021-09-24

## 2021-08-24 RX ORDER — IBUPROFEN 800 MG/1
800 TABLET ORAL EVERY 8 HOURS PRN
Qty: 120 TABLET | Refills: 1 | Status: SHIPPED | OUTPATIENT
Start: 2021-08-24 | End: 2021-09-24

## 2021-08-24 NOTE — PROGRESS NOTES
Subjective:      Génesis Borrego is a 62 y.o. female who presents for initial evaluation of lower back problems. Symptoms have been present for 10 days and include pain in lower back bilaterally (sharp, shooting and stabbing in character; 10/10 in severity). Initial inciting event: helping boyfriend lift item. Symptoms are worse all the time . Alleviating factors identifiable by the patient are none. Aggravating factors identifiable by the patient are bending backwards, bending forwards, bending sideways, standing, walking and walking uphill. Treatments initiated by patient: none. Previous lower back problems: none. Previous work up: none. Previous treatments: none. Current Functional Limitations:  1. How long can you sit? Sometimes, limited to comfortable positions  2. How long can you stand? Not up strait  3. How long can you walk? yes  4. How much weight can you lift? none  5. How long have your activities been limited to this degree? 08/16/2021    'Red Flags' for Fracture:  1. Recent history of major trauma: no  2. Minor trauma or strenuous lifting in older or potentially osteoporotic patient: no  3. History of chronic corticosteroid therapy: no    Red Flags' for Neoplasm or Infection:  1. Age over 48 or under 21: yes  2. History of cancer, especially breast, lung, prostate: no  3. Recent fever/chills: no  4. Recent unexplained weight loss: no  5. Recent bacterial infection: no  6. IV drug use: no  7. Immunosuppression (from meds, HIV, etc.): no  8. Pain worse when supine or at night: yes - supine     Red Flags' Cauda Equina Syndrome:  1. Complaint of saddle anesthesia: no  2. Recent onset of bladder dysfunction, especially retention: no  3.  Severe or progressive lower extremity neurologic deficit: no    Past Medical History:   Diagnosis Date    COPD (chronic obstructive pulmonary disease) (HCC)     Full dentures     upper plate only    H/O dizziness     \"take Meclazine at least twice a day for this\"  H/O Doppler JERMAINE's ultrasound 12/01/2020     No evidence of significant occlusive arterial disease of lower EXT.    H/O echocardiogram 12/01/2020    EF 55-60%, Normal study.  History of drug abuse ([address]     History of nuclear stress test 11/30/2020    Normal study.     History of UTI     \"last one May 2020\"    Hypertension     \"the top # use to be over 200 \"- on medication since 2018\"    Seizures ([address]     hx of epilepsy- last seizure week 7/13/2020- hx grand mal seizures    Wears glasses      Patient Active Problem List    Diagnosis Date Noted    Hypoxia 08/01/2021    Fatigue 08/01/2021    Hypersomnia 08/01/2021    Snoring 08/01/2021    Chronic hypoxemic respiratory failure ([address] 06/09/2021    Hereditary and idiopathic neuropathy     Lung nodule 05/10/2021    Personal history of nicotine dependence 05/10/2021    Intermittent claudication ([address] 11/17/2020    Nicotine abuse 11/17/2020    Class 1 obesity due to excess calories with body mass index (BMI) of 33.0 to 33.9 in adult 11/17/2020    Obesity (BMI 30-39.9) 11/03/2020    Restless legs 10/19/2020    Shortness of breath 10/19/2020    Age-related osteoporosis without current pathological fracture 08/11/2020    De Quervain's tenosynovitis, left     Vertigo 07/14/2020    Intractable generalized idiopathic epilepsy without status epilepticus ([address] 06/16/2020    Prediabetes 06/16/2020    Essential hypertension 06/16/2020    Panlobular emphysema ([address] 06/16/2020    Tobacco use 06/16/2020    Vitamin D deficiency 06/16/2020    Moderate episode of recurrent major depressive disorder ([address] 06/16/2020    Dyslipidemia 06/16/2020    Vestibular migraine 04/21/2020    Diplopia 03/13/2020    Seizure ([address] 08/30/2018    Chronic hyponatremia 02/22/2017     Past Surgical History:   Procedure Laterality Date    BRAIN SURGERY      s/p L anterior temporal lobectomy in 1996\"did surgery to try an treat the seizures\"   [address]    \"not sure if they took the ovaries\"   [facility]    WRIST SURGERY Left 7/30/2020    LEFT WRIST TENDON RELEASE performed by Darcie Tirado DO at [facility] OR     Family History   Problem Relation Age of Onset    Heart Disease Father         cabg     Current Outpatient Medications   Medication Sig Dispense Refill    oxybutynin (DITROPAN-XL) 10 MG extended release tablet       meclizine (ANTIVERT) 25 MG tablet TAKE 1 TABLET BY MOUTH 3 TIMES DAILY AS NEEDED FOR DIZZINESS 90 tablet 3    BREZTRI AEROSPHERE 160-9-4.8 MCG/ACT AERO Inhale 2 puffs into the lungs 2 times daily 1 Inhaler 3    olmesartan-hydroCHLOROthiazide (BENICAR HCT) 40-25 MG per tablet TAKE ONE TABLET BY MOUTH EVERY DAY 30 tablet 3    rOPINIRole (REQUIP) 0.5 MG tablet Take 1 tablet by mouth every evening 30 tablet 3    alendronate (FOSAMAX) 70 MG tablet Take 1 tablet by mouth once a week 12 tablet 1    nortriptyline (PAMELOR) 25 MG capsule Take 2 capsules by mouth nightly 60 capsule 3    albuterol sulfate  (90 Base) MCG/ACT inhaler Inhale 2 puffs into the lungs every 6 hours as needed for Wheezing 18 g 5    magnesium oxide (MAG-OX) 400 MG tablet Take 1 tablet by mouth daily 30 tablet 3    Spacer/Aero-Holding Chambers KADY 1 Device by Does not apply route daily as needed (use with albuterol rescue inhaler) 1 Device 0    eslicarbazepine (APTIOM) 400 MG TABS tablet Take 1 tablet by mouth nightly Takes 1200 total with the 400mg and 800mg 30 tablet 2    eslicarbazepine acetate (APTIOM) 800 MG tablet Take 800 mg by mouth nightly 30 tablet 2    lamoTRIgine (LAMICTAL) 200 MG tablet Take 1 tablet by mouth 2 times daily (Patient taking differently: Take 200 mg by mouth daily . 5 tablet daily) 60 tablet 2     No current facility-administered medications for this visit. Review of Systems  Pertinent items are noted in HPI.       Objective:      /80   Ht 5' 2\" (1.575 m)   Wt 185 lb (83.9 kg)   BMI 33.84 kg/m²   General: alert,

## 2021-08-26 ENCOUNTER — TELEPHONE (OUTPATIENT)
Dept: INTERNAL MEDICINE CLINIC | Age: 61
End: 2021-08-26

## 2021-08-26 NOTE — TELEPHONE ENCOUNTER
Patient stated that baclofen and ibuprofen is not helping- patient stated that she bent over in shower and she started hurting bad and it has not stopped  Seen April couple of days ago

## 2021-09-03 RX ORDER — MAGNESIUM OXIDE 400 MG/1
400 TABLET ORAL DAILY
Qty: 30 TABLET | Refills: 3 | Status: SHIPPED | OUTPATIENT
Start: 2021-09-03 | End: 2021-12-27

## 2021-09-09 ENCOUNTER — OFFICE VISIT (OUTPATIENT)
Dept: INTERNAL MEDICINE CLINIC | Age: 61
End: 2021-09-09
Payer: MEDICARE

## 2021-09-09 VITALS
HEART RATE: 98 BPM | HEIGHT: 62 IN | RESPIRATION RATE: 18 BRPM | SYSTOLIC BLOOD PRESSURE: 150 MMHG | OXYGEN SATURATION: 99 % | BODY MASS INDEX: 34.96 KG/M2 | WEIGHT: 190 LBS | DIASTOLIC BLOOD PRESSURE: 110 MMHG

## 2021-09-09 DIAGNOSIS — I10 ESSENTIAL HYPERTENSION: ICD-10-CM

## 2021-09-09 DIAGNOSIS — M81.0 AGE-RELATED OSTEOPOROSIS WITHOUT CURRENT PATHOLOGICAL FRACTURE: ICD-10-CM

## 2021-09-09 DIAGNOSIS — G25.81 RESTLESS LEGS: ICD-10-CM

## 2021-09-09 DIAGNOSIS — N32.81 OVERACTIVE BLADDER: ICD-10-CM

## 2021-09-09 DIAGNOSIS — R12 HEARTBURN: ICD-10-CM

## 2021-09-09 DIAGNOSIS — F33.1 MODERATE EPISODE OF RECURRENT MAJOR DEPRESSIVE DISORDER (HCC): Primary | ICD-10-CM

## 2021-09-09 DIAGNOSIS — G40.319 INTRACTABLE GENERALIZED IDIOPATHIC EPILEPSY WITHOUT STATUS EPILEPTICUS (HCC): ICD-10-CM

## 2021-09-09 DIAGNOSIS — R42 VERTIGO: ICD-10-CM

## 2021-09-09 PROCEDURE — 99214 OFFICE O/P EST MOD 30 MIN: CPT | Performed by: INTERNAL MEDICINE

## 2021-09-09 PROCEDURE — 1036F TOBACCO NON-USER: CPT | Performed by: INTERNAL MEDICINE

## 2021-09-09 PROCEDURE — G8427 DOCREV CUR MEDS BY ELIG CLIN: HCPCS | Performed by: INTERNAL MEDICINE

## 2021-09-09 PROCEDURE — G8417 CALC BMI ABV UP PARAM F/U: HCPCS | Performed by: INTERNAL MEDICINE

## 2021-09-09 RX ORDER — OLMESARTAN MEDOXOMIL AND HYDROCHLOROTHIAZIDE 40/25 40; 25 MG/1; MG/1
TABLET ORAL
Qty: 30 TABLET | Refills: 3 | Status: SHIPPED | OUTPATIENT
Start: 2021-09-09 | End: 2022-01-06 | Stop reason: SDUPTHER

## 2021-09-09 RX ORDER — ALENDRONATE SODIUM 70 MG/1
70 TABLET ORAL WEEKLY
Qty: 12 TABLET | Refills: 1 | Status: SHIPPED | OUTPATIENT
Start: 2021-09-09 | End: 2022-01-06 | Stop reason: SDUPTHER

## 2021-09-09 RX ORDER — OXYBUTYNIN CHLORIDE 10 MG/1
10 TABLET, EXTENDED RELEASE ORAL DAILY
Qty: 30 TABLET | Refills: 3 | Status: SHIPPED | OUTPATIENT
Start: 2021-09-09 | End: 2022-01-06 | Stop reason: SDUPTHER

## 2021-09-09 RX ORDER — NORTRIPTYLINE HYDROCHLORIDE 25 MG/1
50 CAPSULE ORAL NIGHTLY
Qty: 60 CAPSULE | Refills: 3 | Status: SHIPPED | OUTPATIENT
Start: 2021-09-09 | End: 2022-01-06 | Stop reason: SDUPTHER

## 2021-09-09 RX ORDER — ROPINIROLE 0.5 MG/1
0.5 TABLET, FILM COATED ORAL EVERY EVENING
Qty: 30 TABLET | Refills: 3 | Status: SHIPPED | OUTPATIENT
Start: 2021-09-09 | End: 2022-01-06 | Stop reason: SDUPTHER

## 2021-09-09 RX ORDER — OMEPRAZOLE 40 MG/1
40 CAPSULE, DELAYED RELEASE ORAL
Qty: 30 CAPSULE | Refills: 3 | Status: SHIPPED | OUTPATIENT
Start: 2021-09-09 | End: 2021-12-27

## 2021-09-09 RX ORDER — ARIPIPRAZOLE 5 MG/1
5 TABLET ORAL DAILY
Qty: 30 TABLET | Refills: 0 | Status: SHIPPED | OUTPATIENT
Start: 2021-09-09 | End: 2021-10-01

## 2021-09-09 NOTE — PROGRESS NOTES
9/9/21    Génesis Banks  1960    Chief Complaint   Patient presents with    3 Month Follow-Up       History of Present Illness:  Génesis Manjarrez is a 62 y.o. pleasant lady presenting today with a chief complaint of depression, seizure, HTN. She has a past medical history significant for:  HTN, on Olmesartan/HCTZ 40/25mg QD  HL (LDL 87, TG 83 on 7/7/2020), not on statin therapy  Prediabetes (HbA1C 5.7% on 3/9/2021), not on meds   HFpEF (G1DD, EF 55-60% on TTE 12/1/2020)  Epilepsy (s/p L anterior temporal lobectomy in 8137), on Eslicarbazepine 6296CX QHS  Depression, on Nortriptyline 50mg QHS   COPD, on Albuterol PRN, Breztri  Chronic hypoxemic respiratory failure, on 3L O2   Osteoporosis (DEXA not in chart), on Fosamax 70mg weekly  Vertigo, on Meclizine 25mg TID PRN   Vitamin D deficiency (level 27 on 4/4/13), not on supplementation   RLS, on Ropinirole 0.5mg QHS   Urinary incontinence, on Oxybutynin   S/p hysterectomy (cervix status unknown)  S/p L carpal tunnel release (7/30/2020)   Polysubstance abuse (marijuana, PCP, cocaine)   Former smoker (quit 2020)      # Recently, patient has been having leg cramps that improve with walking. Stopping her from sleep. Mostly what's bothersome is restless legs.   No diarrhea or vomiting. No change in medications lately. Labs recently unremarkable  She is taking OTC RLS meds. Mag did not help. I started her on low dose Ropinirole and increased it and reports improvement.   # Patient had seizure in May 2020 and April 2020 ~ 6 weeks apart. In the ED on 5/29/2020 [facility] unremarkable. Basic labs with mild hyponatremia (Na 131). Had UTI s/p Rocephin once then PO Keflex on discharge. UDS was positive for marijuana and PCP. Patient had another seizure in June 2020. Patient had stopped taking Vimpat as she did not know it was an AED. Na has improved. Patient was diagnosed with epilepsy at age 25.  Used to see Epilepsy specialist at [facility] in [place] [address] does not drive due to h/o seizures. I referred her to Neurology Epilepsy. Seeing Dr. Ordonez Sit  She is now taking Aptiom. Off Vimpat and off Lamictal. MRIb Feb 2021 grossly unremarkable other than post-surgical changes. Off Onfi. She needs paperwork filled for [employer] regarding driving privileges. # Patient was on Abilify, Topamax, Venlafaxine and many other meds that she self-discontinued. She reports she is only depressed but does not have bipolar. Patient's mood continues to be down. ? Has tardive dyskinesia but not taking Benztropine. Has h/o non-compliance with pills. She is taking Nortriptyline only. I referred to Psychiatry. She is interested in seeing someone at [facility]. Waiting to hear back. She is seeing Dr. Aleja Suarez  # She takes Meclizine for vertigo PRN. This helps. No falls or nausea.   # Quit smoking recently through Nicotine patch.    # Patient denies doing any other drugs other than weed and reports that her UDS positive because it might have been mixed with her marijuana as per patient. Gardi Bound Gardi Bound ? ? ? # BP today 150/110. Attributes this to stress. Usually her BP has been controlled. Taking Benicar for HTN. Tries to watch salt intake.   # Seeing Urology for incontinence. She is on Oxybutynin. # She has prediabetes and dyslipidemia. Not on meds. # Patient is not on vitamin D supplements despite low vitamin D level years ago. Taking Fosamax for OP. DEXA not in chart.   # Patient is s/p L carpal tunnel release. Patient with hyponatremia at the time. Chronic due to AEDs.   # Has difficulty losing weight. She is gaining weight. Wanting to be placed on weight loss pills. She is seeing [facility]. She is on non-surgical diet program.   # Complaining of worsening exertional dyspnea. TTE at [facility] in 2019 unremarkable. Stress test unremarkable. PFT shows obstruction wo BD response. She is seeing Dr. Montse Mckeon Cardiology. She has COPD. Following Pulm for BD. 6mwt showed requirement for O2.  She is on 3L O2.   CT lung screening with 4mm LN being followed. Did not get sleep study done. # UTD on COVID-19 vaccination. # UTD on PPSV-23. # Not UTD on breast or colon cancer screening. Health maintenance:   Health Maintenance Due   Topic Date Due    Hepatitis C screen  Never done    HIV screen  Never done    Colon cancer screen colonoscopy  Never done    Shingles Vaccine (1 of 2) Never done    Annual Wellness Visit (AWV)  Never done    Breast cancer screen  07/22/2021    Flu vaccine (1) 09/01/2021         Review of Systems:  Constitutional: no fevers, no chills, no night sweats, no weight loss, no weight gain, no fatigue   Pain assessment: no pain  Head: no headaches  Ears: no hearing loss, no tinnitus, no vertigo  Eyes: no blurry vision, no diplopia, no dryness, no itchiness  Mouth: no oral ulcers, no dry mouth, no sore throat  Nose: no nasal congestion, no epistaxis  Cardiac: no chest pain, no palpitations, no leg swelling, no orthopnea, no PND, no syncope  Pulmonary: no dyspnea, no cough, no wheezing, no hemoptysis  GI: no nausea, no vomiting, no diarrhea, no constipation, no abdominal pain, no hematochezia  : no dysuria, no frequency, no urgency, no hematuria, no frothy urine, no dyspareunia, no pelvic pain, no vaginal bleeding, no abnormal vaginal discharge  MSK: no arthralgias, leg cramps, no early morning stiffness, no Raynaud's   Neuro: no focal neurological deficits, seizures  Sleep: no snoring, no daytime somnolence   Psych: depression, no suicidal ideation      Physical Exam:  VITALS:   BP (!) 150/110 (Site: Left Upper Arm, Position: Sitting, Cuff Size: Medium Adult)   Pulse 98   Resp 18   Ht 5' 2\" (1.575 m)   Wt 190 lb (86.2 kg)   SpO2 99%   BMI 34.75 kg/m²     PHYSICAL EXAMINATION:  General: alert, awake, and oriented to time, place, person, and situation.  Not in acute distress, O2 by NC  Skin:  no suspicious rashes, no jaundice  Head: normocephalic/atraumatic  Eyes: anicteric sclera, well-injected conjunctiva. Pupils are equally round and reactive to light. Extraocular movements are intact   Nose: no septal deviation evident  Sinuses: no sinus tenderness  Ears: external ears normal  Neck: supple, no cervical lymphadenopathy, thyroid symmetric and not enlarged, no bruits   Heart: regular rate and rhythm, regular S1/S2, no S3/S4, no audible murmurs, no audible friction rub  Lungs: clear to auscultation bilaterally, no audible crackles, no audible wheezes, no audible rhonchi    Abdomen: normal bowel sounds, soft abdomen, non-tender, no palpable masses  Extremities: no edema, warm, no cyanosis, no clubbing. Good capillary refill   MSK: no tenderness across spinous processes, full ROM in all 4 extremities. No joint swelling or tenderness   Peripheral vascular: 2+ pulses symmetric (radial)  Neuro: gait unsteady, CN II-XII intact, motor power 5/5 in all 4 extremities, sensation intact and symmetric. Cerebellar signs intact    Labs   I have personally reviewed labs, and discussed pertinent findings with patient on this date 9/9/2021     Imaging   I have personally reviewed imaging, and discussed pertinent findings with patient on this date 9/9/2021     Other notes  I have personally reviewed other notes, and discussed pertinent findings with patient on this date 9/9/2021       Assessment/Plan:     1. Moderate episode of recurrent major depressive disorder (HCC)  Unstable mood  Wants to go back on Abilify 5mg QD  Discussed side effects  FU in 1 month  Continue Nortriptyline 50mg QHS (decreases seizure threshold but long-term med for her)    2. Vertigo  DC Meclizine due to no benefit    3. Overactive bladder  Continue Oxybutynin 10mg QD  Seeing Urology     4. Intractable generalized idiopathic epilepsy without status epilepticus ([facility] [address]  Seeing Neurology  Continue Aptiom 1600mg QHS     5. Essential hypertension  Uncontrolled today.  Otherwise has been well controlled so will not make adjustments today  FU in 1 month  Continue for now Olmesartan/HCTZ 40/25mg QD    6. Restless legs  Continue Ropinirole 0.5mg QHS     7. Age-related osteoporosis without current pathological fracture  DEXA not in chart  Continue Fosamax 70mg weekly     8. Heartburn  Start Prilosec 40mg QAM       Care discussed with patient and questions answered. Patient verbalizes understanding and agrees with plan. Discussed with patient the importance of continuity of care. I encouraged patient to schedule next appointment within 1 month with me. Patient prefers to be reached by Phone call at [phone] for future medical correspondence. Encouraged to activate [employer]. I reviewed and reconciled the medications this visit. I reviewed and updated the past medical, surgical, social, and family history during this visit. After visit summary provided.        Lalo Hendrix MD  Internal Medicine  9/9/2021   11:12 AM

## 2021-09-13 ENCOUNTER — TELEPHONE (OUTPATIENT)
Dept: INTERNAL MEDICINE CLINIC | Age: 61
End: 2021-09-13

## 2021-09-13 NOTE — TELEPHONE ENCOUNTER
Patient stated she is having neck pain and shoulder area - stated when she starts having sex it causes excessive pain and she cant move.  Wants to know results of her xray and wanted to know if it also shows neck and shoulder area

## 2021-09-14 ENCOUNTER — TELEPHONE (OUTPATIENT)
Dept: INTERNAL MEDICINE CLINIC | Age: 61
End: 2021-09-14

## 2021-09-14 DIAGNOSIS — M54.2 ACUTE NECK PAIN: Primary | ICD-10-CM

## 2021-09-14 NOTE — TELEPHONE ENCOUNTER
XR ordered by walk-in provider shows arthritis in her lower back. It was not ordered for the neck or shoulders. That would be 3 separate Xray orders but I do not think she needs all those done.  She would either need Xray neck only, or to be seen in office to localize point of tenderness to order appropriate imaging

## 2021-09-16 ENCOUNTER — HOSPITAL ENCOUNTER (OUTPATIENT)
Dept: GENERAL RADIOLOGY | Age: 61
Discharge: HOME | End: 2021-09-16
Payer: MEDICARE

## 2021-09-16 ENCOUNTER — HOSPITAL ENCOUNTER (OUTPATIENT)
Age: 61
Discharge: HOME | End: 2021-09-16
Payer: MEDICARE

## 2021-09-16 DIAGNOSIS — M54.2 ACUTE NECK PAIN: ICD-10-CM

## 2021-09-16 PROCEDURE — 72040 X-RAY EXAM NECK SPINE 2-3 VW: CPT

## 2021-09-24 ENCOUNTER — OFFICE VISIT (OUTPATIENT)
Dept: CARDIOLOGY CLINIC | Age: 61
End: 2021-09-24
Payer: MEDICARE

## 2021-09-24 VITALS
SYSTOLIC BLOOD PRESSURE: 138 MMHG | HEART RATE: 76 BPM | BODY MASS INDEX: 35.51 KG/M2 | HEIGHT: 62 IN | WEIGHT: 193 LBS | DIASTOLIC BLOOD PRESSURE: 86 MMHG

## 2021-09-24 DIAGNOSIS — Z72.0 NICOTINE ABUSE: ICD-10-CM

## 2021-09-24 DIAGNOSIS — I10 ESSENTIAL HYPERTENSION: ICD-10-CM

## 2021-09-24 DIAGNOSIS — E66.09 CLASS 2 OBESITY DUE TO EXCESS CALORIES WITHOUT SERIOUS COMORBIDITY WITH BODY MASS INDEX (BMI) OF 35.0 TO 35.9 IN ADULT: ICD-10-CM

## 2021-09-24 DIAGNOSIS — R06.02 SHORTNESS OF BREATH: Primary | ICD-10-CM

## 2021-09-24 PROCEDURE — 3017F COLORECTAL CA SCREEN DOC REV: CPT | Performed by: INTERNAL MEDICINE

## 2021-09-24 PROCEDURE — 1036F TOBACCO NON-USER: CPT | Performed by: INTERNAL MEDICINE

## 2021-09-24 PROCEDURE — G8427 DOCREV CUR MEDS BY ELIG CLIN: HCPCS | Performed by: INTERNAL MEDICINE

## 2021-09-24 PROCEDURE — G8417 CALC BMI ABV UP PARAM F/U: HCPCS | Performed by: INTERNAL MEDICINE

## 2021-09-24 PROCEDURE — 99214 OFFICE O/P EST MOD 30 MIN: CPT | Performed by: INTERNAL MEDICINE

## 2021-09-24 NOTE — PROGRESS NOTES
Su Zaragoza MD                                  CARDIOLOGY  NOTE       Chief Complaint:    Chief Complaint   Patient presents with    Hyperlipidemia     Patient here for 9 month follow up. Patient denies CP, palpitations, dizziness or edema. Is on 3L O2 via nasal cannula continously, has COPD. Denies chest pain  Denies palpitations  Ch dyspnea     MPI 11/30/2020    [place] .    No infarct or ischemia noted.    Normal tracer uptake in all segments of myocardium on stress and rest    images. Mild breast tissue attenuation noted    No infarct or ischemia noted.    Normal EF 75 % with normal ventricular contractility.        Recommendation    Medical Management    Follow up     Echocardiogram 12/1/2020     Summary   Left ventricular systolic function is normal with an ejection fraction of   55-60%. Grade I diastolic dysfunction. No significant valvular disease. Normal pulmonary artery pressure with a RVSP of 30mmHg   No evidence of pericardial effusion. JERMAINE 12/1/2020      Procedure       Type of Study:        Extremities Arteries:Lower Extremities Arterial Duplex, VL LOWER EXTREMITY    ARTERIES BILATERAL, Lower Arterial Plethysmography, VL ANKLE ART BRACHIAL    INDICES EXTREMITY BILATERAL.       Indications for Study:Claudication.       Conclusions        Summary        No evidence of significant occlusive arterial disease.    Normal bilateral lower extremity ankle brachial indices. Recent HPI:     Génesis is a 62y.o. year old female who presents to establish care for shortness of breath with exertion. Patient has a history of hypertension, diet-controlled hyperlipidemia, chronic nicotine abuse, recently diagnosed COPD, chronic dizziness. As per patient she has been feeling fatigued and short of breath for the past several months. Patient denies any dependent edema, denies any PND or orthopnea.   She mentions that she can walk up to half a block before she gets short of breath. She can hardly ambulate 1 flight of stairs before she has to catch breath. She denies any sick contacts fever chills. Patient also complains of bilateral lower extremity intermittent claudication with ambulation. He denies any history of open ulcers. Patient denies any chest pain  No Palpitations     EK2020 sinus rhythm without any ST-T changes. Current Outpatient Medications   Medication Sig Dispense Refill    nortriptyline (PAMELOR) 25 MG capsule Take 2 capsules by mouth nightly 60 capsule 3    oxybutynin (DITROPAN-XL) 10 MG extended release tablet Take 1 tablet by mouth daily 30 tablet 3    eslicarbazepine acetate (APTIOM) 800 MG tablet Take 1,600 mg by mouth nightly 60 tablet 3    olmesartan-hydroCHLOROthiazide (BENICAR HCT) 40-25 MG per tablet TAKE ONE TABLET BY MOUTH EVERY DAY 30 tablet 3    rOPINIRole (REQUIP) 0.5 MG tablet Take 1 tablet by mouth every evening 30 tablet 3    alendronate (FOSAMAX) 70 MG tablet Take 1 tablet by mouth once a week 12 tablet 1    ARIPiprazole (ABILIFY) 5 MG tablet Take 1 tablet by mouth daily 30 tablet 0    omeprazole (PRILOSEC) 40 MG delayed release capsule Take 1 capsule by mouth every morning (before breakfast) 30 capsule 3    magnesium oxide (MAG-OX) 400 MG tablet TAKE 1 TABLET BY MOUTH DAILY 30 tablet 3    albuterol sulfate  (90 Base) MCG/ACT inhaler Inhale 2 puffs into the lungs every 6 hours as needed for Wheezing 18 g 5    Spacer/Aero-Holding Chambers KADY 1 Device by Does not apply route daily as needed (use with albuterol rescue inhaler) 1 Device 0     No current facility-administered medications for this visit.        Allergies:     Varenicline and Chantix [varenicline tartrate]    Patient History:    Past Medical History:   Diagnosis Date    COPD [facility]chronic obstructive pulmonary disease) ([facility]     Full dentures     upper plate only    H/O dizziness     \"take Meclazine at least twice a day for this\"  H/O Doppler JERMAINE's ultrasound 2020     No evidence of significant occlusive arterial disease of lower EXT.    H/O echocardiogram 2020    EF 55-60%, Normal study.  History of drug abuse ([facility] [address]     History of nuclear stress test 2020    Normal study.  History of UTI     \"last one May 2020\"    Hypertension     \"the top # use to be over 200 \"- on medication since 2018\"    Seizures ([facility] [address]     hx of epilepsy- last seizure week 2020- hx grand mal seizures    Wears glasses      Past Surgical History:   Procedure Laterality Date    BRAIN SURGERY      s/p L anterior temporal lobectomy in \"did surgery to try an treat the seizures\"   [address]    \"not sure if they took the ovaries\"   [email or web address]    WRIST SURGERY Left 2020    LEFT WRIST TENDON RELEASE performed by Armando De La Torre DO at [facility] History   Problem Relation Age of Onset    Heart Disease Father         cabg     Social History     Tobacco Use    Smoking status: Former Smoker     Packs/day: 1.50     Years: 40.00     Pack years: 60.00     Types: Cigarettes     Quit date: 2020     Years since quittin.1    Smokeless tobacco: Never Used   Substance Use Topics    Alcohol use: No     Comment: per pt on 2020\"quit drinking age 40- use to drink on weekends\"        Review of Systems:     · Constitutional:  No Fever or Weight Loss + intermittent claudication  · Eyes: No Decreased Vision  · ENT: No Headaches, Hearing Loss or Vertigo  · Cardiovascular: No Chest Pain,  + Shortness of breath, No Palpitations. No Edema   · Respiratory: No cough or wheezing .  No Respiratory distress   · Gastrointestinal: No abdominal pain, appetite loss, blood in stools, constipation, diarrhea or heartburn  · Genitourinary: No dysuria, trouble voiding, or hematuria  · Musculoskeletal:  denies any new  joint aches , or pain   · Integumentary: No rash or pruritis  · Neurological: No TIA or stroke Component Value Date    WBC 6.5 11/18/2020    HGB 12.4 11/18/2020    HCT 36.3 11/18/2020    MCV 89.6 11/18/2020     11/18/2020     TSH:   Lab Results   Component Value Date    TSH 1.70 11/18/2020     Lab Results   Component Value Date    AST 14 (L) 02/11/2021    ALT 12 02/11/2021    BILITOT 0.2 02/11/2021    ALKPHOS 107 02/11/2021         All labs, medications and tests reviewed by myself including data and history from outside source , patient and available family . 1. Shortness of breath    2. Essential hypertension    3. Nicotine abuse    4. Class 2 obesity due to excess calories without serious comorbidity with body mass index (BMI) of 35.0 to 35.9 in adult         Impression and Plan:      1. Shortness of breath: likely COPD (now on home O2, symptoms improving) . Echo and Stress MPI within normal limits. Pt to follow up with pulmonologist     2. Essential hypertension: Blood pressures well controlled. Continue with olmesartan/hydrochlorothiazide. 3. Intermittent claudication of bilateral lower extremities. ABIs Normal. Cont ASA. Likely arthritis     4. Recent history of smoking: Patient has quit smoking  - Now using marijuana . counseled against use. 5. Obesity with BMI of 35.30. Counseled about low-salt low-fat diet and exercise as tolerated    Return in about 1 year (around 9/24/2022). Counseled extensively and medication compliance urged. We discussed that for the  prevention of ASCVD our  goal is aggressive risk modification. Patient is encouraged to exercise even a brisk walk for 30 minutes  at least 3 to 4 times a week   Various goals were discussed and questions answered. Continue current medications. Appropriate prescriptions are addressed and refills ordered. Questions answered and patient verbalizes understanding. Call for any problems, questions, or concerns.

## 2021-09-27 ENCOUNTER — TELEPHONE (OUTPATIENT)
Dept: PULMONOLOGY | Age: 61
End: 2021-09-27

## 2021-09-27 DIAGNOSIS — G47.10 HYPERSOMNIA: Primary | ICD-10-CM

## 2021-09-27 DIAGNOSIS — R06.83 SNORING: ICD-10-CM

## 2021-09-27 DIAGNOSIS — R53.83 FATIGUE, UNSPECIFIED TYPE: ICD-10-CM

## 2021-09-27 NOTE — TELEPHONE ENCOUNTER
Patient called regarding if her appt is on November 8 I stated yes she would need to set up a sleep study appt. Gave her the sleep lab number. Yes

## 2021-09-27 NOTE — PROGRESS NOTES
Spoke to sleep lab in regards to Génesis sleep study that was ordered in February 2021. Apparently sleep study fell into an [place] sleep study and was not able to be viewed by Xin Cook to schedule. I spoke with Jil Elliott at sleep lab, new order has been placed, they will try to get her sleep study scheduled in the next few weeks in [place]. I spoke with Génesis informed her as to what it happened with the order that the order was placed but due to the computer system there was a glitch in Xin [facility] was not able to see the order to order the study. [place] sleep lab will be calling her to try to schedule at the first available appointment in [place].

## 2021-10-01 DIAGNOSIS — F33.1 MODERATE EPISODE OF RECURRENT MAJOR DEPRESSIVE DISORDER (HCC): ICD-10-CM

## 2021-10-01 RX ORDER — ARIPIPRAZOLE 5 MG/1
5 TABLET ORAL DAILY
Qty: 30 TABLET | Refills: 0 | Status: SHIPPED | OUTPATIENT
Start: 2021-10-01 | End: 2021-10-07 | Stop reason: SDUPTHER

## 2021-10-04 ENCOUNTER — HOSPITAL ENCOUNTER (OUTPATIENT)
Dept: MAMMOGRAPHY | Age: 61
Discharge: HOME | End: 2021-10-04
Payer: MEDICARE

## 2021-10-04 ENCOUNTER — HOSPITAL ENCOUNTER (OUTPATIENT)
Age: 61
Discharge: HOME | End: 2021-10-04
Payer: MEDICARE

## 2021-10-04 DIAGNOSIS — M81.0 AGE-RELATED OSTEOPOROSIS WITHOUT CURRENT PATHOLOGICAL FRACTURE: ICD-10-CM

## 2021-10-04 DIAGNOSIS — Z12.31 ENCOUNTER FOR SCREENING MAMMOGRAM FOR MALIGNANT NEOPLASM OF BREAST: ICD-10-CM

## 2021-10-04 LAB
BACTERIA: NEGATIVE /HPF
BILIRUBIN URINE: NEGATIVE MG/DL
BLOOD, URINE: NEGATIVE
CAST TYPE: NORMAL /HPF
CLARITY: CLEAR
COLOR: YELLOW
CRYSTAL TYPE: NEGATIVE /HPF
EPITHELIAL CELLS, UA: 3 /HPF
GLUCOSE, URINE: NEGATIVE MG/DL
HEPATITIS B SURFACE ANTIGEN: NON REACTIVE
HIV SCREEN: NON REACTIVE
KETONES, URINE: NEGATIVE MG/DL
LEUKOCYTE ESTERASE, URINE: NEGATIVE
NITRITE URINE, QUANTITATIVE: NEGATIVE
PH, URINE: 6.5 (ref 5–8)
PROTEIN UA: NEGATIVE MG/DL
RBC URINE: NEGATIVE /HPF (ref 0–6)
SPECIFIC GRAVITY UA: 1.02 (ref 1–1.03)
UROBILINOGEN, URINE: 0.2 MG/DL (ref 0.2–1)
WBC UA: 2 /HPF (ref 0–5)

## 2021-10-04 PROCEDURE — 77063 BREAST TOMOSYNTHESIS BI: CPT

## 2021-10-04 PROCEDURE — 87086 URINE CULTURE/COLONY COUNT: CPT

## 2021-10-04 PROCEDURE — 81001 URINALYSIS AUTO W/SCOPE: CPT

## 2021-10-04 PROCEDURE — 87340 HEPATITIS B SURFACE AG IA: CPT

## 2021-10-04 PROCEDURE — 86592 SYPHILIS TEST NON-TREP QUAL: CPT

## 2021-10-04 PROCEDURE — 36415 COLL VENOUS BLD VENIPUNCTURE: CPT

## 2021-10-04 PROCEDURE — 87389 HIV-1 AG W/HIV-1&-2 AB AG IA: CPT

## 2021-10-04 PROCEDURE — 77080 DXA BONE DENSITY AXIAL: CPT

## 2021-10-05 ENCOUNTER — TELEPHONE (OUTPATIENT)
Dept: PULMONOLOGY | Age: 61
End: 2021-10-05

## 2021-10-05 LAB
CULTURE: NORMAL
Lab: NORMAL
RPR: NON REACTIVE
SPECIMEN: NORMAL

## 2021-10-05 NOTE — TELEPHONE ENCOUNTER
KASIM to make pt a phone visit    ----- Message from Frank Call sent at 9/28/2021  9:06 AM EDT -----  Regarding: Needs new order/visit  Tay Rooney,    I talked to Gabriel Berry yesterday about this patient. She had originally ordered a study in 02/21 that we never received. Yesterday she put in a new order but since she hasn't seen her since 08/21, I need her to have a new face to face AND put in another new order during that visit. Basically, give me everything like she's all new. Let me know if you or Gabriel Berry have questions. Thanks!

## 2021-10-06 ENCOUNTER — HOSPITAL ENCOUNTER (OUTPATIENT)
Age: 61
Discharge: HOME | End: 2021-10-06
Payer: MEDICARE

## 2021-10-06 LAB
SARS-COV-2: NOT DETECTED
SOURCE: NORMAL

## 2021-10-06 PROCEDURE — U0005 INFEC AGEN DETEC AMPLI PROBE: HCPCS

## 2021-10-06 PROCEDURE — U0003 INFECTIOUS AGENT DETECTION BY NUCLEIC ACID (DNA OR RNA); SEVERE ACUTE RESPIRATORY SYNDROME CORONAVIRUS 2 (SARS-COV-2) (CORONAVIRUS DISEASE [COVID-19]), AMPLIFIED PROBE TECHNIQUE, MAKING USE OF HIGH THROUGHPUT TECHNOLOGIES AS DESCRIBED BY CMS-2020-01-R: HCPCS

## 2021-10-07 ENCOUNTER — OFFICE VISIT (OUTPATIENT)
Dept: INTERNAL MEDICINE CLINIC | Age: 61
End: 2021-10-07
Payer: MEDICARE

## 2021-10-07 ENCOUNTER — VIRTUAL VISIT (OUTPATIENT)
Dept: PULMONOLOGY | Age: 61
End: 2021-10-07
Payer: MEDICARE

## 2021-10-07 VITALS
BODY MASS INDEX: 34.96 KG/M2 | HEIGHT: 62 IN | OXYGEN SATURATION: 98 % | SYSTOLIC BLOOD PRESSURE: 130 MMHG | HEART RATE: 76 BPM | DIASTOLIC BLOOD PRESSURE: 88 MMHG | RESPIRATION RATE: 16 BRPM | WEIGHT: 190 LBS

## 2021-10-07 DIAGNOSIS — I10 ESSENTIAL HYPERTENSION: ICD-10-CM

## 2021-10-07 DIAGNOSIS — R06.83 SNORING: ICD-10-CM

## 2021-10-07 DIAGNOSIS — G47.10 HYPERSOMNIA: ICD-10-CM

## 2021-10-07 DIAGNOSIS — F33.1 MODERATE EPISODE OF RECURRENT MAJOR DEPRESSIVE DISORDER (HCC): ICD-10-CM

## 2021-10-07 DIAGNOSIS — G40.319 INTRACTABLE GENERALIZED IDIOPATHIC EPILEPSY WITHOUT STATUS EPILEPTICUS (HCC): ICD-10-CM

## 2021-10-07 DIAGNOSIS — J43.2 CENTRILOBULAR EMPHYSEMA (HCC): Primary | ICD-10-CM

## 2021-10-07 DIAGNOSIS — L98.9 SKIN LESION: ICD-10-CM

## 2021-10-07 DIAGNOSIS — G25.81 RLS (RESTLESS LEGS SYNDROME): ICD-10-CM

## 2021-10-07 DIAGNOSIS — F51.01 PRIMARY INSOMNIA: Primary | ICD-10-CM

## 2021-10-07 DIAGNOSIS — Z01.818 PREOP TESTING: ICD-10-CM

## 2021-10-07 DIAGNOSIS — Z87.891 PERSONAL HISTORY OF TOBACCO USE: ICD-10-CM

## 2021-10-07 DIAGNOSIS — R53.83 FATIGUE, UNSPECIFIED TYPE: ICD-10-CM

## 2021-10-07 DIAGNOSIS — Z00.00 HEALTHCARE MAINTENANCE: ICD-10-CM

## 2021-10-07 PROCEDURE — 99214 OFFICE O/P EST MOD 30 MIN: CPT | Performed by: INTERNAL MEDICINE

## 2021-10-07 PROCEDURE — G8427 DOCREV CUR MEDS BY ELIG CLIN: HCPCS | Performed by: INTERNAL MEDICINE

## 2021-10-07 PROCEDURE — G8417 CALC BMI ABV UP PARAM F/U: HCPCS | Performed by: INTERNAL MEDICINE

## 2021-10-07 PROCEDURE — 3017F COLORECTAL CA SCREEN DOC REV: CPT | Performed by: INTERNAL MEDICINE

## 2021-10-07 PROCEDURE — 1036F TOBACCO NON-USER: CPT | Performed by: INTERNAL MEDICINE

## 2021-10-07 PROCEDURE — G0296 VISIT TO DETERM LDCT ELIG: HCPCS | Performed by: NURSE PRACTITIONER

## 2021-10-07 PROCEDURE — 99214 OFFICE O/P EST MOD 30 MIN: CPT | Performed by: NURSE PRACTITIONER

## 2021-10-07 PROCEDURE — G8484 FLU IMMUNIZE NO ADMIN: HCPCS | Performed by: INTERNAL MEDICINE

## 2021-10-07 PROCEDURE — 3017F COLORECTAL CA SCREEN DOC REV: CPT | Performed by: NURSE PRACTITIONER

## 2021-10-07 PROCEDURE — G8427 DOCREV CUR MEDS BY ELIG CLIN: HCPCS | Performed by: NURSE PRACTITIONER

## 2021-10-07 RX ORDER — BUDESONIDE, GLYCOPYRROLATE, AND FORMOTEROL FUMARATE 160; 9; 4.8 UG/1; UG/1; UG/1
AEROSOL, METERED RESPIRATORY (INHALATION)
COMMUNITY
Start: 2021-10-01 | End: 2021-10-07 | Stop reason: SDUPTHER

## 2021-10-07 RX ORDER — BUDESONIDE, GLYCOPYRROLATE, AND FORMOTEROL FUMARATE 160; 9; 4.8 UG/1; UG/1; UG/1
2 AEROSOL, METERED RESPIRATORY (INHALATION) 2 TIMES DAILY
Qty: 1 EACH | Refills: 5 | Status: SHIPPED | OUTPATIENT
Start: 2021-10-07 | End: 2021-11-06

## 2021-10-07 RX ORDER — HYDROXYZINE HYDROCHLORIDE 25 MG/1
25-50 TABLET, FILM COATED ORAL NIGHTLY
Qty: 60 TABLET | Refills: 1 | Status: SHIPPED | OUTPATIENT
Start: 2021-10-07 | End: 2021-12-27

## 2021-10-07 RX ORDER — ARIPIPRAZOLE 5 MG/1
5 TABLET ORAL DAILY
Qty: 30 TABLET | Refills: 0 | Status: SHIPPED | OUTPATIENT
Start: 2021-10-07 | End: 2021-12-27

## 2021-10-07 NOTE — PROGRESS NOTES
Patient doing well on new medication. Patient concerned about brown spots on skin that are itchy at times.

## 2021-10-07 NOTE — PROGRESS NOTES
[address] Pulmonary   Virtual Visit Note      Génesis Villaseñor is a 62 y.o. female evaluated via my chart video on 10/7/2021. Video via doxy. Consent:  Pursuant to emergency declaration under the [address] in the [employer], 1135 waiver authorization in the [facility] Act, this virtual visit was completed with patient's consent, to reduce the patient's risk of exposure to COVID-19 and provide necessary medical care. She and/or health care decision maker is aware that that she may receive a bill for this telephone service, depending on her insurance coverage, and has provided verbal consent to proceed. Patient is at his home and Provider is currently in Pulmonary Clinic at [address]. Documentation:    Génesis states that he usually goes to bed around 10 Pm, sleeps generally 4-6 hours a night. States awakens during the night occasionally due to nocturia or restlessness, and falls back to sleep within 10 minutes after getting up. States in the morning she is fatigued and does not feel like she has slept well. She is sleepy during the day but does not nap. Family reports snoring. Denies any symptoms of CHF. States she does have restless leg syndrome and she is currently on Requip sleep is not interrupted due to chronic pain. There has no been witness apnea. Cardiology and her PCP are requested that she be evaluated for possible sleep apnea. She states that since starting on Breztri she has noticed that she is using less of her albuterol rescue inhaler. She states some days she does not require her rescue inhaler at all. She states she is rinsing her mouth well after use of the [facility] and denies any oral complaints. Génesis states she has not required hospitalization, visits to the emergency room, walk-in clinic, urgent care for respiratory related illness since last visit.     Génesis states she was just was seen by PCP earlier today for routine follow-up. She states when she is out from home she has been social distancing wherever able, wearing a mask in public, washing her hands frequently or using hand . She denies any fever, chills, lightheadedness, dizziness, sinus pressure or pain, loss of sensation of taste or smell, nausea vomiting or abdominal complaints. She denies any recent contact with persons known COVID-19 or under investigation for possible COVID-19 exposure. She did complete a COVID-19 prescreening testing on 1/8/2021 which was negative. Past Medical History:   Diagnosis Date    [facility]     Epilepsy ([facility]     Full dentures     upper plate only    H/O dizziness     \"take Meclazine at least twice a day for this\"    H/O Doppler JERMAINE's ultrasound 12/01/2020     No evidence of significant occlusive arterial disease of lower EXT.    H/O echocardiogram 12/01/2020    EF 55-60%, Normal study.  [facility] of drug abuse [facility]     History of nuclear stress test 11/30/2020    Normal study.     History of UTI     \"last one May 2020\"    Hypertension     \"the top # use to be over 200 \"- on medication since 2018\"    Seizures ([facility]     hx of epilepsy- last seizure week 7/13/2020- hx grand mal seizures    Wears glasses          Current Outpatient Medications:     hydrOXYzine (ATARAX) 25 MG tablet, Take 1-2 tablets by mouth nightly, Disp: 60 tablet, Rfl: 1    ARIPiprazole (ABILIFY) 5 MG tablet, Take 1 tablet by mouth daily, Disp: 30 tablet, Rfl: 0    BREZTRI AEROSPHERE 160-9-4.8 MCG/ACT AERO, Inhale 2 puffs into the lungs 2 times daily, Disp: 1 each, Rfl: 5    nortriptyline (PAMELOR) 25 MG capsule, Take 2 capsules by mouth nightly, Disp: 60 capsule, Rfl: 3    oxybutynin (DITROPAN-XL) 10 MG extended release tablet, Take 1 tablet by mouth daily, Disp: 30 tablet, Rfl: 3    eslicarbazepine acetate (APTIOM) 800 MG tablet, Take 1,600 mg by mouth nightly, Disp: 60 tablet, Rfl: 3    olmesartan-hydroCHLOROthiazide (BENICAR HCT) 40-25 MG per tablet, TAKE ONE TABLET BY MOUTH EVERY DAY, Disp: 30 tablet, Rfl: 3    rOPINIRole (REQUIP) 0.5 MG tablet, Take 1 tablet by mouth every evening, Disp: 30 tablet, Rfl: 3    alendronate (FOSAMAX) 70 MG tablet, Take 1 tablet by mouth once a week, Disp: 12 tablet, Rfl: 1    omeprazole (PRILOSEC) 40 MG delayed release capsule, Take 1 capsule by mouth every morning (before breakfast), Disp: 30 capsule, Rfl: 3    magnesium oxide (MAG-OX) 400 MG tablet, TAKE 1 TABLET BY MOUTH DAILY, Disp: 30 tablet, Rfl: 3    albuterol sulfate  (90 Base) MCG/ACT inhaler, Inhale 2 puffs into the lungs every 6 hours as needed for Wheezing, Disp: 18 g, Rfl: 5    Spacer/Aero-Holding Chambers KADY, 1 Device by Does not apply route daily as needed (use with albuterol rescue inhaler), Disp: 1 Device, Rfl: 0    ROS:   Constitutional: Denies fever, denies chills   Cardiovascular: Denies chest pain, denies palpitations. Pulmonary: Denies cough, occasionally with activity SOB, denies wheeze  Musculoskeletal:  Denies joint swelling, denies joint pain   Psychiatric/Behavioral: Negative for dysphoric mood    Physical exam via video exam per My chart/Doxy:    Gen: No distress. Normal appearance  Eyes:  No sclera icterus. No conjunctival injection. ENT:  External appearance of ears and nose normal.  Resp: No respiratory distress noted, no conversational dyspnea, no cough present during Video exam.   Skin: Exposed areas appear in tact  M/S: No cyanosis. No clubbing. Neuro: no focal deficit, mental status at baseline  Psych: Oriented x 3. Appropriate mood and behavior. Intact judgement and insight. PFT testing:    ESS: 5  Neck circumference: 15  Mallampati: 2    Diagnosis:    ICD-10-CM    1. Centrilobular emphysema ([address]  J43.2 BREZTRI AEROSPHERE 160-9-4.8 MCG/ACT AERO   2. Hypersomnia  G47.10 Baseline Diagnostic Sleep Study   3.  Fatigue, unspecified type R53.83 Baseline Diagnostic Sleep Study   4. Snoring  R06.83 Baseline Diagnostic Sleep Study   5. RLS (restless legs syndrome)  G25.81 Baseline Diagnostic Sleep Study   6. Personal history of tobacco use  Z87.891 IA VISIT TO DISCUSS LUNG CA SCREEN W LDCT     CT Lung Screen (Annual)   7. Preop testing  Z01.818 Covid-19 Ambulatory   8. Healthcare maintenance  Z00.00           Plan:  -Based on assessment and exam patient demonstrates high pretest probability of having KYLE. We will precede with sleep study. I have discussed with Génesis:   [x]  Sleep hygiene/ relaxation methods & CBTi principles review with patient   [x]  Avoid supine/back sleep until sleep study   [x]  Driving precautions   [x]  Medical consequences of untreated KYLE   [x]  Weight loss recommendations   [x]  Diet recommendations   [x]  Exercise  - Continue Breztri and albuterol rescue inhaler, refill for Breztri sent to pharmacy   --PFT 1/2022  --CT lung screen 1/2022 - 4 mm non calcified nodule in right major fissure, former smoker  --Continue Requip for RLS  --While she has been vaccinated at this time for COVID-19 I strongly recommend that she continue Coronavirus precaution including: Wearing mask when in public and when in contact with persons who have not been immunized, social distancing when needing to be in public, handwashing practice, wiping items touched in public such as gas pumps, door handles, shopping carts, etc.  --Recommend yearly flu vaccination  --Recommend Covid 19 booster when available  --Recommend Pneumovax vaccination  --Have discussed signs and symptoms of copd exacerbation and why it is important to monitor for bronchial infections.   To call office should she develop signs of copd exacerbation/bronchial infection  --I will continue to follow in pulmonary clinic       I affirm this is a Patient initiated episode with an established patient who has not had a related appointment within my department in the past 7 days or scheduled

## 2021-10-07 NOTE — PROGRESS NOTES
10/7/21    Génesis Banks  1960    Chief Complaint   Patient presents with    1 Month Follow-Up       History of Present Illness:  Génseis Oquendo is a 62 y.o. pleasant lady presenting today with a chief complaint of depression, insomnia, HTN. She has a past medical history significant for:  HTN, on Olmesartan/HCTZ 40/25mg QD  HL (LDL 87, TG 83 on 7/7/2020), not on statin therapy  Prediabetes (HbA1C 5.7% on 3/9/2021), not on meds   HFpEF (G1DD, EF 55-60% on TTE 12/1/2020)  Epilepsy (s/p L anterior temporal lobectomy in 7383), on Eslicarbazepine 4981OQ QHS  Depression, on Nortriptyline 50mg QHS   Anxiety, on Abilify 5mg QD   COPD, on Albuterol PRN, Breztri  Chronic hypoxemic respiratory failure, on 3L O2   Osteoporosis (DEXA not in chart), on Fosamax 70mg weekly  Vertigo  Vitamin D deficiency (level 27 on 4/4/13), not on supplementation   RLS, on Ropinirole 0.5mg QHS   Urinary incontinence, on Oxybutynin   S/p hysterectomy (cervix status unknown)  S/p L carpal tunnel release (7/30/2020)   Polysubstance abuse (marijuana, PCP, cocaine)   Former smoker (quit 2020)     # Recently, patient has been having leg cramps that improve with walking. Stopping her from sleep. Mostly what's bothersome is restless legs.   No diarrhea or vomiting. No change in medications lately. Labs recently unremarkable  She is taking OTC RLS meds. Mag did not help. I started her on low dose Ropinirole and increased it and reports improvement.   # Patient had seizure in May 2020 and April 2020 ~ 6 weeks apart. In the ED on [facility]/2020 [facility] unremarkable. Basic labs with mild hyponatremia (Na 131). Had UTI s/p Rocephin once then PO Keflex on discharge. UDS was positive for marijuana and PCP. Patient had another seizure in June 2020.   Patient had stopped taking Vimpat as she did not know it was an AED. Na has improved. Patient was diagnosed with epilepsy at age 25.  Used to see Epilepsy specialist at [facility] in [facility] [address] does not drive due to h/o seizures. I referred her to Neurology Epilepsy. Seeing Dr. Toni Burkett  She is now taking Aptiom. Off Vimpat and off Lamictal. MRIb Feb 2021 grossly unremarkable other than post-surgical changes. Off Onfi.   # Patient was on Abilify, Topamax, Venlafaxine, Cymbalta, and many other meds that she self-discontinued. She reports she is only depressed but does not have bipolar. Patient's mood continues to be down. ? Has tardive dyskinesia but not taking Benztropine. Has h/o non-compliance with pills. She is taking Nortriptyline only. I referred to Psychiatry. She is interested in seeing [facility] at [facility]. Waiting to hear back. She is seeing Dr. Adam Alicea  Re-introduced Abilify at her request Sept 2021. # Patient having difficulty sleeping. Melatonin has not helped. # Meclizine has not helped with vertigo. Discontinued. # Quit smoking recently through Nicotine patch.    # Patient denies doing any other drugs other than weed and reports that her UDS positive because it might have been mixed with her marijuana as per patient. Jeanette Fanning Jeanette Fanning ? ? ? # BP today 130/88. Taking Benicar for HTN. Tries to watch salt intake.   # Seeing Urology for incontinence. She is on Oxybutynin. Will be getting cystoscopy next week. # She has prediabetes and dyslipidemia. Not on meds. # Patient is not on vitamin D supplements despite low vitamin D level years ago. Taking Fosamax for OP. DEXA not in chart.   # Patient is s/p L carpal tunnel release. Patient with hyponatremia at the time. Chronic due to AEDs.   # Has difficulty losing weight. She is gaining weight. Wanting to be placed on weight loss pills. She is seeing [facility]. She is on non-surgical diet program.   # Was complaining of worsening exertional dyspnea. TTE at [facility] in 2019 unremarkable. Stress test unremarkable. PFT shows obstruction wo BD response. She is seeing Dr. Romel Martinez Cardiology.   She has COPD. Following Pulm for BD.   6mwt showed requirement for O2.  She is on 3L O2.   CT lung screening with 4mm LN being followed.   Did not get sleep study done.   # UTD on COVID-19 vaccination. # UTD on PPSV-23. # Not UTD on breast or colon cancer screening.       Health maintenance:   Health Maintenance Due   Topic Date Due    Hepatitis C screen  Never done    Colon cancer screen colonoscopy  Never done    Shingles Vaccine (1 of 2) Never done    Flu vaccine (1) 09/01/2021         Review of Systems:  Constitutional: no fevers, no chills, no night sweats, no weight loss, no weight gain, no fatigue   Pain assessment: no pain  Head: no headaches  Ears: no hearing loss, no tinnitus, no vertigo  Eyes: no blurry vision, no diplopia, no dryness, no itchiness  Mouth: no oral ulcers, no dry mouth, no sore throat  Nose: no nasal congestion, no epistaxis  Cardiac: no chest pain, no palpitations, no leg swelling, no orthopnea, no PND, no syncope  Pulmonary: no dyspnea, no cough, no wheezing, no hemoptysis  GI: no nausea, no vomiting, no diarrhea, no constipation, no abdominal pain, no hematochezia  : no dysuria, no frequency, no urgency, no hematuria, no frothy urine, no dyspareunia, no pelvic pain, no vaginal bleeding, no abnormal vaginal discharge  MSK: no arthralgias, leg cramps, no early morning stiffness, no Raynaud's   Neuro: no focal neurological deficits, seizures  Sleep: no snoring, no daytime somnolence   Psych: depression, no suicidal ideation      Physical Exam:  VITALS:   /88 (Site: Right Upper Arm, Position: Sitting, Cuff Size: Medium Adult)   Pulse 76   Resp 16   Ht 5' 2\" (1.575 m)   Wt 190 lb (86.2 kg)   SpO2 98%   BMI 34.75 kg/m²     PHYSICAL EXAMINATION:  General: alert, awake, and oriented to time, place, person, and situation. Not in acute distress   Skin:  Asymmetrically hyperpigmented patch on back   Head: normocephalic/atraumatic  Eyes: anicteric sclera, well-injected conjunctiva. Pupils are equally round and reactive to light.  Extraocular movements are intact   Nose: no septal deviation evident  Sinuses: no sinus tenderness  Ears: external ears normal  Neck: supple, no cervical lymphadenopathy, thyroid symmetric and not enlarged, no bruits   Heart: regular rate and rhythm, regular S1/S2, no S3/S4, no audible murmurs, no audible friction rub  Lungs: decreased breath sounds bilaterally   Abdomen: normal bowel sounds, soft abdomen, non-tender, no palpable masses  Extremities: no edema, warm, no cyanosis, no clubbing. Good capillary refill   MSK: no tenderness across spinous processes, full ROM in all 4 extremities. No joint swelling or tenderness   Peripheral vascular: 2+ pulses symmetric (radial)  Neuro: gait normal, CN II-XII intact, motor power 5/5 in all 4 extremities, sensation intact and symmetric    Labs   I have personally reviewed labs, and discussed pertinent findings with patient on this date 10/7/2021     Imaging   I have personally reviewed imaging, and discussed pertinent findings with patient on this date 10/7/2021     Other notes  I have personally reviewed other notes, and discussed pertinent findings with patient on this date 10/7/2021       Assessment/Plan:     1. Primary insomnia  Start Hydroxyzine 25-50mg QHS PRN  Discussed side effects  Also could trial 12.5mg QHS PRN    2. Moderate episode of recurrent major depressive disorder (HCC)  Continue Abilify 5mg QD   Asked her to get records from community clinic she was seeing to see which anti-depressant helped her the most to go back on it     3. Intractable generalized idiopathic epilepsy without status epilepticus ([facility]  Following with Dr. Mikki Monatlvo 4994OH QHS    4. Essential hypertension  Stable  Continue Olmesartan/HCTZ 40/25mg QD     5. Skin lesion  - External Referral To Dermatology      Care discussed with patient and questions answered. Patient verbalizes understanding and agrees with plan. Discussed with patient the importance of continuity of care.  I encouraged patient to schedule next appointment within 2 months with me. Patient prefers to be reached by Phone call at [phone] for future medical correspondence. Encouraged to activate MyChart. I reviewed and reconciled the medications this visit. I reviewed and updated the past medical, surgical, social, and family history during this visit. After visit summary provided.        America Avila MD  Internal Medicine  10/7/2021   11:24 AM

## 2021-10-12 ENCOUNTER — ANESTHESIA EVENT (OUTPATIENT)
Dept: OPERATING ROOM | Age: 61
End: 2021-10-12
Payer: MEDICARE

## 2021-10-12 ASSESSMENT — ENCOUNTER SYMPTOMS: SHORTNESS OF BREATH: 1

## 2021-10-12 ASSESSMENT — LIFESTYLE VARIABLES: SMOKING_STATUS: 1

## 2021-10-12 ASSESSMENT — COPD QUESTIONNAIRES: CAT_SEVERITY: MILD

## 2021-10-12 NOTE — ANESTHESIA PRE PROCEDURE
Department of Anesthesiology  Preprocedure Note       Name:  Omer See   Age:  62 y.o.  :  1960                                          MRN:  [id number]         Date:  10/12/2021      Surgeon: Steven Su):  Steve Moser MD    Procedure: Procedure(s):  LEFT WRIST TENDON RELEASE    Medications prior to admission:   Prior to Admission medications    Medication Sig Start Date End Date Taking?  Authorizing Provider   hydrOXYzine (ATARAX) 25 MG tablet Take 1-2 tablets by mouth nightly 10/7/21 12/6/21  Melly Taveras MD   ARIPiprazole (ABILIFY) 5 MG tablet Take 1 tablet by mouth daily 10/7/21   Melly Taveras MD   BREZTRI AEROSPHERE 160-9-4.8 MCG/ACT AERO Inhale 2 puffs into the lungs 2 times daily 10/7/21 11/6/21  Jodi Green, APRN - CNP   nortriptyline (PAMELOR) 25 MG capsule Take 2 capsules by mouth nightly 21   Melly Taveras MD   oxybutynin (DITROPAN-XL) 10 MG extended release tablet Take 1 tablet by mouth daily 21   Melly Taveras MD   eslicarbazepine acetate (APTIOM) 800 MG tablet Take 1,600 mg by mouth nightly 21   Melly Taveras MD   olmesartan-hydroCHLOROthiazide (BENICAR HCT) 40-25 MG per tablet TAKE ONE TABLET BY MOUTH EVERY DAY 21   Melly Taveras MD   rOPINIRole (REQUIP) 0.5 MG tablet Take 1 tablet by mouth every evening 21   Melly Taveras MD   alendronate (FOSAMAX) 70 MG tablet Take 1 tablet by mouth once a week 21   Melly Taveras MD   omeprazole (PRILOSEC) 40 MG delayed release capsule Take 1 capsule by mouth every morning (before breakfast) 21   Melly Taveras MD   magnesium oxide (MAG-OX) 400 MG tablet TAKE 1 TABLET BY MOUTH DAILY 9/3/21   Melly Taveras MD   albuterol sulfate  (90 Base) MCG/ACT inhaler Inhale 2 puffs into the lungs every 6 hours as needed for Wheezing 5/10/21 9/9/22  Jodi Green, APRN - CNP   Spacer/Aero-Holding [place] KADY 1 Device by Does not apply route daily as needed (use with albuterol rescue inhaler) 2/8/21   Tammyagncelestino Christopher Green, APRN - CNP       Current medications:    Current Outpatient Medications   Medication Sig Dispense Refill    hydrOXYzine (ATARAX) 25 MG tablet Take 1-2 tablets by mouth nightly 60 tablet 1    ARIPiprazole (ABILIFY) 5 MG tablet Take 1 tablet by mouth daily 30 tablet 0    BREZTRI AEROSPHERE 160-9-4.8 MCG/ACT AERO Inhale 2 puffs into the lungs 2 times daily 1 each 5    nortriptyline (PAMELOR) 25 MG capsule Take 2 capsules by mouth nightly 60 capsule 3    oxybutynin (DITROPAN-XL) 10 MG extended release tablet Take 1 tablet by mouth daily 30 tablet 3    eslicarbazepine acetate (APTIOM) 800 MG tablet Take 1,600 mg by mouth nightly 60 tablet 3    olmesartan-hydroCHLOROthiazide (BENICAR HCT) 40-25 MG per tablet TAKE ONE TABLET BY MOUTH EVERY DAY 30 tablet 3    rOPINIRole (REQUIP) 0.5 MG tablet Take 1 tablet by mouth every evening 30 tablet 3    alendronate (FOSAMAX) 70 MG tablet Take 1 tablet by mouth once a week 12 tablet 1    omeprazole (PRILOSEC) 40 MG delayed release capsule Take 1 capsule by mouth every morning (before breakfast) 30 capsule 3    magnesium oxide (MAG-OX) 400 MG tablet TAKE 1 TABLET BY MOUTH DAILY 30 tablet 3    albuterol sulfate  (90 Base) MCG/ACT inhaler Inhale 2 puffs into the lungs every 6 hours as needed for Wheezing 18 g 5    Spacer/Aero-Holding Chambers KADY 1 Device by Does not apply route daily as needed (use with albuterol rescue inhaler) 1 Device 0     No current facility-administered medications for this visit. Allergies:     Allergies   Allergen Reactions    Varenicline Other (See Comments)     Having bad dreams and chest pain( Chantix)    Chantix [Varenicline Tartrate] Palpitations     Chest pain       Problem List:    Patient Active Problem List   Diagnosis Code    [facility] G40.319    Prediabetes R73.03    Essential hypertension I10    [facility] J43.1    Personal history of tobacco use Z87.891    Vitamin D deficiency E55.9    Moderate episode of recurrent major depressive disorder ([facility]) F33.1    Dyslipidemia E78.5    Vertigo R42    De Quervain's tenosynovitis, left M65.4    Age-related osteoporosis without current pathological fracture M81.0    RLS (restless legs syndrome) G25.81    Shortness of breath R06.02    Obesity (BMI 30-39. 9) E66.9    Intermittent claudication ([facility]) I73.9    Nicotine abuse Z72.0    Class 1 obesity due to excess calories with body mass index (BMI) of 33.0 to 33.9 in adult E66.09, Z68.33    Chronic hyponatremia E87.1    Diplopia H53.2    Seizure ([facility]) R56.9    Vestibular migraine G43.809    Lung nodule R91.1    Personal history of nicotine dependence Z87.891    Hereditary and idiopathic neuropathy G60.9    Chronic hypoxemic respiratory failure ([facility]) J96.11    Hypoxia R09.02    Fatigue R53.83    Hypersomnia G47.10    Snoring R06.83    Overactive bladder N32.81    Centrilobular emphysema ([facility]) J43.2       Past Medical History:        Diagnosis Date    COPD (chronic obstructive pulmonary disease) ([facility])     Epile[facility]     Full dentures     upper plate only    H/O dizziness     \"take Meclazine at least twice a day for this\"    H/O Doppler JERMAINE's ultrasound 12/01/2020     No evidence of significant occlusive arterial disease of lower EXT.    H/O echocardiogram 12/01/2020    EF 55-60%, Normal study.  [facility] of drug abuse ([facility]     History of nuclear stress test 11/30/2020    Normal study.     History of UTI     \"last one May 2020\"    Hypertension     \"the top # use to be over 200 \"- on medication since 2018\"    Seizures ([facility]     hx of epilepsy- last seizure week 7/13/2020- hx grand mal seizures    Wears glasses        Past Surgical History:        Procedure Laterality Date    BRAIN SURGERY      s/p L anterior temporal lobectomy in 1996\"did surgery to try an treat the seizures\"   [address]    \"not sure if they took the ovaries\"   [address]    WRIST SURGERY Left 2020    LEFT WRIST TENDON RELEASE performed by Neva Snyder DO at [address] History:    Social History     Tobacco Use    Smoking status: Former Smoker     Packs/day: 1.50     Years: 40.00     Pack years: 60.00     Types: Cigarettes     Quit date: 2020     Years since quittin.1    Smokeless tobacco: Never Used   Substance Use Topics    Alcohol use: No     Comment: per pt on 2020\"quit drinking age 40- use to drink on weekends\"                                Counseling given: Not Answered      Vital Signs (Current): There were no vitals filed for this visit.                                            BP Readings from Last 3 Encounters:   10/07/21 130/88   21 138/86   21 (!) 150/110       NPO Status:                                                                                 BMI:   Wt Readings from Last 3 Encounters:   10/07/21 190 lb (86.2 kg)   21 193 lb (87.5 kg)   21 190 lb (86.2 kg)     There is no height or weight on file to calculate BMI.    CBC:   Lab Results   Component Value Date    WBC 6.5 2020    RBC 4.05 2020    HGB 12.4 2020    HCT 36.3 2020    MCV 89.6 2020    RDW 12.2 2020     2020       CMP:   Lab Results   Component Value Date     2021    K 4.7 2021    CL 97 2021    CO2 34 2021    BUN 28 2021    CREATININE 1.1 2021    GFRAA >60 2021    AGRATIO 1.6 2020    LABGLOM 51 2021    GLUCOSE 106 2020    PROT 7.3 2021    CALCIUM 10.0 2021    BILITOT 0.2 2021    ALKPHOS 107 2021    AST 14 2021    ALT 12 2021         COVID-19 Screening (If Applicable):   Lab Results   Component Value Date    COVID19 NOT DETECTED 10/06/2021         Anesthesia Evaluation  Patient summary reviewed and Nursing notes reviewed  Airway: Mallampati: II  TM distance: <3 FB     Mouth opening: > = 3 FB Dental:    (+) upper dentures      Pulmonary:   (+) pneumonia: no interval change and resolved,  COPD: mild and moderate,  shortness of breath:  current smoker (1.5 ppd cigarettes and daily marijuana)          Patient did not smoke on day of surgery. ROS comment:   PFT's 2/2020  Pre  FEV1,  1.81L  Post FEV1, 1.55L,  65% of predicted                  Lung diffusion capacity 2/2020  There is no significant bronchodilator response. The TLC is normal. The RV is elevated and RV/TLC is borderline/elevated  indicating air trapping. The diffusing capacity is mildly reduced (79%). Smoker cigarettes and marijuana  Counseled on smoking cessation. PAT Airway. Limited exam. COVID 19 precautions. Patient wearing mask  Head/Neck movement: full  History of difficult intubation:  None  Teeth: Upper denture  Able to lie flat     LUNGS:  Diminished matty LL, no crackles or wheezing      COVID 19 screening  Denies recent fever or known COVID 19 exposure. Instructed to quarantine until surgery and report any new respiratory or fever symptoms to surgeon. Aware COVID testing must be completed before DOS. COVID swab:  7/23/2020       Cardiovascular:  Exercise tolerance: good (>4 METS),   (+) hypertension: mild,       ECG reviewed      Echocardiogram reviewed         Beta Blocker:  Not on Beta Blocker      ROS comment:  Summary   Left ventricular systolic function is normal with an ejection fraction of   55-60%. Grade I diastolic dysfunction. No significant valvular disease. Normal pulmonary artery pressure with a RVSP of 30mmHg   No evidence of pericardial effusion.       Signature      ------------------------------------------------------------------   Electronically signed by Salazar Sorto MD (Interpreting   physician) on 12/01/2020 at 02:23 Maggie Johnson physician Dr. Gibson Magaña .   No infarct or ischemia noted.   Normal tracer uptake in all segments of myocardium on stress and rest   images. Mild breast tissue attenuation noted   No infarct or ischemia noted.   Normal EF 75 % with normal ventricular contractility.      Recommendation   Medical Management   Follow up      Signatures      ------------------------------------------------------------------   Electronically signed by Isaak Llamas MD (Interpreting   cardiologist) on 11/30/2020 at 15:55         Neuro/Psych:   (+) seizures (Epilepsy, onset 1980's. type: GM,  last:  5/2020. Medications: aptiom 1200 mg q daily, lamictal 225 mg bid. Reports smokes marijuana daily for seizure control  ): poorly controlled and no interval change, neuromuscular disease (Hx radiculopathy, Lumbosacral Region. Left wrist DeQuervains dz, wearing brace):, psychiatric history ( Hx cocaine use, UDS + marijuana, 7/23/2020 . Reports quit drinking age 40. ):depression/anxiety              ROS comment: Seizures temporal lobe seizures- left anterior temporal lobectomy underwent in 1996      Dizziness on antivert     \"small seizure 2 weeks ago\" GI/Hepatic/Renal:   (+) renal disease (hx UTI, last 5/2020):,           Endo/Other:    (+) electrolyte abnormalities (hx hyponatremia prob 2/2 seizure meds. Baseline 120-133MMOL/L,   PAT NA+ 124, recheck ordered DOS), . Pt had PAT visit. ROS comment: S/p hysterectomy Abdominal:             Vascular: Other Findings:             Anesthesia Plan      MAC     ASA 3     (Chart review only 10/12/21  covid - 10/6/21  Please attempt mac )  Induction: intravenous.                       STEWART Jessica - CRNA   10/12/2021

## 2021-10-13 ASSESSMENT — LIFESTYLE VARIABLES: SMOKING_STATUS: 1

## 2021-10-13 ASSESSMENT — ENCOUNTER SYMPTOMS: SHORTNESS OF BREATH: 1

## 2021-10-13 ASSESSMENT — COPD QUESTIONNAIRES: CAT_SEVERITY: MILD

## 2021-10-13 NOTE — ANESTHESIA PRE PROCEDURE
Department of Anesthesiology  Preprocedure Note       Name:  Yoko Shipman   Age:  62 y.o.  :  1960                                          MRN:  [id number]         Date:  10/13/2021      Surgeon: Rosanne Waddell):  Coleman Suresh MD    Procedure: Procedure(s):  LEFT WRIST TENDON RELEASE    Medications prior to admission:   Prior to Admission medications    Medication Sig Start Date End Date Taking?  Authorizing Provider   hydrOXYzine (ATARAX) 25 MG tablet Take 1-2 tablets by mouth nightly 10/7/21 12/6/21  Ernestine De MD   ARIPiprazole (ABILIFY) 5 MG tablet Take 1 tablet by mouth daily 10/7/21   Ernestine De MD   BREZTRI AEROSPHERE 160-9-4.8 MCG/ACT AERO Inhale 2 puffs into the lungs 2 times daily 10/7/21 11/6/21  STEWART Amezcua CNP   nortriptyline (PAMELOR) 25 MG capsule Take 2 capsules by mouth nightly 21   Ernestine De MD   oxybutynin (DITROPAN-XL) 10 MG extended release tablet Take 1 tablet by mouth daily 21   Ernestine De MD   eslicarbazepine acetate (APTIOM) 800 MG tablet Take 1,600 mg by mouth nightly 21   Ernestine De MD   olmesartan-hydroCHLOROthiazide (BENICAR HCT) 40-25 MG per tablet TAKE ONE TABLET BY MOUTH EVERY DAY 21   Ernestine De MD   rOPINIRole (REQUIP) 0.5 MG tablet Take 1 tablet by mouth every evening 21   Ernestine De MD   alendronate (FOSAMAX) 70 MG tablet Take 1 tablet by mouth once a week 21   Ernestine De MD   omeprazole (PRILOSEC) 40 MG delayed release capsule Take 1 capsule by mouth every morning (before breakfast) 21   Ernestine De MD   magnesium oxide (MAG-OX) 400 MG tablet TAKE 1 TABLET BY MOUTH DAILY 9/3/21   Ernestine De MD   albuterol sulfate  (90 Base) MCG/ACT inhaler Inhale 2 puffs into the lungs every 6 hours as needed for Wheezing 5/10/21 9/9/22  STEWART Amezcua CNP   Spacer/Aero-Holding [place] 1 Device by Does not apply route daily as needed (use with albuterol rescue inhaler) 2/8/21   Maylin Green, APRN - CNP       Current medications:    Current Outpatient Medications   Medication Sig Dispense Refill    hydrOXYzine (ATARAX) 25 MG tablet Take 1-2 tablets by mouth nightly 60 tablet 1    ARIPiprazole (ABILIFY) 5 MG tablet Take 1 tablet by mouth daily 30 tablet 0    BREZTRI AEROSPHERE 160-9-4.8 MCG/ACT AERO Inhale 2 puffs into the lungs 2 times daily 1 each 5    nortriptyline (PAMELOR) 25 MG capsule Take 2 capsules by mouth nightly 60 capsule 3    oxybutynin (DITROPAN-XL) 10 MG extended release tablet Take 1 tablet by mouth daily 30 tablet 3    eslicarbazepine acetate (APTIOM) 800 MG tablet Take 1,600 mg by mouth nightly 60 tablet 3    olmesartan-hydroCHLOROthiazide (BENICAR HCT) 40-25 MG per tablet TAKE ONE TABLET BY MOUTH EVERY DAY 30 tablet 3    rOPINIRole (REQUIP) 0.5 MG tablet Take 1 tablet by mouth every evening 30 tablet 3    alendronate (FOSAMAX) 70 MG tablet Take 1 tablet by mouth once a week 12 tablet 1    omeprazole (PRILOSEC) 40 MG delayed release capsule Take 1 capsule by mouth every morning (before breakfast) 30 capsule 3    magnesium oxide (MAG-OX) 400 MG tablet TAKE 1 TABLET BY MOUTH DAILY 30 tablet 3    albuterol sulfate  (90 Base) MCG/ACT inhaler Inhale 2 puffs into the lungs every 6 hours as needed for Wheezing 18 g 5    Spacer/Aero-Holding Chambers KADY 1 Device by Does not apply route daily as needed (use with albuterol rescue inhaler) 1 Device 0     No current facility-administered medications for this visit. Allergies:     Allergies   Allergen Reactions    Varenicline Other (See Comments)     Having bad dreams and chest pain( Chantix)    Chantix [Varenicline Tartrate] Palpitations     Chest pain       Problem List:    Patient Active Problem List   Diagnosis Code    Intractable generalized idiopathic epilepsy without status epilepticus ([facility] G40.319    Prediabetes R73.03    Essential hypertension I10    [facility] emph[facility]sema [facility] J43.1    Personal history of tobacco use Z87.891    Vitamin D deficiency E55.9    Moderate episode of recurrent major depressive disorder ([facility]) F33.1    Dyslipidemia E78.5    Vertigo R42    De Quervain's tenosynovitis, left M65.4    Age-related osteoporosis without current pathological fracture M81.0    RLS (restless legs syndrome) G25.81    Shortness of breath R06.02    Obesity (BMI 30-39. 9) E66.9    Intermittent claudication ([facility]) I73.9    Nicotine abuse Z72.0    Class 1 obesity due to excess calories with body mass index (BMI) of 33.0 to 33.9 in adult E66.09, Z68.33    Chronic hyponatremia E87.1    Diplopia H53.2    Seizure ([facility]) R56.9    Vestibular migraine G43.809    Lung nodule R91.1    Personal history of nicotine dependence Z87.891    Hereditary and idiopathic neuropathy G60.9    Chronic hypoxemic respiratory failure ([facility]) J96.11    Hypoxia R09.02    Fatigue R53.83    Hypersomnia G47.10    Snoring R06.83    Overactive bladder N32.81    Centrilobular emphysema ([facility]) J43.2       Past Medical History:        Diagnosis Date    COPD (chronic obstructive pulmonary disease) ([facility])     Epilepsy [facility]     Full dentures     upper plate only    H/O dizziness     \"take Meclazine at least twice a day for this\"    H/O Doppler JERMAINE's ultrasound 12/01/2020     No evidence of significant occlusive arterial disease of lower EXT.    H/O echocardiogram 12/01/2020    EF 55-60%, Normal study.  History of drug abuse ([facility]     History of nuclear stress test 11/30/2020    Normal study.     History of UTI     \"last one May 2020\"    Hypertension     \"the top # use to be over 200 \"- on medication since 2018\"    Seizures ([facility]     hx of epilepsy- last seizure week 7/13/2020- hx grand mal seizures    Wears glasses        Past Surgical History:        Procedure Laterality Date    BRAIN SURGERY      s/p L anterior temporal lobectomy in 1996\"did surgery to try an treat the Nursing notes reviewed  Airway: Mallampati: II  TM distance: <3 FB     Mouth opening: > = 3 FB Dental:    (+) upper dentures      Pulmonary:   (+) pneumonia: no interval change and resolved,  COPD: mild and moderate,  shortness of breath:  current smoker (1.5 ppd cigarettes and daily marijuana)          Patient did not smoke on day of surgery. ROS comment:  Now on 3l  02 continuously     PFT's 2/2020  Pre  FEV1,  1.81L  Post FEV1, 1.55L,  65% of predicted                  Lung diffusion capacity 2/2020  There is no significant bronchodilator response. The TLC is normal. The RV is elevated and RV/TLC is borderline/elevated  indicating air trapping. The diffusing capacity is mildly reduced (79%). Smoker cigarettes and marijuana  Counseled on smoking cessation. PAT Airway. Limited exam. COVID 19 precautions. Patient wearing mask  Head/Neck movement: full  History of difficult intubation:  None  Teeth: Upper denture  Able to lie flat     LUNGS:  Diminished matty LL, no crackles or wheezing      COVID 19 screening  Denies recent fever or known COVID 19 exposure. Instructed to quarantine until surgery and report any new respiratory or fever symptoms to surgeon. Aware COVID testing must be completed before DOS. COVID swab:  7/23/2020       Cardiovascular:  Exercise tolerance: good (>4 METS),   (+) hypertension: mild,       ECG reviewed      Echocardiogram reviewed         Beta Blocker:  Not on Beta Blocker      ROS comment:  Summary   Left ventricular systolic function is normal with an ejection fraction of   55-60%. Grade I diastolic dysfunction. No significant valvular disease. Normal pulmonary artery pressure with a RVSP of 30mmHg   No evidence of pericardial effusion.       Signature      ------------------------------------------------------------------   Electronically signed by Sarika Albert MD (Interpreting   physician) on 12/01/2020 at 02:23 Dottie Wang Dr. Lee Manjarrez infarct or ischemia noted.   Normal tracer uptake in all segments of myocardium on stress and rest   images. Mild breast tissue attenuation noted   No infarct or ischemia noted.   Normal EF 75 % with normal ventricular contractility.      Recommendation   Medical Management   Follow up      Signatures      ------------------------------------------------------------------   Electronically signed by Salazar Sorto MD (Interpreting   cardiologist) on 11/30/2020 at 15:55         Neuro/Psych:   (+) seizures (Epilepsy, onset 1980's. type: GM,  last:  5/2020. Medications: aptiom 1200 mg q daily, lamictal 225 mg bid. Reports smokes marijuana daily for seizure control  ): poorly controlled and no interval change, neuromuscular disease (Hx radiculopathy, Lumbosacral Region. Left wrist DeQuervains dz, wearing brace):, psychiatric history ( Hx cocaine use, UDS + marijuana, 7/23/2020 . Reports quit drinking age 40. ):depression/anxiety              ROS comment: Seizures temporal lobe seizures- left anterior temporal lobectomy underwent in 1996      Dizziness on antivert     \"small seizure 2 weeks ago\" GI/Hepatic/Renal:   (+) renal disease (hx UTI, last 5/2020):,           Endo/Other:    (+) electrolyte abnormalities (hx hyponatremia prob 2/2 seizure meds. Baseline 120-133MMOL/L,   PAT NA+ 124, recheck ordered DOS), . Pt had PAT visit. ROS comment: S/p hysterectomy Abdominal:             Vascular: Other Findings:               Anesthesia Plan      MAC     ASA 3     (Chart review only 10/12/21  covid - 10/6/21  Needs urine drug screen )  Induction: intravenous.                           STEWART Ken - CRNA   10/13/2021

## 2021-10-13 NOTE — PROGRESS NOTES
Surgery Date:    10/14/21                                Arrive at: [facility] [address]  Surgery time: 7775     If your arrival time is before 7 AM come in the emergency room entrance. If after 7 AM come in the main entrance. One visitor may accompany you to the hospital.  Both of you must wear a mask and be free of covid symptoms. 1. Do not eat or drink anything after midnight - unless instructed by your doctor prior to surgery. This includes                  no water, chewing gum or mints. 2. Follow your directions as prescribed by the doctor for your procedure and medications. Take the following medications with a small sip of water the morning of: inhalers,  Prilosec              3. Check with your Doctor regarding stopping Plavix, Coumadin, Lovenox, Effient, Pradaxa, Xarelto, Fragmin or other blood thinners and                  follow their instructions. 4. Do not smoke and do not drink any alcoholic beverages 24 hours prior to surgery. 5. You may brush your teeth and gargle the morning of surgery. DO NOT SWALLOW WATER  6. Please wear simple, loose fitting clothing to the hospital.  Diane Garcia not bring valuables (money, credit cards, checkbooks, etc.) Do not wear any                  makeup (including no eye makeup) or nail polish on your fingers or toes. 7.  DO NOT wear any jewelry or piercings on day of surgery. All body piercing jewelry must be removed. 8.  Take a shower the night before or morning of your procedure, do not apply any lotion, oil or powder. 9. If you have dentures, they will be removed before going to the OR; we will provide you a container. If you wear contact lenses or glasses,                 they will be removed; please bring a case for them. 10. If you  have a Living Will and Durable Power of  for Healthcare, please bring in a copy.            11. Please bring picture ID,  insurance card, paperwork from the doctors office    (H & P, Consent, & card for implantable devices). 12. You MUST make arrangements for a responsible adult to take you home after your surgery and be able to check on you every couple                  hours for the day. You will not be allowed to leave alone or drive yourself home. It is strongly suggested someone stay with you the first 24                  hrs. Your surgery will be cancelled if you do not have a ride home. 13. Wear a mask covering your nose & mouth when entering the hospital. Have your covid-19 test performed within 10 days of your                 surgery. Quarantine yourself after the test until after your surgery. Please call [phone] with any questions.

## 2021-10-14 ENCOUNTER — ANESTHESIA (OUTPATIENT)
Dept: OPERATING ROOM | Age: 61
End: 2021-10-14
Payer: MEDICARE

## 2021-10-14 ENCOUNTER — HOSPITAL ENCOUNTER (OUTPATIENT)
Age: 61
Setting detail: OUTPATIENT SURGERY
Discharge: HOME | End: 2021-10-14
Attending: SPECIALIST | Admitting: SPECIALIST
Payer: MEDICARE

## 2021-10-14 VITALS
BODY MASS INDEX: 35.87 KG/M2 | HEIGHT: 61 IN | OXYGEN SATURATION: 100 % | DIASTOLIC BLOOD PRESSURE: 75 MMHG | RESPIRATION RATE: 16 BRPM | TEMPERATURE: 97.8 F | HEART RATE: 68 BPM | SYSTOLIC BLOOD PRESSURE: 137 MMHG | WEIGHT: 190 LBS

## 2021-10-14 VITALS
SYSTOLIC BLOOD PRESSURE: 100 MMHG | DIASTOLIC BLOOD PRESSURE: 54 MMHG | OXYGEN SATURATION: 100 % | RESPIRATION RATE: 12 BRPM

## 2021-10-14 DIAGNOSIS — N39.46 MIXED INCONTINENCE: ICD-10-CM

## 2021-10-14 LAB
AMPHETAMINES: NEGATIVE
BARBITURATE SCREEN URINE: NEGATIVE
BENZODIAZEPINE SCREEN, URINE: NEGATIVE
CANNABINOID SCREEN URINE: ABNORMAL
COCAINE METABOLITE: NEGATIVE
OPIATES, URINE: NEGATIVE
OXYCODONE: NEGATIVE
PHENCYCLIDINE, URINE: NEGATIVE

## 2021-10-14 PROCEDURE — 6360000002 HC RX W HCPCS: Performed by: SPECIALIST

## 2021-10-14 PROCEDURE — 88305 TISSUE EXAM BY PATHOLOGIST: CPT | Performed by: PATHOLOGY

## 2021-10-14 PROCEDURE — 88108 CYTOPATH CONCENTRATE TECH: CPT | Performed by: PATHOLOGY

## 2021-10-14 PROCEDURE — 2709999900 HC NON-CHARGEABLE SUPPLY: Performed by: SPECIALIST

## 2021-10-14 PROCEDURE — 80307 DRUG TEST PRSMV CHEM ANLYZR: CPT

## 2021-10-14 PROCEDURE — 88305 TISSUE EXAM BY PATHOLOGIST: CPT

## 2021-10-14 PROCEDURE — 3700000000 HC ANESTHESIA ATTENDED CARE: Performed by: SPECIALIST

## 2021-10-14 PROCEDURE — 7100000010 HC PHASE II RECOVERY - FIRST 15 MIN: Performed by: SPECIALIST

## 2021-10-14 PROCEDURE — 2580000003 HC RX 258: Performed by: SPECIALIST

## 2021-10-14 PROCEDURE — 7100000011 HC PHASE II RECOVERY - ADDTL 15 MIN: Performed by: SPECIALIST

## 2021-10-14 PROCEDURE — 3600000002 HC SURGERY LEVEL 2 BASE: Performed by: SPECIALIST

## 2021-10-14 PROCEDURE — 3600000012 HC SURGERY LEVEL 2 ADDTL 15MIN: Performed by: SPECIALIST

## 2021-10-14 PROCEDURE — 3700000001 HC ADD 15 MINUTES (ANESTHESIA): Performed by: SPECIALIST

## 2021-10-14 PROCEDURE — 6360000002 HC RX W HCPCS: Performed by: NURSE ANESTHETIST, CERTIFIED REGISTERED

## 2021-10-14 RX ORDER — PHENAZOPYRIDINE HYDROCHLORIDE 100 MG/1
100 TABLET, FILM COATED ORAL 3 TIMES DAILY PRN
Qty: 20 TABLET | Refills: 0 | Status: SHIPPED | OUTPATIENT
Start: 2021-10-14 | End: 2021-10-21

## 2021-10-14 RX ORDER — SODIUM CHLORIDE 0.9 % (FLUSH) 0.9 %
5-40 SYRINGE (ML) INJECTION PRN
Status: DISCONTINUED | OUTPATIENT
Start: 2021-10-14 | End: 2021-10-14 | Stop reason: HOSPADM

## 2021-10-14 RX ORDER — SODIUM CHLORIDE, SODIUM LACTATE, POTASSIUM CHLORIDE, CALCIUM CHLORIDE 600; 310; 30; 20 MG/100ML; MG/100ML; MG/100ML; MG/100ML
1000 INJECTION, SOLUTION INTRAVENOUS CONTINUOUS
Status: DISCONTINUED | OUTPATIENT
Start: 2021-10-14 | End: 2021-10-14 | Stop reason: HOSPADM

## 2021-10-14 RX ORDER — CEFAZOLIN SODIUM 2 G/100ML
2000 INJECTION, SOLUTION INTRAVENOUS ONCE
Status: DISCONTINUED | OUTPATIENT
Start: 2021-10-14 | End: 2021-10-14 | Stop reason: RX

## 2021-10-14 RX ORDER — PROPOFOL 10 MG/ML
INJECTION, EMULSION INTRAVENOUS PRN
Status: DISCONTINUED | OUTPATIENT
Start: 2021-10-14 | End: 2021-10-14 | Stop reason: SDUPTHER

## 2021-10-14 RX ORDER — LIDOCAINE HYDROCHLORIDE 20 MG/ML
INJECTION, SOLUTION INTRAVENOUS PRN
Status: DISCONTINUED | OUTPATIENT
Start: 2021-10-14 | End: 2021-10-14 | Stop reason: SDUPTHER

## 2021-10-14 RX ORDER — FENTANYL CITRATE 50 UG/ML
INJECTION, SOLUTION INTRAMUSCULAR; INTRAVENOUS PRN
Status: DISCONTINUED | OUTPATIENT
Start: 2021-10-14 | End: 2021-10-14 | Stop reason: SDUPTHER

## 2021-10-14 RX ADMIN — PROPOFOL 50 MG: 10 INJECTION, EMULSION INTRAVENOUS at 11:15

## 2021-10-14 RX ADMIN — PROPOFOL 50 MG: 10 INJECTION, EMULSION INTRAVENOUS at 11:11

## 2021-10-14 RX ADMIN — FENTANYL CITRATE 25 MCG: 50 INJECTION, SOLUTION INTRAMUSCULAR; INTRAVENOUS at 11:20

## 2021-10-14 RX ADMIN — PROPOFOL 30 MG: 10 INJECTION, EMULSION INTRAVENOUS at 11:19

## 2021-10-14 RX ADMIN — PROPOFOL 50 MG: 10 INJECTION, EMULSION INTRAVENOUS at 11:00

## 2021-10-14 RX ADMIN — FENTANYL CITRATE 25 MCG: 50 INJECTION, SOLUTION INTRAMUSCULAR; INTRAVENOUS at 11:10

## 2021-10-14 RX ADMIN — PROPOFOL 50 MG: 10 INJECTION, EMULSION INTRAVENOUS at 11:13

## 2021-10-14 RX ADMIN — PROPOFOL 50 MG: 10 INJECTION, EMULSION INTRAVENOUS at 11:03

## 2021-10-14 RX ADMIN — PROPOFOL 50 MG: 10 INJECTION, EMULSION INTRAVENOUS at 11:01

## 2021-10-14 RX ADMIN — FENTANYL CITRATE 50 MCG: 50 INJECTION, SOLUTION INTRAMUSCULAR; INTRAVENOUS at 10:58

## 2021-10-14 RX ADMIN — SODIUM CHLORIDE, PRESERVATIVE FREE 10 ML: 5 INJECTION INTRAVENOUS at 09:27

## 2021-10-14 RX ADMIN — PROPOFOL 50 MG: 10 INJECTION, EMULSION INTRAVENOUS at 11:17

## 2021-10-14 RX ADMIN — CEFAZOLIN 50 ML: 1 INJECTION, POWDER, FOR SOLUTION INTRAMUSCULAR; INTRAVENOUS at 11:04

## 2021-10-14 RX ADMIN — PROPOFOL 50 MG: 10 INJECTION, EMULSION INTRAVENOUS at 11:05

## 2021-10-14 RX ADMIN — PROPOFOL 50 MG: 10 INJECTION, EMULSION INTRAVENOUS at 11:08

## 2021-10-14 RX ADMIN — SODIUM CHLORIDE, POTASSIUM CHLORIDE, SODIUM LACTATE AND CALCIUM CHLORIDE 1000 ML: 600; 310; 30; 20 INJECTION, SOLUTION INTRAVENOUS at 09:27

## 2021-10-14 RX ADMIN — LIDOCAINE HYDROCHLORIDE 100 MG: 20 INJECTION, SOLUTION INTRAVENOUS at 11:00

## 2021-10-14 ASSESSMENT — PULMONARY FUNCTION TESTS
PIF_VALUE: 0

## 2021-10-14 ASSESSMENT — PAIN SCALES - GENERAL: PAINLEVEL_OUTOF10: 0

## 2021-10-14 ASSESSMENT — PAIN - FUNCTIONAL ASSESSMENT: PAIN_FUNCTIONAL_ASSESSMENT: 0-10

## 2021-10-14 NOTE — PROGRESS NOTES
Pt. Awake, alert, and oriented x 4. Denies pain or nausea. Has been up to bathroom to urinate. Vs stable. Drinking pepsi and tolerating it well. Pt. Ready for d/c home.

## 2021-10-14 NOTE — BRIEF OP NOTE
Brief Postoperative Note      Patient: Bishop Mejía  YOB: 1960  MRN: [id number]    Date of Procedure: 10/14/2021    Pre-Op Diagnosis: Mixed incontinence [N39.46]    Post-Op Diagnosis: Same       Procedure(s):  CYSTOSCOPY BLADDER BIOPSY    Surgeon(s):  Diane Cuellar MD    Assistant:  * No surgical staff found *    Anesthesia: General    Estimated Blood Loss (mL): Minimal    Complications: None    Specimens:   ID Type Source Tests Collected by Time Destination   A : BLADDER URINE  Urine Urine, Cystoscopic CYTOLOGY, NON-GYN Diane Cuellar MD 10/14/2021 1119    B : BLADDER BIOPSY  Tissue Tissue SURGICAL PATHOLOGY Diane Cuellar MD 10/14/2021 1120        Implants:  * No implants in log *      Drains: * No LDAs found *    Findings: moderate to severe cystocele    Electronically signed by Diane Cuellar MD on 10/14/2021 at 11:29 AM

## 2021-10-14 NOTE — PROGRESS NOTES
Pre procedure completed. The patient denies any questions or needs at this time. Will continue to monitor her closely.

## 2021-10-14 NOTE — ANESTHESIA POSTPROCEDURE EVALUATION
Department of Anesthesiology  Postprocedure Note    Patient: Lori Cummings  MRN: [id number]  YOB: 1960  Date of evaluation: 10/14/2021  Time:  11:28 AM     Procedure Summary     Date: 10/14/21 Room / Location: [address] [place] [address] 01 / [facility] [employer] [facility]    Anesthesia Start: 1053 Anesthesia Stop: 1128    Procedure: CYSTOSCOPY BLADDER BIOPSY (N/A Bladder) Diagnosis:       Mixed incontinence      (Mixed incontinence [N39.46])    Surgeons: Xavier Cobian MD Responsible Provider: STEWART Howard CRNA    Anesthesia Type: MAC ASA Status: 3          Anesthesia Type: MAC    Vianey Phase I: Vianey Score: 10    Vianey Phase II:      Last vitals: Reviewed and per EMR flowsheets.        Anesthesia Post Evaluation    Patient location during evaluation: bedside  Patient participation: complete - patient participated  Level of consciousness: awake and alert  Pain score: 0  Airway patency: patent  Nausea & Vomiting: no vomiting and no nausea  Complications: no  Cardiovascular status: blood pressure returned to baseline and hemodynamically stable  Respiratory status: acceptable, spontaneous ventilation, nonlabored ventilation and nasal cannula  Hydration status: stable

## 2021-10-14 NOTE — DISCHARGE SUMMARY
[address]                               DISCHARGE SUMMARY    PATIENT NAME: Rosy Ohara                   :        1960  MED REC NO:   [id number]                          ROOM:  ACCOUNT NO:   [de-identified]                           ADMIT DATE: 10/14/2021  PROVIDER:     Jennifer Armstrong MD                  DISCHARGE DATE:  10/14/2021    DATE OF PROCEDURE:  10/14/2021    PREOPERATIVE DIAGNOSIS:  Bladder mass. POSTOPERATIVE DIAGNOSIS:  Bladder mass. PROCEDURE:  Cystoscopy with bladder biopsy and fulguration. SURGEON:  Jennifer Armstrong MD    ANESTHESIA:  Monitored anesthesia care. ESTIMATED BLOOD LOSS:  None. COMPLICATIONS:  None. SPECIMENS:  1.  Bladder biopsy. 2.  Bladder urine for cytology. BRIEF HISTORY:  This is a 62-year-old female with mixed incontinence,  strong urge component, being evaluated for treatment. Cystoscopy in the  office demonstrated erythema throughout the posterior wall, so she is  here now for biopsy of these lesions. DESCRIPTION OF PROCEDURE:  The patient was identified in the holding,  taken to the procedure room, and placed in a dorsal lithotomy position. The patient's genital region was prepped and draped in a sterile  fashion. A 21-Georgian cystoscope sheath was introduced per urethra under  direct visualization. The patient with a moderate-to-severe central  cystocele. The erythema seen in the office under cystoscopy was much  improved. There were some areas in the posterior wall in midline, which  was biopsied with flexible biopsy forceps and cauterized with Bugbee  electrocautery. At the beginning of the case, I did sample urine from  the cystoscope and taken to pathology for cytology. I did use a 30- and  70-degree optical lens throughout the bladder and no other tumors or  lesions were seen.   So at this point, the bladder was emptied, taken to  the recovery room having tolerated the procedure well. DISCHARGE SUMMARY:  The patient was discharged from Same-day Surgery in  stable condition. We will see her back in the office in several weeks  to go over the results and also talk about the fact that a lot of her  lower urinary tract symptoms likely related to this cystocele.           Jose Roberto Fofana MD    D: 10/14/2021 12:12:34       T: 10/14/2021 12:14:54     [email or web address]  Job#: [id number]     Doc#: [id number]    CC:

## 2021-10-18 NOTE — OP NOTE
[facility]                  [address] [facility], [address] [facility]                                OPERATIVE REPORT    PATIENT NAME: Jmaes Reeves                   :        1960  MED REC NO:   [id number]                          ROOM:  ACCOUNT NO:   [de-identified]                           ADMIT DATE: 10/14/2021  PROVIDER:     Nayan Fisher MD                  DISCHARGE DATE:  10/14/2021    CORRECTED WORK TYPE:  10/18/21 EDB      DATE OF PROCEDURE:  10/14/2021    PREOPERATIVE DIAGNOSIS:  Bladder mass. POSTOPERATIVE DIAGNOSIS:  Bladder mass. PROCEDURE:  Cystoscopy with bladder biopsy and fulguration. SURGEON:  Nayan Fisher MD    ANESTHESIA:  Monitored anesthesia care. ESTIMATED BLOOD LOSS:  None. COMPLICATIONS:  None. SPECIMENS:  1.  Bladder biopsy. 2.  Bladder urine for cytology. BRIEF HISTORY:  This is a 58-year-old female with mixed incontinence,  strong urge component, being evaluated for treatment. Cystoscopy in the  office demonstrated erythema throughout the posterior wall, so she is  here now for biopsy of these lesions. DESCRIPTION OF PROCEDURE:  The patient was identified in the holding,  taken to the procedure room, and placed in a dorsal lithotomy position. The patient's genital region was prepped and draped in a sterile  fashion. A 21-Greek cystoscope sheath was introduced per urethra under  direct visualization. The patient with a moderate-to-severe central  cystocele. The erythema seen in the office under cystoscopy was much  improved. There were some areas in the posterior wall in midline, which  was biopsied with flexible biopsy forceps and cauterized with Bugbee  electrocautery. At the beginning of the case, I did sample urine from  the cystoscope and taken to pathology for cytology. I did use a 30- and  70-degree optical lens throughout the bladder and no other tumors or  lesions were seen.   So at this point, the bladder was emptied, taken to  the recovery room having tolerated the procedure well. DISCHARGE SUMMARY:  The patient was discharged from Same-day Surgery in  stable condition. We will see her back in the office in several weeks  to go over the results and also talk about the fact that a lot of her  lower urinary tract symptoms likely related to this cystocele.     Jim Woodard MD    D: 10/14/2021 12:12:34       T: 10/14/2021 12:14:54     [email or web address]  Job#: [id number]     Doc#: [id number]    CC:

## 2021-10-21 ENCOUNTER — TELEPHONE (OUTPATIENT)
Dept: PULMONOLOGY | Age: 61
End: 2021-10-21

## 2021-10-29 NOTE — H&P
[facility]                  [address] [facility], [address] [facility]                              HISTORY AND PHYSICAL    PATIENT NAME: Miguelangel Carmona                   :        1960  MED REC NO:   [id number]                          ROOM:  ACCOUNT NO:   [de-identified]                           ADMIT DATE: 10/14/2021  PROVIDER:     Susan Dumont MD    CHIEF COMPLAINT:  Bladder mass. BRIEF HISTORY:  This is a 76-year-old female who recently underwent a  workup for hematuria. Cystoscopy in the office demonstrates some  erythema on the bladder dome. She is here now for cystoscopy and  bladder biopsy of the cystoscopic findings. PAST MEDICAL HISTORY:  Hypertension, epilepsy, chronic obstructive  pulmonary disease. PAST SURGICAL HISTORY:  Cystoscopy. ALLERGIES:  VARENICLINE. CURRENT MEDICATIONS:  Ciprofloxacin and Macrobid. FAMILY MEDICAL HISTORY:  Noncontributory. SOCIAL HISTORY:  No current history of alcohol, tobacco or illicit drug  use. REVIEW OF SYSTEMS:  No pertinent positive findings aside of presenting  complaints. PHYSICAL EXAMINATION:  VITAL SIGNS:  She is afebrile, stable vital signs. GENERAL:  She is alert and oriented x3 and appropriate. LUNGS:  Clear. HEART:  Regular rate and rhythm. ABDOMEN:  Soft. EXTREMITIES:  No clubbing, cyanosis or edema. IMPRESSION:  Bladder mass. RECOMMENDATIONS:  At this point, we will make plans for cystoscopy,  bladder biopsy and fulguration.         Vu Salgado MD    D: 10/29/2021 15:35:16       T: 10/29/2021 17:32:46     [email or web address]  Job#: [id number]     Doc#: [id number]    CC:

## 2021-11-09 ENCOUNTER — TELEPHONE (OUTPATIENT)
Dept: PULMONOLOGY | Age: 61
End: 2021-11-09

## 2021-11-10 ENCOUNTER — TELEPHONE (OUTPATIENT)
Dept: PULMONOLOGY | Age: 61
End: 2021-11-10

## 2021-11-10 NOTE — TELEPHONE ENCOUNTER
Mayda Calderón from [employer] office called to see if you would consider putting in for a split night sleep study due to pre existing health conditions. She states that she works through [facility] helping get patient's testing approved and she said the request expires tomorrow. She is helping him with the medical review process. Direct number [phone].   Please advise

## 2021-11-11 ENCOUNTER — TELEPHONE (OUTPATIENT)
Dept: PULMONOLOGY | Age: 61
End: 2021-11-11

## 2021-11-11 NOTE — TELEPHONE ENCOUNTER
[facility] called regarding pts sleep study needing a peer to peer. They are requesting you call Dr. Uzair Abarca at [phone]   reference # [id number]  As soon as you can. Please advise.

## 2021-11-15 ENCOUNTER — OFFICE VISIT (OUTPATIENT)
Dept: PSYCHOLOGY | Age: 61
End: 2021-11-15
Payer: MEDICARE

## 2021-11-15 DIAGNOSIS — F33.1 MODERATE EPISODE OF RECURRENT MAJOR DEPRESSIVE DISORDER (HCC): Primary | ICD-10-CM

## 2021-11-15 PROCEDURE — 1036F TOBACCO NON-USER: CPT | Performed by: PSYCHOLOGIST

## 2021-11-15 PROCEDURE — 90832 PSYTX W PT 30 MINUTES: CPT | Performed by: PSYCHOLOGIST

## 2021-11-15 NOTE — PROGRESS NOTES
Behavioral Health Consultation  Warren Swenson Psy.D. Psychologist      Time spent with Patient: 30 minutes  Visit number: 9  Reason for Consult:  depression and anxiety  Referring Provider: Richie Medrano MD  [address]  [facility] [address] [facility],  [address] [place] [address]    S:  ----------------------------------------------------------------------------------------------------------------------  depression and anxiety  \"My brother just passed away. \" She visited home to spend time w family. Grief has been \"chris hard. \" Relationship w boyfriend has improved some. Continues to experience emotional lability and \"crying all the time. \" Explained will watch a sad movie and \"just can't stop crying. \"     Endorsed depressed mood, anhedonia, anergia, amotivation, poor sleep, fluctuating appetite, diminished concentration, and corrosive self-image. O:  ----------------------------------------------------------------------------------------------------------------------  MSE:  Orientation:  oriented to person, place, time, and general circumstances  Appearance:  alert, cooperative  Speech:  spontaneous, normal rate and normal volume  Mood: depressed and anxious   Thought Content:  intact, hopelessness, helplessness and worthlessness  Thought Process:  linear, goal directed and coherent  Interest/Pleasure: Loss of Pleasure/Fun  Concentration: Normal  Sleep disturbance: Yes  Memory:  recent and remote memory intact  Energy: Tired/Fatigued  Morbid ideation No  Suicide Assessment: no suicidal ideation    A:  ----------------------------------------------------------------------------------------------------------------------  Diagnosis:    1.  Moderate episode of recurrent major depressive disorder [facility])         PHQ Scores 9/9/2021 3/2/2021 11/24/2020 11/3/2020 10/19/2020 9/1/2020 8/11/2020   PHQ2 Score 6 1 0 0 0 0 1   PHQ9 Score 21 1 0 0 0 0 1     Interpretation of Total Score Depression Severity: 1-4 = Minimal depression, 5-9 = Mild depression, 10-14 = Moderate depression, 15-19 = Moderately severe depression, 20-27 = Severe depression    P:  ----------------------------------------------------------------------------------------------------------------------    Called neurologist to discuss presentation today. Suspect d/t Clobazam. Left message w clinical staff and return number given. Ensured pt was not driving. General:   [x] Cement-setting to identify pt's primary goals for [facility] visit / overall health   [x] Provided psychoeducation/handout on:   1. Moderate episode of recurrent major depressive disorder (HCC)        [x]  Supportive interventions    Cognitive:   [x] Trained in strategies for increasing balanced thinking   [x] Cognitive strategies to target current mental health sx    [x] Identified and challenged maladaptive thoughts    Behavioral:   [x] Discussed and set plan for behavioral activation   [x] Discussed and problem-solved barriers in adhering to behavioral change plan   [x] Motivational Interviewing to increase patient confidence and compliance with       adhering to behavioral change plan   [x] Discussed potential barriers to change   [x] Discussed self-care (sleep, nutrition, rewarding activities, social support, exercise)    Other:   []   []   []   []    Recommendations to patient:     1. Return to Dr. Laly Gamble in 2-3 week(s)    2. This note will not be viewable in [employer] for the following reason(s). This is a Psychotherapy Note.             Feedback provided to pt's PCP via EPIC and/or oral report

## 2021-12-03 ENCOUNTER — TELEPHONE (OUTPATIENT)
Dept: INTERNAL MEDICINE CLINIC | Age: 61
End: 2021-12-03

## 2021-12-03 NOTE — TELEPHONE ENCOUNTER
Due to epilepsy, she is limited with what meds she can try for sleep. Can add Melatonin to the Hydroxyzine and take every night consistently same time to regulate circadian rhythm.

## 2021-12-07 ENCOUNTER — HOSPITAL ENCOUNTER (OUTPATIENT)
Dept: SLEEP CENTER | Age: 61
Discharge: HOME | End: 2021-12-07
Payer: MEDICARE

## 2021-12-07 VITALS — BODY MASS INDEX: 35.87 KG/M2 | HEIGHT: 61 IN | WEIGHT: 190 LBS

## 2021-12-07 DIAGNOSIS — R53.83 FATIGUE, UNSPECIFIED TYPE: ICD-10-CM

## 2021-12-07 DIAGNOSIS — R06.83 SNORING: ICD-10-CM

## 2021-12-07 DIAGNOSIS — G25.81 RLS (RESTLESS LEGS SYNDROME): ICD-10-CM

## 2021-12-07 DIAGNOSIS — G47.10 HYPERSOMNIA: ICD-10-CM

## 2021-12-07 PROCEDURE — 95810 POLYSOM 6/> YRS 4/> PARAM: CPT

## 2021-12-08 ENCOUNTER — TELEPHONE (OUTPATIENT)
Dept: PULMONOLOGY | Age: 61
End: 2021-12-08

## 2021-12-08 LAB — STATUS: NORMAL

## 2021-12-08 PROCEDURE — 95810 POLYSOM 6/> YRS 4/> PARAM: CPT | Performed by: INTERNAL MEDICINE

## 2021-12-08 ASSESSMENT — SLEEP AND FATIGUE QUESTIONNAIRES
HOW LIKELY ARE YOU TO NOD OFF OR FALL ASLEEP WHILE SITTING QUIETLY AFTER LUNCH WITHOUT ALCOHOL: 2
HOW LIKELY ARE YOU TO NOD OFF OR FALL ASLEEP WHILE SITTING AND TALKING TO SOMEONE: 0
NECK CIRCUMFERENCE (INCHES): 15
HOW LIKELY ARE YOU TO NOD OFF OR FALL ASLEEP WHILE WATCHING TV: 2
HOW LIKELY ARE YOU TO NOD OFF OR FALL ASLEEP WHILE SITTING INACTIVE IN A PUBLIC PLACE: 2
HOW LIKELY ARE YOU TO NOD OFF OR FALL ASLEEP WHILE SITTING AND READING: 1
ESS TOTAL SCORE: 12
HOW LIKELY ARE YOU TO NOD OFF OR FALL ASLEEP WHEN YOU ARE A PASSENGER IN A CAR FOR AN HOUR WITHOUT A BREAK: 2
HOW LIKELY ARE YOU TO NOD OFF OR FALL ASLEEP WHILE LYING DOWN TO REST IN THE AFTERNOON WHEN CIRCUMSTANCES PERMIT: 3
HOW LIKELY ARE YOU TO NOD OFF OR FALL ASLEEP IN A CAR, WHILE STOPPED FOR A FEW MINUTES IN TRAFFIC: 0

## 2021-12-08 NOTE — PROGRESS NOTES
12/8/2021  sleep study  for Génesis Campbell  1960 is complete. Results are pending physician review.     Electronically signed by Ayleen Mckeon on 12/8/2021 at 12:21 AM

## 2021-12-08 NOTE — TELEPHONE ENCOUNTER
Spoke with Génesis, sleep study results noted AHI 0.0, no sleep apnea. Questions why she keeps waking up during night.  Has follow up appointment with me on December 13 in [facility]

## 2021-12-27 DIAGNOSIS — F51.01 PRIMARY INSOMNIA: ICD-10-CM

## 2021-12-27 DIAGNOSIS — R12 HEARTBURN: ICD-10-CM

## 2021-12-27 DIAGNOSIS — F33.1 MODERATE EPISODE OF RECURRENT MAJOR DEPRESSIVE DISORDER (HCC): ICD-10-CM

## 2021-12-27 RX ORDER — OMEPRAZOLE 40 MG/1
CAPSULE, DELAYED RELEASE ORAL
Qty: 30 CAPSULE | Refills: 1 | Status: SHIPPED | OUTPATIENT
Start: 2021-12-27 | End: 2022-02-24

## 2021-12-27 RX ORDER — MAGNESIUM OXIDE 400 MG/1
TABLET ORAL
Qty: 30 TABLET | Refills: 1 | Status: SHIPPED | OUTPATIENT
Start: 2021-12-27 | End: 2022-03-21

## 2021-12-27 RX ORDER — HYDROXYZINE HYDROCHLORIDE 25 MG/1
25-50 TABLET, FILM COATED ORAL NIGHTLY
Qty: 60 TABLET | Refills: 1 | Status: SHIPPED | OUTPATIENT
Start: 2021-12-27 | End: 2022-01-06 | Stop reason: SDUPTHER

## 2021-12-27 RX ORDER — ARIPIPRAZOLE 5 MG/1
TABLET ORAL
Qty: 30 TABLET | Refills: 0 | Status: SHIPPED | OUTPATIENT
Start: 2021-12-27 | End: 2022-01-06 | Stop reason: SDUPTHER

## 2021-12-27 NOTE — TELEPHONE ENCOUNTER
Medication:   Requested Prescriptions     Pending Prescriptions Disp Refills    ARIPiprazole (ABILIFY) 5 MG tablet [Pharmacy Med Name: ARIPIPRAZOLE 5 MG TABS 5 Tablet] 30 tablet 0     Sig: TAKE ONE TABLET BY MOUTH EVERY DAY    omeprazole (PRILOSEC) 40 MG delayed release capsule [Pharmacy Med Name: OMEPRAZOLE 40 MG CPDR 40 Capsule] 30 capsule 1     Sig: TAKE ONE CAPSULE BY MOUTH EVERY DAY BEFORE BREAKFAST    magnesium oxide (MAG-OX) 400 MG tablet [Pharmacy Med Name: MAGNESIUM OXIDE 400 MG TABS 400 Tablet] 30 tablet 1     Sig: TAKE ONE TABLET BY MOUTH EVERY DAY    hydrOXYzine (ATARAX) 25 MG tablet [Pharmacy Med Name: HYDROXYZINE HCL 25 MG TABS 25 Tablet] 60 tablet 1     Sig: TAKE 1-2 TABLETS BY MOUTH NIGHTLY        Last Filled:      Patient Phone Number: [phone] (home)     Last appt: 10/7/2021   Next appt: 1/6/2022    Last OARRS: No flowsheet data found.

## 2022-01-06 ENCOUNTER — OFFICE VISIT (OUTPATIENT)
Dept: INTERNAL MEDICINE CLINIC | Age: 62
End: 2022-01-06
Payer: MEDICARE

## 2022-01-06 VITALS
OXYGEN SATURATION: 100 % | HEIGHT: 61 IN | WEIGHT: 191 LBS | DIASTOLIC BLOOD PRESSURE: 82 MMHG | SYSTOLIC BLOOD PRESSURE: 138 MMHG | HEART RATE: 52 BPM | RESPIRATION RATE: 18 BRPM | BODY MASS INDEX: 36.06 KG/M2

## 2022-01-06 DIAGNOSIS — K59.00 CONSTIPATION, UNSPECIFIED CONSTIPATION TYPE: ICD-10-CM

## 2022-01-06 DIAGNOSIS — G25.81 RESTLESS LEGS: ICD-10-CM

## 2022-01-06 DIAGNOSIS — I10 ESSENTIAL HYPERTENSION: ICD-10-CM

## 2022-01-06 DIAGNOSIS — M81.0 AGE-RELATED OSTEOPOROSIS WITHOUT CURRENT PATHOLOGICAL FRACTURE: ICD-10-CM

## 2022-01-06 DIAGNOSIS — R73.03 PREDIABETES: ICD-10-CM

## 2022-01-06 DIAGNOSIS — E78.5 DYSLIPIDEMIA: ICD-10-CM

## 2022-01-06 DIAGNOSIS — E07.9 THYROID DISORDER: ICD-10-CM

## 2022-01-06 DIAGNOSIS — N32.81 OVERACTIVE BLADDER: ICD-10-CM

## 2022-01-06 DIAGNOSIS — G40.319 INTRACTABLE GENERALIZED IDIOPATHIC EPILEPSY WITHOUT STATUS EPILEPTICUS (HCC): Primary | ICD-10-CM

## 2022-01-06 DIAGNOSIS — F51.01 PRIMARY INSOMNIA: ICD-10-CM

## 2022-01-06 DIAGNOSIS — F33.1 MODERATE EPISODE OF RECURRENT MAJOR DEPRESSIVE DISORDER (HCC): ICD-10-CM

## 2022-01-06 PROCEDURE — 3017F COLORECTAL CA SCREEN DOC REV: CPT | Performed by: INTERNAL MEDICINE

## 2022-01-06 PROCEDURE — 99214 OFFICE O/P EST MOD 30 MIN: CPT | Performed by: INTERNAL MEDICINE

## 2022-01-06 PROCEDURE — 1036F TOBACCO NON-USER: CPT | Performed by: INTERNAL MEDICINE

## 2022-01-06 PROCEDURE — G8484 FLU IMMUNIZE NO ADMIN: HCPCS | Performed by: INTERNAL MEDICINE

## 2022-01-06 PROCEDURE — G8417 CALC BMI ABV UP PARAM F/U: HCPCS | Performed by: INTERNAL MEDICINE

## 2022-01-06 PROCEDURE — G8427 DOCREV CUR MEDS BY ELIG CLIN: HCPCS | Performed by: INTERNAL MEDICINE

## 2022-01-06 RX ORDER — ROPINIROLE 0.5 MG/1
0.5 TABLET, FILM COATED ORAL EVERY EVENING
Qty: 30 TABLET | Refills: 5 | Status: SHIPPED | OUTPATIENT
Start: 2022-01-06 | End: 2022-06-10 | Stop reason: SDUPTHER

## 2022-01-06 RX ORDER — OXYBUTYNIN CHLORIDE 10 MG/1
10 TABLET, EXTENDED RELEASE ORAL DAILY
Qty: 30 TABLET | Refills: 5 | Status: SHIPPED | OUTPATIENT
Start: 2022-01-06 | End: 2022-09-19

## 2022-01-06 RX ORDER — BUDESONIDE, GLYCOPYRROLATE, AND FORMOTEROL FUMARATE 160; 9; 4.8 UG/1; UG/1; UG/1
AEROSOL, METERED RESPIRATORY (INHALATION)
COMMUNITY
Start: 2021-12-27 | End: 2022-04-25 | Stop reason: ALTCHOICE

## 2022-01-06 RX ORDER — ARIPIPRAZOLE 5 MG/1
TABLET ORAL
Qty: 30 TABLET | Refills: 5 | Status: SHIPPED | OUTPATIENT
Start: 2022-01-06 | End: 2022-06-13

## 2022-01-06 RX ORDER — HYDROXYZINE HYDROCHLORIDE 25 MG/1
25-50 TABLET, FILM COATED ORAL NIGHTLY
Qty: 60 TABLET | Refills: 5 | Status: SHIPPED | OUTPATIENT
Start: 2022-01-06 | End: 2022-03-07

## 2022-01-06 RX ORDER — OLMESARTAN MEDOXOMIL AND HYDROCHLOROTHIAZIDE 40/25 40; 25 MG/1; MG/1
TABLET ORAL
Qty: 30 TABLET | Refills: 5 | Status: SHIPPED | OUTPATIENT
Start: 2022-01-06 | End: 2022-06-10 | Stop reason: SDUPTHER

## 2022-01-06 RX ORDER — ALENDRONATE SODIUM 70 MG/1
70 TABLET ORAL WEEKLY
Qty: 12 TABLET | Refills: 1 | Status: SHIPPED | OUTPATIENT
Start: 2022-01-06 | End: 2022-06-10 | Stop reason: SDUPTHER

## 2022-01-06 RX ORDER — NORTRIPTYLINE HYDROCHLORIDE 25 MG/1
50 CAPSULE ORAL NIGHTLY
Qty: 60 CAPSULE | Refills: 5 | Status: SHIPPED | OUTPATIENT
Start: 2022-01-06 | End: 2022-06-10 | Stop reason: SDUPTHER

## 2022-01-06 ASSESSMENT — PATIENT HEALTH QUESTIONNAIRE - PHQ9
SUM OF ALL RESPONSES TO PHQ QUESTIONS 1-9: 1
2. FEELING DOWN, DEPRESSED OR HOPELESS: 1
SUM OF ALL RESPONSES TO PHQ9 QUESTIONS 1 & 2: 1
SUM OF ALL RESPONSES TO PHQ QUESTIONS 1-9: 1
1. LITTLE INTEREST OR PLEASURE IN DOING THINGS: 0

## 2022-01-06 NOTE — PATIENT INSTRUCTIONS
TCN [place], [place]: [phone]       Miralax: 1 tablespoon in 16oz water once daily or every other day as needed for constipation

## 2022-01-06 NOTE — PROGRESS NOTES
1/6/22    Génesis L Jocelyn  1960    Chief Complaint   Patient presents with    Other     2 mos       History of Present Illness:  Karo Darling is a 62 y.o. pleasant lady presenting today with a chief complaint of epilepsy, HTN, HL, prediabetes. She has a past medical history significant for:  HTN, on Olmesartan/HCTZ 40/25mg QD  HL (LDL 87, TG 83 on 7/7/2020), not on statin   Prediabetes (HbA1C 5.7% on 3/9/2021), not on meds   HFpEF (G1DD, EF 55-60% on TTE 12/1/2020)  Epilepsy (s/p L anterior temporal lobectomy in 1678), on Eslicarbazepine [id number] QHS  Depression, on Nortriptyline 50mg QHS   Anxiety, on Abilify 5mg QD   Insomnia, on Hydroxyzine 25-50mg QHS PRN   COPD, on Albuterol PRN, Breztri  Chronic hypoxemic respiratory failure, on 3L O2   Osteoporosis (DEXA not in chart), on Fosamax 70mg weekly  Vertigo  Vitamin D deficiency (level 31 on 3/9/2021), not on supplementation   RLS, on Ropinirole 0.5mg QHS   Urinary incontinence, on Oxybutynin-XL 10mg QD  Obesity (BMI 36)   S/p hysterectomy (cervix status unknown)  S/p L carpal tunnel release (7/30/2020)   Polysubstance abuse (marijuana, PCP, cocaine)   Former smoker (quit 2020)     # Recently, patient has been having leg cramps that improve with walking. Stopping her from sleep. Mostly what's bothersome is restless legs.   No diarrhea or vomiting. No change in medications lately. Labs recently unremarkable  She is taking OTC RLS meds. Mag did not help. I started her on low dose Ropinirole and increased it and reports improvement.   # Patient had seizure in May 2020 and April 2020 ~ 6 weeks apart. In the ED on 5/29/2020 [facility] unremarkable. Basic labs with mild hyponatremia (Na 131). Had UTI s/p Rocephin once then PO Keflex on discharge. UDS was positive for marijuana and PCP. Patient had another seizure in June 2020.   Patient had stopped taking Vimpat as she did not know it was an AED. Na has improved.  Patient was diagnosed with epilepsy at age 22. Used to see Epilepsy specialist at [facility] in [facility] [address] does not drive due to h/o seizures. I referred her to Neurology Epilepsy. Seeing Dr. Elvia White  She is now taking Aptiom. Off Vimpat and off Lamictal. MRIb Feb 2021 grossly unremarkable other than post-surgical changes. Off Onfi.   # Patient was on Abilify, Topamax, Venlafaxine, Cymbalta, and many other meds that she self-discontinued. She reports she is only depressed but does not have bipolar. Patient's mood continues to be down. ? Has tardive dyskinesia but not taking Benztropine. Has h/o non-compliance with pills. She is taking Nortriptyline only. I referred to Psychiatry. She is interested in seeing someone at [facility]. Waiting to hear back. She is seeing Dr. Ene Cowart  Re-introduced Abilify at her request Sept 2021. # Patient having difficulty sleeping. Taking Hydroxyzine and Melatonin. Sleep study negative. # Meclizine has not helped with vertigo. Discontinued. # Quit smoking recently through Nicotine patch.    # Patient denies doing any other drugs other than weed and reports that her UDS positive because it might have been mixed with her marijuana as per patient. [place] [employer] ? ? ? # BP today 138/82. Taking Benicar for HTN. Tries to watch salt intake.   # Seeing Urology for incontinence. She is on Oxybutynin. # She has prediabetes and dyslipidemia. Not on meds. # Patient is not on vitamin D supplements despite low vitamin D level years ago. Taking Fosamax for OP. DEXA not in chart.   # Patient is s/p L carpal tunnel release.   Patient with hyponatremia at the time. Chronic due to AEDs.   # Has difficulty losing weight. She is gaining weight. Wanting to be placed on weight loss pills. She is seeing [facility]. She is on non-surgical diet program.   # Was complaining of worsening exertional dyspnea. TTE at [facility] in 2019 unremarkable. Stress test unremarkable. PFT shows obstruction wo BD response. She is seeing Dr. Debra Dutta Cardiology.   She has COPD.  Following Pulm for BD.   6mwt showed requirement for O2. She is on 3L O2.   CT lung screening with 4mm LN being followed.   Sleep study with AHI 0.0   # UTD on COVID-19 vaccination ([employer]). Did not get booster yet. # UTD on PPSV-23.   # Not UTD on breast or colon cancer screening.       Health maintenance:   Health Maintenance Due   Topic Date Due    Hepatitis C screen  Never done    Colon cancer screen colonoscopy  Never done    Shingles Vaccine (1 of 2) Never done    Flu vaccine (1) 09/01/2021    COVID-19 Vaccine (3 - Booster for [employer] series) 10/15/2021         Review of Systems:  Constitutional: no fevers, no chills, no night sweats, no weight loss, no weight gain, no fatigue   Head: no headaches  Ears: no hearing loss, no tinnitus, no vertigo  Eyes: no blurry vision, no diplopia, no dryness, no itchiness  Mouth: no oral ulcers, no dry mouth, no sore throat  Nose: no nasal congestion, no epistaxis  Cardiac: no chest pain, no palpitations, no leg swelling, no orthopnea, no PND, no syncope  Pulmonary: no dyspnea, no cough, no wheezing, no hemoptysis  GI: no nausea, no vomiting, no diarrhea, no constipation, no abdominal pain, no hematochezia  : no dysuria, no frequency, no urgency, no hematuria, no frothy urine, no dyspareunia, no pelvic pain, no vaginal bleeding, no abnormal vaginal discharge  MSK: no arthralgias, leg cramps, no early morning stiffness, no Raynaud's   Neuro: no focal neurological deficits, seizures  Sleep: no snoring, no daytime somnolence   Psych: depression, no suicidal ideation      Physical Exam:  VITALS:   /82 (Site: Right Upper Arm)   Pulse 52   Resp 18   Ht 5' 1\" (1.549 m)   Wt 191 lb (86.6 kg)   SpO2 100%   BMI 36.09 kg/m²     PHYSICAL EXAMINATION:  General: alert, awake, and oriented to time, place, person, and situation. Not in acute distress   Skin:  no suspicious rashes, no jaundice  Head: normocephalic/atraumatic  Eyes: anicteric sclera, well-injected conjunctiva. Pupils are equally round and reactive to light. Extraocular movements are intact   Nose: no septal deviation evident  Sinuses: no sinus tenderness  Ears: external ears normal  Neck: supple, no cervical lymphadenopathy, thyroid symmetric and not enlarged, no bruits   Heart: regular rate and rhythm, regular S1/S2, no S3/S4, no audible murmurs, no audible friction rub  Lungs: decreased breath sounds bilaterally   Abdomen: normal bowel sounds, soft abdomen, non-tender, no palpable masses  Extremities: no edema, warm, no cyanosis, no clubbing. Good capillary refill   MSK: no tenderness across spinous processes, full ROM in all 4 extremities. No joint swelling or tenderness   Peripheral vascular: 2+ pulses symmetric (radial)  Neuro: gait normal, CN II-XII intact, motor power 5/5 in all 4 extremities, sensation intact and symmetric    Labs   I have personally reviewed labs, and discussed pertinent findings with patient on this date 1/6/2022     Imaging   I have personally reviewed imaging, and discussed pertinent findings with patient on this date 1/6/2022     Other notes  I have personally reviewed other notes, and discussed pertinent findings with patient on this date 1/6/2022       Assessment/Plan:     1. Intractable generalized idiopathic epilepsy without status epilepticus ([facility] [address]  Follows with Neurology for Eslicarbazepine [id number] QHS    2. Essential hypertension  Stable  Continue Olmesartan/HCTZ 40/25mg QD   - CBC Auto Differential; Future  - COMPREHENSIVE METABOLIC PANEL; Future    3. Dyslipidemia  LDL 87, TG 83 in July 2020  Needs updated labs  Not on statin  - Lipid, Fasting; Future    4. Prediabetes  A1C 5.7% in March 2021  Not on meds  Needs updated labs  - HEMOGLOBIN A1C; Future    5. Restless legs  Stable  Continue Ropinirole 0.5mg QHS     6. Age-related osteoporosis without current pathological fracture  DEXA not on chart  Continue Fosamax 70mg weekly     7.  Moderate episode of recurrent major depressive disorder ([facility] [address]  Gave her info for TCN  Continue for now Nortriptyline 50mg QHS and Abilify 5mg QD     8. Primary insomnia  Continue Hydroxyzine 25-50mg QHS PRN   Continue Melatonin OTC     9. Overactive bladder  Continue Oxybutynin-XL 10mg QD  Follows with Dr. Roverto Brandt     10. Thyroid disorder  - TSH with Reflex; Future    11. Constipation, unspecified constipation type  Recommend Miralax PRN   Fiber       Care discussed with patient and questions answered. Patient verbalizes understanding and agrees with plan. Discussed with patient the importance of continuity of care. I encouraged patient to schedule next appointment within 5 months with me. Patient prefers to be reached by Phone call at [phone] for future medical correspondence. Encouraged to activate vivitt. I reviewed and reconciled the medications this visit. I reviewed and updated the past medical, surgical, social, and family history during this visit. After visit summary provided.        Tammy Thompson MD  Internal Medicine  1/6/2022   10:11 AM

## 2022-01-24 ENCOUNTER — OFFICE VISIT (OUTPATIENT)
Dept: NEUROLOGY | Age: 62
End: 2022-01-24
Payer: MEDICARE

## 2022-01-24 VITALS
HEART RATE: 68 BPM | WEIGHT: 190 LBS | BODY MASS INDEX: 35.87 KG/M2 | DIASTOLIC BLOOD PRESSURE: 82 MMHG | HEIGHT: 61 IN | SYSTOLIC BLOOD PRESSURE: 128 MMHG | OXYGEN SATURATION: 99 %

## 2022-01-24 DIAGNOSIS — G40.319 INTRACTABLE GENERALIZED IDIOPATHIC EPILEPSY WITHOUT STATUS EPILEPTICUS (HCC): ICD-10-CM

## 2022-01-24 DIAGNOSIS — G40.109 PARTIAL SYMPTOMATIC EPILEPSY WITH SIMPLE PARTIAL SEIZURES, NOT INTRACTABLE, WITHOUT STATUS EPILEPTICUS (HCC): Primary | ICD-10-CM

## 2022-01-24 DIAGNOSIS — Z01.811 PREOP PULMONARY/RESPIRATORY EXAM: Primary | ICD-10-CM

## 2022-01-24 PROCEDURE — G8417 CALC BMI ABV UP PARAM F/U: HCPCS | Performed by: PSYCHIATRY & NEUROLOGY

## 2022-01-24 PROCEDURE — 1036F TOBACCO NON-USER: CPT | Performed by: PSYCHIATRY & NEUROLOGY

## 2022-01-24 PROCEDURE — G8427 DOCREV CUR MEDS BY ELIG CLIN: HCPCS | Performed by: PSYCHIATRY & NEUROLOGY

## 2022-01-24 PROCEDURE — 3017F COLORECTAL CA SCREEN DOC REV: CPT | Performed by: PSYCHIATRY & NEUROLOGY

## 2022-01-24 PROCEDURE — G8484 FLU IMMUNIZE NO ADMIN: HCPCS | Performed by: PSYCHIATRY & NEUROLOGY

## 2022-01-24 PROCEDURE — 99214 OFFICE O/P EST MOD 30 MIN: CPT | Performed by: PSYCHIATRY & NEUROLOGY

## 2022-01-24 RX ORDER — LEVETIRACETAM 500 MG/1
500 TABLET ORAL 2 TIMES DAILY
Qty: 60 TABLET | Refills: 5 | Status: SHIPPED | OUTPATIENT
Start: 2022-01-24 | End: 2022-06-15

## 2022-01-24 NOTE — PROGRESS NOTES
1/24/22    Génesis Banks  1960    Chief Complaint   Patient presents with    Follow-up     Epilepsy seems to be stable. Has a feeling like shes going to go into a seizure but it goes away before one. Feels better off that one medication she was taken off last visit. History of Present Illness  Génesis is a 62 y.o. female presenting today for follow-up of: Seizures    She states she feels better off of the lamotrigine. She feels less tired. After our last visit we discontinued the lamotrigine and increase the Aptiom to 1600 mg daily. She feels that this adjustment was good. She has not had any generalized tonic-clonic seizures. She feels that she has had less of her small seizure as well. The \"small\" seizures are characterized by staring spells. She does not lose awareness but will stare off for about 30 to 60 seconds. .  She feels she is having about 1/month at this point.     We went over multiple antiepileptic medications and she tells me that she has tried the following:  --Depakote, Dilantin, Trileptal, Topamax, Onfi, Tegretol    She has NOT tried the following antiepileptic medications:  --Keppra, Vimpat, zonisamide      Current Outpatient Medications   Medication Sig Dispense Refill    levETIRAcetam (KEPPRA) 500 MG tablet Take 1 tablet by mouth 2 times daily 60 tablet 5    BREZTRI AEROSPHERE 160-9-4.8 MCG/ACT AERO       hydrOXYzine (ATARAX) 25 MG tablet Take 1-2 tablets by mouth nightly 60 tablet 5    olmesartan-hydroCHLOROthiazide (BENICAR HCT) 40-25 MG per tablet TAKE ONE TABLET BY MOUTH EVERY DAY 30 tablet 5    rOPINIRole (REQUIP) 0.5 MG tablet Take 1 tablet by mouth every evening 30 tablet 5    alendronate (FOSAMAX) 70 MG tablet Take 1 tablet by mouth once a week 12 tablet 1    nortriptyline (PAMELOR) 25 MG capsule Take 2 capsules by mouth nightly 60 capsule 5    oxybutynin (DITROPAN-XL) 10 MG extended release tablet Take 1 tablet by mouth daily 30 tablet 5    ARIPiprazole (ABILIFY) 5 MG tablet TAKE ONE TABLET BY MOUTH EVERY DAY 30 tablet 5    omeprazole (PRILOSEC) 40 MG delayed release capsule TAKE ONE CAPSULE BY MOUTH EVERY DAY BEFORE BREAKFAST 30 capsule 1    magnesium oxide (MAG-OX) 400 MG tablet TAKE ONE TABLET BY MOUTH EVERY DAY 30 tablet 1    eslicarbazepine acetate (APTIOM) 800 MG tablet Take 1,600 mg by mouth nightly 60 tablet 3    albuterol sulfate  (90 Base) MCG/ACT inhaler Inhale 2 puffs into the lungs every 6 hours as needed for Wheezing 18 g 5    Spacer/Aero-Holding Chambers KADY 1 Device by Does not apply route daily as needed (use with albuterol rescue inhaler) 1 Device 0     No current facility-administered medications for this visit. Physical Exam:    Mental Status   Orientation: oriented to person, oriented to place, oriented to problem and oriented to time    Mood/affectappropriate mood and appropriate affect   Memory/Other: recent memory intact, remote memory intact, fund of knowledge intact, attention span normal and concentration normal  Language  Language: (normal) language, no dysarthria, (normal) articulation and no dysphasia/aphasia  Cranial Nerves   Eyes: pupils normal size and reactive to light and visual fields appear full   CN III, IV, VI : extraocular muscle strength normal, normal pursuit, no nystagmus and no ptosis   Facial Motor: normal facial motor  Motor/Coordination Exam   Power: motor strength appears intact throughout, no arm drift and normal tone  Gait and Stance   Gait/Posture: station normal, casual gait normal and ambulates independently         /82 (Site: Right Upper Arm, Position: Sitting, Cuff Size: Large Adult)   Pulse 68   Ht 5' 1\" (1.549 m)   Wt 190 lb (86.2 kg)   SpO2 99%   BMI 35.90 kg/m²     Assessment and Plan     Diagnosis Orders   1.  Partial symptomatic epilepsy with simple partial seizures, not intractable, without status epilepticus (HCC)  levETIRAcetam (KEPPRA) 500 MG tablet     I am happy to hear that now that he is feeling much better off of the lamotrigine without any increase in her seizures. The increased dose of the Aptiom at 1600 mg daily also likely contributed to better control of her seizures. Overall, she is not having frequent partial seizures like she was previously. These seem to be occurring only once monthly. She would like to trial another antiepileptic medication that she has not tried before to see if she can reach seizure freedom. We will start Keppra 500 mg twice daily. .Medications prescribed for the patient were discussed in detail. This included a discussion of the potential risks vs the potential benefits of the medications. The patient was given time to ask questions and these were answered to the best of my ability. The patient appeared to understand the information provided. Return in about 4 months (around 5/24/2022) for Follow-up me or YAEL.     Brigid Snow,

## 2022-01-26 RX ORDER — ESLICARBAZEPINE ACETATE 800 MG/1
TABLET ORAL
Qty: 60 TABLET | Refills: 5 | Status: SHIPPED | OUTPATIENT
Start: 2022-01-26 | End: 2022-06-15

## 2022-02-07 ENCOUNTER — OFFICE VISIT (OUTPATIENT)
Dept: PSYCHOLOGY | Age: 62
End: 2022-02-07
Payer: MEDICARE

## 2022-02-07 DIAGNOSIS — F33.1 MODERATE EPISODE OF RECURRENT MAJOR DEPRESSIVE DISORDER (HCC): Primary | ICD-10-CM

## 2022-02-07 PROCEDURE — 90832 PSYTX W PT 30 MINUTES: CPT | Performed by: PSYCHOLOGIST

## 2022-02-07 PROCEDURE — 1036F TOBACCO NON-USER: CPT | Performed by: PSYCHOLOGIST

## 2022-02-07 NOTE — PROGRESS NOTES
Behavioral Health Consultation  Lidia Monahan Psy.D. Psychologist      Time spent with Patient: 30 minutes  Visit number: 10  Reason for Consult:  depression and anxiety  Referring Provider: Thomas Cronin MD  [address]  [facility] [address] [facility],  [address] [place] [address]    S:  ----------------------------------------------------------------------------------------------------------------------  depression and anxiety  \"i've been doing alright. \" Yesterday was able to ride her snowmobile and enjoyed this. Mood has been \"pretty rough. \" However, she is looking forward to summer. Endorsed depressed mood, anhedonia, anergia, amotivation, poor sleep, fluctuating appetite, diminished concentration, and corrosive self-image. O:  ----------------------------------------------------------------------------------------------------------------------  MSE:  Orientation:  oriented to person, place, time, and general circumstances  Appearance:  alert, cooperative  Speech:  spontaneous, normal rate and normal volume  Mood: depressed and anxious   Thought Content:  intact, hopelessness, helplessness and worthlessness  Thought Process:  linear, goal directed and coherent  Interest/Pleasure: Loss of Pleasure/Fun  Concentration: Normal  Sleep disturbance: Yes  Memory:  recent and remote memory intact  Energy: Tired/Fatigued  Morbid ideation No  Suicide Assessment: no suicidal ideation    A:  ----------------------------------------------------------------------------------------------------------------------  Diagnosis:    1.  Moderate episode of recurrent major depressive disorder [facility])         PHQ Scores 1/6/2022 9/9/2021 3/2/2021 11/24/2020 11/3/2020 10/19/2020 9/1/2020   PHQ2 Score 1 6 1 0 0 0 0   PHQ9 Score 1 21 1 0 0 0 0     Interpretation of Total Score Depression Severity: 1-4 = Minimal depression, 5-9 = Mild depression, 10-14 = Moderate depression, 15-19 = Moderately severe depression, 20-27 = Severe

## 2022-02-14 ENCOUNTER — HOSPITAL ENCOUNTER (OUTPATIENT)
Age: 62
Discharge: HOME | End: 2022-02-14
Payer: MEDICARE

## 2022-02-14 DIAGNOSIS — Z01.811 PREOP PULMONARY/RESPIRATORY EXAM: Primary | ICD-10-CM

## 2022-02-14 PROCEDURE — U0003 INFECTIOUS AGENT DETECTION BY NUCLEIC ACID (DNA OR RNA); SEVERE ACUTE RESPIRATORY SYNDROME CORONAVIRUS 2 (SARS-COV-2) (CORONAVIRUS DISEASE [COVID-19]), AMPLIFIED PROBE TECHNIQUE, MAKING USE OF HIGH THROUGHPUT TECHNOLOGIES AS DESCRIBED BY CMS-2020-01-R: HCPCS

## 2022-02-14 PROCEDURE — U0005 INFEC AGEN DETEC AMPLI PROBE: HCPCS

## 2022-02-15 LAB
SARS-COV-2: DETECTED
SOURCE: ABNORMAL

## 2022-02-24 DIAGNOSIS — R12 HEARTBURN: ICD-10-CM

## 2022-02-24 RX ORDER — OMEPRAZOLE 40 MG/1
CAPSULE, DELAYED RELEASE ORAL
Qty: 30 CAPSULE | Refills: 1 | Status: SHIPPED | OUTPATIENT
Start: 2022-02-24 | End: 2022-04-18

## 2022-02-24 NOTE — TELEPHONE ENCOUNTER
Medication:   Requested Prescriptions     Pending Prescriptions Disp Refills    omeprazole (PRILOSEC) 40 MG delayed release capsule [Pharmacy Med Name: OMEPRAZOLE 40 MG CPDR 40 Capsule] 30 capsule 1     Sig: TAKE ONE CAPSULE BY MOUTH EVERY DAY BEFORE BREAKFAST        Last Filled:      Patient Phone Number: [phone] (home)     Last appt: 1/6/2022   Next appt: 6/6/2022    Last OARRS: No flowsheet data found.

## 2022-02-24 NOTE — TELEPHONE ENCOUNTER
Patient is doing well from covid. Patient requested pain medication for back and informed her she would need to be seen.

## 2022-03-16 ENCOUNTER — HOSPITAL ENCOUNTER (OUTPATIENT)
Age: 62
Discharge: HOME | End: 2022-03-16
Payer: MEDICARE

## 2022-03-16 LAB
SARS-COV-2: NOT DETECTED
SOURCE: NORMAL

## 2022-03-16 PROCEDURE — U0003 INFECTIOUS AGENT DETECTION BY NUCLEIC ACID (DNA OR RNA); SEVERE ACUTE RESPIRATORY SYNDROME CORONAVIRUS 2 (SARS-COV-2) (CORONAVIRUS DISEASE [COVID-19]), AMPLIFIED PROBE TECHNIQUE, MAKING USE OF HIGH THROUGHPUT TECHNOLOGIES AS DESCRIBED BY CMS-2020-01-R: HCPCS

## 2022-03-16 PROCEDURE — U0005 INFEC AGEN DETEC AMPLI PROBE: HCPCS

## 2022-03-21 RX ORDER — MAGNESIUM OXIDE 400 MG/1
TABLET ORAL
Qty: 30 TABLET | Refills: 3 | Status: SHIPPED | OUTPATIENT
Start: 2022-03-21 | End: 2022-06-10 | Stop reason: SDUPTHER

## 2022-03-22 ENCOUNTER — HOSPITAL ENCOUNTER (OUTPATIENT)
Dept: CARDIAC REHAB | Age: 62
Discharge: HOME | End: 2022-03-22
Payer: MEDICARE

## 2022-03-22 DIAGNOSIS — J43.1 PANLOBULAR EMPHYSEMA (HCC): ICD-10-CM

## 2022-03-22 PROCEDURE — 94727 GAS DIL/WSHOT DETER LNG VOL: CPT

## 2022-03-22 PROCEDURE — 94729 DIFFUSING CAPACITY: CPT

## 2022-03-22 PROCEDURE — 94060 EVALUATION OF WHEEZING: CPT

## 2022-04-12 ENCOUNTER — OFFICE VISIT (OUTPATIENT)
Dept: PULMONOLOGY | Age: 62
End: 2022-04-12
Payer: MEDICARE

## 2022-04-12 VITALS
HEART RATE: 91 BPM | DIASTOLIC BLOOD PRESSURE: 80 MMHG | SYSTOLIC BLOOD PRESSURE: 132 MMHG | OXYGEN SATURATION: 95 % | BODY MASS INDEX: 35.3 KG/M2 | HEIGHT: 61 IN | WEIGHT: 187 LBS

## 2022-04-12 DIAGNOSIS — G47.33 OSA (OBSTRUCTIVE SLEEP APNEA): ICD-10-CM

## 2022-04-12 DIAGNOSIS — Z87.891 PERSONAL HISTORY OF TOBACCO USE: Primary | ICD-10-CM

## 2022-04-12 DIAGNOSIS — E66.9 OBESITY (BMI 30-39.9): ICD-10-CM

## 2022-04-12 DIAGNOSIS — G47.10 HYPERSOMNIA: ICD-10-CM

## 2022-04-12 DIAGNOSIS — Z87.891 EX-SMOKER: ICD-10-CM

## 2022-04-12 DIAGNOSIS — J44.9 CHRONIC OBSTRUCTIVE PULMONARY DISEASE, UNSPECIFIED COPD TYPE (HCC): ICD-10-CM

## 2022-04-12 PROCEDURE — 3023F SPIROM DOC REV: CPT | Performed by: INTERNAL MEDICINE

## 2022-04-12 PROCEDURE — G8417 CALC BMI ABV UP PARAM F/U: HCPCS | Performed by: INTERNAL MEDICINE

## 2022-04-12 PROCEDURE — G0296 VISIT TO DETERM LDCT ELIG: HCPCS | Performed by: INTERNAL MEDICINE

## 2022-04-12 PROCEDURE — G8427 DOCREV CUR MEDS BY ELIG CLIN: HCPCS | Performed by: INTERNAL MEDICINE

## 2022-04-12 PROCEDURE — 99214 OFFICE O/P EST MOD 30 MIN: CPT | Performed by: INTERNAL MEDICINE

## 2022-04-12 ASSESSMENT — ENCOUNTER SYMPTOMS
COUGH: 0
BACK PAIN: 0
EYE DISCHARGE: 0
ABDOMINAL DISTENTION: 0
EYE ITCHING: 0
SHORTNESS OF BREATH: 1
ABDOMINAL PAIN: 0

## 2022-04-12 NOTE — PROGRESS NOTES
Génesis Banks  1960  Referring Provider: Tico Bonilla MD    Subjective:     Chief Complaint   Patient presents with    Results       HPI  Génesis is a 62 y.o. female has come back as a follow up. She has h/o smoking  More than 1 pk/day x 43 years quit 1 year ago. She has cough, no phlegm, no hemoptysis, no loss of weight, good appetite, SOBOE some. She is off oxygen now as her  sats are good. Her last PFT done on 03/22/22 showed mild decrease in DLCO of 66. Her last Low dose CT chest done on 01/15/21 showed:  . 0.4 cm perifissural lymph node along the right major fissure. 2. Mild to moderate emphysema. 3. Minimal to mild bronchial wall thickening potentially due to reactive   airways disease or bronchitis. 4. Minimal coronary atherosclerotic calcifications           She snores and not sure if she stops breathing. She goes to bed at 10 PM. She wakes up many times in the night. She never woke up choking or gasping for air, woke up with dry mouth, no palpitations. Sh gets up at 7 AM and she is tired. She has no morning headaches. She is sleepy and tired during the day time. But her PSG is normal    Current Outpatient Medications   Medication Sig Dispense Refill    magnesium oxide (MAG-OX) 400 MG tablet TAKE ONE TABLET BY MOUTH EVERY DAY 30 tablet 3    omeprazole (PRILOSEC) 40 MG delayed release capsule TAKE ONE CAPSULE BY MOUTH EVERY DAY BEFORE BREAKFAST 30 capsule 1    APTIOM 800 MG tablet TAKE TWO TABLETS BY MOUTH AT BEDTIME 60 tablet 5    levETIRAcetam (KEPPRA) 500 MG tablet Take 1 tablet by mouth 2 times daily 60 tablet 5    BREZTRI AEROSPHERE 160-9-4.8 MCG/ACT AERO       olmesartan-hydroCHLOROthiazide (BENICAR HCT) 40-25 MG per tablet TAKE ONE TABLET BY MOUTH EVERY DAY 30 tablet 5    rOPINIRole (REQUIP) 0.5 MG tablet Take 1 tablet by mouth every evening 30 tablet 5    alendronate (FOSAMAX) 70 MG tablet Take 1 tablet by mouth once a week 12 tablet 1    nortriptyline (PAMELOR) 25 MG capsule Take 2 capsules by mouth nightly 60 capsule 5    oxybutynin (DITROPAN-XL) 10 MG extended release tablet Take 1 tablet by mouth daily 30 tablet 5    ARIPiprazole (ABILIFY) 5 MG tablet TAKE ONE TABLET BY MOUTH EVERY DAY 30 tablet 5    albuterol sulfate  (90 Base) MCG/ACT inhaler Inhale 2 puffs into the lungs every 6 hours as needed for Wheezing 18 g 5    Spacer/Aero-Holding Chambers KADY 1 Device by Does not apply route daily as needed (use with albuterol rescue inhaler) 1 Device 0     No current facility-administered medications for this visit. Allergies   Allergen Reactions    Varenicline Other (See Comments)     Having bad dreams and chest pain( Chantix)    Chantix [Varenicline Tartrate] Palpitations     Chest pain       Past Medical History:   Diagnosis Date    [facility]     Epilepsy ([facility]     Full dentures     upper plate only    H/O dizziness     \"take Meclazine at least twice a day for this\"    H/O Doppler JERMAINE's ultrasound 12/01/2020     No evidence of significant occlusive arterial disease of lower EXT.    H/O echocardiogram 12/01/2020    EF 55-60%, Normal study.  History of drug abuse ([facility]     History of nuclear stress test 11/30/2020    Normal study.     History of UTI     \"last one May 2020\"    Hypertension     \"the top # use to be over 200 \"- on medication since 2018\"    Seizures ([facility]     hx of epilepsy- last seizure week 7/13/2020- hx grand mal seizures    Wears glasses        Past Surgical History:   Procedure Laterality Date    BRAIN SURGERY      s/p L anterior temporal lobectomy in 1996\"did surgery to try an treat the seizures\"    CYSTOSCOPY N/A 10/14/2021    with a bladder biopsy-Dr. Tavon Rojas in [address] N/A 10/14/2021    CYSTOSCOPY BLADDER BIOPSY performed by Tonya Bliss MD at [facility]    \"not sure if they took the ovaries\"   [facility] WRIST SURGERY Left 7/30/2020 LEFT WRIST TENDON RELEASE performed by Darcie Tirado DO at [address] [facility] History     Socioeconomic History    Marital status:      Spouse name: None    Number of children: None    Years of education: None    Highest education level: None   Occupational History    None   Tobacco Use    Smoking status: Former Smoker     Packs/day: 1.50     Years: 40.00     Pack years: 60.00     Types: Cigarettes     Quit date: 2020     Years since quittin.6    Smokeless tobacco: Never Used   Vaping Use    Vaping Use: Never used   Substance and Sexual Activity    Alcohol use: No     Comment: per pt on 2020\"quit drinking age 40- use to drink on weekends\"    Drug use: Yes     Types: Marijuana (Landy Song), Other-see comments     Comment: PCP/ per pt on 2020\"last used marijuana 2020, last did cocaine  spring 2020\"    Sexual activity: Yes     Partners: Male   Other Topics Concern    None   Social History Narrative    None     Social Determinants of Health     Financial Resource Strain: High Risk    Difficulty of Paying Living Expenses: Very hard   Food Insecurity: Food Insecurity Present    Worried About Running Out of Food in the Last Year: Sometimes true    Paloma of Food in the Last Year: Sometimes true   Transportation Needs:     Lack of Transportation (Medical): Not on file    Lack of Transportation (Non-Medical):  Not on file   Physical Activity:     Days of Exercise per Week: Not on file    Minutes of Exercise per Session: Not on file   Stress:     Feeling of Stress : Not on file   Social Connections:     Frequency of Communication with Friends and Family: Not on file    Frequency of Social Gatherings with Friends and Family: Not on file    Attends Nondenominational Services: Not on file    Active Member of Clubs or Organizations: Not on file    Attends Club or Organization Meetings: Not on file    Marital Status: Not on file   Intimate Partner Violence:     Fear of Current or Ex-Partner: Not on file    Emotionally Abused: Not on file    Physically Abused: Not on file    Sexually Abused: Not on file   Housing Stability:     Unable to Pay for Housing in the Last Year: Not on file    Number of Places Lived in the Last Year: Not on file    Unstable Housing in the Last Year: Not on file       Review of Systems   Constitutional: Positive for fatigue. HENT: Negative for congestion and nosebleeds. Eyes: Negative for discharge and itching. Respiratory: Positive for shortness of breath. Negative for cough. Cardiovascular: Negative for chest pain and leg swelling. Gastrointestinal: Negative for abdominal distention and abdominal pain. Endocrine: Negative for cold intolerance and heat intolerance. Genitourinary: Negative for enuresis and frequency. Musculoskeletal: Negative for arthralgias and back pain. Allergic/Immunologic: Negative for environmental allergies and food allergies. Neurological: Negative for light-headedness and headaches. Hematological: Negative for adenopathy. Psychiatric/Behavioral: Negative for agitation and behavioral problems. Objective:   /80   Pulse 91   Ht 5' 1\" (1.549 m)   Wt 187 lb (84.8 kg)   SpO2 95%   BMI 35.33 kg/m²   Body mass index is 35.33 kg/m². Sleep Medicine 12/8/2021 2/27/2021 8/3/2020   Sitting and reading 1 2 1   Watching TV 2 2 1   Sitting, inactive in a public place (e.g. a theatre or a meeting) 2 0 0   As a passenger in a car for an hour without a break 2 1 0   Lying down to rest in the afternoon when circumstances permit 3 0 2   Sitting and talking to someone 0 0 0   Sitting quietly after a lunch without alcohol 2 0 0   In a car, while stopped for a few minutes in traffic 0 0 0   Total score 12 5 4   Neck circumference (Inches) 15 15 12.5     Mallampati 3    Physical Exam  Vitals reviewed. Constitutional:       Appearance: Normal appearance.       Comments: Obesity   HENT:      Head: Normocephalic and atraumatic. Nose: Nose normal.      Mouth/Throat:      Mouth: Mucous membranes are moist.   Eyes:      Extraocular Movements: Extraocular movements intact. Pupils: Pupils are equal, round, and reactive to light. Cardiovascular:      Rate and Rhythm: Normal rate and regular rhythm. Pulses: Normal pulses. Heart sounds: Normal heart sounds. Pulmonary:      Effort: Pulmonary effort is normal.      Breath sounds: Normal breath sounds. Abdominal:      General: Abdomen is flat. Palpations: Abdomen is soft. Musculoskeletal:         General: Normal range of motion. Cervical back: Normal range of motion and neck supple. Skin:     General: Skin is warm and dry. Neurological:      General: No focal deficit present. Mental Status: She is alert and oriented to person, place, and time. Psychiatric:         Mood and Affect: Mood normal.         Behavior: Behavior normal.         Radiology: None    Assessment and Plan     Problem List        Pulmonary Problems    KYLE (obstructive sleep apnea)      She has symptoms of KYLE  Advised to go for the PSG  Loose weight         COPD (chronic obstructive pulmonary disease) (HCC)      Advised to c/w quitting smoking  Home O2 eval in office today  C/w inhalera  Get yearly flu vaccine  Low dose CT chest  PFT in 1 year         Relevant Medications    albuterol sulfate  (90 Base) MCG/ACT inhaler    BREZTRI AEROSPHERE 160-9-4.8 MCG/ACT AERO    Other Relevant Orders    Full PFT Study With Bronchodilator    Home O2 eval (desaturation screen)       Other    Obesity (BMI 30-39. 9)      Advised to loose weight with diet and exercise           Relevant Medications    magnesium oxide (MAG-OX) 400 MG tablet    Hypersomnia      Advised to go for the PSG  Loose weight         Ex-smoker      Advised to c/w quitting smoking         Relevant Orders    CT LUNG SCREENING    Full PFT Study With Bronchodilator               Follow-Up:    Return in about 4 weeks (around 5/10/2022) for Low dose CT chest.     Progress notes sent to the referring Provider    Adrián Iniguez MD MD  4/12/2022  11:25 AM    Low Dose CT (LDCT) Lung Screening criteria met:     Age 50-77(Medicare) or 50-80 ([facility])   Pack year smoking >20   Still smoking or less than 15 year since quit   No sign or symptoms of lung cancer   > 11 months since last LDCT     Risks and benefits of lung cancer screening with LDCT scans discussed:    Significance of positive screen - False-positive LDCT results often occur. 95% of all positive results do not lead to a diagnosis of cancer. Usually further imaging can resolve most false-positive results; however, some patients may require invasive procedures. Over diagnosis risk - 10% to 12% of screen-detected lung cancer cases are over diagnosed--that is, the cancer would not have been detected in the patient's lifetime without the screening. Need for follow up screens annually to continue lung cancer screening effectiveness     Risks associated with radiation from annual LDCT- Radiation exposure is about the same as for a mammogram, which is about 1/3 of the annual background radiation exposure from everyday life. Starting screening at age 54 is not likely to increase cancer risk from radiation exposure. Patients with comorbidities resulting in life expectancy of < 10 years, or that would preclude treatment of an abnormality identified on CT, should not be screened due to lack of benefit.     To obtain maximal benefit from this screening, smoking cessation and long-term abstinence from smoking is critical

## 2022-04-12 NOTE — ASSESSMENT & PLAN NOTE
Advised to c/w quitting smoking  Home O2 eval in office today  C/w inhalera  Get yearly flu vaccine  Low dose CT chest  PFT in 1 year

## 2022-04-18 DIAGNOSIS — R12 HEARTBURN: ICD-10-CM

## 2022-04-18 RX ORDER — OMEPRAZOLE 40 MG/1
CAPSULE, DELAYED RELEASE ORAL
Qty: 30 CAPSULE | Refills: 1 | Status: SHIPPED | OUTPATIENT
Start: 2022-04-18 | End: 2022-06-13

## 2022-04-18 NOTE — TELEPHONE ENCOUNTER
Medication:   Requested Prescriptions     Pending Prescriptions Disp Refills    omeprazole (PRILOSEC) 40 MG delayed release capsule [Pharmacy Med Name: OMEPRAZOLE 40 MG CPDR 40 Capsule] 30 capsule 1     Sig: TAKE ONE CAPSULE BY MOUTH EVERY DAY BEFORE BREAKFAST        Last Filled:      Patient Phone Number: [phone] (home)     Last appt: 1/6/2022   Next appt: 6/6/2022    Last OARRS: No flowsheet data found.

## 2022-04-24 NOTE — PROGRESS NOTES
4/24/22    Génesis Banks  1960    Chief Complaint   Patient presents with    Follow-up     Seizures seem to be getting better but still having them occasionally       History of Present Illness  Génesis is a 62 y.o. female presenting today for follow-up of: Epilepsy. Patient remains on Aptiom 1600 mg daily for management of tonic-clonic seizures. On last visit Keppra 500 mg twice daily was initiated for partial seizures consisting of staring spells 30-60 seconds without loss of awareness, occurring approximately 1 time per month. She did today that she does not notice any improvement after starting keppra however her episodes have decreased. She reports having 1 episode in February, 4 episodes in March, and only one episode in April of these staring spells. She has had no missed doses, no increased stress or anxiety. She has had no tonic-clonic seizures. She is tolerating medication well.          Current Outpatient Medications   Medication Sig Dispense Refill    omeprazole (PRILOSEC) 40 MG delayed release capsule TAKE ONE CAPSULE BY MOUTH EVERY DAY BEFORE BREAKFAST 30 capsule 1    magnesium oxide (MAG-OX) 400 MG tablet TAKE ONE TABLET BY MOUTH EVERY DAY 30 tablet 3    APTIOM 800 MG tablet TAKE TWO TABLETS BY MOUTH AT BEDTIME 60 tablet 5    levETIRAcetam (KEPPRA) 500 MG tablet Take 1 tablet by mouth 2 times daily 60 tablet 5    BREZTRI AEROSPHERE 160-9-4.8 MCG/ACT AERO       olmesartan-hydroCHLOROthiazide (BENICAR HCT) 40-25 MG per tablet TAKE ONE TABLET BY MOUTH EVERY DAY 30 tablet 5    rOPINIRole (REQUIP) 0.5 MG tablet Take 1 tablet by mouth every evening 30 tablet 5    alendronate (FOSAMAX) 70 MG tablet Take 1 tablet by mouth once a week 12 tablet 1    nortriptyline (PAMELOR) 25 MG capsule Take 2 capsules by mouth nightly 60 capsule 5    oxybutynin (DITROPAN-XL) 10 MG extended release tablet Take 1 tablet by mouth daily 30 tablet 5    ARIPiprazole (ABILIFY) 5 MG tablet TAKE ONE TABLET BY MOUTH EVERY DAY 30 tablet 5    albuterol sulfate  (90 Base) MCG/ACT inhaler Inhale 2 puffs into the lungs every 6 hours as needed for Wheezing 18 g 5    Spacer/Aero-Holding Chambers KADY 1 Device by Does not apply route daily as needed (use with albuterol rescue inhaler) 1 Device 0     No current facility-administered medications for this visit. Physical Exam:  Mental Status              Orientation: oriented to person, oriented to place, oriented to problem and oriented to time                        Mood/affectappropriate mood and appropriate affect              Memory/Other: recent memory intact, remote memory intact, fund of knowledge intact, attention span normal and concentration normal  Language  Language: (normal) language, no dysarthria, (normal) articulation and no dysphasia/aphasia  Cranial Nerves              Eyes: pupils normal size and reactive to light and visual fields appear full              CN III, IV, VI : extraocular muscle strength normal, normal pursuit, no nystagmus and no ptosis              Facial Motor: normal facial motor  Motor/Coordination Exam              Power: motor strength appears intact throughout, no arm drift and normal tone  Gait and Stance              Gait/Posture: station normal, casual gait normal and ambulates independently              BP (!) 120/98 (Site: Left Upper Arm, Position: Sitting, Cuff Size: Large Adult)   Pulse 76   Ht 5' 1\" (1.549 m)   Wt 187 lb (84.8 kg)   SpO2 99%   BMI 35.33 kg/m²     Assessment and Plan     Diagnosis Orders   1. 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 Hereditary and idiopathic neuropathy     3. Partial symptomatic epilepsy with simple partial seizures, not intractable, without status epilepticus ([address]       Génesis has had improvement in the frequency of her partial seizures. At this time I will not make any changes to current medication regimen.   I did encourage Génesis to notify our office when she does have any of these episodes so that we may keep better track as it was very hard to clarify with Génesis today the frequency of her partial seizures. If she increases in frequency we may look towards starting Fycompa rather than increasing her Keppra. I will plan on following up with Génesis again in 3 months, sooner if need arises. Seizure precautions discussed including no driving, tub baths, climbing ladders or operating dangerous equipment until event/seizure free for 3 months. Patient will notify in the event of any breakthrough seizure or seizure-like activity including staring spells, automatisms such as lip smacking, eye blinking, recurring episodes of déjà vu, awakening having bitten tongue during sleep. Medications prescribed for the patient were discussed in detail. This included a discussion of the potential risks versus potential benefits of the medications. The patient was given time to ask questions and these were answered to the best of my ability. The patient appeared to understand the information provided. Return in about 3 months (around 7/25/2022).     STEWART Chavez NP

## 2022-04-25 ENCOUNTER — OFFICE VISIT (OUTPATIENT)
Dept: NEUROLOGY | Age: 62
End: 2022-04-25
Payer: MEDICARE

## 2022-04-25 VITALS
HEART RATE: 76 BPM | BODY MASS INDEX: 35.3 KG/M2 | WEIGHT: 187 LBS | OXYGEN SATURATION: 99 % | DIASTOLIC BLOOD PRESSURE: 98 MMHG | SYSTOLIC BLOOD PRESSURE: 120 MMHG | HEIGHT: 61 IN

## 2022-04-25 DIAGNOSIS — G40.319 INTRACTABLE GENERALIZED IDIOPATHIC EPILEPSY WITHOUT STATUS EPILEPTICUS (HCC): Primary | ICD-10-CM

## 2022-04-25 DIAGNOSIS — G60.9 HEREDITARY AND IDIOPATHIC NEUROPATHY: ICD-10-CM

## 2022-04-25 DIAGNOSIS — G40.109 PARTIAL SYMPTOMATIC EPILEPSY WITH SIMPLE PARTIAL SEIZURES, NOT INTRACTABLE, WITHOUT STATUS EPILEPTICUS (HCC): ICD-10-CM

## 2022-04-25 PROCEDURE — G8427 DOCREV CUR MEDS BY ELIG CLIN: HCPCS | Performed by: NURSE PRACTITIONER

## 2022-04-25 PROCEDURE — 3017F COLORECTAL CA SCREEN DOC REV: CPT | Performed by: NURSE PRACTITIONER

## 2022-04-25 PROCEDURE — 99214 OFFICE O/P EST MOD 30 MIN: CPT | Performed by: NURSE PRACTITIONER

## 2022-04-25 PROCEDURE — G8417 CALC BMI ABV UP PARAM F/U: HCPCS | Performed by: NURSE PRACTITIONER

## 2022-04-25 PROCEDURE — 1036F TOBACCO NON-USER: CPT | Performed by: NURSE PRACTITIONER

## 2022-05-23 ENCOUNTER — HOSPITAL ENCOUNTER (OUTPATIENT)
Age: 62
Discharge: HOME | End: 2022-05-23
Payer: MEDICARE

## 2022-05-23 LAB
ALBUMIN SERPL-MCNC: 4.5 GM/DL (ref 3.4–5)
ALP BLD-CCNC: 121 IU/L (ref 40–129)
ALT SERPL-CCNC: 18 U/L (ref 10–40)
ANION GAP SERPL CALCULATED.3IONS-SCNC: 12 MMOL/L (ref 4–16)
AST SERPL-CCNC: 21 IU/L (ref 15–37)
BASOPHILS ABSOLUTE: 0.1 K/CU MM
BASOPHILS RELATIVE PERCENT: 0.9 % (ref 0–1)
BILIRUB SERPL-MCNC: 0.3 MG/DL (ref 0–1)
BUN BLDV-MCNC: 13 MG/DL (ref 6–23)
CALCIUM SERPL-MCNC: 9.8 MG/DL (ref 8.3–10.6)
CHLORIDE BLD-SCNC: 85 MMOL/L (ref 99–110)
CHOLESTEROL, FASTING: 218 MG/DL
CO2: 28 MMOL/L (ref 21–32)
CREAT SERPL-MCNC: 1 MG/DL (ref 0.6–1.1)
DIFFERENTIAL TYPE: ABNORMAL
EOSINOPHILS ABSOLUTE: 0.1 K/CU MM
EOSINOPHILS RELATIVE PERCENT: 0.9 % (ref 0–3)
ESTIMATED AVERAGE GLUCOSE: 128 MG/DL
GFR AFRICAN AMERICAN: >60 ML/MIN/1.73M2
GFR NON-AFRICAN AMERICAN: 56 ML/MIN/1.73M2
GLUCOSE FASTING: 123 MG/DL (ref 70–99)
HBA1C MFR BLD: 6.1 % (ref 4.2–6.3)
HCT VFR BLD CALC: 34.1 % (ref 37–47)
HDLC SERPL-MCNC: 73 MG/DL
HEMOGLOBIN: 12 GM/DL (ref 12.5–16)
IMMATURE NEUTROPHIL %: 0.5 % (ref 0–0.43)
LDL CHOLESTEROL CALCULATED: 119 MG/DL
LYMPHOCYTES ABSOLUTE: 2 K/CU MM
LYMPHOCYTES RELATIVE PERCENT: 34.8 % (ref 24–44)
MCH RBC QN AUTO: 29.7 PG (ref 27–31)
MCHC RBC AUTO-ENTMCNC: 35.2 % (ref 32–36)
MCV RBC AUTO: 84.4 FL (ref 78–100)
MONOCYTES ABSOLUTE: 0.6 K/CU MM
MONOCYTES RELATIVE PERCENT: 10.1 % (ref 0–4)
PDW BLD-RTO: 12.2 % (ref 11.7–14.9)
PLATELET # BLD: 345 K/CU MM (ref 140–440)
PMV BLD AUTO: 8.3 FL (ref 7.5–11.1)
POTASSIUM SERPL-SCNC: 3.3 MMOL/L (ref 3.5–5.1)
RBC # BLD: 4.04 M/CU MM (ref 4.2–5.4)
SEGMENTED NEUTROPHILS ABSOLUTE COUNT: 3.1 K/CU MM
SEGMENTED NEUTROPHILS RELATIVE PERCENT: 52.8 % (ref 36–66)
SODIUM BLD-SCNC: 125 MMOL/L (ref 135–145)
TOTAL IMMATURE NEUTOROPHIL: 0.03 K/CU MM
TOTAL PROTEIN: 7.7 GM/DL (ref 6.4–8.2)
TRIGLYCERIDE, FASTING: 132 MG/DL
TSH HIGH SENSITIVITY: 0.8 UIU/ML (ref 0.27–4.2)
WBC # BLD: 5.8 K/CU MM (ref 4–10.5)

## 2022-05-23 PROCEDURE — 80053 COMPREHEN METABOLIC PANEL: CPT

## 2022-05-23 PROCEDURE — 84443 ASSAY THYROID STIM HORMONE: CPT

## 2022-05-23 PROCEDURE — 36415 COLL VENOUS BLD VENIPUNCTURE: CPT

## 2022-05-23 PROCEDURE — 83036 HEMOGLOBIN GLYCOSYLATED A1C: CPT

## 2022-05-23 PROCEDURE — 85025 COMPLETE CBC W/AUTO DIFF WBC: CPT

## 2022-05-23 PROCEDURE — 80061 LIPID PANEL: CPT

## 2022-05-23 RX ORDER — POTASSIUM CHLORIDE 750 MG/1
10 TABLET, EXTENDED RELEASE ORAL 2 TIMES DAILY
Qty: 6 TABLET | Refills: 0 | Status: SHIPPED | OUTPATIENT
Start: 2022-05-23 | End: 2022-06-10

## 2022-05-24 ENCOUNTER — TELEPHONE (OUTPATIENT)
Dept: INTERNAL MEDICINE CLINIC | Age: 62
End: 2022-05-24

## 2022-05-27 ENCOUNTER — HOSPITAL ENCOUNTER (OUTPATIENT)
Dept: GENERAL RADIOLOGY | Age: 62
Discharge: HOME | End: 2022-05-27
Payer: MEDICARE

## 2022-05-27 ENCOUNTER — TELEMEDICINE (OUTPATIENT)
Dept: INTERNAL MEDICINE CLINIC | Age: 62
End: 2022-05-27
Payer: MEDICARE

## 2022-05-27 ENCOUNTER — HOSPITAL ENCOUNTER (OUTPATIENT)
Age: 62
Discharge: HOME | End: 2022-05-27
Payer: MEDICARE

## 2022-05-27 DIAGNOSIS — S60.862A TICK BITE OF LEFT WRIST, INITIAL ENCOUNTER: Primary | ICD-10-CM

## 2022-05-27 DIAGNOSIS — Z79.899 NEED FOR PROPHYLACTIC CHEMOTHERAPY: ICD-10-CM

## 2022-05-27 DIAGNOSIS — F17.200 TOBACCO USE DISORDER: ICD-10-CM

## 2022-05-27 DIAGNOSIS — I10 ESSENTIAL HYPERTENSION, MALIGNANT: ICD-10-CM
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
## 2022-05-27 LAB
EKG ATRIAL RATE: 78 BPM
EKG DIAGNOSIS: NORMAL
EKG P AXIS: 74 DEGREES
EKG P-R INTERVAL: 168 MS
EKG Q-T INTERVAL: 426 MS
EKG QRS DURATION: 82 MS
EKG QTC CALCULATION (BAZETT): 485 MS
EKG R AXIS: 70 DEGREES
EKG T AXIS: 66 DEGREES
EKG VENTRICULAR RATE: 78 BPM

## 2022-05-27 PROCEDURE — 93005 ELECTROCARDIOGRAM TRACING: CPT

## 2022-05-27 PROCEDURE — 71046 X-RAY EXAM CHEST 2 VIEWS: CPT

## 2022-05-27 PROCEDURE — 99213 OFFICE O/P EST LOW 20 MIN: CPT | Performed by: INTERNAL MEDICINE

## 2022-05-27 PROCEDURE — G8427 DOCREV CUR MEDS BY ELIG CLIN: HCPCS | Performed by: INTERNAL MEDICINE

## 2022-05-27 PROCEDURE — 3017F COLORECTAL CA SCREEN DOC REV: CPT | Performed by: INTERNAL MEDICINE

## 2022-05-27 RX ORDER — DOXYCYCLINE HYCLATE 100 MG
200 TABLET ORAL ONCE
Qty: 2 TABLET | Refills: 0 | Status: SHIPPED | OUTPATIENT
Start: 2022-05-27 | End: 2022-05-27

## 2022-05-27 NOTE — PROGRESS NOTES
TELEHEALTH EVALUATION -- Audio/Visual (During OZHUY-80 public health emergency)      Génesis Banks  1960    Patient location: Patient Pavan Cabello is a 65 y.o. pleasant lady evaluated via video on 5/27/22       Consent:  She and/or health care decision maker is aware that that she may receive a bill for this virtual visit service, depending on her insurance coverage, and has provided verbal consent to proceed: Yes      History of Present Illness:  Génesis Christine is a 65 y.o. pleasant lady who has requested an audio/video evaluation for the following concern(s): tick bite. She has a past medical history significant for:  HTN, on Olmesartan/HCTZ 40/25mg QD  HL (LDL 87, TG 83 on 7/7/2020), not on statin   Prediabetes (HbA1C 5.7% on 3/9/2021), not on meds   HFpEF (G1DD, EF 55-60% on TTE 12/1/2020)  Epilepsy (s/p L anterior temporal lobectomy in 1736), on Eslicarbazepine 2522OW QHS  Depression, on Nortriptyline 50mg QHS   Anxiety, on Abilify 5mg QD   Insomnia, on Hydroxyzine 25-50mg QHS PRN   COPD, on Albuterol PRN, Breztri  Chronic hypoxemic respiratory failure, on 3L O2   Osteoporosis (DEXA not in chart), on Fosamax 70mg weekly  Vertigo  Vitamin D deficiency (level 31 on 3/9/2021), not on supplementation   RLS, on Ropinirole 0.5mg QHS   Urinary incontinence, on Oxybutynin-XL 10mg QD  Obesity (BMI 36)   S/p hysterectomy (cervix status unknown)  S/p L carpal tunnel release (7/30/2020)   Polysubstance abuse (marijuana, PCP, cocaine)   Former smoker (quit 2020)     # Patient reports tick bite 3 days ago. No rash. Has some itching. Had some swelling but this is improving. Has 2 cats. She was mowing grass so it was attached for at least a few hours. Patient is concerned it looked like the ones that carry Lyme disease. Had it removed with tweezers.        Health maintenance:   Health Maintenance Due   Topic Date Due    Hepatitis C screen  Never done    Colorectal Cancer Screen  Never done    Shingles vaccine (1 of 2) Never done    COVID-19 Vaccine (3 - Booster for Moderna series) 09/15/2021    Low dose CT lung screening  01/15/2022         Review of Systems:  Constitutional: itching at site of tick bite      Physical Exam:  Due to this being a TeleHealth encounter, evaluation of the following organ systems is limited: Vitals/Constitutional/EENT/Resp/CV/GI//MSK/Neuro/Skin/Heme-Lymph-Imm. General: alert, awake, and oriented to time, place, person, and situation. Not in acute distress. Not toxic appearing. Mood appears normal   Skin: no jaundice, no rash on visible skin  Head: appears grossly normocephalic/atraumatic  Eyes: anicteric sclera, extraocular movements are intact   Oropharynx: lips are not cyanotic  Lungs: breathing appears normal, does not appear tachypneic, does not appear labored  Abdomen: abdomen does not appear to be distended  Extremities: no swelling noted in bilateral lower extremities  MSK: full ROM in all 4 extremities. No joint swelling or erythema noted   Neuro: gait normal, CN II-XII grossly intact, motor power grossly intact in all 4 extremities      Labs   I have personally reviewed labs, and discussed pertinent findings with patient on this date 5/27/2022     Imaging   I have personally reviewed imaging, and discussed pertinent findings with patient on this date 5/27/2022     Other notes  I have personally reviewed other notes, and discussed pertinent findings with patient on this date 5/27/2022       Assessment/Plan:     1. Tick bite of left wrist, initial encounter  - doxycycline hyclate (VIBRA-TABS) 100 MG tablet; Take 2 tablets by mouth once for 1 dose  Dispense: 2 tablet; Refill: 0      Care discussed with patient and questions answered. Patient verbalizes understanding and agrees with plan. Discussed with patient the importance of continuity of care. I encouraged patient to schedule next appointment within 2 weeks (already scheduled) with me.      I reviewed and reconciled the medications this visit. I reviewed and updated the past medical, surgical, social, and family history during this visit. Pursuant to the emergency declaration under the [address] [place] [address], [facility] waiver authority and the [employer] and [employer] Act, this Virtual Visit was conducted, with patient's consent, to reduce the patient's risk of exposure to COVID-19 and provide continuity of care for an established patient. Services were provided through a video synchronous discussion virtually to substitute for in-person clinic visit.       Seven Carlos MD  Internal Medicine  5/27/2022   3:38 PM

## 2022-06-01 LAB — FECAL BLOOD IMMUNOCHEMICAL TEST: NEGATIVE

## 2022-06-10 ENCOUNTER — TELEMEDICINE (OUTPATIENT)
Dept: INTERNAL MEDICINE CLINIC | Age: 62
End: 2022-06-10
Payer: MEDICARE

## 2022-06-10 DIAGNOSIS — G40.319 INTRACTABLE GENERALIZED IDIOPATHIC EPILEPSY WITHOUT STATUS EPILEPTICUS (HCC): ICD-10-CM

## 2022-06-10 DIAGNOSIS — M81.0 AGE-RELATED OSTEOPOROSIS WITHOUT CURRENT PATHOLOGICAL FRACTURE: ICD-10-CM

## 2022-06-10 DIAGNOSIS — F33.1 MODERATE EPISODE OF RECURRENT MAJOR DEPRESSIVE DISORDER (HCC): ICD-10-CM

## 2022-06-10 DIAGNOSIS — I10 ESSENTIAL HYPERTENSION: ICD-10-CM

## 2022-06-10 DIAGNOSIS — G25.81 RESTLESS LEGS: ICD-10-CM

## 2022-06-10 DIAGNOSIS — E87.6 HYPOKALEMIA: ICD-10-CM

## 2022-06-10 DIAGNOSIS — D64.9 NORMOCYTIC ANEMIA: Primary | ICD-10-CM

## 2022-06-10 DIAGNOSIS — E83.42 HYPOMAGNESEMIA: ICD-10-CM

## 2022-06-10 PROCEDURE — 99442 PR PHYS/QHP TELEPHONE EVALUATION 11-20 MIN: CPT | Performed by: INTERNAL MEDICINE

## 2022-06-10 RX ORDER — NORTRIPTYLINE HYDROCHLORIDE 25 MG/1
50 CAPSULE ORAL NIGHTLY
Qty: 60 CAPSULE | Refills: 5 | Status: SHIPPED | OUTPATIENT
Start: 2022-06-10

## 2022-06-10 RX ORDER — ROPINIROLE 0.5 MG/1
0.5 TABLET, FILM COATED ORAL 2 TIMES DAILY
Qty: 60 TABLET | Refills: 5 | Status: SHIPPED | OUTPATIENT
Start: 2022-06-10

## 2022-06-10 RX ORDER — ALENDRONATE SODIUM 70 MG/1
70 TABLET ORAL WEEKLY
Qty: 12 TABLET | Refills: 1 | Status: SHIPPED | OUTPATIENT
Start: 2022-06-10

## 2022-06-10 RX ORDER — ROPINIROLE 0.5 MG/1
0.5 TABLET, FILM COATED ORAL EVERY EVENING
Qty: 30 TABLET | Refills: 5 | Status: SHIPPED | OUTPATIENT
Start: 2022-06-10 | End: 2022-06-10 | Stop reason: SDUPTHER

## 2022-06-10 RX ORDER — OLMESARTAN MEDOXOMIL AND HYDROCHLOROTHIAZIDE 40/25 40; 25 MG/1; MG/1
TABLET ORAL
Qty: 30 TABLET | Refills: 5 | Status: SHIPPED | OUTPATIENT
Start: 2022-06-10 | End: 2022-08-17 | Stop reason: ALTCHOICE

## 2022-06-10 RX ORDER — MAGNESIUM OXIDE 400 MG/1
TABLET ORAL
Qty: 30 TABLET | Refills: 5 | Status: SHIPPED | OUTPATIENT
Start: 2022-06-10 | End: 2022-06-13

## 2022-06-10 NOTE — PROGRESS NOTES
TELEHEALTH EVALUATION -- Audio/Visual (During WJVAN-81 public health emergency)      Génesis Banks  1960    Patient location: Patient Ro London is a 65 y.o. pleasant lady evaluated via telephone on 6/10/22      Consent:  She and/or health care decision maker is aware that that she may receive a bill for this telephone service, depending on her insurance coverage, and has provided verbal consent to proceed: Yes      History of Present Illness:  Génesis Cardenas is a 65 y.o. pleasant lady who has requested an audio/video evaluation for the following concern(s): hypokalemia, hypoMag. She has a past medical history significant for:  HTN, on Olmesartan/HCTZ 40/25mg QD  HL (LDL 119,  on 5/23/20220), not on statin   Prediabetes (HbA1C 6.1% on 5/23/2022), not on meds   HFpEF (G1DD, EF 55-60% on TTE 12/1/2020)  Epilepsy (s/p L anterior temporal lobectomy in 1996), on Aptiom 1600mg QHS, Keppra 500mg BID  Depression, on Nortriptyline 50mg QHS   Anxiety, on Abilify 5mg QD   Insomnia, on Hydroxyzine 25-50mg QHS PRN   COPD, on Albuterol PRN, Breztri  Chronic hypoxemic respiratory failure, on 3L O2   Osteoporosis (DEXA 10/4/2021 with osteopenia), on Fosamax 70mg weekly  Vertigo  Vitamin D deficiency (level 31 on 3/9/2021), not on supplementation   RLS, on Ropinirole 0.5mg QHS   Urinary incontinence, on Oxybutynin-XL 10mg QD  Obesity (BMI 35)   S/p hysterectomy (cervix status unknown)  S/p L carpal tunnel release (7/30/2020)   Polysubstance abuse (marijuana, PCP, cocaine)   Former smoker (quit 2020)     # Recently, patient has been having leg cramps that improve with walking. Stopping her from sleep. Mostly what's bothersome is restless legs.   No diarrhea or vomiting. No change in medications lately. Labs recently unremarkable  She is taking OTC RLS meds.  Mag did not help. I started her on low dose Ropinirole and increased it and reports improvement.   # Patient had seizure in May 2020 and April 2020 ~ 6 weeks apart. In the ED on 5/29/2020 [facility] unremarkable. Basic labs with mild hyponatremia (Na 131). Had UTI s/p Rocephin once then PO Keflex on discharge. UDS was positive for marijuana and PCP. Patient had another seizure in June 2020.   Patient had stopped taking Vimpat as she did not know it was an AED. Na has improved. Patient was diagnosed with epilepsy at age 25. Used to see Epilepsy specialist at [facility] in [facility] [address] does not drive due to h/o seizures. I referred her to Neurology Epilepsy. Seeing Dr. Kisha Jovel  She is now taking Aptiom. Off Vimpat and off Lamictal. MRIb Feb 2021 grossly unremarkable other than post-surgical changes. Off Onfi.   # Patient was on Abilify, Topamax, Venlafaxine, Cymbalta, and many other meds that she self-discontinued. She reports she is only depressed but does not have bipolar. Patient's mood continues to be down. ? Has tardive dyskinesia but not taking Benztropine. Has h/o non-compliance with pills. She is taking Nortriptyline only. I referred to Psychiatry. She is interested in seeing someone at [facility]. Waiting to hear back. She is seeing Dr. Landy Michelle  Re-introduced Abilify at her request Sept 2021.    # Patient having difficulty sleeping. Taking Hydroxyzine and Melatonin. Sleep study negative. # Meclizine has not helped with vertigo. Discontinued.   # Quit smoking recently through Nicotine patch.    # Patient denies doing any other drugs other than weed and reports that her UDS positive because it might have been mixed with her marijuana as per patient. [place] [facility] ? ? ? # BP [id number]. Taking Benicar for HTN. Tries to watch salt intake.   # Seeing Urology for incontinence. She is on Oxybutynin. Had surgery done May 2022. # She has prediabetes and dyslipidemia. Not on meds. # Patient is not on vitamin D supplements despite low vitamin D level years ago. Taking Fosamax for OP.  DEXA not in chart.   # Patient is s/p L carpal tunnel release.   Patient with hyponatremia at the time. Chronic due to AEDs.   # Has difficulty losing weight. She is gaining weight. Wanting to be placed on weight loss pills. She is seeing [facility]. She is on non-surgical diet program.   # Was complaining of worsening exertional dyspnea. TTE at [facility] in 2019 unremarkable. Stress test unremarkable. PFT shows obstruction wo BD response. She is seeing Dr. Vern Betancourt Cardiology.   She has COPD. Following Pulm for BD.   6mwt showed requirement for O2.  She is on 3L O2.   CT lung screening with 4mm LN being followed.   Sleep study with [facility] 0.0   # UTD on PPSV-23.       Health maintenance:   Health Maintenance Due   Topic Date Due    Hepatitis C screen  Never done    Colorectal Cancer Screen  Never done    Shingles vaccine (1 of 2) Never done    COVID-19 Vaccine (3 - Booster for Moderna series) 09/15/2021    Low dose CT lung screening  01/15/2022    Pneumococcal 0-64 years Vaccine (2 - PCV) 06/09/2022         Review of Systems:  Constitutional: no fevers, no chills, no night sweats, no weight loss, no weight gain, no fatigue   Head: no headaches  Ears: no hearing loss, no tinnitus, no vertigo  Eyes: no blurry vision, no diplopia, no dryness, no itchiness  Mouth: no oral ulcers, no dry mouth, no sore throat  Nose: no nasal congestion, no epistaxis  Cardiac: no chest pain, no palpitations, no leg swelling, no orthopnea, no PND, no syncope  Pulmonary: no dyspnea, no cough, no wheezing, no hemoptysis  GI: no nausea, no vomiting, no diarrhea, no constipation, no abdominal pain, no hematochezia  : no dysuria, no frequency, no urgency, no hematuria, no frothy urine, no dyspareunia, no pelvic pain, no vaginal bleeding, no abnormal vaginal discharge  MSK: no arthralgias, leg cramps, no early morning stiffness, no Raynaud's   Neuro: no focal neurological deficits, seizures  Sleep: no snoring, no daytime somnolence   Psych: depression, no suicidal ideation      Physical Exam:  Due to this being a TeleHealth encounter, billable if this call serves to triage the patient into an appointment for the relevant concern      Pursuant to the emergency declaration under the [address] [place] [address] waiver authority and the [employer] and [employer] Act, this Virtual Visit was conducted, with patient's consent, to reduce the patient's risk of exposure to COVID-19 and provide continuity of care for an established patient. Services were provided through a telephone synchronous discussion virtually to substitute for in-person clinic visit.       Tico Bonilla MD  Internal Medicine  6/10/2022   10:49 AM

## 2022-06-13 DIAGNOSIS — R12 HEARTBURN: ICD-10-CM

## 2022-06-13 DIAGNOSIS — G40.319 INTRACTABLE GENERALIZED IDIOPATHIC EPILEPSY WITHOUT STATUS EPILEPTICUS (HCC): ICD-10-CM

## 2022-06-13 DIAGNOSIS — F33.1 MODERATE EPISODE OF RECURRENT MAJOR DEPRESSIVE DISORDER (HCC): ICD-10-CM

## 2022-06-13 DIAGNOSIS — G40.109 PARTIAL SYMPTOMATIC EPILEPSY WITH SIMPLE PARTIAL SEIZURES, NOT INTRACTABLE, WITHOUT STATUS EPILEPTICUS (HCC): ICD-10-CM

## 2022-06-13 RX ORDER — MAGNESIUM OXIDE 400 MG/1
TABLET ORAL
Qty: 30 TABLET | Refills: 3 | Status: SHIPPED | OUTPATIENT
Start: 2022-06-13

## 2022-06-13 RX ORDER — OMEPRAZOLE 40 MG/1
CAPSULE, DELAYED RELEASE ORAL
Qty: 30 CAPSULE | Refills: 1 | Status: ON HOLD | OUTPATIENT
Start: 2022-06-13 | End: 2022-08-09

## 2022-06-13 RX ORDER — ARIPIPRAZOLE 5 MG/1
TABLET ORAL
Qty: 30 TABLET | Refills: 5 | Status: SHIPPED | OUTPATIENT
Start: 2022-06-13

## 2022-06-13 NOTE — TELEPHONE ENCOUNTER
Patient is refilling last refill on these meds, please send in script for future order with refills.

## 2022-06-13 NOTE — TELEPHONE ENCOUNTER
Medication:   Requested Prescriptions     Pending Prescriptions Disp Refills    ARIPiprazole (ABILIFY) 5 MG tablet [Pharmacy Med Name: ARIPIPRAZOLE 5 MG TABS 5 Tablet] 30 tablet 5     Sig: TAKE ONE TABLET BY MOUTH EVERY DAY        Last Filled:      Patient Phone Number: [phone] (home)     Last appt: 6/10/2022   Next appt: 6/13/2022    Last OARRS: No flowsheet data found.

## 2022-06-13 NOTE — TELEPHONE ENCOUNTER
Medication:   Requested Prescriptions     Pending Prescriptions Disp Refills    omeprazole (PRILOSEC) 40 MG delayed release capsule [Pharmacy Med Name: OMEPRAZOLE 40 MG CPDR 40 Capsule] 30 capsule 1     Sig: TAKE ONE CAPSULE BY MOUTH EVERY DAY BEFORE BREAKFAST        Last Filled:      Patient Phone Number: [phone] (home)     Last appt: 6/10/2022   Next appt: Visit date not found    Last OARRS: No flowsheet data found.

## 2022-06-15 RX ORDER — LEVETIRACETAM 500 MG/1
500 TABLET ORAL 2 TIMES DAILY
Qty: 60 TABLET | Refills: 5 | Status: ON HOLD | OUTPATIENT
Start: 2022-06-15 | End: 2022-08-09 | Stop reason: SDUPTHER

## 2022-06-15 RX ORDER — ESLICARBAZEPINE ACETATE 800 MG/1
TABLET ORAL
Qty: 60 TABLET | Refills: 5 | Status: ON HOLD | OUTPATIENT
Start: 2022-06-15 | End: 2022-08-09 | Stop reason: SDUPTHER

## 2022-07-05 ENCOUNTER — TELEPHONE (OUTPATIENT)
Dept: NEUROLOGY | Age: 62
End: 2022-07-05

## 2022-07-05 NOTE — TELEPHONE ENCOUNTER
Pt called to reschedule appt and wanted to inform Larry Thompson that she had a seizure last night. She stated it was because she was sick last week and wasn't able to take meds as prescribed. Advised to try to stick to medication regimen and call with any other issues.

## 2022-07-11 RX ORDER — MAGNESIUM OXIDE 400 MG/1
TABLET ORAL
Qty: 30 TABLET | Refills: 3 | OUTPATIENT
Start: 2022-07-11

## 2022-07-22 ENCOUNTER — TELEPHONE (OUTPATIENT)
Dept: PULMONOLOGY | Age: 62
End: 2022-07-22

## 2022-07-22 NOTE — TELEPHONE ENCOUNTER
LVM for patient regarding oxygen sats being in 86%-88%. She states that she thinks she needs her oxygen back. I advised on the VM that she needs to be retested for oxygen in order to re qualify for oxygen. She also mentioned going up [place] for a week and I advised on VM that she should closely monitor her O2 sats if she believes that they are low.

## 2022-08-04 ENCOUNTER — OFFICE VISIT (OUTPATIENT)
Dept: NEUROLOGY | Age: 62
End: 2022-08-04
Payer: MEDICARE

## 2022-08-04 VITALS
WEIGHT: 187 LBS | OXYGEN SATURATION: 93 % | HEIGHT: 61 IN | BODY MASS INDEX: 35.3 KG/M2 | HEART RATE: 52 BPM | DIASTOLIC BLOOD PRESSURE: 86 MMHG | SYSTOLIC BLOOD PRESSURE: 140 MMHG

## 2022-08-04 DIAGNOSIS — G40.109 PARTIAL SYMPTOMATIC EPILEPSY WITH SIMPLE PARTIAL SEIZURES, NOT INTRACTABLE, WITHOUT STATUS EPILEPTICUS (HCC): ICD-10-CM

## 2022-08-04 DIAGNOSIS — G40.319 INTRACTABLE GENERALIZED IDIOPATHIC EPILEPSY WITHOUT STATUS EPILEPTICUS (HCC): Primary | ICD-10-CM

## 2022-08-04 PROCEDURE — 1036F TOBACCO NON-USER: CPT | Performed by: NURSE PRACTITIONER

## 2022-08-04 PROCEDURE — 3017F COLORECTAL CA SCREEN DOC REV: CPT | Performed by: NURSE PRACTITIONER

## 2022-08-04 PROCEDURE — G8427 DOCREV CUR MEDS BY ELIG CLIN: HCPCS | Performed by: NURSE PRACTITIONER

## 2022-08-04 PROCEDURE — G8417 CALC BMI ABV UP PARAM F/U: HCPCS | Performed by: NURSE PRACTITIONER

## 2022-08-04 PROCEDURE — 99214 OFFICE O/P EST MOD 30 MIN: CPT | Performed by: NURSE PRACTITIONER

## 2022-08-04 NOTE — PROGRESS NOTES
8/4/22    Génesis Banks  1960    Chief Complaint   Patient presents with    Follow-up     Pt has had 2 big seizures and 3 small ones since last visit. History of Present Illness  Génesis is a 65 y.o. female presenting today for follow-up of: Epilepsy. Génesis remains on Aptiom 1600 mg daily for management of tonic-clonic seizures and was recently started on Keppra 500 mg twice daily for partial seizures consisting of staring spells for 30-60 seconds without loss of awareness, these episodes were occurring approximately once per month. On last visit she reported improvement in frequency of partial seizures since starting Keppra and had no breakthrough tonic-clonic seizures. Génesis did notify the office recently that she had a breakthrough seizure however she reported having been ill and was unable to take her seizure medication. She denies having any breakthrough seizures since that time. She reports doing well overall but reports having some dizziness occurring over the past week.       Current Outpatient Medications   Medication Sig Dispense Refill    APTIOM 800 MG tablet TAKE TWO TABLETS BY MOUTH AT BEDTIME 60 tablet 5    levETIRAcetam (KEPPRA) 500 MG tablet TAKE 1 TABLET BY MOUTH 2 TIMES DAILY 60 tablet 5    ARIPiprazole (ABILIFY) 5 MG tablet TAKE ONE TABLET BY MOUTH EVERY DAY 30 tablet 5    magnesium oxide (MAG-OX) 400 MG tablet TAKE ONE TABLET BY MOUTH EVERY DAY 30 tablet 3    omeprazole (PRILOSEC) 40 MG delayed release capsule TAKE ONE CAPSULE BY MOUTH EVERY DAY BEFORE BREAKFAST 30 capsule 1    olmesartan-hydroCHLOROthiazide (BENICAR HCT) 40-25 MG per tablet TAKE ONE TABLET BY MOUTH EVERY DAY 30 tablet 5    alendronate (FOSAMAX) 70 MG tablet Take 1 tablet by mouth once a week 12 tablet 1    nortriptyline (PAMELOR) 25 MG capsule Take 2 capsules by mouth nightly 60 capsule 5    rOPINIRole (REQUIP) 0.5 MG tablet Take 1 tablet by mouth in the morning and at bedtime 60 tablet 5    oxybutynin (DITROPAN-XL) 10 MG extended release tablet Take 1 tablet by mouth daily 30 tablet 5     No current facility-administered medications for this visit. Physical Exam:    Mental Status              Orientation: oriented to person, oriented to place, oriented to problem and oriented to time                        Mood/affectappropriate mood and appropriate affect              Memory/Other: recent memory intact, remote memory intact, fund of knowledge intact, attention span normal and concentration normal  Language  Language: (normal) language, no dysarthria, (normal) articulation and no dysphasia/aphasia  Cranial Nerves              Eyes: pupils normal size and reactive to light and visual fields appear full              CN III, IV, VI : extraocular muscle strength normal, normal pursuit, no nystagmus and no ptosis              Facial Motor: normal facial motor  Motor/Coordination Exam              Power: motor strength appears intact throughout, no arm drift and normal tone  Gait and Stance              Gait/Posture: station normal, casual gait normal and ambulates with walker      BP (!) 140/86 (Site: Right Upper Arm, Position: Sitting, Cuff Size: Large Adult)   Pulse 52   Ht 5' 1\" (1.549 m)   Wt 187 lb (84.8 kg)   SpO2 93%   BMI 35.33 kg/m²     Assessment and Plan     Diagnosis Orders   1. Intractable generalized idiopathic epilepsy without status epilepticus ([address]        2. Partial symptomatic epilepsy with simple partial seizures, not intractable, without status epilepticus ([address]        Génesis is doing well in regard to seizure management, she has had no breakthrough seizures since the reported seizure when she was ill and unable to take her medication. I will not make any changes to current regimen. In regard to reports of dizziness, I encouraged her to follow-up with PCP to discuss. I will plan on following up with Génesis again in 6 months, sooner if the need arises.     Seizure precautions

## 2022-08-05 ENCOUNTER — APPOINTMENT (OUTPATIENT)
Dept: GENERAL RADIOLOGY | Age: 62
DRG: 644 | End: 2022-08-05
Attending: STUDENT IN AN ORGANIZED HEALTH CARE EDUCATION/TRAINING PROGRAM
Payer: MEDICARE

## 2022-08-05 ENCOUNTER — APPOINTMENT (OUTPATIENT)
Dept: CT IMAGING | Age: 62
End: 2022-08-05
Payer: MEDICARE

## 2022-08-05 ENCOUNTER — HOSPITAL ENCOUNTER (EMERGENCY)
Age: 62
Discharge: ANOTHER HOSPITAL | End: 2022-08-05
Attending: STUDENT IN AN ORGANIZED HEALTH CARE EDUCATION/TRAINING PROGRAM
Payer: MEDICARE

## 2022-08-05 ENCOUNTER — HOSPITAL ENCOUNTER (INPATIENT)
Age: 62
LOS: 4 days | Discharge: HOME | DRG: 644 | End: 2022-08-09
Attending: STUDENT IN AN ORGANIZED HEALTH CARE EDUCATION/TRAINING PROGRAM | Admitting: STUDENT IN AN ORGANIZED HEALTH CARE EDUCATION/TRAINING PROGRAM
Payer: MEDICARE

## 2022-08-05 VITALS
HEIGHT: 61 IN | SYSTOLIC BLOOD PRESSURE: 129 MMHG | TEMPERATURE: 97.6 F | DIASTOLIC BLOOD PRESSURE: 88 MMHG | RESPIRATION RATE: 18 BRPM | BODY MASS INDEX: 35.3 KG/M2 | WEIGHT: 187 LBS | OXYGEN SATURATION: 99 % | HEART RATE: 76 BPM

## 2022-08-05 DIAGNOSIS — E87.6 HYPOKALEMIA: ICD-10-CM

## 2022-08-05 DIAGNOSIS — R42 DIZZINESS: Primary | ICD-10-CM

## 2022-08-05 DIAGNOSIS — G40.109 PARTIAL SYMPTOMATIC EPILEPSY WITH SIMPLE PARTIAL SEIZURES, NOT INTRACTABLE, WITHOUT STATUS EPILEPTICUS (HCC): ICD-10-CM

## 2022-08-05 DIAGNOSIS — G40.319 INTRACTABLE GENERALIZED IDIOPATHIC EPILEPSY WITHOUT STATUS EPILEPTICUS (HCC): ICD-10-CM

## 2022-08-05 DIAGNOSIS — E87.1 HYPONATREMIA: Primary | ICD-10-CM

## 2022-08-05 DIAGNOSIS — I65.01 STENOSIS OF RIGHT VERTEBRAL ARTERY: ICD-10-CM

## 2022-08-05 DIAGNOSIS — E87.1 HYPONATREMIA: ICD-10-CM

## 2022-08-05 LAB
ALBUMIN SERPL-MCNC: 4.4 GM/DL (ref 3.4–5)
ALP BLD-CCNC: 151 IU/L (ref 40–129)
ALT SERPL-CCNC: 13 U/L (ref 10–40)
ANION GAP SERPL CALCULATED.3IONS-SCNC: 10 MMOL/L (ref 4–16)
AST SERPL-CCNC: 16 IU/L (ref 15–37)
BACTERIA: NEGATIVE /HPF
BASOPHILS ABSOLUTE: 0 K/CU MM
BASOPHILS RELATIVE PERCENT: 0.4 % (ref 0–1)
BILIRUB SERPL-MCNC: 0.3 MG/DL (ref 0–1)
BILIRUBIN URINE: NEGATIVE MG/DL
BLOOD, URINE: NEGATIVE
BUN BLDV-MCNC: 20 MG/DL (ref 6–23)
CALCIUM SERPL-MCNC: 9.6 MG/DL (ref 8.3–10.6)
CAST TYPE: ABNORMAL /HPF
CHLORIDE BLD-SCNC: 71 MMOL/L (ref 99–110)
CHLORIDE URINE RANDOM: 57 MMOL/L (ref 43–210)
CLARITY: CLEAR
CO2: 30 MMOL/L (ref 21–32)
COLOR: YELLOW
CREAT SERPL-MCNC: 1.2 MG/DL (ref 0.6–1.1)
CRYSTAL TYPE: NEGATIVE /HPF
DIFFERENTIAL TYPE: ABNORMAL
EOSINOPHILS ABSOLUTE: 0 K/CU MM
EOSINOPHILS RELATIVE PERCENT: 0.3 % (ref 0–3)
EPITHELIAL CELLS, UA: ABNORMAL /HPF
GFR AFRICAN AMERICAN: 55 ML/MIN/1.73M2
GFR NON-AFRICAN AMERICAN: 46 ML/MIN/1.73M2
GLUCOSE BLD-MCNC: 101 MG/DL (ref 70–99)
GLUCOSE, URINE: 250 MG/DL
HCT VFR BLD CALC: 32.5 % (ref 37–47)
HEMOGLOBIN: 11.8 GM/DL (ref 12.5–16)
IMMATURE NEUTROPHIL %: 0.6 % (ref 0–0.43)
INR BLD: 1.02 INDEX
KETONES, URINE: NEGATIVE MG/DL
LEUKOCYTE ESTERASE, URINE: NEGATIVE
LYMPHOCYTES ABSOLUTE: 1.7 K/CU MM
LYMPHOCYTES RELATIVE PERCENT: 24.6 % (ref 24–44)
MAGNESIUM: 2.4 MG/DL (ref 1.8–2.4)
MCH RBC QN AUTO: 28.8 PG (ref 27–31)
MCHC RBC AUTO-ENTMCNC: 36.3 % (ref 32–36)
MCV RBC AUTO: 79.3 FL (ref 78–100)
MONOCYTES ABSOLUTE: 0.6 K/CU MM
MONOCYTES RELATIVE PERCENT: 8.5 % (ref 0–4)
NITRITE URINE, QUANTITATIVE: NEGATIVE
OSMOLALITY URINE: 506 MOS/L (ref 292–1090)
PDW BLD-RTO: 12.3 % (ref 11.7–14.9)
PH, URINE: 7.5 (ref 5–8)
PLATELET # BLD: 274 K/CU MM (ref 140–440)
PMV BLD AUTO: 8.2 FL (ref 7.5–11.1)
POTASSIUM SERPL-SCNC: 2.5 MMOL/L (ref 3.5–5.1)
POTASSIUM, UR: 21.9 MMOL/L (ref 22–119)
PROTEIN UA: NEGATIVE MG/DL
PROTHROMBIN TIME: 12.4 SECONDS (ref 11.7–14.5)
RBC # BLD: 4.1 M/CU MM (ref 4.2–5.4)
RBC URINE: NEGATIVE /HPF (ref 0–6)
SEGMENTED NEUTROPHILS ABSOLUTE COUNT: 4.5 K/CU MM
SEGMENTED NEUTROPHILS RELATIVE PERCENT: 65.6 % (ref 36–66)
SODIUM BLD-SCNC: 111 MMOL/L (ref 135–145)
SODIUM URINE: 102 MMOL/L (ref 35–167)
SPECIFIC GRAVITY UA: 1.01 (ref 1–1.03)
TOTAL IMMATURE NEUTOROPHIL: 0.04 K/CU MM
TOTAL PROTEIN: 7.8 GM/DL (ref 6.4–8.2)
TROPONIN T: <0.01 NG/ML
UROBILINOGEN, URINE: 0.2 MG/DL (ref 0.2–1)
WBC # BLD: 6.8 K/CU MM (ref 4–10.5)
WBC UA: ABNORMAL /HPF (ref 0–5)

## 2022-08-05 PROCEDURE — 80048 BASIC METABOLIC PNL TOTAL CA: CPT

## 2022-08-05 PROCEDURE — 83735 ASSAY OF MAGNESIUM: CPT

## 2022-08-05 PROCEDURE — 2000000000 HC ICU R&B

## 2022-08-05 PROCEDURE — 70450 CT HEAD/BRAIN W/O DYE: CPT

## 2022-08-05 PROCEDURE — 93005 ELECTROCARDIOGRAM TRACING: CPT | Performed by: STUDENT IN AN ORGANIZED HEALTH CARE EDUCATION/TRAINING PROGRAM

## 2022-08-05 PROCEDURE — 70498 CT ANGIOGRAPHY NECK: CPT

## 2022-08-05 PROCEDURE — 2580000003 HC RX 258: Performed by: STUDENT IN AN ORGANIZED HEALTH CARE EDUCATION/TRAINING PROGRAM

## 2022-08-05 PROCEDURE — 96366 THER/PROPH/DIAG IV INF ADDON: CPT

## 2022-08-05 PROCEDURE — 83935 ASSAY OF URINE OSMOLALITY: CPT

## 2022-08-05 PROCEDURE — 84133 ASSAY OF URINE POTASSIUM: CPT

## 2022-08-05 PROCEDURE — 6360000004 HC RX CONTRAST MEDICATION: Performed by: STUDENT IN AN ORGANIZED HEALTH CARE EDUCATION/TRAINING PROGRAM

## 2022-08-05 PROCEDURE — 6360000002 HC RX W HCPCS: Performed by: STUDENT IN AN ORGANIZED HEALTH CARE EDUCATION/TRAINING PROGRAM

## 2022-08-05 PROCEDURE — 83930 ASSAY OF BLOOD OSMOLALITY: CPT

## 2022-08-05 PROCEDURE — 80053 COMPREHEN METABOLIC PANEL: CPT

## 2022-08-05 PROCEDURE — 85025 COMPLETE CBC W/AUTO DIFF WBC: CPT

## 2022-08-05 PROCEDURE — 85610 PROTHROMBIN TIME: CPT

## 2022-08-05 PROCEDURE — 82436 ASSAY OF URINE CHLORIDE: CPT

## 2022-08-05 PROCEDURE — 84484 ASSAY OF TROPONIN QUANT: CPT

## 2022-08-05 PROCEDURE — 84300 ASSAY OF URINE SODIUM: CPT

## 2022-08-05 PROCEDURE — 73630 X-RAY EXAM OF FOOT: CPT

## 2022-08-05 PROCEDURE — 96365 THER/PROPH/DIAG IV INF INIT: CPT

## 2022-08-05 PROCEDURE — 99285 EMERGENCY DEPT VISIT HI MDM: CPT

## 2022-08-05 PROCEDURE — 81001 URINALYSIS AUTO W/SCOPE: CPT

## 2022-08-05 PROCEDURE — 71045 X-RAY EXAM CHEST 1 VIEW: CPT

## 2022-08-05 RX ORDER — SODIUM CHLORIDE 0.9 % (FLUSH) 0.9 %
5-40 SYRINGE (ML) INJECTION PRN
Status: DISCONTINUED | OUTPATIENT
Start: 2022-08-05 | End: 2022-08-09 | Stop reason: HOSPADM

## 2022-08-05 RX ORDER — SODIUM CHLORIDE 9 MG/ML
1000 INJECTION, SOLUTION INTRAVENOUS CONTINUOUS
Status: DISCONTINUED | OUTPATIENT
Start: 2022-08-05 | End: 2022-08-05 | Stop reason: HOSPADM

## 2022-08-05 RX ORDER — ACETAMINOPHEN 325 MG/1
650 TABLET ORAL EVERY 6 HOURS PRN
Status: DISCONTINUED | OUTPATIENT
Start: 2022-08-05 | End: 2022-08-09 | Stop reason: HOSPADM

## 2022-08-05 RX ORDER — POLYETHYLENE GLYCOL 3350 17 G/17G
17 POWDER, FOR SOLUTION ORAL DAILY PRN
Status: DISCONTINUED | OUTPATIENT
Start: 2022-08-05 | End: 2022-08-09 | Stop reason: HOSPADM

## 2022-08-05 RX ORDER — ONDANSETRON 4 MG/1
4 TABLET, ORALLY DISINTEGRATING ORAL EVERY 8 HOURS PRN
Status: DISCONTINUED | OUTPATIENT
Start: 2022-08-05 | End: 2022-08-09 | Stop reason: HOSPADM

## 2022-08-05 RX ORDER — ONDANSETRON 2 MG/ML
4 INJECTION INTRAMUSCULAR; INTRAVENOUS EVERY 6 HOURS PRN
Status: DISCONTINUED | OUTPATIENT
Start: 2022-08-05 | End: 2022-08-09 | Stop reason: HOSPADM

## 2022-08-05 RX ORDER — ENOXAPARIN SODIUM 100 MG/ML
30 INJECTION SUBCUTANEOUS DAILY
Status: DISCONTINUED | OUTPATIENT
Start: 2022-08-06 | End: 2022-08-09

## 2022-08-05 RX ORDER — 3% SODIUM CHLORIDE 3 G/100ML
20 INJECTION, SOLUTION INTRAVENOUS CONTINUOUS
Status: DISPENSED | OUTPATIENT
Start: 2022-08-06 | End: 2022-08-07

## 2022-08-05 RX ORDER — SODIUM CHLORIDE 9 MG/ML
1000 INJECTION, SOLUTION INTRAVENOUS CONTINUOUS
Status: DISCONTINUED | OUTPATIENT
Start: 2022-08-05 | End: 2022-08-05

## 2022-08-05 RX ORDER — SODIUM CHLORIDE 9 MG/ML
INJECTION, SOLUTION INTRAVENOUS PRN
Status: DISCONTINUED | OUTPATIENT
Start: 2022-08-05 | End: 2022-08-09 | Stop reason: HOSPADM

## 2022-08-05 RX ORDER — MAGNESIUM SULFATE 1 G/100ML
1000 INJECTION INTRAVENOUS PRN
Status: DISCONTINUED | OUTPATIENT
Start: 2022-08-05 | End: 2022-08-09 | Stop reason: HOSPADM

## 2022-08-05 RX ORDER — POTASSIUM CHLORIDE 7.45 MG/ML
10 INJECTION INTRAVENOUS PRN
Status: DISCONTINUED | OUTPATIENT
Start: 2022-08-05 | End: 2022-08-09 | Stop reason: HOSPADM

## 2022-08-05 RX ORDER — SODIUM CHLORIDE 0.9 % (FLUSH) 0.9 %
5-40 SYRINGE (ML) INJECTION EVERY 12 HOURS SCHEDULED
Status: DISCONTINUED | OUTPATIENT
Start: 2022-08-06 | End: 2022-08-09 | Stop reason: HOSPADM

## 2022-08-05 RX ORDER — LEVETIRACETAM 500 MG/1
500 TABLET ORAL 2 TIMES DAILY
Status: DISCONTINUED | OUTPATIENT
Start: 2022-08-05 | End: 2022-08-07

## 2022-08-05 RX ORDER — ACETAMINOPHEN 650 MG/1
650 SUPPOSITORY RECTAL EVERY 6 HOURS PRN
Status: DISCONTINUED | OUTPATIENT
Start: 2022-08-05 | End: 2022-08-09 | Stop reason: HOSPADM

## 2022-08-05 RX ORDER — SODIUM CHLORIDE 0.9 % (FLUSH) 0.9 %
5-40 SYRINGE (ML) INJECTION EVERY 12 HOURS SCHEDULED
Status: DISCONTINUED | OUTPATIENT
Start: 2022-08-05 | End: 2022-08-09 | Stop reason: HOSPADM

## 2022-08-05 RX ORDER — POTASSIUM CHLORIDE 7.45 MG/ML
20 INJECTION INTRAVENOUS ONCE
Status: COMPLETED | OUTPATIENT
Start: 2022-08-05 | End: 2022-08-05

## 2022-08-05 RX ADMIN — IOPAMIDOL 75 ML: 755 INJECTION, SOLUTION INTRAVENOUS at 15:30

## 2022-08-05 RX ADMIN — SODIUM CHLORIDE 1000 ML: 9 INJECTION, SOLUTION INTRAVENOUS at 16:20

## 2022-08-05 RX ADMIN — POTASSIUM CHLORIDE 20 MEQ: 7.46 INJECTION, SOLUTION INTRAVENOUS at 16:25

## 2022-08-05 NOTE — ED PROVIDER NOTES
Kieran Palma6      Pt Name: Michelle Hernández  MRN: [id number]  [address] 1960  Date of evaluation: 8/5/2022  Provider: Jennifer Sanders MD    [address] [place] [address]       Chief Complaint   Patient presents with    Dizziness     X 1 week pt reports loss of appetite. Gait Problem     Pt reports having to hold on to things while walking for the last week       Avinash Wells 43 is a 65 y.o. female History of COPD, epilepsy on Keppra, hypertension, presenting with dizziness, fatigue, and decreased p.o. intake. Patient states she had approximately 4 days last week of the symptoms that did not resolve. She felt better for a day or 2 and then the symptoms returned for the past few days. She describes feeling lightheaded and dizzy but has difficulty describing exactly what she means by this. Does not report that the room is spinning. States that she feels unstable with ambulation and needs assistance. Describes hazy vision changes with the lightheadedness. Reports not feeling hungry and has eaten very little over the past few days. Has been taking medications as prescribed. Denies any alcohol or IV drug use. Does report marijuana use. Nursing Notes were reviewed. REVIEW OF SYSTEMS     Review of Systems  A 10 point review of system was performed and is otherwise negative apart from what is noted in HPI and nursing notes. As is my standard practice, all pertinent positives from the ROS are documented in the HPI. PAST MEDICAL HISTORY     Past Medical History:   Diagnosis Date    COPD (chronic obstructive pulmonary disease) ([facility]     Epilepsy ([facility]     Full dentures     upper plate only    H/O dizziness     \"take Meclazine at least twice a day for this\"    H/O Doppler JERMAINE's ultrasound 12/01/2020     No evidence of significant occlusive arterial disease of lower EXT.     H/O echocardiogram 12/01/2020    EF 55-60%, Normal study.    History of drug abuse ([facility]     History of nuclear stress test 11/30/2020    Normal study.     History of UTI     \"last one May 2020\"    Hypertension     \"the top # use to be over 200 \"- on medication since 2018\"    Seizures ([facility]     hx of epilepsy- last seizure week 7/13/2020- hx grand mal seizures    Wears glasses        SURGICAL HISTORY       Past Surgical History:   Procedure Laterality Date    BRAIN SURGERY      s/p L anterior temporal lobectomy in 1996\"did surgery to try an treat the seizures\"    CYSTOSCOPY N/A 10/14/2021    with a bladder biopsy-Dr. Wendie Small in [address] 10/14/2021    CYSTOSCOPY BLADDER BIOPSY performed by Henry Good MD at [address] ([facility])  1996    \"not sure if they took the ovaries\"    [address]    WRIST SURGERY Left 7/30/2020    LEFT WRIST TENDON RELEASE performed by Gaby Crowe DO at [address]       Previous Medications    ALENDRONATE (FOSAMAX) 70 MG TABLET    Take 1 tablet by mouth once a week    APTIOM 800 MG TABLET    TAKE TWO TABLETS BY MOUTH AT BEDTIME    ARIPIPRAZOLE (ABILIFY) 5 MG TABLET    TAKE ONE TABLET BY MOUTH EVERY DAY    LEVETIRACETAM (KEPPRA) 500 MG TABLET    TAKE 1 TABLET BY MOUTH 2 TIMES DAILY    MAGNESIUM OXIDE (MAG-OX) 400 MG TABLET    TAKE ONE TABLET BY MOUTH EVERY DAY    NORTRIPTYLINE (PAMELOR) 25 MG CAPSULE    Take 2 capsules by mouth nightly    OLMESARTAN-HYDROCHLOROTHIAZIDE (BENICAR HCT) 40-25 MG PER TABLET    TAKE ONE TABLET BY MOUTH EVERY DAY    OMEPRAZOLE (PRILOSEC) 40 MG DELAYED RELEASE CAPSULE    TAKE ONE CAPSULE BY MOUTH EVERY DAY BEFORE BREAKFAST    OXYBUTYNIN (DITROPAN-XL) 10 MG EXTENDED RELEASE TABLET    Take 1 tablet by mouth daily    ROPINIROLE (REQUIP) 0.5 MG TABLET    Take 1 tablet by mouth in the morning and at bedtime       ALLERGIES     Varenicline and Chantix [varenicline tartrate]    FAMILY HISTORY       Family History   Problem Relation Age of Onset    Heart Disease Father         cabg    Breast Cancer Maternal Cousin         SOCIAL HISTORY       Social History     Socioeconomic History    Marital status:      Spouse name: None    Number of children: None    Years of education: None    Highest education level: None   Tobacco Use    Smoking status: Former     Packs/day: 1.50     Years: 40.00     Pack years: 60.00     Types: Cigarettes     Quit date: 2020     Years since quittin.0    Smokeless tobacco: Never   Vaping Use    Vaping Use: Never used   Substance and Sexual Activity    Alcohol use: No     Comment: per pt on 2020\"quit drinking age 40- use to drink on weekends\"    Drug use: Yes     Types: Marijuana (Shari Paddock), Other-see comments     Comment: PCP/ per pt on 2020\"last used marijuana 2020, last did cocaine  spring 2020\"    Sexual activity: Yes     Partners: Male     Social Determinants of Health     Financial Resource Strain: High Risk    Difficulty of Paying Living Expenses: Very hard   Food Insecurity: Food Insecurity Present    Worried About Running Out of Food in the Last Year: Sometimes true    Ran Out of Food in the Last Year: Sometimes true       PHYSICAL EXAM       ED Triage Vitals   BP Temp Temp src Heart Rate Resp SpO2 Height Weight   22 1445 -- -- 22 1320 22 1320 22 1320 22 1320 22 1320   126/62   79 15 97 % 5' 1\" (1.549 m) 187 lb (84.8 kg)         Physical Exam  Vitals and nursing note reviewed. Constitutional:       Appearance: She is not toxic-appearing. HENT:      Head: Normocephalic. Eyes:      Extraocular Movements: Extraocular movements intact. Conjunctiva/sclera: Conjunctivae normal.      Pupils: Pupils are equal, round, and reactive to light. Cardiovascular:      Rate and Rhythm: Normal rate and regular rhythm. Pulmonary:      Effort: Pulmonary effort is normal. No respiratory distress. Breath sounds: Normal breath sounds.    Abdominal: General: There is no distension. Palpations: Abdomen is soft. Tenderness: There is no abdominal tenderness. Musculoskeletal:         General: No tenderness (Bilateral lower extremity). Cervical back: Normal range of motion. Right lower leg: No edema. Left lower leg: No edema. Skin:     General: Skin is warm and dry. Neurological:      General: No focal deficit present. Mental Status: She is alert and oriented to person, place, and time. Cranial Nerves: No cranial nerve deficit. Sensory: No sensory deficit. Motor: No weakness. Coordination: Coordination normal.   Psychiatric:         Mood and Affect: Mood normal.         Behavior: Behavior normal.       DIAGNOSTIC RESULTS     EKG: All EKG's are interpreted by me in the absence of a cardiologist.  Normal sinus rhythm. Rate of 77. Difficult to interpret EKG due to artifact at baseline. Likely prolonged QT. RADIOLOGY:   Interpretation per the Radiologist below, if available at the time of this note:    CT Head WO Contrast   Final Result   1. No acute intracranial abnormality. 2. 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 3. Atherosclerosis contributes to less than 50% stenosis of the proximal   right cervical ICA by NASCET criteria. 4. No flowing stenosis involving the left cervical ICA or left vertebral   artery. 5. No significant stenosis or large vessel occlusion of the [id number]. CTA HEAD NECK W CONTRAST   Final Result   1. No acute intracranial abnormality. 2. 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 3. Atherosclerosis contributes to less than 50% stenosis of the proximal   right cervical ICA by NASCET criteria. 4. No flowing stenosis involving the left cervical ICA or left vertebral   artery. 5. No significant stenosis or large vessel occlusion of the [id number]. LABS:  Results for orders placed or performed during the hospital encounter of 08/05/22   CBC with Auto Differential   Result Value Ref Range    WBC 6.8 4.0 - 10.5 K/CU MM    RBC 4.10 (L) 4.2 - 5.4 M/CU MM    Hemoglobin 11.8 (L) 12.5 - 16.0 GM/DL    Hematocrit 32.5 (L) 37 - 47 %    MCV 79.3 78 - 100 FL    MCH 28.8 27 - 31 PG    MCHC 36.3 (H) 32.0 - 36.0 %    RDW 12.3 11.7 - 14.9 %    Platelets 562 410 - 369 K/CU MM    MPV 8.2 7.5 - 11.1 FL    Differential Type AUTOMATED DIFFERENTIAL     Segs Relative 65.6 36 - 66 %    Lymphocytes % 24.6 24 - 44 %    Monocytes % 8.5 (H) 0 - 4 %    Eosinophils % 0.3 0 - 3 %    Basophils % 0.4 0 - 1 %    Segs Absolute 4.5 K/CU MM    Lymphocytes Absolute 1.7 K/CU MM    Monocytes Absolute 0.6 K/CU MM    Eosinophils Absolute 0.0 K/CU MM    Basophils Absolute 0.0 K/CU MM    Immature Neutrophil % 0.6 (H) 0 - 0.43 %    Total Immature Neutrophil 0.04 K/CU MM   Comprehensive Metabolic Panel w/ Reflex to MG   Result Value Ref Range    Sodium 111 (LL) 135 - 145 MMOL/L    Potassium 2.5 (LL) 3.5 - 5.1 MMOL/L    Chloride 71 (L) 99 - 110 mMol/L    CO2 30 21 - 32 MMOL/L    BUN 20 6 - 23 MG/DL    Creatinine 1.2 (H) 0.6 - 1.1 MG/DL    Glucose 101 (H) 70 - 99 MG/DL    Calcium 9.6 8.3 - 10.6 MG/DL    Albumin 4.4 3.4 - 5.0 GM/DL    Total Protein 7.8 6.4 - 8.2 GM/DL    Total Bilirubin 0.3 0.0 - 1.0 MG/DL    ALT 13 10 - 40 U/L    AST 16 15 - 37 IU/L    Alkaline Phosphatase 151 (H) 40 - 129 IU/L    GFR Non- 46 (L) >60 mL/min/1.73m2    GFR  55 (L) >60 mL/min/1.73m2    Anion Gap 10 4 - 16   Troponin   Result Value Ref Range    Troponin T <0.010 <0.01 NG/ML   Protime-INR   Result Value Ref Range    Protime 12.4 11.7 - 14.5 SECONDS    INR 1.02 INDEX   Urinalysis with Microscopic   Result Value Ref Range    Color, UA YELLOW YELLOW    Clarity, UA CLEAR CLEAR    Glucose, Urine 250 (A) NEGATIVE MG/DL    Bilirubin Urine NEGATIVE NEGATIVE MG/DL    Ketones, Urine NEGATIVE NEGATIVE MG/DL    Specific Gravity, UA 1.010 1.001 - 1.035    Blood, Urine NEGATIVE NEGATIVE    pH, Urine 7.5 5.0 - 8.0    Protein, UA NEGATIVE NEGATIVE MG/DL    Urobilinogen, Urine 0.2 0.2 - 1.0 MG/DL    Nitrite Urine, Quantitative NEGATIVE NEGATIVE    Leukocyte Esterase, Urine NEGATIVE NEGATIVE    RBC, UA NEGATIVE 0 - 6 /HPF    WBC, UA  0 TO 2 0 - 5 /HPF    Epithelial Cells, UA  3 TO 5 /HPF    Cast Type NO CAST FORMS SEEN NO CAST FORMS SEEN /HPF    Bacteria, UA NEGATIVE NEGATIVE /HPF    Crystal Type NEGATIVE NEGATIVE /HPF   Magnesium   Result Value Ref Range    Magnesium 2.4 1.8 - 2.4 mg/dl   EKG 12 Lead   Result Value Ref Range    Ventricular Rate 77 BPM    Atrial Rate 77 BPM    P-R Interval 172 ms    QRS Duration 88 ms    Q-T Interval 448 ms    QTc Calculation (Bazett) 506 ms    P Axis 76 degrees    R Axis 59 degrees    T Axis 73 degrees    Diagnosis       Normal sinus rhythm  Possible Left atrial enlargement  Nonspecific ST abnormality  Prolonged QT  Abnormal ECG  When compared with ECG of 27-MAY-2022 10:23,  No significant change was found          EMERGENCY DEPARTMENT COURSE     ED Course as of 08/05/22 1711   Fri Aug 05, 2022   1610 Spoke with nephrology Dr. Smith Crew who recommended normal saline at 50 mils per hour, urine electrolytes, urine osmolality, and serum osmolality. They will evaluate when patient arrives at [place] [address]. [RB]      ED Course User Index  [RB] Aniket Eisenberg MD       DIFFERENTIAL DIAGNOSIS/MDM:   Vitals:    Vitals:    08/05/22 1320 08/05/22 1445 08/05/22 1500 08/05/22 1630   BP:  126/62 (!) 120/51 126/78   Pulse: 79 78 75 70   Resp: 15   14   SpO2: 97% 94% 93% (!) 89%   Weight: 187 lb (84.8 kg)      Height: 5' 1\" (1.549 m)          MDM  Number of Diagnoses or Management Options  Dizziness  Diagnosis management comments: 36-year-old female presenting for intermittent and now persistent dizziness, difficulty with ambulation, reported vision changes. Vitals within normal limits.   Patient occasionally dips to 89% on O2 sat but immediately comes back up to the 90s. She does report a history of COPD. Is not on baseline oxygen. NIH score of 0. Normal finger-to-nose. 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 No other abnormalities. Patient's labs show severe hyponatremia and hypokalemia. Feel that this time patient's symptoms are likely due to her electrolyte abnormalities but I cannot rule out that the vertebral artery is not also contributing to her symptoms. I spoke with nephrology who recommends 50 mL/h normal saline so as not to overcorrect until they evaluate when patient arrives to the hospital.  Dr. Luís Hooker is excepted for transfer to [facility].          CONSULTS:  [facility]:  Unless otherwise noted below, none. Procedures      FINAL IMPRESSION      1. Dizziness    2. Stenosis of right vertebral artery    3. Hyponatremia    4. Hypokalemia          PATIENT REFERRED TO:  No follow-up provider specified. DISCHARGE MEDICATIONS:  New Prescriptions    No medications on file     Controlled Substances Monitoring:     No flowsheet data found.     (Please note that portions of this note were completed with a voice recognition program.  Efforts were made to edit the dictations but occasionally words are mis-transcribed.)    Bharati Khalil MD (electronically signed)  Attending Emergency Physician            Bharati Khalil MD  08/05/22 [id number]

## 2022-08-05 NOTE — ED NOTES
Sodium : 111   Potassium : 2.5   Reported by , Dr. Yelitza Escamilla notified. Requested for lab to confirm results but retesting.       Sarah Toure RN  08/05/22 0604

## 2022-08-05 NOTE — ED NOTES
Sodium and Potassium results confirmed by retest, notified my . Dr. Franklyn Alpers notified.       Alonso Guillen RN  08/05/22 2128

## 2022-08-06 ENCOUNTER — APPOINTMENT (OUTPATIENT)
Dept: CT IMAGING | Age: 62
DRG: 644 | End: 2022-08-06
Attending: STUDENT IN AN ORGANIZED HEALTH CARE EDUCATION/TRAINING PROGRAM
Payer: MEDICARE

## 2022-08-06 LAB
ACETAMINOPHEN LEVEL: 6.2 UG/ML (ref 15–30)
ALCOHOL SCREEN SERUM: <0.01 %WT/VOL
ANION GAP SERPL CALCULATED.3IONS-SCNC: 11 MMOL/L (ref 4–16)
ANION GAP SERPL CALCULATED.3IONS-SCNC: 13 MMOL/L (ref 4–16)
ANION GAP SERPL CALCULATED.3IONS-SCNC: 14 MMOL/L (ref 4–16)
BASOPHILS ABSOLUTE: 0.1 K/CU MM
BASOPHILS RELATIVE PERCENT: 0.9 % (ref 0–1)
BUN BLDV-MCNC: 12 MG/DL (ref 6–23)
BUN BLDV-MCNC: 12 MG/DL (ref 6–23)
CALCIUM SERPL-MCNC: 8.7 MG/DL (ref 8.3–10.6)
CALCIUM SERPL-MCNC: 8.8 MG/DL (ref 8.3–10.6)
CHLORIDE BLD-SCNC: 77 MMOL/L (ref 99–110)
CHLORIDE BLD-SCNC: 80 MMOL/L (ref 99–110)
CHLORIDE BLD-SCNC: 81 MMOL/L (ref 99–110)
CHLORIDE URINE RANDOM: 78 MMOL/L (ref 43–210)
CO2: 21 MMOL/L (ref 21–32)
CO2: 25 MMOL/L (ref 21–32)
CO2: 25 MMOL/L (ref 21–32)
CORTISOL - AM: 11.8 UG/DL (ref 6–18.4)
CREAT SERPL-MCNC: 0.9 MG/DL (ref 0.6–1.1)
CREAT SERPL-MCNC: 0.9 MG/DL (ref 0.6–1.1)
DIFFERENTIAL TYPE: ABNORMAL
EOSINOPHILS ABSOLUTE: 0.1 K/CU MM
EOSINOPHILS RELATIVE PERCENT: 1.2 % (ref 0–3)
GFR AFRICAN AMERICAN: >60 ML/MIN/1.73M2
GFR AFRICAN AMERICAN: >60 ML/MIN/1.73M2
GFR NON-AFRICAN AMERICAN: >60 ML/MIN/1.73M2
GFR NON-AFRICAN AMERICAN: >60 ML/MIN/1.73M2
GLUCOSE BLD-MCNC: 88 MG/DL (ref 70–99)
GLUCOSE BLD-MCNC: 94 MG/DL (ref 70–99)
HCT VFR BLD CALC: 30.5 % (ref 37–47)
HEMOGLOBIN: 10.9 GM/DL (ref 12.5–16)
IMMATURE NEUTROPHIL %: 0.5 % (ref 0–0.43)
LYMPHOCYTES ABSOLUTE: 1.9 K/CU MM
LYMPHOCYTES RELATIVE PERCENT: 29.3 % (ref 24–44)
MAGNESIUM: 1.8 MG/DL (ref 1.8–2.4)
MCH RBC QN AUTO: 29.3 PG (ref 27–31)
MCHC RBC AUTO-ENTMCNC: 35.7 % (ref 32–36)
MCV RBC AUTO: 82 FL (ref 78–100)
MONOCYTES ABSOLUTE: 0.5 K/CU MM
MONOCYTES RELATIVE PERCENT: 8.4 % (ref 0–4)
NUCLEATED RBC %: 0 %
OSMOLALITY URINE: 505 MOS/L (ref 292–1090)
OSMOLALITY: 243 MOS/L (ref 280–300)
OSMOLALITY: 244 MOS/L (ref 280–300)
PDW BLD-RTO: 12.3 % (ref 11.7–14.9)
PLATELET # BLD: 291 K/CU MM (ref 140–440)
PMV BLD AUTO: 9 FL (ref 7.5–11.1)
POTASSIUM SERPL-SCNC: 2.7 MMOL/L (ref 3.5–5.1)
POTASSIUM SERPL-SCNC: 3.8 MMOL/L (ref 3.5–5.1)
POTASSIUM SERPL-SCNC: 3.8 MMOL/L (ref 3.5–5.1)
POTASSIUM, UR: 15.6 MMOL/L (ref 22–119)
PRO-BNP: 130.1 PG/ML
RBC # BLD: 3.72 M/CU MM (ref 4.2–5.4)
SALICYLATE LEVEL: <0.3 MG/DL (ref 15–30)
SEGMENTED NEUTROPHILS ABSOLUTE COUNT: 3.8 K/CU MM
SEGMENTED NEUTROPHILS RELATIVE PERCENT: 59.7 % (ref 36–66)
SODIUM BLD-SCNC: 115 MMOL/L (ref 135–145)
SODIUM BLD-SCNC: 115 MMOL/L (ref 135–145)
SODIUM BLD-SCNC: 117 MMOL/L (ref 135–145)
SODIUM BLD-SCNC: 117 MMOL/L (ref 135–145)
SODIUM BLD-SCNC: 120 MMOL/L (ref 135–145)
SODIUM BLD-SCNC: 121 MMOL/L (ref 135–145)
SODIUM URINE: 138 MMOL/L (ref 35–167)
TOTAL IMMATURE NEUTOROPHIL: 0.03 K/CU MM
TOTAL NUCLEATED RBC: 0 K/CU MM
TOTAL RETICULOCYTE COUNT: 0.05 K/CU MM
TSH HIGH SENSITIVITY: 2.07 UIU/ML (ref 0.27–4.2)
URIC ACID: 1.8 MG/DL (ref 2.6–6)
WBC # BLD: 6.4 K/CU MM (ref 4–10.5)

## 2022-08-06 PROCEDURE — 76937 US GUIDE VASCULAR ACCESS: CPT

## 2022-08-06 PROCEDURE — 2580000003 HC RX 258: Performed by: STUDENT IN AN ORGANIZED HEALTH CARE EDUCATION/TRAINING PROGRAM

## 2022-08-06 PROCEDURE — 84132 ASSAY OF SERUM POTASSIUM: CPT

## 2022-08-06 PROCEDURE — 71260 CT THORAX DX C+: CPT

## 2022-08-06 PROCEDURE — 84550 ASSAY OF BLOOD/URIC ACID: CPT

## 2022-08-06 PROCEDURE — 80048 BASIC METABOLIC PNL TOTAL CA: CPT

## 2022-08-06 PROCEDURE — 02HV33Z INSERTION OF INFUSION DEVICE INTO SUPERIOR VENA CAVA, PERCUTANEOUS APPROACH: ICD-10-PCS | Performed by: INTERNAL MEDICINE

## 2022-08-06 PROCEDURE — 36569 INSJ PICC 5 YR+ W/O IMAGING: CPT

## 2022-08-06 PROCEDURE — 84300 ASSAY OF URINE SODIUM: CPT

## 2022-08-06 PROCEDURE — 80051 ELECTROLYTE PANEL: CPT

## 2022-08-06 PROCEDURE — 6370000000 HC RX 637 (ALT 250 FOR IP): Performed by: INTERNAL MEDICINE

## 2022-08-06 PROCEDURE — 84295 ASSAY OF SERUM SODIUM: CPT

## 2022-08-06 PROCEDURE — 82436 ASSAY OF URINE CHLORIDE: CPT

## 2022-08-06 PROCEDURE — 6370000000 HC RX 637 (ALT 250 FOR IP): Performed by: STUDENT IN AN ORGANIZED HEALTH CARE EDUCATION/TRAINING PROGRAM

## 2022-08-06 PROCEDURE — G0480 DRUG TEST DEF 1-7 CLASSES: HCPCS

## 2022-08-06 PROCEDURE — 83880 ASSAY OF NATRIURETIC PEPTIDE: CPT

## 2022-08-06 PROCEDURE — 84133 ASSAY OF URINE POTASSIUM: CPT

## 2022-08-06 PROCEDURE — 84443 ASSAY THYROID STIM HORMONE: CPT

## 2022-08-06 PROCEDURE — 82533 TOTAL CORTISOL: CPT

## 2022-08-06 PROCEDURE — 83935 ASSAY OF URINE OSMOLALITY: CPT

## 2022-08-06 PROCEDURE — 85025 COMPLETE CBC W/AUTO DIFF WBC: CPT

## 2022-08-06 PROCEDURE — 2580000003 HC RX 258: Performed by: INTERNAL MEDICINE

## 2022-08-06 PROCEDURE — 6360000002 HC RX W HCPCS: Performed by: INTERNAL MEDICINE

## 2022-08-06 PROCEDURE — C1751 CATH, INF, PER/CENT/MIDLINE: HCPCS

## 2022-08-06 PROCEDURE — 94761 N-INVAS EAR/PLS OXIMETRY MLT: CPT

## 2022-08-06 PROCEDURE — 6360000004 HC RX CONTRAST MEDICATION: Performed by: INTERNAL MEDICINE

## 2022-08-06 PROCEDURE — 2000000000 HC ICU R&B

## 2022-08-06 PROCEDURE — 83930 ASSAY OF BLOOD OSMOLALITY: CPT

## 2022-08-06 PROCEDURE — 83735 ASSAY OF MAGNESIUM: CPT

## 2022-08-06 RX ORDER — POTASSIUM CHLORIDE 20 MEQ/1
20 TABLET, EXTENDED RELEASE ORAL
Status: COMPLETED | OUTPATIENT
Start: 2022-08-06 | End: 2022-08-07

## 2022-08-06 RX ADMIN — SODIUM CHLORIDE, PRESERVATIVE FREE 10 ML: 5 INJECTION INTRAVENOUS at 00:29

## 2022-08-06 RX ADMIN — SODIUM CHLORIDE, PRESERVATIVE FREE 10 ML: 5 INJECTION INTRAVENOUS at 08:56

## 2022-08-06 RX ADMIN — LEVETIRACETAM 500 MG: 500 TABLET, FILM COATED ORAL at 21:16

## 2022-08-06 RX ADMIN — ACETAMINOPHEN 650 MG: 325 TABLET ORAL at 06:34

## 2022-08-06 RX ADMIN — LEVETIRACETAM 500 MG: 500 TABLET, FILM COATED ORAL at 08:55

## 2022-08-06 RX ADMIN — SODIUM CHLORIDE, PRESERVATIVE FREE 10 ML: 5 INJECTION INTRAVENOUS at 08:55

## 2022-08-06 RX ADMIN — SODIUM CHLORIDE, PRESERVATIVE FREE 10 ML: 5 INJECTION INTRAVENOUS at 21:17

## 2022-08-06 RX ADMIN — POTASSIUM CHLORIDE 10 MEQ: 7.46 INJECTION, SOLUTION INTRAVENOUS at 07:33

## 2022-08-06 RX ADMIN — LEVETIRACETAM 500 MG: 500 TABLET, FILM COATED ORAL at 00:22

## 2022-08-06 RX ADMIN — SODIUM CHLORIDE 25 ML/HR: 3 INJECTION, SOLUTION INTRAVENOUS at 15:59

## 2022-08-06 RX ADMIN — ESLICARBAZEPINE ACETATE 800 MG: 200 TABLET ORAL at 21:16

## 2022-08-06 RX ADMIN — POTASSIUM CHLORIDE 10 MEQ: 7.46 INJECTION, SOLUTION INTRAVENOUS at 03:56

## 2022-08-06 RX ADMIN — ONDANSETRON 4 MG: 4 TABLET, ORALLY DISINTEGRATING ORAL at 00:22

## 2022-08-06 RX ADMIN — POTASSIUM CHLORIDE 10 MEQ: 7.46 INJECTION, SOLUTION INTRAVENOUS at 08:59

## 2022-08-06 RX ADMIN — POTASSIUM CHLORIDE 10 MEQ: 7.46 INJECTION, SOLUTION INTRAVENOUS at 02:58

## 2022-08-06 RX ADMIN — IOPAMIDOL 80 ML: 755 INJECTION, SOLUTION INTRAVENOUS at 10:13

## 2022-08-06 RX ADMIN — POTASSIUM CHLORIDE 10 MEQ: 7.46 INJECTION, SOLUTION INTRAVENOUS at 05:03

## 2022-08-06 RX ADMIN — POTASSIUM CHLORIDE 20 MEQ: 1500 TABLET, EXTENDED RELEASE ORAL at 08:55

## 2022-08-06 RX ADMIN — SODIUM CHLORIDE 25 ML/HR: 3 INJECTION, SOLUTION INTRAVENOUS at 02:02

## 2022-08-06 RX ADMIN — POTASSIUM CHLORIDE 10 MEQ: 7.46 INJECTION, SOLUTION INTRAVENOUS at 06:08

## 2022-08-06 ASSESSMENT — ENCOUNTER SYMPTOMS
VOICE CHANGE: 0
COUGH: 0
DIARRHEA: 0
ABDOMINAL PAIN: 0
SINUS PAIN: 0
EYE PAIN: 0
NAUSEA: 0
BACK PAIN: 1
SHORTNESS OF BREATH: 0
BLOOD IN STOOL: 0
CHEST TIGHTNESS: 0
VOMITING: 0
SINUS PRESSURE: 0
EYE DISCHARGE: 0

## 2022-08-06 ASSESSMENT — PAIN SCALES - GENERAL
PAINLEVEL_OUTOF10: 2
PAINLEVEL_OUTOF10: 2
PAINLEVEL_OUTOF10: 8

## 2022-08-06 ASSESSMENT — PAIN DESCRIPTION - ORIENTATION
ORIENTATION: MID
ORIENTATION: MID

## 2022-08-06 ASSESSMENT — PAIN DESCRIPTION - LOCATION
LOCATION: HEAD
LOCATION: HEAD

## 2022-08-06 ASSESSMENT — PAIN DESCRIPTION - DESCRIPTORS
DESCRIPTORS: ACHING
DESCRIPTORS: ACHING

## 2022-08-06 ASSESSMENT — PAIN - FUNCTIONAL ASSESSMENT
PAIN_FUNCTIONAL_ASSESSMENT: PREVENTS OR INTERFERES SOME ACTIVE ACTIVITIES AND ADLS
PAIN_FUNCTIONAL_ASSESSMENT: ACTIVITIES ARE NOT PREVENTED

## 2022-08-06 NOTE — CONSULTS
Consult reviewed with primary nurse. PICC line to be placed for critical medications and limited access. Education regarding PICC insertion discussed and risks and benefits assessed with Patient. Patient verbalized understanding. Consent given by Patient. Time out done @ 00:30. Attempted to insert PICC in cephalic vein but unable to pass tip of PICC pass shoulder after multiple trouble shooting attempts. With second attempt, PICC inserted in FRANK basilic vein without difficulty per protocol. Placement verified via VPSG4 monitoring system. Good blood return and flushes easily on both ports. Patient tolerated well. Education pamphlets left at bedside. Ultrasound photos of vein diameter and doppler signature placed in chart. Please consult IV/PICC team or any questions or if patient's needs change. PICC OK to use.

## 2022-08-06 NOTE — PLAN OF CARE
Problem: Discharge Planning  Goal: Discharge to home or other facility with appropriate resources  Outcome: Not Progressing     Problem: Safety - Adult  Goal: Free from fall injury  Outcome: Not Progressing

## 2022-08-06 NOTE — PROGRESS NOTES
V2.0  [facility] [place] [facility] [place]      Name:  Carlos Fields /Age/Sex: 1960  (65 y.o. female)   MRN & CSN:  [id number] & [id number] Encounter Date/Time: 2022 10:32 AM EDT    Location:  -A PCP: Yolanda Sandoval MD       Hospital Day: 2    Assessment and Plan:   Carlos Fields is a 65 y.o. female with a pmh of epilepsy, COPD, hypertension who presents with Hyponatremia      Plan:  Severe symptomatic euvolemic hyponatremia likely SIADH versus hypovolemia: Na 111 on admission. Now 117. Nephrology following up. Appreciate recommendations. Andra 100s, Urine Osmol 500. Will get Ct chest with contrast to r/o malignancy. Limit oral intake. Hypertonic saline on hold for now. May need aldactone due to HTN and hypokalemia. History of epilepsy on Keppra and Aptiom. Continue to monitor. Benign essential hypertension nephrology recommends use of spironolactone or Aldactone because of persistent hypokalemia  COPD not on home oxygen  Tobacco use disorder  Left foot pain, was complaining of left foot pain at the time of admission x-ray was ordered that did not show any significant damage. Diet ADULT DIET; Regular; 1800 ml   DVT Prophylaxis [x] Lovenox, []  Heparin, [] SCDs, [] Ambulation,  [] Eliquis, [] Xarelto  [] Coumadin   Code Status Full Code   Disposition From: Home  Expected Disposition: Home  Estimated Date of Discharge:2-3 days  Patient requires continued admission due to hyponatremia   Surrogate Decision Maker/ POA      Subjective:     Chief Complaint: No chief complaint on file. Carlos Fields is a 65 y.o. female who presents with Hyponatremia. Patient was seen at the bedside. Patient was crying a lot when I saw her. She was concerned that she did not have any devices to call the nurse at bedside and she was shouting in the room to ask for help but no one came. Counseled at bedside. All support services provided at bedside. Patient is much calmer after I left the room. Explained the plan in detail which the patient agreed to. Sodium level is slowly coming up. She had questions about her sodium levels that why it is taking so much time to go up. I explained that if we go faster in terms of correcting the sodium and can cause swelling in the brain. Patient showed understanding to the process. Review of Systems:    Review of Systems   Constitutional:  Positive for fatigue and unexpected weight change. Negative for chills and fever. HENT:  Negative for ear pain, hearing loss, sinus pressure, sinus pain and voice change. Eyes:  Negative for pain, discharge and visual disturbance. Respiratory:  Negative for cough, chest tightness and shortness of breath. Cardiovascular:  Negative for chest pain, palpitations and leg swelling. Gastrointestinal:  Negative for abdominal pain, blood in stool, diarrhea, nausea and vomiting. Genitourinary:  Negative for dysuria and frequency. Musculoskeletal:  Positive for arthralgias and back pain. Neurological:  Positive for weakness. Negative for dizziness, light-headedness and headaches. Psychiatric/Behavioral:  Positive for decreased concentration and sleep disturbance. Objective: Intake/Output Summary (Last 24 hours) at 8/6/2022 1032  Last data filed at 8/6/2022 0705  Gross per 24 hour   Intake 745.48 ml   Output --   Net 745.48 ml        Vitals:   Vitals:    08/06/22 0900   BP: 132/82   Pulse: 74   Resp: 17   Temp: 98 °F (36.7 °C)   SpO2:        Physical Exam:   Physical Exam  Vitals and nursing note reviewed. Constitutional:       Appearance: Normal appearance. She is normal weight. HENT:      Head: Normocephalic. Nose: Nose normal.      Mouth/Throat:      Mouth: Mucous membranes are moist.   Eyes:      Conjunctiva/sclera: Conjunctivae normal.      Pupils: Pupils are equal, round, and reactive to light. Cardiovascular:      Rate and Rhythm: Normal rate and regular rhythm.       Pulses: Normal pulses. Heart sounds: Normal heart sounds. No murmur heard. Pulmonary:      Effort: Pulmonary effort is normal.      Breath sounds: Rales present. No wheezing or rhonchi. Abdominal:      General: Abdomen is flat. Bowel sounds are normal. There is no distension. Palpations: Abdomen is soft. Tenderness: There is abdominal tenderness. Musculoskeletal:         General: No deformity. Normal range of motion. Cervical back: Normal range of motion and neck supple. Right lower leg: No edema. Left lower leg: No edema. Skin:     Coloration: Skin is not jaundiced or pale. Neurological:      General: No focal deficit present. Mental Status: She is alert and oriented to person, place, and time. Mental status is at baseline. Motor: Weakness present.           Medications:   Medications:    potassium chloride  20 mEq Oral Daily with breakfast    sodium chloride flush  5-40 mL IntraVENous 2 times per day    enoxaparin  30 mg SubCUTAneous Daily    levETIRAcetam  500 mg Oral BID    eslicarbazepine acetate  800 mg Oral Daily    sodium chloride flush  5-40 mL IntraVENous 2 times per day      Infusions:    sodium chloride      sodium chloride      [Held by provider] sodium chloride Stopped (08/06/22 0604)     PRN Meds: sodium chloride flush, 5-40 mL, PRN  sodium chloride, , PRN  ondansetron, 4 mg, Q8H PRN   Or  ondansetron, 4 mg, Q6H PRN  polyethylene glycol, 17 g, Daily PRN  acetaminophen, 650 mg, Q6H PRN   Or  acetaminophen, 650 mg, Q6H PRN  sodium chloride flush, 5-40 mL, PRN  sodium chloride, , PRN  potassium chloride, 10 mEq, PRN  magnesium sulfate, 1,000 mg, PRN        Labs      Recent Results (from the past 24 hour(s))   EKG 12 Lead    Collection Time: 08/05/22  1:24 PM   Result Value Ref Range    Ventricular Rate 77 BPM    Atrial Rate 77 BPM    P-R Interval 172 ms    QRS Duration 88 ms    Q-T Interval 448 ms    QTc Calculation (Bazett) 506 ms    P Axis 76 degrees    R Axis 59 degrees    T Axis 73 degrees    Diagnosis       Normal sinus rhythm  Possible Left atrial enlargement  Nonspecific ST abnormality  Prolonged QT  Abnormal ECG  When compared with ECG of 27-MAY-2022 10:23,  No significant change was found     CBC with Auto Differential    Collection Time: 08/05/22  2:26 PM   Result Value Ref Range    WBC 6.8 4.0 - 10.5 K/CU MM    RBC 4.10 (L) 4.2 - 5.4 M/CU MM    Hemoglobin 11.8 (L) 12.5 - 16.0 GM/DL    Hematocrit 32.5 (L) 37 - 47 %    MCV 79.3 78 - 100 FL    MCH 28.8 27 - 31 PG    MCHC 36.3 (H) 32.0 - 36.0 %    RDW 12.3 11.7 - 14.9 %    Platelets 086 418 - 405 K/CU MM    MPV 8.2 7.5 - 11.1 FL    Differential Type AUTOMATED DIFFERENTIAL     Segs Relative 65.6 36 - 66 %    Lymphocytes % 24.6 24 - 44 %    Monocytes % 8.5 (H) 0 - 4 %    Eosinophils % 0.3 0 - 3 %    Basophils % 0.4 0 - 1 %    Segs Absolute 4.5 K/CU MM    Lymphocytes Absolute 1.7 K/CU MM    Monocytes Absolute 0.6 K/CU MM    Eosinophils Absolute 0.0 K/CU MM    Basophils Absolute 0.0 K/CU MM    Immature Neutrophil % 0.6 (H) 0 - 0.43 %    Total Immature Neutrophil 0.04 K/CU MM   Comprehensive Metabolic Panel w/ Reflex to MG    Collection Time: 08/05/22  2:26 PM   Result Value Ref Range    Sodium 111 (LL) 135 - 145 MMOL/L    Potassium 2.5 (LL) 3.5 - 5.1 MMOL/L    Chloride 71 (L) 99 - 110 mMol/L    CO2 30 21 - 32 MMOL/L    BUN 20 6 - 23 MG/DL    Creatinine 1.2 (H) 0.6 - 1.1 MG/DL    Glucose 101 (H) 70 - 99 MG/DL    Calcium 9.6 8.3 - 10.6 MG/DL    Albumin 4.4 3.4 - 5.0 GM/DL    Total Protein 7.8 6.4 - 8.2 GM/DL    Total Bilirubin 0.3 0.0 - 1.0 MG/DL    ALT 13 10 - 40 U/L    AST 16 15 - 37 IU/L    Alkaline Phosphatase 151 (H) 40 - 129 IU/L    GFR Non- 46 (L) >60 mL/min/1.73m2    GFR  55 (L) >60 mL/min/1.73m2    Anion Gap 10 4 - 16   Troponin    Collection Time: 08/05/22  2:26 PM   Result Value Ref Range    Troponin T <0.010 <0.01 NG/ML   Protime-INR    Collection Time: 08/05/22  2:26 PM   Result Anion Gap 13 4 - 16    BUN 12 6 - 23 MG/DL    Creatinine 0.9 0.6 - 1.1 MG/DL    Glucose 88 70 - 99 MG/DL    Calcium 8.8 8.3 - 10.6 MG/DL    GFR Non-African American >60 >60 mL/min/1.73m2    GFR African American >60 >60 mL/min/1.73m2   TSH    Collection Time: 08/06/22  2:45 AM   Result Value Ref Range    TSH, High Sensitivity 2.070 0.270 - 4.20 uIu/ml   Magnesium    Collection Time: 08/06/22  2:45 AM   Result Value Ref Range    Magnesium 1.8 1.8 - 2.4 mg/dl   Cortisol AM, Total    Collection Time: 08/06/22  6:15 AM   Result Value Ref Range    Cortisol - AM 11.8 6.0 - 18.4 UG/DL   Sodium Lab    Collection Time: 08/06/22  6:15 AM   Result Value Ref Range    Sodium 117 (LL) 135 - 145 MMOL/L   Basic Metabolic Panel    Collection Time: 08/06/22  6:15 AM   Result Value Ref Range    Sodium 117 (LL) 135 - 145 MMOL/L    Potassium 3.8 3.5 - 5.1 MMOL/L    Chloride 81 (L) 99 - 110 mMol/L    CO2 25 21 - 32 MMOL/L    Anion Gap 11 4 - 16    BUN 12 6 - 23 MG/DL    Creatinine 0.9 0.6 - 1.1 MG/DL    Glucose 94 70 - 99 MG/DL    Calcium 8.7 8.3 - 10.6 MG/DL    GFR Non-African American >60 >60 mL/min/1.73m2    GFR African American >60 >60 mL/min/1.73m2   Toxicology screen, serum    Collection Time: 08/06/22  9:25 AM   Result Value Ref Range    Acetaminophen Level 6.2 (L) 15 - 30 ug/ml    Salicylate Lvl <9.9 (L) 15 - 30 MG/DL   Ethanol    Collection Time: 08/06/22  9:25 AM   Result Value Ref Range    Alcohol Scrn <0.01 <0.01 %WT/VOL        Imaging/Diagnostics Last 24 Hours   XR FOOT LEFT (MIN 3 VIEWS)    Result Date: 8/6/2022  EXAMINATION: THREE XRAY VIEWS OF THE LEFT FOOT; ONE XRAY VIEW OF THE CHEST 8/5/2022 11:09 pm COMPARISON: Chest x-ray 03/27/2022 HISTORY: ORDERING SYSTEM PROVIDED HISTORY: fall TECHNOLOGIST PROVIDED HISTORY: Reason for exam:->fall Reason for Exam: fall; ORDERING SYSTEM PROVIDED HISTORY: mass? TECHNOLOGIST PROVIDED HISTORY: Reason for exam:->mass? Reason for Exam: mass? FINDINGS: Chest: Lungs are clear. Cardiomediastinal silhouette is within normal limits. No pleural effusion. No pneumothorax. Bony structures are unremarkable. Left foot: No acute fracture or dislocation. Chest: No acute findings. Left foot: No acute fracture or dislocation. CT Head WO Contrast    Result Date: 8/5/2022  EXAMINATION: CTA OF THE HEAD AND NECK WITH CONTRAST; CT OF THE HEAD WITHOUT CONTRAST 8/5/2022 3:09 pm; 8/5/2022 3:08 pm: TECHNIQUE: CTA of the head and neck was performed with the administration of intravenous contrast. Multiplanar reformatted images are provided for review. MIP images are provided for review. Stenosis of the internal carotid arteries measured using NASCET criteria. Automated exposure control, iterative reconstruction, and/or weight based adjustment of the mA/kV was utilized to reduce the radiation dose to as low as reasonably achievable.; CT of the head was performed without the administration of intravenous contrast. Automated exposure control, iterative reconstruction, and/or weight based adjustment of the mA/kV was utilized to reduce the radiation dose to as low as reasonably achievable. Noncontrast CT of the head with reconstructed 2-D images are also provided for review. This scan was analyzed using [employer]. [employer] contact LVO. Identification of suspected findings is not for diagnostic use beyond notification. Viz LVO is limited to analysis of imaging data and should not be used in-lieu of full patient evaluation or relied upon to make or confirm diagnosis. COMPARISON: CT head 05/29/2020. HISTORY: ORDERING SYSTEM PROVIDED HISTORY: dizziness, gait difficulties, 1 week TECHNOLOGIST PROVIDED HISTORY: Reason for exam:->dizziness, gait difficulties, 1 week Has a \"code stroke\" or \"stroke alert\" been called? ->No Decision Support Exception - unselect if not a suspected or confirmed emergency medical condition->Emergency Medical Condition (MA) Reason for Exam: 100ml isovue 370 inj by mv iv le 1520 Additional signs and symptoms: dizziness, gait difficulties, 1 week; ORDERING SYSTEM PROVIDED HISTORY: dizziness, gait difficulties, 1 week TECHNOLOGIST PROVIDED HISTORY: Reason for exam:->dizziness, gait difficulties, 1 week Has a \"code stroke\" or \"stroke alert\" been called? ->No Decision Support Exception - unselect if not a suspected or confirmed emergency medical condition->Emergency Medical Condition (MA) Additional signs and symptoms: dizziness, gait difficulties, 1 week FINDINGS: CT HEAD: BRAIN/VENTRICLES:  Encephalomalacia is again seen involving the left temporal lobe. Otherwise, the gray-white differentiation appears maintained. No acute intracranial hemorrhage. No mass effect or midline shift. No evidence of hydrocephalus. ORBITS: The visualized portion of the orbits demonstrate no acute abnormality. SINUSES:  The visualized paranasal sinuses and mastoid air cells demonstrate no acute abnormality. SOFT TISSUES/SKULL: No acute abnormality of the calvarium. Postsurgical change from a left temporal craniotomy. CTA NECK: AORTIC ARCH/ARCH VESSELS: There is minimal scattered atherosclerosis of the aortic arch and arch vessels. No flow limiting stenosis seen of the innominate or subclavian arteries. CAROTID ARTERIES: No focal stenosis seen of the common carotid arteries. Atherosclerosis of the carotid bulbs and proximal internal carotid arteries bilaterally. There is less than 50% stenosis of the proximal right cervical ICA by NASCET criteria. No focal stenosis seen of the left cervical ICA by NASCET criteria. VERTEBRAL ARTERIES: There is a diffusely diminutive caliber of the right vertebral artery, which is presumably congenital in nature. There is severe stenosis at the origin of the right vertebral artery. No focal stenosis otherwise seen of the vertebral arteries. SOFT TISSUES: Centrilobular and paraseptal emphysematous changes seen in the lungs bilaterally. Mild dependent changes are seen bilaterally.   No acute abnormality within the visualized mediastinum. BONES: The osseous structures appear osteopenic. No acute osseous abnormality clearly identified. CTA HEAD: ANTERIOR CIRCULATION: No significant stenosis of the intracranial internal carotid, anterior cerebral, or middle cerebral arteries. No aneurysm. POSTERIOR CIRCULATION: No significant stenosis of the vertebral, basilar, or posterior cerebral arteries. No aneurysm. OTHER: No dural venous sinus thrombosis on this non-dedicated study. 1. No acute intracranial abnormality. 2. There appears to be severe stenosis at the origin of the right vertebral artery. 3. Atherosclerosis contributes to less than 50% stenosis of the proximal right cervical ICA by NASCET criteria. 4. No flowing stenosis involving the left cervical ICA or left vertebral artery. 5. No significant stenosis or large vessel occlusion of the [id number]. XR CHEST PORTABLE    Result Date: 8/6/2022  EXAMINATION: THREE XRAY VIEWS OF THE LEFT FOOT; ONE XRAY VIEW OF THE CHEST 8/5/2022 11:09 pm COMPARISON: Chest x-ray 03/27/2022 HISTORY: ORDERING SYSTEM PROVIDED HISTORY: fall TECHNOLOGIST PROVIDED HISTORY: Reason for exam:->fall Reason for Exam: fall; ORDERING SYSTEM PROVIDED HISTORY: mass? TECHNOLOGIST PROVIDED HISTORY: Reason for exam:->mass? Reason for Exam: mass? FINDINGS: Chest: Lungs are clear. Cardiomediastinal silhouette is within normal limits. No pleural effusion. No pneumothorax. Bony structures are unremarkable. Left foot: No acute fracture or dislocation. Chest: No acute findings. Left foot: No acute fracture or dislocation. CTA HEAD NECK W CONTRAST    Result Date: 8/5/2022  EXAMINATION: CTA OF THE HEAD AND NECK WITH CONTRAST; CT OF THE HEAD WITHOUT CONTRAST 8/5/2022 3:09 pm; 8/5/2022 3:08 pm: TECHNIQUE: CTA of the head and neck was performed with the administration of intravenous contrast. Multiplanar reformatted images are provided for review.   MIP images are provided for review. Stenosis of the internal carotid arteries measured using NASCET criteria. Automated exposure control, iterative reconstruction, and/or weight based adjustment of the mA/kV was utilized to reduce the radiation dose to as low as reasonably achievable.; CT of the head was performed without the administration of intravenous contrast. Automated exposure control, iterative reconstruction, and/or weight based adjustment of the mA/kV was utilized to reduce the radiation dose to as low as reasonably achievable. Noncontrast CT of the head with reconstructed 2-D images are also provided for review. This scan was analyzed using [employer] ai contact LVO. Identification of suspected findings is not for diagnostic use beyond notification. Viz LVO is limited to analysis of imaging data and should not be used in-lieu of full patient evaluation or relied upon to make or confirm diagnosis. COMPARISON: CT head 05/29/2020. HISTORY: ORDERING SYSTEM PROVIDED HISTORY: dizziness, gait difficulties, 1 week TECHNOLOGIST PROVIDED HISTORY: Reason for exam:->dizziness, gait difficulties, 1 week Has a \"code stroke\" or \"stroke alert\" been called? ->No Decision Support Exception - unselect if not a suspected or confirmed emergency medical condition->Emergency Medical Condition (MA) Reason for Exam: 100ml isovue 370 inj by mv iv le 1520 Additional signs and symptoms: dizziness, gait difficulties, 1 week; ORDERING SYSTEM PROVIDED HISTORY: dizziness, gait difficulties, 1 week TECHNOLOGIST PROVIDED HISTORY: Reason for exam:->dizziness, gait difficulties, 1 week Has a \"code stroke\" or \"stroke alert\" been called? ->No Decision Support Exception - unselect if not a suspected or confirmed emergency medical condition->Emergency Medical Condition (MA) Additional signs and symptoms: dizziness, gait difficulties, 1 week FINDINGS: CT HEAD: BRAIN/VENTRICLES:  Encephalomalacia is again seen involving the left temporal lobe.   Otherwise, the gray-white differentiation appears maintained. No acute intracranial hemorrhage. No mass effect or midline shift. No evidence of hydrocephalus. ORBITS: The visualized portion of the orbits demonstrate no acute abnormality. SINUSES:  The visualized paranasal sinuses and mastoid air cells demonstrate no acute abnormality. SOFT TISSUES/SKULL: No acute abnormality of the calvarium. Postsurgical change from a left temporal craniotomy. CTA NECK: AORTIC ARCH/ARCH VESSELS: There is minimal scattered atherosclerosis of the aortic arch and arch vessels. No flow limiting stenosis seen of the innominate or subclavian arteries. CAROTID ARTERIES: No focal stenosis seen of the common carotid arteries. Atherosclerosis of the carotid bulbs and proximal internal carotid arteries bilaterally. There is less than 50% stenosis of the proximal right cervical ICA by NASCET criteria. No focal stenosis seen of the left cervical ICA by NASCET criteria. VERTEBRAL ARTERIES: There is a diffusely diminutive caliber of the right vertebral artery, which is presumably congenital in nature. There is severe stenosis at the origin of the right vertebral artery. No focal stenosis otherwise seen of the vertebral arteries. SOFT TISSUES: Centrilobular and paraseptal emphysematous changes seen in the lungs bilaterally. Mild dependent changes are seen bilaterally. No acute abnormality within the visualized mediastinum. BONES: The osseous structures appear osteopenic. No acute osseous abnormality clearly identified. CTA HEAD: ANTERIOR CIRCULATION: No significant stenosis of the intracranial internal carotid, anterior cerebral, or middle cerebral arteries. No aneurysm. POSTERIOR CIRCULATION: No significant stenosis of the vertebral, basilar, or posterior cerebral arteries. No aneurysm. OTHER: No dural venous sinus thrombosis on this non-dedicated study. 1. No acute intracranial abnormality.  2. There appears to be severe stenosis at the origin of the right vertebral artery. 3. Atherosclerosis contributes to less than 50% stenosis of the proximal right cervical ICA by NASCET criteria. 4. No flowing stenosis involving the left cervical ICA or left vertebral artery. 5. No significant stenosis or large vessel occlusion of the [id number].        Electronically signed by Leigha Keene MD, MD on 8/6/2022 at 10:32 AM

## 2022-08-06 NOTE — H&P
V2.0  History and Physical      Name:  Buffy Lambert /Age/Sex: 1960  (65 y.o. female)   MRN & CSN:  [id number] & [id number] Encounter Date/Time: 2022 11:03 PM EDT   Location:  -A PCP: Maria A Dill MD       Hospital Day: 1    Assessment and Plan:   Buffy Lambert is a 65 y.o. female with a pmh of epilepsy, COPD, hypertension who presents with Hyponatremia      1. Severe hyponatremia  -Patient presenting with a sodium of 111  -Nephrology consulted and recommend starting patient on 50 cc of normal saline  -Continue to monitor with every 6 sodium labs  -Work-up with urine /plasma osmolality/urine sodium    2. Hypokalemia  -Potassium on presentation 2.5, giving 20 mEq at [address] [place] [address] and monitor with repeat labs    3. Epilepsy  -Resume Keppra and Aptiom  -Continue to monitor    4. Hypertension  -Resume home medications after reconciliation    5. COPD  -Not in acute exacerbation  -Albuterol as needed    6. Tobacco abuse  -Nicotine replacement therapy if agreeable  -Counseling and outpatient follow-up    7. Left foot pain  -X-ray ordered  -Pain control    Hospital Problems             Last Modified POA    * (Principal) Hyponatremia 2022 Yes         Disposition:   Current Living situation: Home  Expected Disposition: TBD  Estimated D/C: TBD    Diet ADULT DIET; Regular   DVT Prophylaxis [] Lovenox, []  Heparin, [] SCDs, [] Ambulation,  [] Eliquis, [] Xarelto   Code Status Full Code   Surrogate Decision Maker/ SCOTT Lunaing boyfriend     History from:     patient    History of Present Illness:     Chief Complaint: Hyponatremia  Génesis Almeida is a 65 y.o. female with pmh of epilepsy who presents with dizziness and fatigue. Patient reports that she went up [facility] about a week ago and fell while trying to get down stairs. Reports hitting her head but denies any loss of consciousness or shaking.   Since that episode patient states that she is remained in the couch for about 3 days and could not ambulate with difficulties in reading things, poor appetite with poor oral intake, dizziness and lightheadedness and pain on ambulation. Patient initially presented to [facility] and lab work-up showed WBC 6.8, hemoglobin 11.8, sodium 111, potassium 2.5, BUN 20, creatinine 1.2. Head CT did not show any acute intracranial abnormality and CTA showed severe stenosis of the right vertebral artery. Nephrology was consulted who recommended starting patient on 50 cc of normal saline and transfer to [facility].     Review of Systems: Need 10 Elements   Review of Systems    10 point ROS reviewed negative except as above    Objective:   No intake or output data in the 24 hours ending 08/05/22 2303   Vitals: There were no vitals filed for this visit. Medications Prior to Admission     Prior to Admission medications    Medication Sig Start Date End Date Taking?  Authorizing Provider   APTIOM 800 MG tablet TAKE TWO TABLETS BY MOUTH AT BEDTIME 6/15/22   Ceci Yuan DO   levETIRAcetam (KEPPRA) 500 MG tablet TAKE 1 TABLET BY MOUTH 2 TIMES DAILY 6/15/22   Ceci Yuan DO   ARIPiprazole (ABILIFY) 5 MG tablet TAKE ONE TABLET BY MOUTH EVERY DAY 6/13/22   Lori Rosario MD   magnesium oxide (MAG-OX) 400 MG tablet TAKE ONE TABLET BY MOUTH EVERY DAY 6/13/22   Lori Rosario MD   omeprazole (PRILOSEC) 40 MG delayed release capsule TAKE ONE CAPSULE BY MOUTH EVERY DAY BEFORE BREAKFAST 6/13/22   Lori Rosario MD   olmesartan-hydroCHLOROthiazide (BENICAR HCT) 40-25 MG per tablet TAKE ONE TABLET BY MOUTH EVERY DAY 6/10/22   Lori Rosario MD   alendronate (FOSAMAX) 70 MG tablet Take 1 tablet by mouth once a week 6/10/22   Lori Rosario MD   nortriptyline (PAMELOR) 25 MG capsule Take 2 capsules by mouth nightly 6/10/22   Lori Rosario MD   rOPINIRole (REQUIP) 0.5 MG tablet Take 1 tablet by mouth in the morning and at bedtime 6/10/22   Lori Rosario MD   oxybutynin (DITROPAN-XL) 10 MG extended release tablet Take 1 tablet by mouth daily 1/6/22   Lori Rosario MD       Physical Exam: Need 8 Elements   Physical Exam     General: NAD  Eyes: EOMI  ENT: neck supple  Cardiovascular: Regular rate. Respiratory: Clear to auscultation  Gastrointestinal: Soft, non tender  Genitourinary: no suprapubic tenderness  Musculoskeletal: No edema, palpation tenderness and limited ROM of left foot  Skin: warm, dry  Neuro: Alert. Psych: Mood appropriate. Past Medical History:   PMHx   Past Medical History:   Diagnosis Date    [facility]ch[facility] obstructive [facility]) ([facility]     Epilepsy ([facility]     Full dentures     upper plate only    H/O dizziness     \"take Meclazine at least twice a day for this\"    H/O Doppler JERMAINE's ultrasound 12/01/2020     No evidence of significant occlusive arterial disease of lower EXT.    H/O echocardiogram 12/01/2020    EF 55-60%, Normal study.  History of drug abuse ([facility]     History of nuclear stress test 11/30/2020    Normal study.  History of UTI     \"last one May 2020\"    Hypertension     \"the top # use to be over 200 \"- on medication since 2018\"    Seizures ([facility]     hx of epilepsy- last seizure week 7/13/2020- hx grand mal seizures    Wears glasses      PSHX:  has a past surgical history that includes brain surgery; Tubal ligation (1995); Hysterectomy (1996); Wrist surgery (Left, 7/30/2020); Cystoscopy (N/A, 10/14/2021); and Cystoscopy (N/A, 10/14/2021). Allergies: Allergies   Allergen Reactions    Varenicline Other (See Comments)     Having bad dreams and chest pain( Chantix)    Chantix [Varenicline Tartrate] Palpitations     Chest pain     Fam HX:  family history includes Breast Cancer in her maternal cousin; Heart Disease in her father.   Soc HX:   Social History     Socioeconomic History    Marital status:    Tobacco Use    Smoking status: Former     Packs/day: 1.50     Years: 40.00     Pack years: 60.00     Types: Cigarettes     Quit input(s): LACTA in the last 72 hours. BNP: No results for input(s): PROBNP in the last 72 hours. UA:  Lab Results   Component Value Date/Time    NITRU NEGATIVE 08/05/2022 04:15 PM    COLORU YELLOW 08/05/2022 04:15 PM    WBCUA  0 TO 2 08/05/2022 04:15 PM    RBCUA NEGATIVE 08/05/2022 04:15 PM    BACTERIA NEGATIVE 08/05/2022 04:15 PM    CLARITYU CLEAR 08/05/2022 04:15 PM    SPECGRAV 1.010 08/05/2022 04:15 PM    LEUKOCYTESUR NEGATIVE 08/05/2022 04:15 PM    UROBILINOGEN 0.2 08/05/2022 04:15 PM    BILIRUBINUR NEGATIVE 08/05/2022 04:15 PM    BLOODU NEGATIVE 08/05/2022 04:15 PM    KETUA NEGATIVE 08/05/2022 04:15 PM     Urine Cultures: No results found for: Olive Orosco  Blood Cultures: No results found for: BC  No results found for: BLOODCULT2  Organism: No results found for: ORG    Imaging/Diagnostics Last 24 Hours   CT Head WO Contrast    Result Date: 8/5/2022  EXAMINATION: CTA OF THE HEAD AND NECK WITH CONTRAST; CT OF THE HEAD WITHOUT CONTRAST 8/5/2022 3:09 pm; 8/5/2022 3:08 pm: TECHNIQUE: CTA of the head and neck was performed with the administration of intravenous contrast. Multiplanar reformatted images are provided for review. MIP images are provided for review. Stenosis of the internal carotid arteries measured using NASCET criteria. Automated exposure control, iterative reconstruction, and/or weight based adjustment of the mA/kV was utilized to reduce the radiation dose to as low as reasonably achievable.; CT of the head was performed without the administration of intravenous contrast. Automated exposure control, iterative reconstruction, and/or weight based adjustment of the mA/kV was utilized to reduce the radiation dose to as low as reasonably achievable. Noncontrast CT of the head with reconstructed 2-D images are also provided for review. This scan was analyzed using Viz. ai contact LVO. Identification of suspected findings is not for diagnostic use beyond notification.  Viz LVO is limited to analysis of imaging data and should not be used in-lieu of full patient evaluation or relied upon to make or confirm diagnosis. COMPARISON: CT head 05/29/2020. HISTORY: ORDERING SYSTEM PROVIDED HISTORY: dizziness, gait difficulties, 1 week TECHNOLOGIST PROVIDED HISTORY: Reason for exam:->dizziness, gait difficulties, 1 week Has a \"code stroke\" or \"stroke alert\" been called? ->No Decision Support Exception - unselect if not a suspected or confirmed emergency medical condition->Emergency Medical Condition (MA) Reason for Exam: 100ml isovue 370 inj by mv iv le 1520 Additional signs and symptoms: dizziness, gait difficulties, 1 week; ORDERING SYSTEM PROVIDED HISTORY: dizziness, gait difficulties, 1 week TECHNOLOGIST PROVIDED HISTORY: Reason for exam:->dizziness, gait difficulties, 1 week Has a \"code stroke\" or \"stroke alert\" been called? ->No Decision Support Exception - unselect if not a suspected or confirmed emergency medical condition->Emergency Medical Condition (MA) Additional signs and symptoms: dizziness, gait difficulties, 1 week FINDINGS: CT HEAD: BRAIN/VENTRICLES:  Encephalomalacia is again seen involving the left temporal lobe. Otherwise, the gray-white differentiation appears maintained. No acute intracranial hemorrhage. No mass effect or midline shift. No evidence of hydrocephalus. ORBITS: The visualized portion of the orbits demonstrate no acute abnormality. SINUSES:  The visualized paranasal sinuses and mastoid air cells demonstrate no acute abnormality. SOFT TISSUES/SKULL: No acute abnormality of the calvarium. Postsurgical change from a left temporal craniotomy. CTA NECK: AORTIC ARCH/ARCH VESSELS: There is minimal scattered atherosclerosis of the aortic arch and arch vessels. No flow limiting stenosis seen of the innominate or subclavian arteries. CAROTID ARTERIES: No focal stenosis seen of the common carotid arteries. Atherosclerosis of the carotid bulbs and proximal internal carotid arteries bilaterally.   There is less than 50% stenosis of the proximal right cervical ICA by NASCET criteria. No focal stenosis seen of the left cervical ICA by NASCET criteria. VERTEBRAL ARTERIES: There is a diffusely diminutive caliber of the right vertebral artery, which is presumably congenital in nature. There is severe stenosis at the origin of the right vertebral artery. No focal stenosis otherwise seen of the vertebral arteries. SOFT TISSUES: Centrilobular and paraseptal emphysematous changes seen in the lungs bilaterally. Mild dependent changes are seen bilaterally. No acute abnormality within the visualized mediastinum. BONES: The osseous structures appear osteopenic. No acute osseous abnormality clearly identified. CTA HEAD: ANTERIOR CIRCULATION: No significant stenosis of the intracranial internal carotid, anterior cerebral, or middle cerebral arteries. No aneurysm. POSTERIOR CIRCULATION: No significant stenosis of the vertebral, basilar, or posterior cerebral arteries. No aneurysm. OTHER: No dural venous sinus thrombosis on this non-dedicated study. 1. No acute intracranial abnormality. 2. There appears to be severe stenosis at the origin of the right vertebral artery. 3. Atherosclerosis contributes to less than 50% stenosis of the proximal right cervical ICA by NASCET criteria. 4. No flowing stenosis involving the left cervical ICA or left vertebral artery. 5. No significant stenosis or large vessel occlusion of the [id number]. CTA HEAD NECK W CONTRAST    Result Date: 8/5/2022  EXAMINATION: CTA OF THE HEAD AND NECK WITH CONTRAST; CT OF THE HEAD WITHOUT CONTRAST 8/5/2022 3:09 pm; 8/5/2022 3:08 pm: TECHNIQUE: CTA of the head and neck was performed with the administration of intravenous contrast. Multiplanar reformatted images are provided for review. MIP images are provided for review. Stenosis of the internal carotid arteries measured using NASCET criteria.  Automated exposure control, iterative reconstruction, and/or weight based adjustment of the mA/kV was utilized to reduce the radiation dose to as low as reasonably achievable.; CT of the head was performed without the administration of intravenous contrast. Automated exposure control, iterative reconstruction, and/or weight based adjustment of the mA/kV was utilized to reduce the radiation dose to as low as reasonably achievable. Noncontrast CT of the head with reconstructed 2-D images are also provided for review. This scan was analyzed using Viz. ai contact LVO. Identification of suspected findings is not for diagnostic use beyond notification. Viz LVO is limited to analysis of imaging data and should not be used in-lieu of full patient evaluation or relied upon to make or confirm diagnosis. COMPARISON: CT head 05/29/2020. HISTORY: ORDERING SYSTEM PROVIDED HISTORY: dizziness, gait difficulties, 1 week TECHNOLOGIST PROVIDED HISTORY: Reason for exam:->dizziness, gait difficulties, 1 week Has a \"code stroke\" or \"stroke alert\" been called? ->No Decision Support Exception - unselect if not a suspected or confirmed emergency medical condition->Emergency Medical Condition (MA) Reason for Exam: 100ml isovue 370 inj by mv iv le 1520 Additional signs and symptoms: dizziness, gait difficulties, 1 week; ORDERING SYSTEM PROVIDED HISTORY: dizziness, gait difficulties, 1 week TECHNOLOGIST PROVIDED HISTORY: Reason for exam:->dizziness, gait difficulties, 1 week Has a \"code stroke\" or \"stroke alert\" been called? ->No Decision Support Exception - unselect if not a suspected or confirmed emergency medical condition->Emergency Medical Condition (MA) Additional signs and symptoms: dizziness, gait difficulties, 1 week FINDINGS: CT HEAD: BRAIN/VENTRICLES:  Encephalomalacia is again seen involving the left temporal lobe. Otherwise, the gray-white differentiation appears maintained. No acute intracranial hemorrhage. No mass effect or midline shift. No evidence of hydrocephalus.  ORBITS: The visualized portion of the orbits demonstrate no acute abnormality. SINUSES:  The visualized paranasal sinuses and mastoid air cells demonstrate no acute abnormality. SOFT TISSUES/SKULL: No acute abnormality of the calvarium. Postsurgical change from a left temporal craniotomy. CTA NECK: AORTIC ARCH/ARCH VESSELS: There is minimal scattered atherosclerosis of the aortic arch and arch vessels. No flow limiting stenosis seen of the innominate or subclavian arteries. CAROTID ARTERIES: No focal stenosis seen of the common carotid arteries. Atherosclerosis of the carotid bulbs and proximal internal carotid arteries bilaterally. There is less than 50% stenosis of the proximal right cervical ICA by NASCET criteria. No focal stenosis seen of the left cervical ICA by NASCET criteria. VERTEBRAL ARTERIES: There is a diffusely diminutive caliber of the right vertebral artery, which is presumably congenital in nature. There is severe stenosis at the origin of the right vertebral artery. No focal stenosis otherwise seen of the vertebral arteries. SOFT TISSUES: Centrilobular and paraseptal emphysematous changes seen in the lungs bilaterally. Mild dependent changes are seen bilaterally. No acute abnormality within the visualized mediastinum. BONES: The osseous structures appear osteopenic. No acute osseous abnormality clearly identified. CTA HEAD: ANTERIOR CIRCULATION: No significant stenosis of the intracranial internal carotid, anterior cerebral, or middle cerebral arteries. No aneurysm. POSTERIOR CIRCULATION: No significant stenosis of the vertebral, basilar, or posterior cerebral arteries. No aneurysm. OTHER: No dural venous sinus thrombosis on this non-dedicated study. 1. No acute intracranial abnormality. 2. There appears to be severe stenosis at the origin of the right vertebral artery. 3. Atherosclerosis contributes to less than 50% stenosis of the proximal right cervical ICA by NASCET criteria.  4. No flowing stenosis involving the left cervical ICA or left vertebral artery. 5. No significant stenosis or large vessel occlusion of the [id number].        Electronically signed by Joe Siddiqui MD on 8/5/2022 at 11:03 PM

## 2022-08-06 NOTE — PLAN OF CARE
Problem: Discharge Planning  Goal: Discharge to home or other facility with appropriate resources  8/6/2022 0433 by Eliecer Wilde RN  Outcome: Not Progressing  8/6/2022 0431 by Eliecer Wilde RN  Outcome: Not Progressing     Problem: Safety - Adult  Goal: Free from fall injury  8/6/2022 0433 by Eliecer Wilde RN  Outcome: Not Progressing  8/6/2022 0431 by Eliecer Wilde RN  Outcome: Not Progressing

## 2022-08-06 NOTE — CONSULTS
Nephrology Service Consultation      [address], [address] [address]  Phone: [phone]  Office Hours: 8:30AM - 4:30PM  Monday - Friday              MDM//Assessment and Recommendations     -Severe hyponatremia: 111 on admission//TSH wnl, AM cortisol wnl  -Severe hypokalemia: 2.5 on admission  -Generalized weakness  -Seizure hx    Plan:  -Cherelle being in the 100s, Uosm 500s, higH USG, suggest SIADH, IT COULD BE FROM THE ANTISEIZURE MEds or she may have an undiagnosed malignancy. Obtain CT chest w/contrast to ensure there is no lung cancer.   Limit oral fluid to 1800ml  -Hypertonic saline on hold until I see the electrolyte panel at 2pm  -Give po KCL for now  -Should she need an antihypertensive then start aldactone which will help with normokalemia    Thank you      Patient Active Problem List    Diagnosis Date Noted    Hyponatremia 08/05/2022    KYLE (obstructive sleep apnea) 04/12/2022    Ex-smoker 04/12/2022    COPD (chronic obstructive pulmonary disease) ([address] 04/12/2022    Primary insomnia 01/06/2022    Centrilobular emphysema ([address] 10/07/2021    Overactive bladder 09/09/2021    Hypoxia 08/01/2021    Fatigue 08/01/2021    Hypersomnia 08/01/2021    Snoring 08/01/2021    Chronic hypoxemic respiratory failure ([address] 06/09/2021    Hereditary and idiopathic neuropathy     Lung nodule 05/10/2021    Personal history of nicotine dependence 05/10/2021    Intermittent claudication ([address] 11/17/2020    Nicotine abuse 11/17/2020    Class 1 obesity due to excess calories with body mass index (BMI) of 33.0 to 33.9 in adult 11/17/2020    Obesity (BMI 30-39.9) 11/03/2020    Restless legs 10/19/2020    Shortness of breath 10/19/2020    Age-related osteoporosis without current pathological fracture 08/11/2020    De Quervain's tenosynovitis, left     Vertigo 07/14/2020    Intractable generalized idiopathic epilepsy without status epilepticus ([address] 06/16/2020    Prediabetes 06/16/2020    Essential hypertension 06/16/2020    Panlobular emphysema ([facility] 06/16/2020    Personal history of tobacco use 06/16/2020    Vitamin D deficiency 06/16/2020    Moderate episode of recurrent major depressive disorder ([facility] 06/16/2020    Dyslipidemia 06/16/2020    Vestibular migraine 04/21/2020    Diplopia 03/13/2020    Seizure ([facility] 08/30/2018    Chronic hyponatremia 02/22/2017         Patient:  Jeremy Mercedes  MRN: [id number]  Consulting physician:  Dionna Orourke MD  Reason for Consult: sodium 111    PCP: Viktoriya Philippe MD    HISTORY OF PRESENT ILLNESS:   The patient is a 65 y.o. female with seizure presented with feeling unwell, poor appettite and was found to have a sodium level of 111 and K of 2.5 thus the renal consult. She denies having had any new meds lately. She reports having dry mouth frequently so she tends to sip a lot of water. She has no diagnosis of active cancer. She reports being uptodate on her age-appropriate cancer screening. She does not take any thiazides but has been on antiseizure meds for at least 20y. She has a smoking hx and follows with pulmonology. Per lab review, she had a sodium of 125 on 5/23/22, 132 in Nov 2020, 122 in sept 2020, 131 in aug 2020, 128 in July 2020. REVIEW OF SYSTEMS:  14 point ROS is Negative. See positive ROS per HPI    Past Medical History:        Diagnosis Date    COPD (chronic obstructive pulmonary disease) ([facility]     Epilepsy ([facility]     Full dentures     upper plate only    H/O dizziness     \"take Meclazine at least twice a day for this\"    H/O Doppler JERMAINE's ultrasound 12/01/2020     No evidence of significant occlusive arterial disease of lower EXT. H/O echocardiogram 12/01/2020    EF 55-60%, Normal study. History of drug abuse ([facility]     History of nuclear stress test 11/30/2020    Normal study.     History of UTI     \"last one May 2020\"    Hypertension     \"the top # use to be over 200 \"- on medication since 2018\"    Seizures ([facility]     hx of epilepsy- last seizure week 7/13/2020- hx grand mal seizures    Wears glasses        Past Surgical History:        Procedure Laterality Date    BRAIN SURGERY      s/p L anterior temporal lobectomy in 1996\"did surgery to try an treat the seizures\"    CYSTOSCOPY N/A 10/14/2021    with a bladder biopsy-Dr. Irais Ruffin in [address] 10/14/2021    CYSTOSCOPY BLADDER BIOPSY performed by Eron Garay MD at [address] ([address] [facility])  1996    \"not sure if they took the ovaries\"    [address] Left 7/30/2020    LEFT WRIST TENDON RELEASE performed by Deep Patrick DO at [address] [facility]       Medications:   Prior to Admission medications    Medication Sig Start Date End Date Taking?  Authorizing Provider   APTIOM 800 MG tablet TAKE TWO TABLETS BY MOUTH AT BEDTIME 6/15/22   Polo Yuan DO   levETIRAcetam (KEPPRA) 500 MG tablet TAKE 1 TABLET BY MOUTH 2 TIMES DAILY 6/15/22   Polo Yuan DO   ARIPiprazole (ABILIFY) 5 MG tablet TAKE ONE TABLET BY MOUTH EVERY DAY 6/13/22   Erendira Rivera MD   magnesium oxide (MAG-OX) 400 MG tablet TAKE ONE TABLET BY MOUTH EVERY DAY 6/13/22   Erendira Rivera MD   omeprazole (PRILOSEC) 40 MG delayed release capsule TAKE ONE CAPSULE BY MOUTH EVERY DAY BEFORE BREAKFAST 6/13/22   Erendira Rivera MD   olmesartan-hydroCHLOROthiazide (BENICAR HCT) 40-25 MG per tablet TAKE ONE TABLET BY MOUTH EVERY DAY 6/10/22   Erendira Rivera MD   alendronate (FOSAMAX) 70 MG tablet Take 1 tablet by mouth once a week 6/10/22   Erendira Rivera MD   nortriptyline (PAMELOR) 25 MG capsule Take 2 capsules by mouth nightly 6/10/22   Erendira Rivera MD   rOPINIRole (REQUIP) 0.5 MG tablet Take 1 tablet by mouth in the morning and at bedtime 6/10/22   Erendira Rivera MD   oxybutynin (DITROPAN-XL) 10 MG extended release tablet Take 1 tablet by mouth daily 1/6/22   Erendira Rivera MD        Allergies:  Varenicline and Chantix [varenicline tartrate]    Social History: TOBACCO:   reports that she quit smoking about 2 years ago. Her smoking use included cigarettes. She has a 60.00 pack-year smoking history. She has never used smokeless tobacco.  ETOH:   reports no history of alcohol use.   OCCUPATION:      Family History:       Problem Relation Age of Onset    Heart Disease Father         cabg    Breast Cancer Maternal Cousin      Physical Exam:    Vitals: BP (!) 149/90   Pulse 69   Temp 98.4 °F (36.9 °C) (Oral)   Resp 14   SpO2 100%   General appearance: in no acute distress, appears stated age  Skin: Skin color, texture, turgor normal. No rashes or lesions  HEENT: normocephalic, atraumatic  Neck: supple, trachea midline  Lungs: clear to auscultation bilaterally, breathing comfortably  Heart[de-identified] regular rate and rhythm,   Abdomen: non distended  Extremities: extremities normal, atraumatic, no cyanosis or edema  Neurologic: Mental status: alert, oriented, interactive, following commands  Psychiatric: mood and affect somewhat inappropriate     CBC:   Recent Labs     08/05/22  1426   WBC 6.8   HGB 11.8*        BMP:    Recent Labs     08/05/22  1426 08/06/22  0245 08/06/22  0615   * 115* 117*  117*   K 2.5* 2.7* 3.8   CL 71* 77* 81*   CO2 30 25 25   BUN 20 12 12   CREATININE 1.2* 0.9 0.9   GLUCOSE 101* 88 94     Hepatic:   Recent Labs     08/05/22  1426   AST 16   ALT 13   BILITOT 0.3   ALKPHOS 151*            Electronically signed by Zoila Ny DO on 8/6/2022 at 8:39 AM    [facility]  MD Philly Tubbs DO [address],  [address]  [facility], [place] [address]  PHONE: [phone]  FAX: [phone]

## 2022-08-06 NOTE — CONSULTS
[facility] [place] [facility] Consult Note      Name:  Hanane Hammond /Age/Sex: 1960  (65 y.o. female)   MRN & CSN:  [id number] & [id number] Encounter Date/Time: 2022 8:24 AM EDT   Location:  -A PCP: Angelika Lang MD     Attending:Yesenia Koo MD  Consulting Provider: Oziel Stallings Day: 2    Assessment and Recommendations   Génesis Hernández is a 65 y.o. female with pmh of Epilepsy, COPD, cig smoker, Pulmonary nodule, Obesity, HTN who presents with Hyponatremia, dizziness and fatigue. She had a recent fall and not hit her head. She has no cough, no phlegm, no hemoptysis,no loss of weight. Her last CT chest in  showed 0.4 cm nodule. She has no h/o CHF on the ECHO from . At this time she is lying in the bed. She is in mild resp distress. She is answering the questions appropriately. Hospital Problems             Last Modified POA    * (Principal) Hyponatremia 2022 Yes   ASSESSMENT  Hyponatremia likely sec to Aptiom, will r/o SIADH, CHF- slowly improving  Hypokalemia- improving  COPD  Cig smoker  Obesity  ? KYLE  Left foot pain- no abn on X ray    Recommendations:    Hold Aptiom  Watch off 3% Nacl  Lytes q 6hr  BMP in am  Neurology eval for reconsidering Aptiom and eval for the severe right vertebral artery  Inhalers  BIPAP prn  ECHO  PBNPT  CT chest to eval the pulmonary nodule  Keep sats > 92%  C/w present management  OP PSG          Diet ADULT DIET;  Regular; 1800 ml   DVT Prophylaxis [] Lovenox, []  Heparin, [] SCDs, [] Ambulation,  [] Eliquis, [] Xarelto [] Coumadin   GI Prophylaxis [] Yes         [] No   Disposition Reason for ICU Admit: Symptomatic Hyponatremia  Estimated ICU Stay: 3   Code Status Full Code    Hi  History Obtained From:    patient, electronic medical record    History of Present Illness:     Chief Complaint: Hyponatremia    Génesis Hernández is a 65 y.o. female who is seen today by the hospitalist team at the request of  Hyponatremiawith a Chief Complaint of AMS and dizziness       Review of Systems:    Review of Systems    Negative    Objective: Intake/Output Summary (Last 24 hours) at 8/6/2022 0824  Last data filed at 8/6/2022 5780  Gross per 24 hour   Intake 745.48 ml   Output --   Net 745.48 ml      Vitals:   Vitals:    08/06/22 0602 08/06/22 0645 08/06/22 0702 08/06/22 0738   BP:       Pulse: 75 66 69    Resp: 12 14 14    Temp:       TempSrc:       SpO2: 90%  100% 100%       Medications Prior to Admission     Prior to Admission medications    Medication Sig Start Date End Date Taking? Authorizing Provider   APTIOM 800 MG tablet TAKE TWO TABLETS BY MOUTH AT BEDTIME 6/15/22   Hari Yuan,    levETIRAcetam (KEPPRA) 500 MG tablet TAKE 1 TABLET BY MOUTH 2 TIMES DAILY 6/15/22   Hari Yuan DO   ARIPiprazole (ABILIFY) 5 MG tablet TAKE ONE TABLET BY MOUTH EVERY DAY 6/13/22   Viktoriya Philippe MD   magnesium oxide (MAG-OX) 400 MG tablet TAKE ONE TABLET BY MOUTH EVERY DAY 6/13/22   Viktoriya Philippe MD   omeprazole (PRILOSEC) 40 MG delayed release capsule TAKE ONE CAPSULE BY MOUTH EVERY DAY BEFORE BREAKFAST 6/13/22   Viktoriya Philippe MD   olmesartan-hydroCHLOROthiazide (BENICAR HCT) 40-25 MG per tablet TAKE ONE TABLET BY MOUTH EVERY DAY 6/10/22   Viktoriya Philippe MD   alendronate (FOSAMAX) 70 MG tablet Take 1 tablet by mouth once a week 6/10/22   Viktoriya Philippe MD   nortriptyline (PAMELOR) 25 MG capsule Take 2 capsules by mouth nightly 6/10/22   Viktoriya Philippe MD   rOPINIRole (REQUIP) 0.5 MG tablet Take 1 tablet by mouth in the morning and at bedtime 6/10/22   Viktoriya Philippe MD   oxybutynin (DITROPAN-XL) 10 MG extended release tablet Take 1 tablet by mouth daily 1/6/22   Viktoriya Philippe MD       Physical Exam: Need 8 Elements   General: NAD  Eyes: EOMI  ENT: neck supple  Cardiovascular: Regular rate.   Respiratory: Clear to auscultation  Gastrointestinal: Soft, non tender  Genitourinary: no suprapubic tenderness  Musculoskeletal: No edema  Skin: warm, dry  Neuro: Alert. Psych: Mood appropriate. Past Medical History:   PMHx   Past Medical History:   Diagnosis Date    [facility]     Epilepsy ([facility]     Full dentures     upper plate only    H/O dizziness     \"take Meclazine at least twice a day for this\"    H/O Doppler JERMAINE's ultrasound 2020     No evidence of significant occlusive arterial disease of lower EXT.    H/O echocardiogram 2020    EF 55-60%, Normal study.  History of drug abuse ([facility]     History of nuclear stress test 2020    Normal study.  History of UTI     \"last one May 2020\"    Hypertension     \"the top # use to be over 200 \"- on medication since \"    Seizures ([facility]     hx of epilepsy- last seizure week 2020- hx grand mal seizures    Wears glasses      PSHX:  has a past surgical history that includes brain surgery; Tubal ligation (); Hysterectomy (); Wrist surgery (Left, 2020); Cystoscopy (N/A, 10/14/2021); and Cystoscopy (N/A, 10/14/2021). Allergies: Allergies   Allergen Reactions    Varenicline Other (See Comments)     Having bad dreams and chest pain( Chantix)    Chantix [Varenicline Tartrate] Palpitations     Chest pain     Fam HX:family history includes Breast Cancer in her maternal cousin; Heart Disease in her father.   Soc HX:   Social History     Socioeconomic History    Marital status:    Tobacco Use    Smoking status: Former     Packs/day: 1.50     Years: 40.00     Pack years: 60.00     Types: Cigarettes     Quit date: 2020     Years since quittin.0    Smokeless tobacco: Never   Vaping Use    Vaping Use: Never used   Substance and Sexual Activity    Alcohol use: No     Comment: per pt on 2020\"quit drinking age 40- use to drink on weekends\"    Drug use: Yes     Types: Marijuana (Autumn Quale), Other-see comments     Comment: PCP/ per pt on 2020\"last used marijuana 2020, last did cocaine spring 2020\"    Sexual activity: Yes     Partners: Male     Social Determinants of Health     Financial Resource Strain: High Risk    Difficulty of Paying Living Expenses: Very hard   Food Insecurity: Food Insecurity Present    Worried About Running Out of Food in the Last Year: Sometimes true    Ran Out of Food in the Last Year: Sometimes true       Medications:   Medications:    potassium chloride  20 mEq Oral Daily with breakfast    sodium chloride flush  5-40 mL IntraVENous 2 times per day    enoxaparin  30 mg SubCUTAneous Daily    levETIRAcetam  500 mg Oral BID    eslicarbazepine acetate  800 mg Oral Daily    sodium chloride flush  5-40 mL IntraVENous 2 times per day      Infusions:    sodium chloride      sodium chloride      [Held by provider] sodium chloride Stopped (08/06/22 0604)     PRN Meds: sodium chloride flush, 5-40 mL, PRN  sodium chloride, , PRN  ondansetron, 4 mg, Q8H PRN   Or  ondansetron, 4 mg, Q6H PRN  polyethylene glycol, 17 g, Daily PRN  acetaminophen, 650 mg, Q6H PRN   Or  acetaminophen, 650 mg, Q6H PRN  sodium chloride flush, 5-40 mL, PRN  sodium chloride, , PRN  potassium chloride, 10 mEq, PRN  magnesium sulfate, 1,000 mg, PRN        Labs and Imaging   XR FOOT LEFT (MIN 3 VIEWS)    Result Date: 8/6/2022  EXAMINATION: THREE XRAY VIEWS OF THE LEFT FOOT; ONE XRAY VIEW OF THE CHEST 8/5/2022 11:09 pm COMPARISON: Chest x-ray 03/27/2022 HISTORY: ORDERING SYSTEM PROVIDED HISTORY: fall TECHNOLOGIST PROVIDED HISTORY: Reason for exam:->fall Reason for Exam: fall; ORDERING SYSTEM PROVIDED HISTORY: mass? TECHNOLOGIST PROVIDED HISTORY: Reason for exam:->mass? Reason for Exam: mass? FINDINGS: Chest: Lungs are clear. Cardiomediastinal silhouette is within normal limits. No pleural effusion. No pneumothorax. Bony structures are unremarkable. Left foot: No acute fracture or dislocation. Chest: No acute findings. Left foot: No acute fracture or dislocation.      CT Head WO Contrast    Result Date: 8/5/2022  EXAMINATION: CTA OF THE HEAD AND NECK WITH CONTRAST; CT OF THE HEAD WITHOUT CONTRAST 8/5/2022 3:09 pm; 8/5/2022 3:08 pm: TECHNIQUE: CTA of the head and neck was performed with the administration of intravenous contrast. Multiplanar reformatted images are provided for review. MIP images are provided for review. Stenosis of the internal carotid arteries measured using NASCET criteria. Automated exposure control, iterative reconstruction, and/or weight based adjustment of the mA/kV was utilized to reduce the radiation dose to as low as reasonably achievable.; CT of the head was performed without the administration of intravenous contrast. Automated exposure control, iterative reconstruction, and/or weight based adjustment of the mA/kV was utilized to reduce the radiation dose to as low as reasonably achievable. Noncontrast CT of the head with reconstructed 2-D images are also provided for review. This scan was analyzed using oroeco. ai contact LVO. Identification of suspected findings is not for diagnostic use beyond notification. Viz LVO is limited to analysis of imaging data and should not be used in-lieu of full patient evaluation or relied upon to make or confirm diagnosis. COMPARISON: CT head 05/29/2020. HISTORY: ORDERING SYSTEM PROVIDED HISTORY: dizziness, gait difficulties, 1 week TECHNOLOGIST PROVIDED HISTORY: Reason for exam:->dizziness, gait difficulties, 1 week Has a \"code stroke\" or \"stroke alert\" been called? ->No Decision Support Exception - unselect if not a suspected or confirmed emergency medical condition->Emergency Medical Condition (MA) Reason for Exam: 100ml isovue 370 inj by mv iv le 1520 Additional signs and symptoms: dizziness, gait difficulties, 1 week; ORDERING SYSTEM PROVIDED HISTORY: dizziness, gait difficulties, 1 week TECHNOLOGIST PROVIDED HISTORY: Reason for exam:->dizziness, gait difficulties, 1 week Has a \"code stroke\" or \"stroke alert\" been called? ->No Decision Support Exception - unselect if not a suspected or confirmed emergency medical condition->Emergency Medical Condition (MA) Additional signs and symptoms: dizziness, gait difficulties, 1 week FINDINGS: CT HEAD: BRAIN/VENTRICLES:  Encephalomalacia is again seen involving the left temporal lobe. Otherwise, the gray-white differentiation appears maintained. No acute intracranial hemorrhage. No mass effect or midline shift. No evidence of hydrocephalus. ORBITS: The visualized portion of the orbits demonstrate no acute abnormality. SINUSES:  The visualized paranasal sinuses and mastoid air cells demonstrate no acute abnormality. SOFT TISSUES/SKULL: No acute abnormality of the calvarium. Postsurgical change from a left temporal craniotomy. CTA NECK: AORTIC ARCH/ARCH VESSELS: There is minimal scattered atherosclerosis of the aortic arch and arch vessels. No flow limiting stenosis seen of the innominate or subclavian arteries. CAROTID ARTERIES: No focal stenosis seen of the common carotid arteries. Atherosclerosis of the carotid bulbs and proximal internal carotid arteries bilaterally. There is less than 50% stenosis of the proximal right cervical ICA by NASCET criteria. No focal stenosis seen of the left cervical ICA by NASCET criteria. VERTEBRAL ARTERIES: There is a diffusely diminutive caliber of the right vertebral artery, which is presumably congenital in nature. There is severe stenosis at the origin of the right vertebral artery. No focal stenosis otherwise seen of the vertebral arteries. SOFT TISSUES: Centrilobular and paraseptal emphysematous changes seen in the lungs bilaterally. Mild dependent changes are seen bilaterally. No acute abnormality within the visualized mediastinum. BONES: The osseous structures appear osteopenic. No acute osseous abnormality clearly identified.  CTA HEAD: ANTERIOR CIRCULATION: No significant stenosis of the intracranial internal carotid, anterior cerebral, or middle cerebral arteries. No aneurysm. POSTERIOR CIRCULATION: No significant stenosis of the vertebral, basilar, or posterior cerebral arteries. No aneurysm. OTHER: No dural venous sinus thrombosis on this non-dedicated study. 1. No acute intracranial abnormality. 2. There appears to be severe stenosis at the origin of the right vertebral artery. 3. Atherosclerosis contributes to less than 50% stenosis of the proximal right cervical ICA by NASCET criteria. 4. No flowing stenosis involving the left cervical ICA or left vertebral artery. 5. No significant stenosis or large vessel occlusion of the [id number]. XR CHEST PORTABLE    Result Date: 8/6/2022  EXAMINATION: THREE XRAY VIEWS OF THE LEFT FOOT; ONE XRAY VIEW OF THE CHEST 8/5/2022 11:09 pm COMPARISON: Chest x-ray 03/27/2022 HISTORY: ORDERING SYSTEM PROVIDED HISTORY: fall TECHNOLOGIST PROVIDED HISTORY: Reason for exam:->fall Reason for Exam: fall; ORDERING SYSTEM PROVIDED HISTORY: mass? TECHNOLOGIST PROVIDED HISTORY: Reason for exam:->mass? Reason for Exam: mass? FINDINGS: Chest: Lungs are clear. Cardiomediastinal silhouette is within normal limits. No pleural effusion. No pneumothorax. Bony structures are unremarkable. Left foot: No acute fracture or dislocation. Chest: No acute findings. Left foot: No acute fracture or dislocation. CTA HEAD NECK W CONTRAST    Result Date: 8/5/2022  EXAMINATION: CTA OF THE HEAD AND NECK WITH CONTRAST; CT OF THE HEAD WITHOUT CONTRAST 8/5/2022 3:09 pm; 8/5/2022 3:08 pm: TECHNIQUE: CTA of the head and neck was performed with the administration of intravenous contrast. Multiplanar reformatted images are provided for review. MIP images are provided for review. Stenosis of the internal carotid arteries measured using NASCET criteria.  Automated exposure control, iterative reconstruction, and/or weight based adjustment of the mA/kV was utilized to reduce the radiation dose to as low as reasonably achievable.; CT of the head was performed without the administration of intravenous contrast. Automated exposure control, iterative reconstruction, and/or weight based adjustment of the mA/kV was utilized to reduce the radiation dose to as low as reasonably achievable. Noncontrast CT of the head with reconstructed 2-D images are also provided for review. This scan was analyzed using Viz. ai contact LVO. Identification of suspected findings is not for diagnostic use beyond notification. Viz LVO is limited to analysis of imaging data and should not be used in-lieu of full patient evaluation or relied upon to make or confirm diagnosis. COMPARISON: CT head 05/29/2020. HISTORY: ORDERING SYSTEM PROVIDED HISTORY: dizziness, gait difficulties, 1 week TECHNOLOGIST PROVIDED HISTORY: Reason for exam:->dizziness, gait difficulties, 1 week Has a \"code stroke\" or \"stroke alert\" been called? ->No Decision Support Exception - unselect if not a suspected or confirmed emergency medical condition->Emergency Medical Condition (MA) Reason for Exam: 100ml isovue 370 inj by mv iv le 1520 Additional signs and symptoms: dizziness, gait difficulties, 1 week; ORDERING SYSTEM PROVIDED HISTORY: dizziness, gait difficulties, 1 week TECHNOLOGIST PROVIDED HISTORY: Reason for exam:->dizziness, gait difficulties, 1 week Has a \"code stroke\" or \"stroke alert\" been called? ->No Decision Support Exception - unselect if not a suspected or confirmed emergency medical condition->Emergency Medical Condition (MA) Additional signs and symptoms: dizziness, gait difficulties, 1 week FINDINGS: CT HEAD: BRAIN/VENTRICLES:  Encephalomalacia is again seen involving the left temporal lobe. Otherwise, the gray-white differentiation appears maintained. No acute intracranial hemorrhage. No mass effect or midline shift. No evidence of hydrocephalus. ORBITS: The visualized portion of the orbits demonstrate no acute abnormality.  SINUSES:  The visualized paranasal sinuses and mastoid air [id number]. CBC:   Recent Labs     08/05/22  1426   WBC 6.8   HGB 11.8*        BMP:    Recent Labs     08/05/22  1426 08/06/22  0245 08/06/22  0615   * 115* 117*  117*   K 2.5* 2.7* 3.8   CL 71* 77* 81*   CO2 30 25 25   BUN 20 12 12   CREATININE 1.2* 0.9 0.9   GLUCOSE 101* 88 94     Hepatic:   Recent Labs     08/05/22  1426   AST 16   ALT 13   BILITOT 0.3   ALKPHOS 151*     Lipids:   Lab Results   Component Value Date/Time    CHOL 196 04/04/2013 02:05 PM    HDL 73 05/23/2022 10:35 AM    TRIG 126 04/04/2013 02:05 PM     Hemoglobin A1C:   Lab Results   Component Value Date/Time    LABA1C 6.1 05/23/2022 10:35 AM     TSH:   Lab Results   Component Value Date/Time    TSH 1.70 11/18/2020 09:13 AM     Troponin:   Lab Results   Component Value Date/Time    TROPONINT <0.010 08/05/2022 02:26 PM     Lactic Acid: No results for input(s): LACTA in the last 72 hours. BNP: No results for input(s): PROBNP in the last 72 hours.   UA:  Lab Results   Component Value Date/Time    NITRU NEGATIVE 08/05/2022 04:15 PM    COLORU YELLOW 08/05/2022 04:15 PM    WBCUA  0 TO 2 08/05/2022 04:15 PM    RBCUA NEGATIVE 08/05/2022 04:15 PM    BACTERIA NEGATIVE 08/05/2022 04:15 PM    CLARITYU CLEAR 08/05/2022 04:15 PM    SPECGRAV 1.010 08/05/2022 04:15 PM    LEUKOCYTESUR NEGATIVE 08/05/2022 04:15 PM    UROBILINOGEN 0.2 08/05/2022 04:15 PM    BILIRUBINUR NEGATIVE 08/05/2022 04:15 PM    BLOODU NEGATIVE 08/05/2022 04:15 PM    KETUA NEGATIVE 08/05/2022 04:15 PM     Urine Cultures: No results found for: LABURIN  Blood Cultures: No results found for: BC  No results found for: BLOODCULT2  Organism: No results found for: [facility]    Personally reviewed Lab Studies, Imaging, and discussed with Nursing and the patient and more than 31 mins of CC time spent on the patient    Electronically signed by Adrien Gusman MD on 8/6/2022 at 8:24 AM

## 2022-08-07 LAB
EKG ATRIAL RATE: 77 BPM
EKG DIAGNOSIS: NORMAL
EKG P AXIS: 76 DEGREES
EKG P-R INTERVAL: 172 MS
EKG Q-T INTERVAL: 448 MS
EKG QRS DURATION: 88 MS
EKG QTC CALCULATION (BAZETT): 506 MS
EKG R AXIS: 59 DEGREES
EKG T AXIS: 73 DEGREES
EKG VENTRICULAR RATE: 77 BPM
SODIUM BLD-SCNC: 119 MMOL/L (ref 135–145)
SODIUM BLD-SCNC: 120 MMOL/L (ref 135–145)
SODIUM BLD-SCNC: 122 MMOL/L (ref 135–145)
SODIUM BLD-SCNC: 122 MMOL/L (ref 135–145)
SODIUM BLD-SCNC: 126 MMOL/L (ref 135–145)

## 2022-08-07 PROCEDURE — 2000000000 HC ICU R&B

## 2022-08-07 PROCEDURE — 2580000003 HC RX 258: Performed by: STUDENT IN AN ORGANIZED HEALTH CARE EDUCATION/TRAINING PROGRAM

## 2022-08-07 PROCEDURE — 97166 OT EVAL MOD COMPLEX 45 MIN: CPT

## 2022-08-07 PROCEDURE — 6370000000 HC RX 637 (ALT 250 FOR IP): Performed by: INTERNAL MEDICINE

## 2022-08-07 PROCEDURE — 93010 ELECTROCARDIOGRAM REPORT: CPT | Performed by: INTERNAL MEDICINE

## 2022-08-07 PROCEDURE — 97530 THERAPEUTIC ACTIVITIES: CPT

## 2022-08-07 PROCEDURE — 6370000000 HC RX 637 (ALT 250 FOR IP): Performed by: STUDENT IN AN ORGANIZED HEALTH CARE EDUCATION/TRAINING PROGRAM

## 2022-08-07 PROCEDURE — 36592 COLLECT BLOOD FROM PICC: CPT

## 2022-08-07 PROCEDURE — 2580000003 HC RX 258: Performed by: INTERNAL MEDICINE

## 2022-08-07 PROCEDURE — 97116 GAIT TRAINING THERAPY: CPT

## 2022-08-07 PROCEDURE — 6360000002 HC RX W HCPCS: Performed by: STUDENT IN AN ORGANIZED HEALTH CARE EDUCATION/TRAINING PROGRAM

## 2022-08-07 PROCEDURE — 84295 ASSAY OF SERUM SODIUM: CPT

## 2022-08-07 PROCEDURE — 6370000000 HC RX 637 (ALT 250 FOR IP): Performed by: NURSE PRACTITIONER

## 2022-08-07 PROCEDURE — 94761 N-INVAS EAR/PLS OXIMETRY MLT: CPT

## 2022-08-07 PROCEDURE — 99223 1ST HOSP IP/OBS HIGH 75: CPT | Performed by: PSYCHIATRY & NEUROLOGY

## 2022-08-07 PROCEDURE — 97162 PT EVAL MOD COMPLEX 30 MIN: CPT

## 2022-08-07 RX ORDER — 3% SODIUM CHLORIDE 3 G/100ML
25 INJECTION, SOLUTION INTRAVENOUS CONTINUOUS
Status: DISCONTINUED | OUTPATIENT
Start: 2022-08-07 | End: 2022-08-07

## 2022-08-07 RX ORDER — LAMOTRIGINE 100 MG/1
200 TABLET ORAL EVERY MORNING
Status: DISCONTINUED | OUTPATIENT
Start: 2022-08-07 | End: 2022-08-07

## 2022-08-07 RX ORDER — LAMOTRIGINE 100 MG/1
300 TABLET ORAL EVERY EVENING
Status: DISCONTINUED | OUTPATIENT
Start: 2022-08-07 | End: 2022-08-07

## 2022-08-07 RX ADMIN — SODIUM CHLORIDE, PRESERVATIVE FREE 10 ML: 5 INJECTION INTRAVENOUS at 21:35

## 2022-08-07 RX ADMIN — Medication 15 G: at 15:29

## 2022-08-07 RX ADMIN — SODIUM CHLORIDE 20 ML/HR: 3 INJECTION, SOLUTION INTRAVENOUS at 07:09

## 2022-08-07 RX ADMIN — POLYETHYLENE GLYCOL (3350) 17 G: 17 POWDER, FOR SOLUTION ORAL at 10:35

## 2022-08-07 RX ADMIN — SODIUM CHLORIDE, PRESERVATIVE FREE 10 ML: 5 INJECTION INTRAVENOUS at 08:43

## 2022-08-07 RX ADMIN — LEVETIRACETAM 750 MG: 500 TABLET, FILM COATED ORAL at 21:34

## 2022-08-07 RX ADMIN — LEVETIRACETAM 500 MG: 500 TABLET, FILM COATED ORAL at 08:43

## 2022-08-07 RX ADMIN — ENOXAPARIN SODIUM 30 MG: 100 INJECTION SUBCUTANEOUS at 08:41

## 2022-08-07 RX ADMIN — Medication 15 G: at 21:34

## 2022-08-07 RX ADMIN — SODIUM CHLORIDE, PRESERVATIVE FREE 10 ML: 5 INJECTION INTRAVENOUS at 08:42

## 2022-08-07 RX ADMIN — ESLICARBAZEPINE ACETATE 1200 MG: 200 TABLET ORAL at 21:36

## 2022-08-07 RX ADMIN — POTASSIUM CHLORIDE 20 MEQ: 1500 TABLET, EXTENDED RELEASE ORAL at 08:43

## 2022-08-07 RX ADMIN — SODIUM CHLORIDE, PRESERVATIVE FREE 10 ML: 5 INJECTION INTRAVENOUS at 21:52

## 2022-08-07 RX ADMIN — Medication 15 G: at 08:43

## 2022-08-07 ASSESSMENT — ENCOUNTER SYMPTOMS
EYE PAIN: 0
NAUSEA: 0
EYE DISCHARGE: 0
DIARRHEA: 0
SINUS PAIN: 0
SHORTNESS OF BREATH: 0
SINUS PRESSURE: 0
BLOOD IN STOOL: 0
BACK PAIN: 1
COUGH: 0
VOMITING: 0
VOICE CHANGE: 0
CHEST TIGHTNESS: 0
ABDOMINAL PAIN: 0

## 2022-08-07 ASSESSMENT — PAIN - FUNCTIONAL ASSESSMENT: PAIN_FUNCTIONAL_ASSESSMENT: ACTIVITIES ARE NOT PREVENTED

## 2022-08-07 ASSESSMENT — PAIN SCALES - GENERAL
PAINLEVEL_OUTOF10: 2
PAINLEVEL_OUTOF10: 2

## 2022-08-07 NOTE — PROGRESS NOTES
Nephrology Progress Note        [address], [address] [address]  Phone: [phone]  Office Hours: 8:30AM - 4:30PM  Monday - Friday 8/7/2022 6:54 AM  Subjective:   Admit Date: 8/5/2022  PCP: Maryjane Crouch MD  Interval History: doing ok    Diet: ADULT DIET; Regular; 1800 ml      Data:   Scheduled Meds:   urea  15 g Oral TID    potassium chloride  20 mEq Oral Daily with breakfast    sodium chloride flush  5-40 mL IntraVENous 2 times per day    enoxaparin  30 mg SubCUTAneous Daily    levETIRAcetam  500 mg Oral BID    eslicarbazepine acetate  800 mg Oral Daily    sodium chloride flush  5-40 mL IntraVENous 2 times per day     Continuous Infusions:   sodium chloride      sodium chloride      sodium chloride       PRN Meds:sodium chloride flush, sodium chloride, ondansetron **OR** ondansetron, polyethylene glycol, acetaminophen **OR** acetaminophen, sodium chloride flush, sodium chloride, potassium chloride, magnesium sulfate  I/O last 3 completed shifts:   In: 745.5 [P.O.:250; IV Piggyback:495.5]  Out: 1500 [Urine:1500]  I/O this shift:  In: -   Out: 300 [Urine:300]    Intake/Output Summary (Last 24 hours) at 8/7/2022 0654  Last data filed at 8/7/2022 0557  Gross per 24 hour   Intake 495.48 ml   Output 1800 ml   Net -1304.52 ml       CBC:   Recent Labs     08/05/22  1426 08/06/22  0615   WBC 6.8 6.4   HGB 11.8* 10.9*    291       BMP:    Recent Labs     08/05/22  1426 08/06/22  0245 08/06/22  0615 08/06/22  1407 08/06/22  1847 08/06/22  2145 08/07/22  0100   * 115* 117*  117* 115* 120* 121* 119*   K 2.5* 2.7* 3.8 3.8  --   --   --    CL 71* 77* 81* 80*  --   --   --    CO2 30 25 25 21  --   --   --    BUN 20 12 12  --   --   --   --    CREATININE 1.2* 0.9 0.9  --   --   --   --    GLUCOSE 101* 88 94  --   --   --   --      Hepatic:   Recent Labs     08/05/22  1426   AST 16   ALT 13   BILITOT 0.3   ALKPHOS 151*     Troponin: No results for input(s): TROPONINI in the last 72 hours. BNP: No results for input(s): BNP in the last 72 hours. Lipids: No results for input(s): CHOL, HDL in the last 72 hours.     Invalid input(s): LDLCALCU  ABGs: No results found for: PHART, PO2ART, BHW2CIY  INR:   Recent Labs     08/05/22  1426   INR 1.02       Objective:   Vitals: /70   Pulse 76   Temp 97.8 °F (36.6 °C) (Oral)   Resp 14   SpO2 93%   General appearance: alert and cooperative with exam, in no acute distress  HEENT: normocephalic, atraumatic,   Neck: supple, trachea midline  Lungs: clear to auscultation bilaterally, breathing comfortably on room air  Heart[de-identified] regular rate and rhythm, S1, S2 normal,  Abdomen: soft, non-tender; non distended, +bowel sounds  Extremities: extremities atraumatic, no cyanosis or edema  Neurologic: alert, oriented, follows commands, interactive    Assessment and Plan:       Patient Active Problem List    Diagnosis Date Noted    Hyponatremia 08/05/2022    KYLE (obstructive sleep apnea) 04/12/2022    Ex-smoker 04/12/2022    COPD (chronic obstructive pulmonary disease) ([facility] 04/12/2022    Primary insomnia 01/06/2022    Centrilobular emphysema ([facility] 10/07/2021    Overactive bladder 09/09/2021    Hypoxia 08/01/2021    Fatigue 08/01/2021    Hypersomnia 08/01/2021    Snoring 08/01/2021    Chronic hypoxemic respiratory failure ([facility] 06/09/2021    Hereditary and idiopathic neuropathy     Lung nodule 05/10/2021    Personal history of nicotine dependence 05/10/2021    Intermittent claudication ([facility] 11/17/2020    Nicotine abuse 11/17/2020    Class 1 obesity due to excess calories with body mass index (BMI) of 33.0 to 33.9 in adult 11/17/2020    Obesity (BMI 30-39.9) 11/03/2020    Restless legs 10/19/2020    Shortness of breath 10/19/2020    Age-related osteoporosis without current pathological fracture 08/11/2020    De Quervain's tenosynovitis, left     Vertigo 07/14/2020    Intractable generalized idiopathic epilepsy without status epilepticus [facility] 06/16/2020 Prediabetes 06/16/2020    Essential hypertension 06/16/2020    Panlobular emphysema ([facility] 06/16/2020    Personal history of tobacco use 06/16/2020    Vitamin D deficiency 06/16/2020    Moderate episode of recurrent major depressive disorder ([facility] 06/16/2020    Dyslipidemia 06/16/2020    Vestibular migraine 04/21/2020    Diplopia 03/13/2020    Seizure ([facility] 08/30/2018    Chronic hyponatremia 02/22/2017     No lung cancer on ct chest with contrast  Start urea  Resume hypertonic saline, important to obtain the q2hrs sodium levels    Thank you                Electronically signed by Tristan Amador DO on 8/7/2022 at [address] [facility], DO Estrella Raza ,  [address]  [place], [place]  PHONE: [phone]  FAX: [phone]

## 2022-08-07 NOTE — PROGRESS NOTES
V2.0  [facility] Hospitalist Progress Note      Name:  Ibeth Rand /Age/Sex: 1960  (65 y.o. female)   MRN & CSN:  [id number] & [id number] Encounter Date/Time: 2022 10:32 AM EDT    Location:  -A PCP: Anne Pretty MD       Hospital Day: 3    Assessment and Plan:   Ibeth Rand is a 65 y.o. female with a pmh of epilepsy, COPD, hypertension who presents with Hyponatremia    Plan:  Severe symptomatic euvolemic hyponatremia likely SIADH from carbamezepine use: Na 111 on admission. Now 120. Nephrology following up. Appreciate recommendations. Andra 100s, Urine Osmol 500. Ct chest with contrast to showed no malignancy. Limit oral intake. Hypertonic saline infusion. May need aldactone due to HTN and hypokalemia. BMP q2h for now  History of epilepsy on Keppra and Aptiom. Continue to monitor. Neurology consulted, will continue meds but need something to change carbamezepine  Benign essential hypertension nephrology recommends use of spironolactone or Aldactone because of persistent hypokalemia  COPD not on home oxygen  Tobacco use disorder  Left foot pain, was complaining of left foot pain at the time of admission x-ray was ordered that did not show any significant damage. Diet ADULT DIET; Regular; 1800 ml   DVT Prophylaxis [x] Lovenox, []  Heparin, [] SCDs, [] Ambulation,  [] Eliquis, [] Xarelto  [] Coumadin   Code Status Full Code   Disposition From: Home  Expected Disposition: Home  Estimated Date of Discharge:2-3 days  Patient requires continued admission due to hyponatremia   Surrogate Decision Maker/ POA      Subjective:     Chief Complaint: No chief complaint on file. Ibeth Rand is a 65 y.o. female who presents with Hyponatremia. Patient was seen at the bedside. Happy now. Doing well. Discussed plan in detail. Eduard Corrales. Good UOP. Review of Systems:    Review of Systems   Constitutional:  Positive for fatigue and unexpected weight change. Negative for chills and fever. HENT:  Negative for ear pain, hearing loss, sinus pressure, sinus pain and voice change. Eyes:  Negative for pain, discharge and visual disturbance. Respiratory:  Negative for cough, chest tightness and shortness of breath. Cardiovascular:  Negative for chest pain, palpitations and leg swelling. Gastrointestinal:  Negative for abdominal pain, blood in stool, diarrhea, nausea and vomiting. Genitourinary:  Negative for dysuria and frequency. Musculoskeletal:  Positive for arthralgias and back pain. Neurological:  Negative for dizziness, weakness, light-headedness and headaches. Psychiatric/Behavioral:  Positive for sleep disturbance. Negative for decreased concentration. Objective: Intake/Output Summary (Last 24 hours) at 8/7/2022 1028  Last data filed at 8/7/2022 0630  Gross per 24 hour   Intake 250 ml   Output 2400 ml   Net -2150 ml          Vitals:   Vitals:    08/07/22 0800   BP: (!) 132/117   Pulse: (!) 110   Resp: 19   Temp: 97.8 °F (36.6 °C)   SpO2:        Physical Exam:   Physical Exam  Vitals and nursing note reviewed. Constitutional:       Appearance: Normal appearance. She is normal weight. HENT:      Head: Normocephalic. Nose: Nose normal.      Mouth/Throat:      Mouth: Mucous membranes are moist.   Eyes:      Conjunctiva/sclera: Conjunctivae normal.      Pupils: Pupils are equal, round, and reactive to light. Cardiovascular:      Rate and Rhythm: Normal rate and regular rhythm. Pulses: Normal pulses. Heart sounds: Normal heart sounds. No murmur heard. Pulmonary:      Effort: Pulmonary effort is normal.      Breath sounds: No wheezing, rhonchi or rales. Abdominal:      General: Abdomen is flat. Bowel sounds are normal. There is no distension. Palpations: Abdomen is soft. Tenderness: There is abdominal tenderness. Musculoskeletal:         General: No deformity. Normal range of motion.       Cervical back: Normal range of motion and neck supple. Right lower leg: No edema. Left lower leg: No edema. Skin:     Coloration: Skin is not jaundiced or pale. Neurological:      General: No focal deficit present. Mental Status: She is alert and oriented to person, place, and time. Mental status is at baseline. Motor: No weakness.           Medications:   Medications:    urea  15 g Oral TID    sodium chloride flush  5-40 mL IntraVENous 2 times per day    enoxaparin  30 mg SubCUTAneous Daily    levETIRAcetam  500 mg Oral BID    eslicarbazepine acetate  800 mg Oral Daily    sodium chloride flush  5-40 mL IntraVENous 2 times per day      Infusions:    sodium chloride 20 mL/hr (08/07/22 0709)    sodium chloride      sodium chloride       PRN Meds: sodium chloride flush, 5-40 mL, PRN  sodium chloride, , PRN  ondansetron, 4 mg, Q8H PRN   Or  ondansetron, 4 mg, Q6H PRN  polyethylene glycol, 17 g, Daily PRN  acetaminophen, 650 mg, Q6H PRN   Or  acetaminophen, 650 mg, Q6H PRN  sodium chloride flush, 5-40 mL, PRN  sodium chloride, , PRN  potassium chloride, 10 mEq, PRN  magnesium sulfate, 1,000 mg, PRN      Labs      Recent Results (from the past 24 hour(s))   Electrolyte Panel w/ Reflex to MG    Collection Time: 08/06/22  2:07 PM   Result Value Ref Range    Sodium 115 (LL) 135 - 145 MMOL/L    Potassium 3.8 3.5 - 5.1 MMOL/L    Chloride 80 (L) 99 - 110 mMol/L    CO2 21 21 - 32 MMOL/L    Anion Gap 14 4 - 16   Sodium    Collection Time: 08/06/22  6:47 PM   Result Value Ref Range    Sodium 120 (L) 135 - 145 MMOL/L   Sodium    Collection Time: 08/06/22  9:45 PM   Result Value Ref Range    Sodium 121 (L) 135 - 145 MMOL/L   Brain Natriuretic Peptide    Collection Time: 08/06/22  9:45 PM   Result Value Ref Range    Pro-.1 <300 PG/ML   Uric Acid    Collection Time: 08/06/22  9:45 PM   Result Value Ref Range    Uric Acid 1.8 (L) 2.6 - 6.0 MG/DL   Sodium    Collection Time: 08/07/22  1:00 AM   Result Value Ref Range    Sodium 119 (LL) 135 - 145 MMOL/L   Sodium    Collection Time: 08/07/22  8:50 AM   Result Value Ref Range    Sodium 120 (L) 135 - 145 MMOL/L        Imaging/Diagnostics Last 24 Hours   XR FOOT LEFT (MIN 3 VIEWS)    Result Date: 8/6/2022  EXAMINATION: THREE XRAY VIEWS OF THE LEFT FOOT; ONE XRAY VIEW OF THE CHEST 8/5/2022 11:09 pm COMPARISON: Chest x-ray 03/27/2022 HISTORY: ORDERING SYSTEM PROVIDED HISTORY: fall TECHNOLOGIST PROVIDED HISTORY: Reason for exam:->fall Reason for Exam: fall; ORDERING SYSTEM PROVIDED HISTORY: mass? TECHNOLOGIST PROVIDED HISTORY: Reason for exam:->mass? Reason for Exam: mass? FINDINGS: Chest: Lungs are clear. Cardiomediastinal silhouette is within normal limits. No pleural effusion. No pneumothorax. Bony structures are unremarkable. Left foot: No acute fracture or dislocation. Chest: No acute findings. Left foot: No acute fracture or dislocation. CT Head WO Contrast    Result Date: 8/5/2022  EXAMINATION: CTA OF THE HEAD AND NECK WITH CONTRAST; CT OF THE HEAD WITHOUT CONTRAST 8/5/2022 3:09 pm; 8/5/2022 3:08 pm: TECHNIQUE: CTA of the head and neck was performed with the administration of intravenous contrast. Multiplanar reformatted images are provided for review. MIP images are provided for review. Stenosis of the internal carotid arteries measured using NASCET criteria. Automated exposure control, iterative reconstruction, and/or weight based adjustment of the mA/kV was utilized to reduce the radiation dose to as low as reasonably achievable.; CT of the head was performed without the administration of intravenous contrast. Automated exposure control, iterative reconstruction, and/or weight based adjustment of the mA/kV was utilized to reduce the radiation dose to as low as reasonably achievable. Noncontrast CT of the head with reconstructed 2-D images are also provided for review. This scan was analyzed using [employer]. ai contact LVO.  Identification of suspected findings is not for diagnostic use beyond notification. Viz LVO is limited to analysis of imaging data and should not be used in-lieu of full patient evaluation or relied upon to make or confirm diagnosis. COMPARISON: CT head 05/29/2020. HISTORY: ORDERING SYSTEM PROVIDED HISTORY: dizziness, gait difficulties, 1 week TECHNOLOGIST PROVIDED HISTORY: Reason for exam:->dizziness, gait difficulties, 1 week Has a \"code stroke\" or \"stroke alert\" been called? ->No Decision Support Exception - unselect if not a suspected or confirmed emergency medical condition->Emergency Medical Condition (MA) Reason for Exam: 100ml isovue 370 inj by mv iv le 1520 Additional signs and symptoms: dizziness, gait difficulties, 1 week; ORDERING SYSTEM PROVIDED HISTORY: dizziness, gait difficulties, 1 week TECHNOLOGIST PROVIDED HISTORY: Reason for exam:->dizziness, gait difficulties, 1 week Has a \"code stroke\" or \"stroke alert\" been called? ->No Decision Support Exception - unselect if not a suspected or confirmed emergency medical condition->Emergency Medical Condition (MA) Additional signs and symptoms: dizziness, gait difficulties, 1 week FINDINGS: CT HEAD: BRAIN/VENTRICLES:  Encephalomalacia is again seen involving the left temporal lobe. Otherwise, the gray-white differentiation appears maintained. No acute intracranial hemorrhage. No mass effect or midline shift. No evidence of hydrocephalus. ORBITS: The visualized portion of the orbits demonstrate no acute abnormality. SINUSES:  The visualized paranasal sinuses and mastoid air cells demonstrate no acute abnormality. SOFT TISSUES/SKULL: No acute abnormality of the calvarium. Postsurgical change from a left temporal craniotomy. CTA NECK: AORTIC ARCH/ARCH VESSELS: There is minimal scattered atherosclerosis of the aortic arch and arch vessels. No flow limiting stenosis seen of the innominate or subclavian arteries. CAROTID ARTERIES: No focal stenosis seen of the common carotid arteries.  Atherosclerosis of the carotid bulbs and proximal internal carotid arteries bilaterally. There is less than 50% stenosis of the proximal right cervical ICA by NASCET criteria. No focal stenosis seen of the left cervical ICA by NASCET criteria. VERTEBRAL ARTERIES: There is a diffusely diminutive caliber of the right vertebral artery, which is presumably congenital in nature. There is severe stenosis at the origin of the right vertebral artery. No focal stenosis otherwise seen of the vertebral arteries. SOFT TISSUES: Centrilobular and paraseptal emphysematous changes seen in the lungs bilaterally. Mild dependent changes are seen bilaterally. No acute abnormality within the visualized mediastinum. BONES: The osseous structures appear osteopenic. No acute osseous abnormality clearly identified. CTA HEAD: ANTERIOR CIRCULATION: No significant stenosis of the intracranial internal carotid, anterior cerebral, or middle cerebral arteries. No aneurysm. POSTERIOR CIRCULATION: No significant stenosis of the vertebral, basilar, or posterior cerebral arteries. No aneurysm. OTHER: No dural venous sinus thrombosis on this non-dedicated study. 1. No acute intracranial abnormality. 2. There appears to be severe stenosis at the origin of the right vertebral artery. 3. Atherosclerosis contributes to less than 50% stenosis of the proximal right cervical ICA by NASCET criteria. 4. No flowing stenosis involving the left cervical ICA or left vertebral artery. 5. No significant stenosis or large vessel occlusion of the [id number]. XR CHEST PORTABLE    Result Date: 8/6/2022  EXAMINATION: THREE XRAY VIEWS OF THE LEFT FOOT; ONE XRAY VIEW OF THE CHEST 8/5/2022 11:09 pm COMPARISON: Chest x-ray 03/27/2022 HISTORY: ORDERING SYSTEM PROVIDED HISTORY: fall TECHNOLOGIST PROVIDED HISTORY: Reason for exam:->fall Reason for Exam: fall; ORDERING SYSTEM PROVIDED HISTORY: mass? TECHNOLOGIST PROVIDED HISTORY: Reason for exam:->mass? Reason for Exam: mass?  FINDINGS: Chest: Lungs are clear. Cardiomediastinal silhouette is within normal limits. No pleural effusion. No pneumothorax. Bony structures are unremarkable. Left foot: No acute fracture or dislocation. Chest: No acute findings. Left foot: No acute fracture or dislocation. CTA HEAD NECK W CONTRAST    Result Date: 8/5/2022  EXAMINATION: CTA OF THE HEAD AND NECK WITH CONTRAST; CT OF THE HEAD WITHOUT CONTRAST 8/5/2022 3:09 pm; 8/5/2022 3:08 pm: TECHNIQUE: CTA of the head and neck was performed with the administration of intravenous contrast. Multiplanar reformatted images are provided for review. MIP images are provided for review. Stenosis of the internal carotid arteries measured using NASCET criteria. Automated exposure control, iterative reconstruction, and/or weight based adjustment of the mA/kV was utilized to reduce the radiation dose to as low as reasonably achievable.; CT of the head was performed without the administration of intravenous contrast. Automated exposure control, iterative reconstruction, and/or weight based adjustment of the mA/kV was utilized to reduce the radiation dose to as low as reasonably achievable. Noncontrast CT of the head with reconstructed 2-D images are also provided for review. This scan was analyzed using [employer]. ai contact LVO. Identification of suspected findings is not for diagnostic use beyond notification. Viz LVO is limited to analysis of imaging data and should not be used in-lieu of full patient evaluation or relied upon to make or confirm diagnosis. COMPARISON: CT head 05/29/2020. HISTORY: ORDERING SYSTEM PROVIDED HISTORY: dizziness, gait difficulties, 1 week TECHNOLOGIST PROVIDED HISTORY: Reason for exam:->dizziness, gait difficulties, 1 week Has a \"code stroke\" or \"stroke alert\" been called? ->No Decision Support Exception - unselect if not a suspected or confirmed emergency medical condition->Emergency Medical Condition (MA) Reason for Exam: 100ml isovue 370 inj by mv iv le 1520 Additional signs and symptoms: dizziness, gait difficulties, 1 week; ORDERING SYSTEM PROVIDED HISTORY: dizziness, gait difficulties, 1 week TECHNOLOGIST PROVIDED HISTORY: Reason for exam:->dizziness, gait difficulties, 1 week Has a \"code stroke\" or \"stroke alert\" been called? ->No Decision Support Exception - unselect if not a suspected or confirmed emergency medical condition->Emergency Medical Condition (MA) Additional signs and symptoms: dizziness, gait difficulties, 1 week FINDINGS: CT HEAD: BRAIN/VENTRICLES:  Encephalomalacia is again seen involving the left temporal lobe. Otherwise, the gray-white differentiation appears maintained. No acute intracranial hemorrhage. No mass effect or midline shift. No evidence of hydrocephalus. ORBITS: The visualized portion of the orbits demonstrate no acute abnormality. SINUSES:  The visualized paranasal sinuses and mastoid air cells demonstrate no acute abnormality. SOFT TISSUES/SKULL: No acute abnormality of the calvarium. Postsurgical change from a left temporal craniotomy. CTA NECK: AORTIC ARCH/ARCH VESSELS: There is minimal scattered atherosclerosis of the aortic arch and arch vessels. No flow limiting stenosis seen of the innominate or subclavian arteries. CAROTID ARTERIES: No focal stenosis seen of the common carotid arteries. Atherosclerosis of the carotid bulbs and proximal internal carotid arteries bilaterally. There is less than 50% stenosis of the proximal right cervical ICA by NASCET criteria. No focal stenosis seen of the left cervical ICA by NASCET criteria. VERTEBRAL ARTERIES: There is a diffusely diminutive caliber of the right vertebral artery, which is presumably congenital in nature. There is severe stenosis at the origin of the right vertebral artery. No focal stenosis otherwise seen of the vertebral arteries. SOFT TISSUES: Centrilobular and paraseptal emphysematous changes seen in the lungs bilaterally.   Mild dependent changes are seen bilaterally. No acute abnormality within the visualized mediastinum. BONES: The osseous structures appear osteopenic. No acute osseous abnormality clearly identified. CTA HEAD: ANTERIOR CIRCULATION: No significant stenosis of the intracranial internal carotid, anterior cerebral, or middle cerebral arteries. No aneurysm. POSTERIOR CIRCULATION: No significant stenosis of the vertebral, basilar, or posterior cerebral arteries. No aneurysm. OTHER: No dural venous sinus thrombosis on this non-dedicated study. 1. No acute intracranial abnormality. 2. There appears to be severe stenosis at the origin of the right vertebral artery. 3. Atherosclerosis contributes to less than 50% stenosis of the proximal right cervical ICA by NASCET criteria. 4. No flowing stenosis involving the left cervical ICA or left vertebral artery. 5. No significant stenosis or large vessel occlusion of the [id number].        Electronically signed by Shirley Thomas MD, MD on 8/7/2022 at 10:28 AM

## 2022-08-07 NOTE — CONSULTS
[facility] PHYSICAL THERAPY EVALUATION  Génesis Banks, 1960, 2111/2111-A, 8/7/2022    History  [place]:  There were no encounter diagnoses. Patient  has a past medical history of COPD (chronic obstructive pulmonary disease) ([facility] [address] Epilepsy ([facility] [address] Full dentures, H/O dizziness, H/O Doppler JERMAINE's ultrasound, H/O echocardiogram, History of drug abuse ([facility] [address] History of nuclear stress test, History of UTI, Hypertension, Seizures ([facility] [address] and Wears glasses. Patient  has a past surgical history that includes brain surgery; Tubal ligation (1995); Hysterectomy (1996); Wrist surgery (Left, 7/30/2020); Cystoscopy (N/A, 10/14/2021); and Cystoscopy (N/A, 10/14/2021). Subjective:  Patient states: \"Oh good, I was hoping to walk. \"    Pain:  denies pain. Communication with other providers:  Handoff to RN, OT  Restrictions: general precautions, seizure precautions, fall risk    Home Setup/Prior level of function  Social/Functional History  Lives With: Significant other  Type of Home: House  Home Layout: Two level, Able to Live on Main level with bedroom/bathroom  Home Access: Level entry  Bathroom Shower/Tub: Tub/Shower unit  [place] Toilet: Handicap height  Bathroom Equipment: Grab bars in [id number]:  (does not own AD)  Receives Help From: Neighbor (checks on pt due to epilepsy)  ADL Assistance: Independent  Homemaking Assistance: Independent  Homemaking Responsibilities: Yes  Ambulation Assistance: Independent  Transfer Assistance: Independent  Active : No  Occupation: On disability    Examination of body systems (includes body structures/functions, activity/participation limitations):  Observation:  Pt up in chair with RN in room upon arrival and agreeable to therapy  Vision:  [facility]  Hearing:  [place]  Cardiopulmonary:  no O2 needs  Cognition: WFL, see OT/SLP note for further evaluation. Musculoskeletal  ROM R/L:  WFL.     Strength R/L:  4+/5, minimal impairment in function and endurance. Neuro:  WFL      Mobility:  Rolling L/R:  NT, pt up in chair at  beginning and end of session  Supine to sit:  NT, pt up in chair at beginning and end of session  Transfers: pt completed STS to/from chair SBA   Sitting balance:  good. Standing balance:  fair+. Gait: pt ambulated 200' without an AD CGA progressing to SBA with decreased renetta but no other gait abnormalities at this time. Pt seems to be ambulating at baseline    [facility] 6 Clicks Inpatient Mobility:  AM-PAC Inpatient Mobility Raw Score : 18    Safety: patient left in chair with alarm on, neurology in room, call light within reach, RN notified, gait belt used. Assessment:  Pt is a 65 y.o. female admitted to the hospital of hyponatremia. Pt is typically independent with all ambulation and transfers without a device. Pt is currently performing transfers SBA and ambulating 200' without a device SBA. Pt is functioning close to baseline and does not require any further acute care PT at this time. Pt would benefit from PRN assist upon discharge. Complexity: moderate    Prognosis: Good, no significant barriers to participation at this time.      Equipment: none      Recommendations for NURSING mobility: amb with gait belt    Time:   Time in: 0853  Time out: 0914  Timed treatment minutes: 10  Total time: 21    Electronically signed by:    Jerri Habermann, PT  8/7/2022, 9:30 AM

## 2022-08-07 NOTE — PROGRESS NOTES
V2.0  [facility] Critical Care Progress Note      Name:  Buffy Lambret /Age/Sex: 1960  (65 y.o. female)   MRN & CSN:  [id number] & [id number] Encounter Date/Time: 2022 1:19 PM EDT    Location:  -A PCP: Maria A Dill MD       Hospital Day: 3    Assessment and Plan:   Buffy Lambert is a 65 y.o. female with pmh of Epilepsy, COPD, cig smoker, Pulmonary nodule, Obesity, HTN  who presents with Hyponatremia on 3% Nacl now which is slowly improving. She is sitting in the bed. She is not in acute resp distress. Her CT chest in negative. Her PBNPT is 130. Uric acid is 1. 8. Her CT chest showed no acute  abnormality. ASSESSMENT  Hyponatremia likely sec to Aptiom- slowly improving  Hypokalemia- improving  COPD  Cig smoker  Obesity  ? KYLE  Left foot pain- no abn on X ray      Plan:  3% Nacl  Lytes Q 4hr  Inhalers  BIPAP qhs and prn  Keep sats > 92%  ICS  OOB  BMP in am  C.w present management  Neurology to eval for the change of anti seizure meds    Diet ADULT DIET; Regular; 1800 ml   DVT Prophylaxis [x] Lovenox, []  Heparin, [] SCDs, [] Ambulation,  [] Eliquis, [] Xarelto  [] Coumadin   GI Prophylaxis [x] Yes          [] No   Code Status Full Code    Disposition Patient requires continued ICU care due to Symptomatic hyponatremia     Subjective:     Chief Complaint: No chief complaint on file. Buffy Lambert is a 65 y.o. female who presents with AMS         Review of Systems:    Review of Systems    Negative    Objective: Intake/Output Summary (Last 24 hours) at 2022 1319  Last data filed at 2022 0630  Gross per 24 hour   Intake 250 ml   Output 1450 ml   Net -1200 ml        Vitals:   Vitals:    22 1000   BP: 107/75   Pulse: 81   Resp: 15   Temp:    SpO2:        Physical Exam:     General: NAD  Eyes: EOMI  ENT: neck supple  Cardiovascular: Regular rate.   Respiratory: Occasional basal crackles  Gastrointestinal: Soft, non tender  Genitourinary: no suprapubic MMOL/L        Imaging/Diagnostics Last 24 Hours   XR FOOT LEFT (MIN 3 VIEWS)    Result Date: 8/6/2022  EXAMINATION: THREE XRAY VIEWS OF THE LEFT FOOT; ONE XRAY VIEW OF THE CHEST 8/5/2022 11:09 pm COMPARISON: Chest x-ray 03/27/2022 HISTORY: ORDERING SYSTEM PROVIDED HISTORY: fall TECHNOLOGIST PROVIDED HISTORY: Reason for exam:->fall Reason for Exam: fall; ORDERING SYSTEM PROVIDED HISTORY: mass? TECHNOLOGIST PROVIDED HISTORY: Reason for exam:->mass? Reason for Exam: mass? FINDINGS: Chest: Lungs are clear. Cardiomediastinal silhouette is within normal limits. No pleural effusion. No pneumothorax. Bony structures are unremarkable. Left foot: No acute fracture or dislocation. Chest: No acute findings. Left foot: No acute fracture or dislocation. CT Head WO Contrast    Result Date: 8/5/2022  EXAMINATION: CTA OF THE HEAD AND NECK WITH CONTRAST; CT OF THE HEAD WITHOUT CONTRAST 8/5/2022 3:09 pm; 8/5/2022 3:08 pm: TECHNIQUE: CTA of the head and neck was performed with the administration of intravenous contrast. Multiplanar reformatted images are provided for review. MIP images are provided for review. Stenosis of the internal carotid arteries measured using NASCET criteria. Automated exposure control, iterative reconstruction, and/or weight based adjustment of the mA/kV was utilized to reduce the radiation dose to as low as reasonably achievable.; CT of the head was performed without the administration of intravenous contrast. Automated exposure control, iterative reconstruction, and/or weight based adjustment of the mA/kV was utilized to reduce the radiation dose to as low as reasonably achievable. Noncontrast CT of the head with reconstructed 2-D images are also provided for review. This scan was analyzed using [email or web address]. ai contact LVO. Identification of suspected findings is not for diagnostic use beyond notification.  Viz LVO is limited to analysis of imaging data and should not be used in-lieu of full patient evaluation or relied upon to make or confirm diagnosis. COMPARISON: CT head 05/29/2020. HISTORY: ORDERING SYSTEM PROVIDED HISTORY: dizziness, gait difficulties, 1 week TECHNOLOGIST PROVIDED HISTORY: Reason for exam:->dizziness, gait difficulties, 1 week Has a \"code stroke\" or \"stroke alert\" been called? ->No Decision Support Exception - unselect if not a suspected or confirmed emergency medical condition->Emergency Medical Condition (MA) Reason for Exam: 100ml isovue 370 inj by mv iv le 1520 Additional signs and symptoms: dizziness, gait difficulties, 1 week; ORDERING SYSTEM PROVIDED HISTORY: dizziness, gait difficulties, 1 week TECHNOLOGIST PROVIDED HISTORY: Reason for exam:->dizziness, gait difficulties, 1 week Has a \"code stroke\" or \"stroke alert\" been called? ->No Decision Support Exception - unselect if not a suspected or confirmed emergency medical condition->Emergency Medical Condition (MA) Additional signs and symptoms: dizziness, gait difficulties, 1 week FINDINGS: CT HEAD: BRAIN/VENTRICLES:  Encephalomalacia is again seen involving the left temporal lobe. Otherwise, the gray-white differentiation appears maintained. No acute intracranial hemorrhage. No mass effect or midline shift. No evidence of hydrocephalus. ORBITS: The visualized portion of the orbits demonstrate no acute abnormality. SINUSES:  The visualized paranasal sinuses and mastoid air cells demonstrate no acute abnormality. SOFT TISSUES/SKULL: No acute abnormality of the calvarium. Postsurgical change from a left temporal craniotomy. CTA NECK: AORTIC ARCH/ARCH VESSELS: There is minimal scattered atherosclerosis of the aortic arch and arch vessels. No flow limiting stenosis seen of the innominate or subclavian arteries. CAROTID ARTERIES: No focal stenosis seen of the common carotid arteries. Atherosclerosis of the carotid bulbs and proximal internal carotid arteries bilaterally.   There is less than 50% stenosis of the proximal right cervical ICA by NASCET criteria. No focal stenosis seen of the left cervical ICA by NASCET criteria. VERTEBRAL ARTERIES: There is a diffusely diminutive caliber of the right vertebral artery, which is presumably congenital in nature. There is severe stenosis at the origin of the right vertebral artery. No focal stenosis otherwise seen of the vertebral arteries. SOFT TISSUES: Centrilobular and paraseptal emphysematous changes seen in the lungs bilaterally. Mild dependent changes are seen bilaterally. No acute abnormality within the visualized mediastinum. BONES: The osseous structures appear osteopenic. No acute osseous abnormality clearly identified. CTA HEAD: ANTERIOR CIRCULATION: No significant stenosis of the intracranial internal carotid, anterior cerebral, or middle cerebral arteries. No aneurysm. POSTERIOR CIRCULATION: No significant stenosis of the vertebral, basilar, or posterior cerebral arteries. No aneurysm. OTHER: No dural venous sinus thrombosis on this non-dedicated study. 1. No acute intracranial abnormality. 2. There appears to be severe stenosis at the origin of the right vertebral artery. 3. Atherosclerosis contributes to less than 50% stenosis of the proximal right cervical ICA by NASCET criteria. 4. No flowing stenosis involving the left cervical ICA or left vertebral artery. 5. No significant stenosis or large vessel occlusion of the [id number]. CT CHEST W CONTRAST    Result Date: 8/6/2022  EXAMINATION: CT OF THE CHEST WITH CONTRAST 8/6/2022 10:12 am TECHNIQUE: CT of the chest was performed with the administration of intravenous contrast. Multiplanar reformatted images are provided for review. Automated exposure control, iterative reconstruction, and/or weight based adjustment of the mA/kV was utilized to reduce the radiation dose to as low as reasonably achievable.  COMPARISON: CT lung screening chest dated 01/15/2021 HISTORY: ORDERING SYSTEM PROVIDED HISTORY: eval for lung cancer in this pt with unexplained hyponatremia and smoking hx. thanks TECHNOLOGIST PROVIDED HISTORY: Reason for exam:->eval for lung cancer in this pt with unexplained hyponatremia and smoking hx. thanks Reason for Exam: eval for lung cancer in this pt with unexplained hyponatremia and smoking hx. Additional signs and symptoms: 80ml Isovue 370 FINDINGS: Mediastinum: Thyroid is homogeneous in attenuation. No bulky mediastinal adenopathy. Central airways are patent. Esophagus is normal course and caliber. Cardiac size within normal limits without pericardial effusion. Lungs/pleura: Lungs demonstrate chronic changes of moderate upper lobe predominant centrilobular emphysema. Interlobular septal predominance concerning for interstitial edema pattern. Subpleural reticulation and subsegmental dependent atelectasis without consolidation. No dominant nodule or mass lesion. No pleural effusion or pleural process. Upper Abdomen: Visualized portions of the upper abdomen reveals multiple filling defects of likely noncalcified stones or cholelithiasis within the gallbladder lumen. . Soft Tissues/Bones: No acute osseous or soft tissue findings. No aggressive osseous lesion. Chronic changes the lungs including emphysematous change and likely superimposed interstitial edema pattern without dominant nodule or mass. Partially visualized portions of the upper abdomen reveal cholelithiasis noncalcified stones or filling defects within the gallbladder lumen dependent portion however no wall thickening. XR CHEST PORTABLE    Result Date: 8/6/2022  EXAMINATION: THREE XRAY VIEWS OF THE LEFT FOOT; ONE XRAY VIEW OF THE CHEST 8/5/2022 11:09 pm COMPARISON: Chest x-ray 03/27/2022 HISTORY: ORDERING SYSTEM PROVIDED HISTORY: fall TECHNOLOGIST PROVIDED HISTORY: Reason for exam:->fall Reason for Exam: fall; ORDERING SYSTEM PROVIDED HISTORY: mass? TECHNOLOGIST PROVIDED HISTORY: Reason for exam:->mass? Reason for Exam: mass?  FINDINGS: Chest: Lungs are clear. Cardiomediastinal silhouette is within normal limits. No pleural effusion. No pneumothorax. Bony structures are unremarkable. Left foot: No acute fracture or dislocation. Chest: No acute findings. Left foot: No acute fracture or dislocation. CTA HEAD NECK W CONTRAST    Result Date: 8/5/2022  EXAMINATION: CTA OF THE HEAD AND NECK WITH CONTRAST; CT OF THE HEAD WITHOUT CONTRAST 8/5/2022 3:09 pm; 8/5/2022 3:08 pm: TECHNIQUE: CTA of the head and neck was performed with the administration of intravenous contrast. Multiplanar reformatted images are provided for review. MIP images are provided for review. Stenosis of the internal carotid arteries measured using NASCET criteria. Automated exposure control, iterative reconstruction, and/or weight based adjustment of the mA/kV was utilized to reduce the radiation dose to as low as reasonably achievable.; CT of the head was performed without the administration of intravenous contrast. Automated exposure control, iterative reconstruction, and/or weight based adjustment of the mA/kV was utilized to reduce the radiation dose to as low as reasonably achievable. Noncontrast CT of the head with reconstructed 2-D images are also provided for review. This scan was analyzed using [employer]. ai contact LVO. Identification of suspected findings is not for diagnostic use beyond notification. Viz LVO is limited to analysis of imaging data and should not be used in-lieu of full patient evaluation or relied upon to make or confirm diagnosis. COMPARISON: CT head 05/29/2020. HISTORY: ORDERING SYSTEM PROVIDED HISTORY: dizziness, gait difficulties, 1 week TECHNOLOGIST PROVIDED HISTORY: Reason for exam:->dizziness, gait difficulties, 1 week Has a \"code stroke\" or \"stroke alert\" been called? ->No Decision Support Exception - unselect if not a suspected or confirmed emergency medical condition->Emergency Medical Condition (MA) Reason for Exam: 100ml isovue 370 inj by mv iv le 1520 Additional signs and symptoms: dizziness, gait difficulties, 1 week; ORDERING SYSTEM PROVIDED HISTORY: dizziness, gait difficulties, 1 week TECHNOLOGIST PROVIDED HISTORY: Reason for exam:->dizziness, gait difficulties, 1 week Has a \"code stroke\" or \"stroke alert\" been called? ->No Decision Support Exception - unselect if not a suspected or confirmed emergency medical condition->Emergency Medical Condition (MA) Additional signs and symptoms: dizziness, gait difficulties, 1 week FINDINGS: CT HEAD: BRAIN/VENTRICLES:  Encephalomalacia is again seen involving the left temporal lobe. Otherwise, the gray-white differentiation appears maintained. No acute intracranial hemorrhage. No mass effect or midline shift. No evidence of hydrocephalus. ORBITS: The visualized portion of the orbits demonstrate no acute abnormality. SINUSES:  The visualized paranasal sinuses and mastoid air cells demonstrate no acute abnormality. SOFT TISSUES/SKULL: No acute abnormality of the calvarium. Postsurgical change from a left temporal craniotomy. CTA NECK: AORTIC ARCH/ARCH VESSELS: There is minimal scattered atherosclerosis of the aortic arch and arch vessels. No flow limiting stenosis seen of the innominate or subclavian arteries. CAROTID ARTERIES: No focal stenosis seen of the common carotid arteries. Atherosclerosis of the carotid bulbs and proximal internal carotid arteries bilaterally. There is less than 50% stenosis of the proximal right cervical ICA by NASCET criteria. No focal stenosis seen of the left cervical ICA by NASCET criteria. VERTEBRAL ARTERIES: There is a diffusely diminutive caliber of the right vertebral artery, which is presumably congenital in nature. There is severe stenosis at the origin of the right vertebral artery. No focal stenosis otherwise seen of the vertebral arteries. SOFT TISSUES: Centrilobular and paraseptal emphysematous changes seen in the lungs bilaterally.   Mild dependent changes are seen bilaterally. No acute abnormality within the visualized mediastinum. BONES: The osseous structures appear osteopenic. No acute osseous abnormality clearly identified. CTA HEAD: ANTERIOR CIRCULATION: No significant stenosis of the intracranial internal carotid, anterior cerebral, or middle cerebral arteries. No aneurysm. POSTERIOR CIRCULATION: No significant stenosis of the vertebral, basilar, or posterior cerebral arteries. No aneurysm. OTHER: No dural venous sinus thrombosis on this non-dedicated study. 1. No acute intracranial abnormality. 2. There appears to be severe stenosis at the origin of the right vertebral artery. 3. Atherosclerosis contributes to less than 50% stenosis of the proximal right cervical ICA by NASCET criteria. 4. No flowing stenosis involving the left cervical ICA or left vertebral artery. 5. No significant stenosis or large vessel occlusion of the [id number].        Electronically signed by Jose De Jesus Velasquez MD on 8/7/2022 at 1:19 PM
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
## 2022-08-07 NOTE — PROGRESS NOTES
Occupational [address] [facility] ACUTE CARE OCCUPATIONAL THERAPY EVALUATION    Génesis Banks, 1960, 2111/2111-A, 8/7/2022    Discharge Recommendation: Home w/ assist prn      History:  [place]:  There were no encounter diagnoses. Past Medical History:   Diagnosis Date    COPD (chronic obstructive pulmonary disease) ([facility]     Epilepsy ([facility]     Full dentures     upper plate only    H/O dizziness     \"take Meclazine at least twice a day for this\"    H/O Doppler JERMAINE's ultrasound 12/01/2020     No evidence of significant occlusive arterial disease of lower EXT. H/O echocardiogram 12/01/2020    EF 55-60%, Normal study. History of drug abuse ([facility]     History of nuclear stress test 11/30/2020    Normal study.     History of UTI     \"last one May 2020\"    Hypertension     \"the top # use to be over 200 \"- on medication since 2018\"    Seizures ([facility]     hx of epilepsy- last seizure week 7/13/2020- hx grand mal seizures    Wears glasses          Subjective:  Patient states: \"You don't know how much I've been dying to walk\"  Pain:  denies  Communication with other providers: co-eval w/ PT, handoff to RN  Restrictions: General Precautions, Fall Risk    Home Setup/Prior level of function:  Social/Functional History  Lives With: Significant other  Type of Home: House  Home Layout: Two level  Home Access: Level entry  Bathroom Shower/Tub: Tub/Shower unit  Bathroom Toilet: Handicap height  Bathroom Equipment: Grab bars in [address]:  (does not own AD)  Receives Help From: Neighbor (checks on pt due to epilepsy)  ADL Assistance: Independent  Homemaking Assistance: Independent  Homemaking Responsibilities: Yes  Ambulation Assistance: Independent  Transfer Assistance: Independent  Active : No     Examination:  Observation: Seated in recliner upon arrival, agreeable to therapy eval.  Vision: WFL  Hearing: WFL  Vitals: Stable vitals throughout session on room air      [address] and functions:  ROM: WFL   Strength: B UE grossly 4+/5 across all major joints   Sensation: WFL  Tone: Normal  Coordination: WFL  Perception: WNL      Cognitive and Psychosocial Functioning:  Overall cognitive status: alert and oriented, WFL  Affect: Normal       Functional Mobility:  Bed mobility:  NT  Sitting balance:  SBA static and dynamic    Transfers: SBA STS and stand to sit from recliner  Standing balance:  SBA static, CGA - SBA dynamic w/o AD  Functional Mobility: Ambulated long functional household distance w/ CGA progressing to SBA w/o AD  Toilet/Shower Transfers: NT        Activities of Daily Living (ADLs):  Feeding: set up A  Grooming: SBA  UB bathing: SBA  LB bathing: SBA to sponge bathe LEs  UB dressing: sup  LB dressing: Sup to don socks  Toileting: SBA    *ADL determined per observation of functional mobility, balance, activity tolerance, cognition, or actual ADL performance. AM-PAC 6 click short form for inpatient daily activity:   How much help from another person does the patient currently need. .. Unable  Dep A Lot  Max A A Lot   Mod A A Little  Min A A Little   CGA  SBA None   Mod I  Indep  Sup   1. Putting on and taking off regular lower body clothing? [] 1    [] 2   [] 2   [] 3   [] 3   [x] 4      2. Bathing (including washing, rinsing, drying)? [] 1   [] 2   [] 2 [] 3 [x] 3 [] 4   3. Toileting, which includes using toilet, bedpan, or urinal? [] 1    [] 2   [] 2   [] 3   [x] 3   [] 4     4. Putting on and taking off regular upper body clothing? [] 1   [] 2   [] 2   [] 3   [] 3    [x] 4      5. Taking care of personal grooming such as brushing teeth? [] 1   [] 2    [] 2 [] 3    [] 3   [x] 4      6. Eating meals?    [] 1   [] 2   [] 2   [] 3   [] 3   [x] 4        Raw Score:  22     [24=0% impaired(CH), 23=1-19%(CI), 20-22=20-39%(CJ), 15-19=40-59%(CK), 10-14=60-79%(CL), 7-9=80-99%(CM), 6=100%(CN)]     Treatment:    Therapeutic Activity Training:   Therapeutic activity training was instructed today.  Cues were given for safety, sequence, UE/LE placement, awareness, and balance. Activities performed today included bed mobility training, sup-sit, sit-stand, ambulation long functional household distance w/o AD. Educated pt on role of OT, therapy POC and functional goals, progression w/ ADLs and transfers, importance of movement and OOB activity, d/c recommendations     Safety Measures: Gait belt used, Left in recliner, Alarm in place  Recommendations for NURSING activity:  Up to chair for all 3 meals and up to  bathroom for all toileting needs     Assessment:  Pt is a 65 y o F admitted d/t hyponatremia. Pt at baseline is IND for ADLs, IND for high level IADLs, and IND for functional transfers/mobility w/o AD. Pt currently presents w/ deficits in ADL and high level IADL independence, functional ADL transfers, strength, and functional activity tolerance. Continued OT services recommended to increase safety and independence with ADL routine and to address remaining functional deficits. Pt would benefit from continued acute care OT services w/ discharge to home w/ assist prn. Complexity: Moderate  Prognosis: Good, no significant barriers to participation at this time.            Time:   Time in: 0853  Time out: 0914  Total time: 21  Timed treatment minutes: 11        Electronically signed by:      YUSUF Nieves/YUDY  [id number]

## 2022-08-08 LAB
ALBUMIN SERPL-MCNC: 4.3 GM/DL (ref 3.4–5)
ALP BLD-CCNC: 131 IU/L (ref 40–129)
ALT SERPL-CCNC: 18 U/L (ref 10–40)
ANION GAP SERPL CALCULATED.3IONS-SCNC: 12 MMOL/L (ref 4–16)
ANION GAP SERPL CALCULATED.3IONS-SCNC: 7 MMOL/L (ref 4–16)
ANION GAP SERPL CALCULATED.3IONS-SCNC: 9 MMOL/L (ref 4–16)
AST SERPL-CCNC: 20 IU/L (ref 15–37)
BILIRUB SERPL-MCNC: 0.2 MG/DL (ref 0–1)
BILIRUBIN DIRECT: 0.2 MG/DL (ref 0–0.3)
BILIRUBIN, INDIRECT: 0 MG/DL (ref 0–0.7)
BUN BLDV-MCNC: 42 MG/DL (ref 6–23)
BUN BLDV-MCNC: 43 MG/DL (ref 6–23)
BUN BLDV-MCNC: 45 MG/DL (ref 6–23)
CALCIUM SERPL-MCNC: 9.4 MG/DL (ref 8.3–10.6)
CALCIUM SERPL-MCNC: 9.5 MG/DL (ref 8.3–10.6)
CALCIUM SERPL-MCNC: 9.6 MG/DL (ref 8.3–10.6)
CHLORIDE BLD-SCNC: 91 MMOL/L (ref 99–110)
CHLORIDE BLD-SCNC: 92 MMOL/L (ref 99–110)
CHLORIDE BLD-SCNC: 93 MMOL/L (ref 99–110)
CO2: 23 MMOL/L (ref 21–32)
CO2: 25 MMOL/L (ref 21–32)
CO2: 26 MMOL/L (ref 21–32)
CREAT SERPL-MCNC: 0.7 MG/DL (ref 0.6–1.1)
GFR AFRICAN AMERICAN: >60 ML/MIN/1.73M2
GFR NON-AFRICAN AMERICAN: >60 ML/MIN/1.73M2
GLUCOSE BLD-MCNC: 103 MG/DL (ref 70–99)
GLUCOSE BLD-MCNC: 105 MG/DL (ref 70–99)
GLUCOSE BLD-MCNC: 113 MG/DL (ref 70–99)
HCT VFR BLD CALC: 29.7 % (ref 37–47)
HEMOGLOBIN: 10.3 GM/DL (ref 12.5–16)
LV EF: 58 %
LVEF MODALITY: NORMAL
MCH RBC QN AUTO: 28.9 PG (ref 27–31)
MCHC RBC AUTO-ENTMCNC: 34.7 % (ref 32–36)
MCV RBC AUTO: 83.2 FL (ref 78–100)
PDW BLD-RTO: 12.6 % (ref 11.7–14.9)
PLATELET # BLD: 301 K/CU MM (ref 140–440)
PMV BLD AUTO: 8.6 FL (ref 7.5–11.1)
POTASSIUM SERPL-SCNC: 4.3 MMOL/L (ref 3.5–5.1)
POTASSIUM SERPL-SCNC: 4.4 MMOL/L (ref 3.5–5.1)
POTASSIUM SERPL-SCNC: 4.5 MMOL/L (ref 3.5–5.1)
RBC # BLD: 3.57 M/CU MM (ref 4.2–5.4)
SODIUM BLD-SCNC: 123 MMOL/L (ref 135–145)
SODIUM BLD-SCNC: 127 MMOL/L (ref 135–145)
SODIUM BLD-SCNC: 128 MMOL/L (ref 135–145)
TOTAL PROTEIN: 6.4 GM/DL (ref 6.4–8.2)
WBC # BLD: 6.2 K/CU MM (ref 4–10.5)

## 2022-08-08 PROCEDURE — 6370000000 HC RX 637 (ALT 250 FOR IP): Performed by: INTERNAL MEDICINE

## 2022-08-08 PROCEDURE — 6370000000 HC RX 637 (ALT 250 FOR IP): Performed by: NURSE PRACTITIONER

## 2022-08-08 PROCEDURE — 99233 SBSQ HOSP IP/OBS HIGH 50: CPT | Performed by: INTERNAL MEDICINE

## 2022-08-08 PROCEDURE — 80048 BASIC METABOLIC PNL TOTAL CA: CPT

## 2022-08-08 PROCEDURE — 80076 HEPATIC FUNCTION PANEL: CPT

## 2022-08-08 PROCEDURE — 6360000002 HC RX W HCPCS: Performed by: STUDENT IN AN ORGANIZED HEALTH CARE EDUCATION/TRAINING PROGRAM

## 2022-08-08 PROCEDURE — 93306 TTE W/DOPPLER COMPLETE: CPT

## 2022-08-08 PROCEDURE — 36592 COLLECT BLOOD FROM PICC: CPT

## 2022-08-08 PROCEDURE — 2000000000 HC ICU R&B

## 2022-08-08 PROCEDURE — 2580000003 HC RX 258: Performed by: INTERNAL MEDICINE

## 2022-08-08 PROCEDURE — 2580000003 HC RX 258: Performed by: STUDENT IN AN ORGANIZED HEALTH CARE EDUCATION/TRAINING PROGRAM

## 2022-08-08 PROCEDURE — 94761 N-INVAS EAR/PLS OXIMETRY MLT: CPT

## 2022-08-08 PROCEDURE — 85027 COMPLETE CBC AUTOMATED: CPT

## 2022-08-08 RX ADMIN — ESLICARBAZEPINE ACETATE 1200 MG: 200 TABLET ORAL at 20:07

## 2022-08-08 RX ADMIN — Medication 15 G: at 20:05

## 2022-08-08 RX ADMIN — Medication 15 G: at 08:04

## 2022-08-08 RX ADMIN — SODIUM CHLORIDE, PRESERVATIVE FREE 10 ML: 5 INJECTION INTRAVENOUS at 08:05

## 2022-08-08 RX ADMIN — LEVETIRACETAM 750 MG: 500 TABLET, FILM COATED ORAL at 20:05

## 2022-08-08 RX ADMIN — Medication 15 G: at 02:40

## 2022-08-08 RX ADMIN — LEVETIRACETAM 750 MG: 500 TABLET, FILM COATED ORAL at 08:04

## 2022-08-08 RX ADMIN — SODIUM CHLORIDE, PRESERVATIVE FREE 10 ML: 5 INJECTION INTRAVENOUS at 08:15

## 2022-08-08 RX ADMIN — SODIUM CHLORIDE, PRESERVATIVE FREE 10 ML: 5 INJECTION INTRAVENOUS at 20:06

## 2022-08-08 RX ADMIN — Medication 15 G: at 15:03

## 2022-08-08 RX ADMIN — ENOXAPARIN SODIUM 30 MG: 100 INJECTION SUBCUTANEOUS at 08:05

## 2022-08-08 ASSESSMENT — ENCOUNTER SYMPTOMS
NAUSEA: 0
VOMITING: 0
EYE DISCHARGE: 0
SHORTNESS OF BREATH: 0
DIARRHEA: 0
SINUS PRESSURE: 0
VOICE CHANGE: 0
EYE PAIN: 0
BLOOD IN STOOL: 0
SINUS PAIN: 0
ABDOMINAL PAIN: 0
BACK PAIN: 1
CHEST TIGHTNESS: 0
COUGH: 0

## 2022-08-08 ASSESSMENT — PAIN - FUNCTIONAL ASSESSMENT: PAIN_FUNCTIONAL_ASSESSMENT: ACTIVITIES ARE NOT PREVENTED

## 2022-08-08 NOTE — PROGRESS NOTES
Echo being performed at bedside. AM medication administration completed. Pt became tearful/anxious during administration of Lovenox, stating \"I just hate being stuck with needles. I want to go home. \" RN provided emotional support at this time.

## 2022-08-08 NOTE — PLAN OF CARE
Problem: Safety - Adult  Goal: Free from fall injury  8/8/2022 1934 by Vivi Oliveira RN  Outcome: Progressing  8/8/2022 1355 by Yancey Gilford, RN  Outcome: Progressing     Problem: Discharge Planning  Goal: Discharge to home or other facility with appropriate resources  8/8/2022 1934 by Vivi Oliveira RN  Outcome: Progressing  8/8/2022 1355 by Yancey Gilford, RN  Outcome: Progressing

## 2022-08-08 NOTE — PROGRESS NOTES
V2.0  [facility] [place]      Name:  Waylon Ledezma /Age/Sex: 1960  (65 y.o. female)   MRN & CSN:  [id number] & [id number] Encounter Date/Time: 2022 10:32 AM EDT    Location:  -A PCP: Leopoldo Kulkarni MD       Hospital Day: 4    Assessment and Plan:   Waylon Ledezma is a 65 y.o. 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 Now 128. Nephrology following up. Appreciate recommendations. Andra 100s, Urine Osmol 500. Ct chest with contrast to showed no malignancy. Limit oral intake. Hypertonic saline infusion. May need aldactone due to HTN and hypokalemia. Change BMP to q12h for now. Continue urea  History of epilepsy on Keppra and Aptiom. Continue to monitor. Neurology consulted, neurology increased Keppra dose and decreased Aptiom dose keeping in mind that the patient history of refractory epilepsy they do not want to stop Aptiom right away. Outpatient management. Benign essential hypertension nephrology recommends use of spironolactone or Aldactone because of persistent hypokalemia  COPD not on home oxygen  Tobacco use disorder  Left foot pain, was complaining of left foot pain at the time of admission x-ray was ordered that did not show any significant damage. Diet ADULT DIET;  Regular; 1800 ml   DVT Prophylaxis [x] Lovenox, []  Heparin, [] SCDs, [] Ambulation,  [] Eliquis, [] Xarelto  [] Coumadin   Code Status Full Code   Disposition From: Home  Expected Disposition: Home  Estimated Date of Discharge: 1 to 2 days  Patient requires continued admission due to hyponatremia, significantly better but needs to get normal can discharge tomorrow likely  Stable for transfer to stepdown with every 12 hours BMP checks for now until sodium is above 133   Surrogate Decision Maker/ POA      Subjective:     Chief Complaint: No chief complaint on file. Alessandra Qureshi is a 65 y.o. female who presents with Hyponatremia. Patient was seen at the bedside. H good urine output. Told her that her sodium numbers are getting better and she will be transferred out to stepdown today. She agrees with the plan. All questions answered at bedside      Review of Systems:    Review of Systems   Constitutional:  Positive for fatigue and unexpected weight change. Negative for chills and fever. HENT:  Negative for ear pain, hearing loss, sinus pressure, sinus pain and voice change. Eyes:  Negative for pain, discharge and visual disturbance. Respiratory:  Negative for cough, chest tightness and shortness of breath. Cardiovascular:  Negative for chest pain, palpitations and leg swelling. Gastrointestinal:  Negative for abdominal pain, blood in stool, diarrhea, nausea and vomiting. Genitourinary:  Negative for dysuria and frequency. Musculoskeletal:  Positive for arthralgias and back pain. Neurological:  Negative for dizziness, weakness, light-headedness and headaches. Psychiatric/Behavioral:  Positive for sleep disturbance. Negative for decreased concentration. Objective: Intake/Output Summary (Last 24 hours) at 8/8/2022 1201  Last data filed at 8/8/2022 0648  Gross per 24 hour   Intake 547.12 ml   Output --   Net 547.12 ml          Vitals:   Vitals:    08/08/22 1000   BP: 100/65   Pulse: 84   Resp: 15   Temp:    SpO2:        Physical Exam:   Physical Exam  Vitals and nursing note reviewed. Constitutional:       Appearance: Normal appearance. She is normal weight. HENT:      Head: Normocephalic. Nose: Nose normal.      Mouth/Throat:      Mouth: Mucous membranes are moist.   Eyes:      Conjunctiva/sclera: Conjunctivae normal.      Pupils: Pupils are equal, round, and reactive to light.    Cardiovascular:      Rate and Rhythm: Normal rate and regular rhythm. Pulses: Normal pulses. Heart sounds: Normal heart sounds. No murmur heard. Pulmonary:      Effort: Pulmonary effort is normal.      Breath sounds: No wheezing, rhonchi or rales. Abdominal:      General: Abdomen is flat. Bowel sounds are normal. There is no distension. Palpations: Abdomen is soft. Tenderness: There is no abdominal tenderness. Musculoskeletal:         General: No deformity. Normal range of motion. Cervical back: Normal range of motion and neck supple. Right lower leg: No edema. Left lower leg: No edema. Skin:     Coloration: Skin is not jaundiced or pale. Neurological:      General: No focal deficit present. Mental Status: She is alert and oriented to person, place, and time. Mental status is at baseline. Motor: Weakness present.           Medications:   Medications:    eslicarbazepine acetate  1,200 mg Oral Daily    levETIRAcetam  750 mg Oral BID    urea  15 g Oral Q6H    sodium chloride flush  5-40 mL IntraVENous 2 times per day    enoxaparin  30 mg SubCUTAneous Daily    sodium chloride flush  5-40 mL IntraVENous 2 times per day      Infusions:    sodium chloride      sodium chloride       PRN Meds: sodium chloride flush, 5-40 mL, PRN  sodium chloride, , PRN  ondansetron, 4 mg, Q8H PRN   Or  ondansetron, 4 mg, Q6H PRN  polyethylene glycol, 17 g, Daily PRN  acetaminophen, 650 mg, Q6H PRN   Or  acetaminophen, 650 mg, Q6H PRN  sodium chloride flush, 5-40 mL, PRN  sodium chloride, , PRN  potassium chloride, 10 mEq, PRN  magnesium sulfate, 1,000 mg, PRN      Labs      Recent Results (from the past 24 hour(s))   Sodium    Collection Time: 08/07/22  1:30 PM   Result Value Ref Range    Sodium 122 (L) 135 - 145 MMOL/L   Sodium    Collection Time: 08/07/22  3:45 PM   Result Value Ref Range    Sodium 126 (L) 135 - 145 MMOL/L   Basic Metabolic Panel    Collection Time: 08/08/22  6:30 AM   Result Value Ref Range    Sodium 127 (L) 135 - 145 MMOL/L    Potassium 4.3 3.5 - 5.1 MMOL/L    Chloride 92 (L) 99 - 110 mMol/L    CO2 23 21 - 32 MMOL/L    Anion Gap 12 4 - 16    BUN 45 (H) 6 - 23 MG/DL    Creatinine 0.7 0.6 - 1.1 MG/DL    Glucose 105 (H) 70 - 99 MG/DL    Calcium 9.4 8.3 - 10.6 MG/DL    GFR Non-African American >60 >60 mL/min/1.73m2    GFR African American >60 >60 mL/min/1.73m2   CBC    Collection Time: 08/08/22  8:45 AM   Result Value Ref Range    WBC 6.2 4.0 - 10.5 K/CU MM    RBC 3.57 (L) 4.2 - 5.4 M/CU MM    Hemoglobin 10.3 (L) 12.5 - 16.0 GM/DL    Hematocrit 29.7 (L) 37 - 47 %    MCV 83.2 78 - 100 FL    MCH 28.9 27 - 31 PG    MCHC 34.7 32.0 - 36.0 %    RDW 12.6 11.7 - 14.9 %    Platelets 315 222 - 126 K/CU MM    MPV 8.6 7.5 - 11.1 FL   Hepatic Function Panel    Collection Time: 08/08/22  8:45 AM   Result Value Ref Range    Albumin 4.3 3.4 - 5.0 GM/DL    Total Bilirubin 0.2 0.0 - 1.0 MG/DL    Bilirubin, Direct 0.2 0.0 - 0.3 MG/DL    Bilirubin, Indirect 0.0 0 - 0.7 MG/DL    Alkaline Phosphatase 131 (H) 40 - 129 IU/L    AST 20 15 - 37 IU/L    ALT 18 10 - 40 U/L    Total Protein 6.4 6.4 - 8.2 GM/DL   Basic Metabolic Panel    Collection Time: 08/08/22 11:06 AM   Result Value Ref Range    Sodium 128 (L) 135 - 145 MMOL/L    Potassium 4.4 3.5 - 5.1 MMOL/L    Chloride 93 (L) 99 - 110 mMol/L    CO2 26 21 - 32 MMOL/L    Anion Gap 9 4 - 16    BUN 43 (H) 6 - 23 MG/DL    Creatinine 0.7 0.6 - 1.1 MG/DL    Glucose 113 (H) 70 - 99 MG/DL    Calcium 9.5 8.3 - 10.6 MG/DL    GFR Non-African American >60 >60 mL/min/1.73m2    GFR African American >60 >60 mL/min/1.73m2        Imaging/Diagnostics Last 24 Hours   XR FOOT LEFT (MIN 3 VIEWS)    Result Date: 8/6/2022  EXAMINATION: THREE XRAY VIEWS OF THE LEFT FOOT; ONE XRAY VIEW OF THE CHEST 8/5/2022 11:09 pm COMPARISON: Chest x-ray 03/27/2022 HISTORY: ORDERING SYSTEM PROVIDED HISTORY: fall TECHNOLOGIST PROVIDED HISTORY: Reason for exam:->fall Reason for Exam: fall; [address] [place] [address] HISTORY: mass? TECHNOLOGIST PROVIDED HISTORY: Reason for exam:->mass? Reason for Exam: mass? FINDINGS: Chest: Lungs are clear. Cardiomediastinal silhouette is within normal limits. No pleural effusion. No pneumothorax. Bony structures are unremarkable. Left foot: No acute fracture or dislocation. Chest: No acute findings. Left foot: No acute fracture or dislocation. CT Head WO Contrast    Result Date: 8/5/2022  EXAMINATION: CTA OF THE HEAD AND NECK WITH CONTRAST; CT OF THE HEAD WITHOUT CONTRAST 8/5/2022 3:09 pm; 8/5/2022 3:08 pm: TECHNIQUE: CTA of the head and neck was performed with the administration of intravenous contrast. Multiplanar reformatted images are provided for review. MIP images are provided for review. Stenosis of the internal carotid arteries measured using NASCET criteria. Automated exposure control, iterative reconstruction, and/or weight based adjustment of the mA/kV was utilized to reduce the radiation dose to as low as reasonably achievable.; CT of the head was performed without the administration of intravenous contrast. Automated exposure control, iterative reconstruction, and/or weight based adjustment of the mA/kV was utilized to reduce the radiation dose to as low as reasonably achievable. Noncontrast CT of the head with reconstructed 2-D images are also provided for review. This scan was analyzed using [employer]. ai contact LVO. Identification of suspected findings is not for diagnostic use beyond notification. Viz LVO is limited to analysis of imaging data and should not be used in-lieu of full patient evaluation or relied upon to make or confirm diagnosis. COMPARISON: CT head 05/29/2020. HISTORY: ORDERING SYSTEM PROVIDED HISTORY: dizziness, gait difficulties, 1 week TECHNOLOGIST PROVIDED HISTORY: Reason for exam:->dizziness, gait difficulties, 1 week Has a \"code stroke\" or \"stroke alert\" been called? ->No Decision Support Exception - unselect if not a suspected or confirmed emergency medical condition->Emergency Medical Condition (MA) Reason for Exam: 100ml isovue 370 inj by mv iv le 1520 Additional signs and symptoms: dizziness, gait difficulties, 1 week; ORDERING SYSTEM PROVIDED HISTORY: dizziness, gait difficulties, 1 week TECHNOLOGIST PROVIDED HISTORY: Reason for exam:->dizziness, gait difficulties, 1 week Has a \"code stroke\" or \"stroke alert\" been called? ->No Decision Support Exception - unselect if not a suspected or confirmed emergency medical condition->Emergency Medical Condition (MA) Additional signs and symptoms: dizziness, gait difficulties, 1 week FINDINGS: CT HEAD: BRAIN/VENTRICLES:  Encephalomalacia is again seen involving the left temporal lobe. Otherwise, the gray-white differentiation appears maintained. No acute intracranial hemorrhage. No mass effect or midline shift. No evidence of hydrocephalus. ORBITS: The visualized portion of the orbits demonstrate no acute abnormality. SINUSES:  The visualized paranasal sinuses and mastoid air cells demonstrate no acute abnormality. SOFT TISSUES/SKULL: No acute abnormality of the calvarium. Postsurgical change from a left temporal craniotomy. CTA NECK: AORTIC ARCH/ARCH VESSELS: There is minimal scattered atherosclerosis of the aortic arch and arch vessels. No flow limiting stenosis seen of the innominate or subclavian arteries. CAROTID ARTERIES: No focal stenosis seen of the common carotid arteries. Atherosclerosis of the carotid bulbs and proximal internal carotid arteries bilaterally. There is less than 50% stenosis of the proximal right cervical ICA by NASCET criteria. No focal stenosis seen of the left cervical ICA by NASCET criteria. VERTEBRAL ARTERIES: There is a diffusely diminutive caliber of the right vertebral artery, which is presumably congenital in nature. There is severe stenosis at the origin of the right vertebral artery. No focal stenosis otherwise seen of the vertebral arteries.  SOFT TISSUES: Centrilobular and paraseptal emphysematous changes seen in the lungs bilaterally. Mild dependent changes are seen bilaterally. No acute abnormality within the visualized mediastinum. BONES: The osseous structures appear osteopenic. No acute osseous abnormality clearly identified. CTA HEAD: ANTERIOR CIRCULATION: No significant stenosis of the intracranial internal carotid, anterior cerebral, or middle cerebral arteries. No aneurysm. POSTERIOR CIRCULATION: No significant stenosis of the vertebral, basilar, or posterior cerebral arteries. No aneurysm. OTHER: No dural venous sinus thrombosis on this non-dedicated study. 1. No acute intracranial abnormality. 2. There appears to be severe stenosis at the origin of the right vertebral artery. 3. Atherosclerosis contributes to less than 50% stenosis of the proximal right cervical ICA by NASCET criteria. 4. No flowing stenosis involving the left cervical ICA or left vertebral artery. 5. No significant stenosis or large vessel occlusion of the [id number]. XR CHEST PORTABLE    Result Date: 8/6/2022  EXAMINATION: THREE XRAY VIEWS OF THE LEFT FOOT; ONE XRAY VIEW OF THE CHEST 8/5/2022 11:09 pm COMPARISON: Chest x-ray 03/27/2022 HISTORY: ORDERING SYSTEM PROVIDED HISTORY: fall TECHNOLOGIST PROVIDED HISTORY: Reason for exam:->fall Reason for Exam: fall; ORDERING SYSTEM PROVIDED HISTORY: mass? TECHNOLOGIST PROVIDED HISTORY: Reason for exam:->mass? Reason for Exam: mass? FINDINGS: Chest: Lungs are clear. Cardiomediastinal silhouette is within normal limits. No pleural effusion. No pneumothorax. Bony structures are unremarkable. Left foot: No acute fracture or dislocation. Chest: No acute findings. Left foot: No acute fracture or dislocation.      CTA HEAD NECK W CONTRAST    Result Date: 8/5/2022  EXAMINATION: CTA OF THE HEAD AND NECK WITH CONTRAST; CT OF THE HEAD WITHOUT CONTRAST 8/5/2022 3:09 pm; 8/5/2022 3:08 pm: TECHNIQUE: CTA of the head and neck was performed with the administration of intravenous contrast. Multiplanar reformatted images are provided for review. MIP images are provided for review. Stenosis of the internal carotid arteries measured using NASCET criteria. Automated exposure control, iterative reconstruction, and/or weight based adjustment of the mA/kV was utilized to reduce the radiation dose to as low as reasonably achievable.; CT of the head was performed without the administration of intravenous contrast. Automated exposure control, iterative reconstruction, and/or weight based adjustment of the mA/kV was utilized to reduce the radiation dose to as low as reasonably achievable. Noncontrast CT of the head with reconstructed 2-D images are also provided for review. This scan was analyzed using [employer]. ai contact LVO. Identification of suspected findings is not for diagnostic use beyond notification. Viz LVO is limited to analysis of imaging data and should not be used in-lieu of full patient evaluation or relied upon to make or confirm diagnosis. COMPARISON: CT head 05/29/2020. HISTORY: ORDERING SYSTEM PROVIDED HISTORY: dizziness, gait difficulties, 1 week TECHNOLOGIST PROVIDED HISTORY: Reason for exam:->dizziness, gait difficulties, 1 week Has a \"code stroke\" or \"stroke alert\" been called? ->No Decision Support Exception - unselect if not a suspected or confirmed emergency medical condition->Emergency Medical Condition (MA) Reason for Exam: 100ml isovue 370 inj by mv iv le 1520 Additional signs and symptoms: dizziness, gait difficulties, 1 week; ORDERING SYSTEM PROVIDED HISTORY: dizziness, gait difficulties, 1 week TECHNOLOGIST PROVIDED HISTORY: Reason for exam:->dizziness, gait difficulties, 1 week Has a \"code stroke\" or \"stroke alert\" been called? ->No Decision Support Exception - unselect if not a suspected or confirmed emergency medical condition->Emergency Medical Condition (MA) Additional signs and symptoms: dizziness, gait difficulties, 1 week FINDINGS: CT HEAD: BRAIN/VENTRICLES:  Encephalomalacia is again seen involving the left temporal lobe. Otherwise, the gray-white differentiation appears maintained. No acute intracranial hemorrhage. No mass effect or midline shift. No evidence of hydrocephalus. ORBITS: The visualized portion of the orbits demonstrate no acute abnormality. SINUSES:  The visualized paranasal sinuses and mastoid air cells demonstrate no acute abnormality. SOFT TISSUES/SKULL: No acute abnormality of the calvarium. Postsurgical change from a left temporal craniotomy. CTA NECK: AORTIC ARCH/ARCH VESSELS: There is minimal scattered atherosclerosis of the aortic arch and arch vessels. No flow limiting stenosis seen of the innominate or subclavian arteries. CAROTID ARTERIES: No focal stenosis seen of the common carotid arteries. Atherosclerosis of the carotid bulbs and proximal internal carotid arteries bilaterally. There is less than 50% stenosis of the proximal right cervical ICA by NASCET criteria. No focal stenosis seen of the left cervical ICA by NASCET criteria. VERTEBRAL ARTERIES: There is a diffusely diminutive caliber of the right vertebral artery, which is presumably congenital in nature. There is severe stenosis at the origin of the right vertebral artery. No focal stenosis otherwise seen of the vertebral arteries. SOFT TISSUES: Centrilobular and paraseptal emphysematous changes seen in the lungs bilaterally. Mild dependent changes are seen bilaterally. No acute abnormality within the visualized mediastinum. BONES: The osseous structures appear osteopenic. No acute osseous abnormality clearly identified. CTA HEAD: ANTERIOR CIRCULATION: No significant stenosis of the intracranial internal carotid, anterior cerebral, or middle cerebral arteries. No aneurysm. POSTERIOR CIRCULATION: No significant stenosis of the vertebral, basilar, or posterior cerebral arteries. No aneurysm.  OTHER: No dural venous sinus thrombosis on this non-dedicated study. 1. No acute intracranial abnormality. 2. There appears to be severe stenosis at the origin of the right vertebral artery. 3. Atherosclerosis contributes to less than 50% stenosis of the proximal right cervical ICA by NASCET criteria. 4. No flowing stenosis involving the left cervical ICA or left vertebral artery. 5. No significant stenosis or large vessel occlusion of the [id number].        Electronically signed by Nasrin Lan MD, MD on 8/8/2022 at 12:01 PM

## 2022-08-08 NOTE — PROGRESS NOTES
Neurology Service Progress Note  [facility]  Patient Name: Rosemary Lemus  : 1960        Subjective:   Reason for consult: \"I fell I have been feeling off\"  Patient was seen and assessed, chart was reviewed in detail. Patient is alert, oriented and pleasant today. She is able to follow all commands and is appropriate. She states she is excited to be moving out of ICU today. There has been no reported seizure activity. Sodium continues to slowly correct. Past Medical History:   Diagnosis Date    COPD (chronic obstructive pulmonary disease) ([address]     Epilepsy ([address]     Full dentures     upper plate only    H/O dizziness     \"take Meclazine at least twice a day for this\"    H/O Doppler JERMAINE's ultrasound 2020     No evidence of significant occlusive arterial disease of lower EXT. H/O echocardiogram 2020    EF 55-60%, Normal study. History of drug abuse ([facility]     History of nuclear stress test 2020    Normal study.     History of UTI     \"last one May 2020\"    Hypertension     \"the top # use to be over 200 \"- on medication since \"    Seizures ([facility]     hx of epilepsy- last seizure week 2020- hx grand mal seizures    Wears glasses     :   Past Surgical History:   Procedure Laterality Date    BRAIN SURGERY      s/p L anterior temporal lobectomy in \"did surgery to try an treat the seizures\"    CYSTOSCOPY N/A 10/14/2021    with a bladder biopsy-Dr. Franklin Pollard in [facility] 10/14/2021    CYSTOSCOPY BLADDER BIOPSY performed by Gayla Craven MD at [address] [facility] ([address] [place] [address])      \"not sure if they took the ovaries\"    [address] Left 2020    LEFT WRIST TENDON RELEASE performed by Ruth Tenorio DO at [address]     Medications:  Scheduled Meds:   eslicarbazepine acetate  1,200 mg Oral Daily    levETIRAcetam  750 mg Oral BID    urea  15 g Oral Q6H    sodium chloride flush  5-40 mL IntraVENous 2 times per day enoxaparin  30 mg SubCUTAneous Daily    sodium chloride flush  5-40 mL IntraVENous 2 times per day     Continuous Infusions:   sodium chloride      sodium chloride       PRN Meds:.sodium chloride flush, sodium chloride, ondansetron **OR** ondansetron, polyethylene glycol, acetaminophen **OR** acetaminophen, sodium chloride flush, sodium chloride, potassium chloride, magnesium sulfate    Allergies   Allergen Reactions    Varenicline Other (See Comments)     Having bad dreams and chest pain( Chantix)    Chantix [Varenicline Tartrate] Palpitations     Chest pain     Social History     Socioeconomic History    Marital status:      Spouse name: Not on file    Number of children: Not on file    Years of education: Not on file    Highest education level: Not on file   Occupational History    Not on file   Tobacco Use    Smoking status: Former     Packs/day: 1.50     Years: 40.00     Pack years: 60.00     Types: Cigarettes     Quit date: 2020     Years since quittin.0    Smokeless tobacco: Never   Vaping Use    Vaping Use: Never used   Substance and Sexual Activity    Alcohol use: No     Comment: per pt on 2020\"quit drinking age 40- use to drink on weekends\"    Drug use: Yes     Types: Marijuana (Maryjane Neighbor), Other-see comments     Comment: PCP/ per pt on 2020\"last used marijuana 2020, last did cocaine  spring 2020\"    Sexual activity: Yes     Partners: Male   Other Topics Concern    Not on file   Social History Narrative    Not on file     Social Determinants of Health     Financial Resource Strain: High Risk    Difficulty of Paying Living Expenses: Very hard   Food Insecurity: Food Insecurity Present    Worried About Running Out of Food in the Last Year: Sometimes true    Ran Out of Food in the Last Year: Sometimes true   Transportation Needs: Not on file   Physical Activity: Not on file   Stress: Not on file   Social Connections: Not on file   Intimate Partner Violence: Not on file   Housing Stability: Not on file      Family History   Problem Relation Age of Onset    Heart Disease Father         cabg    Breast Cancer Maternal Cousin          ROS (10 systems)  In addition to that documented in the HPI above, the additional ROS was obtained:  Constitutional: Denies fevers or chills  Eyes: Denies vision changes  ENMT: Denies sore throat  CV: Denies chest pain  Resp: Denies SOB  GI: Denies vomiting or diarrhea  : Denies painful urination  MSK: Denies recent trauma  Skin: Denies new rashes  Neuro: Denies new numbness or tingling or weakness  Endocrine: Denies unexpected weight loss  Heme: Denies bleeding disorders    Physical Exam:       Wt Readings from Last 3 Encounters:   08/07/22 159 lb 13.3 oz (72.5 kg)   08/05/22 187 lb (84.8 kg)   08/04/22 187 lb (84.8 kg)     Temp Readings from Last 3 Encounters:   08/08/22 97.9 °F (36.6 °C) (Oral)   08/05/22 97.6 °F (36.4 °C) (Oral)   10/14/21 97.8 °F (36.6 °C) (Temporal)     BP Readings from Last 3 Encounters:   08/08/22 100/65   08/05/22 129/88   08/04/22 (!) 140/86     Pulse Readings from Last 3 Encounters:   08/08/22 84   08/05/22 76   08/04/22 52        Gen: A&O x 4, NAD, cooperative  HEENT: NC/AT, EOMI, PERRL, mmm, no carotid bruits, neck supple, no meningeal signs; Heart: regular   Lungs: no distress  Ext: no edema, no calf tenderness b/l  Psych: normal mood and affect  Skin: no rashes or lesions    NEUROLOGIC EXAM:    Mental Status: A&O to self, location, month and year, NAD, speech clear, language fluent, repetition and naming intact, follows commands appropriately    Cranial Nerve Exam:   CN II-XII: PERRL, VFF, no nystagmus, no gaze paresis, sensation V1-V3 intact b/l, muscles of facial expression symmetric; hearing intact to conversational tone, palate elevates symmetrically, shoulder elevation symmetric and tongue protrudes midline with movement side to side.     Motor Exam:       Strength 5/5 UE's/LE's b/l  Tone and bulk normal   No pronator drift    Deep Tendon Reflexes: 2/4 biceps, triceps, brachioradialis, patellar, and achilles b/l; flexor plantar responses b/l    Sensation: Intact light touch/pinprick/vibration UE's/LE's b/l    Coordination/Cerebellum:       Tremors--none      Rapidly alternating movements: no dysdiadochokinesia b/l                    Finger-to-Nose: no dysmetria b/l    Gait and stance:      Gait: Steady gait wide-based gait no apraxia or ataxia      LABS:     Recent Labs     08/05/22  1426 08/06/22  0245 08/06/22  0615 08/06/22  1407 08/06/22  1847 08/07/22  1330 08/07/22  1545 08/08/22  0630 08/08/22  0845   WBC 6.8  --  6.4  --   --   --   --   --  6.2   * 115* 117*  117* 115*   < > 122* 126* 127*  --    K 2.5* 2.7* 3.8 3.8  --   --   --  4.3  --    CL 71* 77* 81* 80*  --   --   --  92*  --    CO2 30 25 25 21  --   --   --  23  --    BUN 20 12 12  --   --   --   --  45*  --    CREATININE 1.2* 0.9 0.9  --   --   --   --  0.7  --    GLUCOSE 101* 88 94  --   --   --   --  105*  --    INR 1.02  --   --   --   --   --   --   --   --     < > = values in this interval not displayed. IMAGING:      CT head:  1. No acute intracranial abnormality. 2. 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 3. Atherosclerosis contributes to less than 50% stenosis of the proximal   right cervical ICA by NASCET criteria. 4. No flowing stenosis involving the left cervical ICA or left vertebral   artery. 5. No significant stenosis or large vessel occlusion of the [id number]. Personally reviewed    ASSESSMENT/PLAN:   70-year-old female with reported balance disturbance dizziness fatigue decreased appetite and hypertension. secondary to    Patient's neurological exam is nonfocal.    Fatigue and balance disturbance secondary to acute TME superimposed on acute hyponatremia and SIADH, recent fall and concussion.  [facility] does have rare cases of SIADH but at a lower dose for a year before she maintained appropriate sodium levels. CT head as above  Continue with Aptiom 1200 mg daily, Keppra 750 mg twice daily  Hyponatremia correcting - management per nephrology  PT/OT as recommended - feel patient would benefit from inpatient rehab if she qualifies. If no inpatient rehab recommend outpatient balance therapy  Follow up in our office in 2 weeks  No further recommendations at this time. We will sign off, please contact with any new concerns. > 20 minutes of time spent included chart review, obtaining history, patient examination, developing plan of care, and documentation. Patient was discussed with Attending Physician Dr. Miko Stratton. Thank you for allowing us to participate in the care of your patient. If there are any questions regarding evaluation please feel free to contact us.      STEWART Plummer - CNS, 8/8/2022       ------------------------------------

## 2022-08-08 NOTE — PROGRESS NOTES
Nephrology Progress Note        [address] [place] [address]  Phone: [phone]  Office Hours: 8:30AM - 4:30PM  Monday - Friday 8/8/2022 7:55 AM  Subjective:   Admit Date: 8/5/2022  PCP: Kourtney Najera MD  Interval History:     Diet: ADULT DIET; Regular; 1800 ml      Data:   Scheduled Meds:   eslicarbazepine acetate  1,200 mg Oral Daily    levETIRAcetam  750 mg Oral BID    urea  15 g Oral Q6H    sodium chloride flush  5-40 mL IntraVENous 2 times per day    enoxaparin  30 mg SubCUTAneous Daily    sodium chloride flush  5-40 mL IntraVENous 2 times per day     Continuous Infusions:   sodium chloride      sodium chloride       PRN Meds:sodium chloride flush, sodium chloride, ondansetron **OR** ondansetron, polyethylene glycol, acetaminophen **OR** acetaminophen, sodium chloride flush, sodium chloride, potassium chloride, magnesium sulfate  I/O last 3 completed shifts: In: 797.1 [P.O.:250; IV Piggyback:547.1]  Out: 900 [Urine:900]  No intake/output data recorded. Intake/Output Summary (Last 24 hours) at 8/8/2022 0755  Last data filed at 8/8/2022 0648  Gross per 24 hour   Intake 547.12 ml   Output --   Net 547.12 ml       CBC:   Recent Labs     08/05/22  1426 08/06/22  0615   WBC 6.8 6.4   HGB 11.8* 10.9*    291       BMP:    Recent Labs     08/06/22  0245 08/06/22  0615 08/06/22  1407 08/06/22  1847 08/07/22  1330 08/07/22  1545 08/08/22  0630   * 117*  117* 115*   < > 122* 126* 127*   K 2.7* 3.8 3.8  --   --   --  4.3   CL 77* 81* 80*  --   --   --  92*   CO2 25 25 21  --   --   --  23   BUN 12 12  --   --   --   --  45*   CREATININE 0.9 0.9  --   --   --   --  0.7   GLUCOSE 88 94  --   --   --   --  105*    < > = values in this interval not displayed. Hepatic:   Recent Labs     08/05/22  1426   AST 16   ALT 13   BILITOT 0.3   ALKPHOS 151*     Troponin: No results for input(s): TROPONINI in the last 72 hours.   BNP: No results for input(s): BNP in the last 72 hours. Lipids: No results for input(s): CHOL, HDL in the last 72 hours.     Invalid input(s): LDLCALCU  ABGs: No results found for: PHART, PO2ART, JAJ0CQA  INR:   Recent Labs     08/05/22  1426   INR 1.02       Objective:   Vitals: /69   Pulse 78   Temp 98.2 °F (36.8 °C) (Oral)   Resp 14   Ht 5' 1\" (1.549 m)   Wt 159 lb 13.3 oz (72.5 kg)   SpO2 99%   BMI 30.20 kg/m²   General appearance: alert and cooperative with exam, in no acute distress  HEENT: normocephalic, atraumatic,   Neck: supple, trachea midline  Lungs: clear to auscultation bilaterally, breathing comfortably on room air  Heart[de-identified] regular rate and rhythm, S1, S2 normal,  Abdomen: soft, non-tender; non distended, +bowel sounds  Extremities: extremities atraumatic, no cyanosis or edema  Neurologic: alert, oriented, follows commands, interactive    Assessment and Plan:         Patient Active Problem List    Diagnosis Date Noted    Hyponatremia 08/05/2022    KYLE (obstructive sleep apnea) 04/12/2022    Ex-smoker 04/12/2022    COPD (chronic obstructive pulmonary disease) ([facility] 04/12/2022    Primary insomnia 01/06/2022    Centrilobular emphysema ([address] 10/07/2021    Overactive bladder 09/09/2021    Hypoxia 08/01/2021    Fatigue 08/01/2021    Hypersomnia 08/01/2021    Snoring 08/01/2021    Chronic hypoxemic respiratory failure ([address] 06/09/2021    Hereditary and idiopathic neuropathy     Lung nodule 05/10/2021    Personal history of nicotine dependence 05/10/2021    Intermittent claudication ([facility] 11/17/2020    Nicotine abuse 11/17/2020    Class 1 obesity due to excess calories with body mass index (BMI) of 33.0 to 33.9 in adult 11/17/2020    Obesity (BMI 30-39.9) 11/03/2020    Restless legs 10/19/2020    Shortness of breath 10/19/2020    Age-related osteoporosis without current pathological fracture 08/11/2020    De Quervain's tenosynovitis, left     Vertigo 07/14/2020    Intractable generalized idiopathic epilepsy without status epilepticus ([facility] 06/16/2020    Prediabetes 06/16/2020    Essential hypertension 06/16/2020    Panlobular emphysema ([facility] 06/16/2020    Personal history of tobacco use 06/16/2020    Vitamin D deficiency 06/16/2020    Moderate episode of recurrent major depressive disorder ([facility] 06/16/2020    Dyslipidemia 06/16/2020    Vestibular migraine 04/21/2020    Diplopia 03/13/2020    Seizure ([facility] 08/30/2018    Chronic hyponatremia 02/22/2017     Sodium 127, better//safely corrected  Continue urea qid for now  Limit oral fluids to 1800-2000ml per day  Continue good nutrition  Daily BMP     Thank you              Electronically signed by Delroy Norman DO on 8/8/2022 at 7:55 AM    [address],  [address]  [facility], [place] [address]  PHONE: [phone]  FAX: [phone]

## 2022-08-08 NOTE — CARE COORDINATION
Attempted visit; patient is on the phone and did not stop conversation to discuss discharge needs. CM will attempt re visit. Markham Brunner, RN     Chart reviewed. Patient is from home; appears independent. She has a PCP and insurance that assist with Rx when needed. No needs identified at this time, CM will remain available should needs arise.  Markham Brunner, RN

## 2022-08-08 NOTE — PROGRESS NOTES
Physician Progress Note      Mari Bran  CSN #:                  [id number]  :                       1960  ADMIT DATE:       2022 9:59 PM  DISCH DATE:  RESPONDING  PROVIDER #:        Herson CASTELLANOS          QUERY TEXT:    Pt admitted with  Hyponatremia. Pt noted to have NA of 111 and noted possible   SIADH. If possible, please document in the progress notes and discharge   summary if you are evaluating and / or treating any of the following: The medical record reflects the following:  Risk Factors: Hyponatremia, Seizures  Clinical Indicators: \"Hyponatremia likely sec to Aptiom- slowly improving\",   \"Severe symptomatic euvolemic hyponatremia likely SIADH versus hypovolemia: Na   111 on admission. Now 117. Nephrology following up. Appreciate   recommendations. Andra 100s, Urine Osmol 500\"  Treatment: Neuro consult , decrease Aptiom to the 1200mg daily dose and   increase Keppra to 750mg BID, drinking Gatorade zero at home, and water at   least 64 ounces daily rather than Pepsi soda, Nephrology consult, 3% Nacl IVF   , BMP q2h    Thank you Tamela Coker RN, CDS ([phone])  Options provided:  -- Hyponatremia  -- SIADH  -- Other - I will add my own diagnosis  -- Disagree - Not applicable / Not valid  -- Disagree - Clinically unable to determine / Unknown  -- Refer to Clinical Documentation Reviewer    PROVIDER RESPONSE TEXT:    This patient has SIADH.     Query created by: Adalberto Fall on 2022 8:26 AM      Electronically signed by:  Najma Hankins 2022 9:22 AM

## 2022-08-09 VITALS
RESPIRATION RATE: 15 BRPM | WEIGHT: 159.83 LBS | HEIGHT: 61 IN | DIASTOLIC BLOOD PRESSURE: 80 MMHG | BODY MASS INDEX: 30.18 KG/M2 | TEMPERATURE: 97.9 F | HEART RATE: 81 BPM | OXYGEN SATURATION: 98 % | SYSTOLIC BLOOD PRESSURE: 126 MMHG

## 2022-08-09 DIAGNOSIS — R12 HEARTBURN: ICD-10-CM

## 2022-08-09 LAB
ANION GAP SERPL CALCULATED.3IONS-SCNC: 12 MMOL/L (ref 4–16)
BUN BLDV-MCNC: 46 MG/DL (ref 6–23)
CALCIUM SERPL-MCNC: 9.6 MG/DL (ref 8.3–10.6)
CHLORIDE BLD-SCNC: 96 MMOL/L (ref 99–110)
CO2: 22 MMOL/L (ref 21–32)
CREAT SERPL-MCNC: 0.7 MG/DL (ref 0.6–1.1)
GFR AFRICAN AMERICAN: >60 ML/MIN/1.73M2
GFR NON-AFRICAN AMERICAN: >60 ML/MIN/1.73M2
GLUCOSE BLD-MCNC: 144 MG/DL (ref 70–99)
POTASSIUM SERPL-SCNC: 4.5 MMOL/L (ref 3.5–5.1)
SODIUM BLD-SCNC: 130 MMOL/L (ref 135–145)

## 2022-08-09 PROCEDURE — 80048 BASIC METABOLIC PNL TOTAL CA: CPT

## 2022-08-09 PROCEDURE — 6360000002 HC RX W HCPCS: Performed by: STUDENT IN AN ORGANIZED HEALTH CARE EDUCATION/TRAINING PROGRAM

## 2022-08-09 PROCEDURE — 6370000000 HC RX 637 (ALT 250 FOR IP): Performed by: NURSE PRACTITIONER

## 2022-08-09 PROCEDURE — 99232 SBSQ HOSP IP/OBS MODERATE 35: CPT | Performed by: INTERNAL MEDICINE

## 2022-08-09 PROCEDURE — 94761 N-INVAS EAR/PLS OXIMETRY MLT: CPT

## 2022-08-09 PROCEDURE — 6370000000 HC RX 637 (ALT 250 FOR IP): Performed by: INTERNAL MEDICINE

## 2022-08-09 RX ORDER — ENOXAPARIN SODIUM 100 MG/ML
40 INJECTION SUBCUTANEOUS DAILY
Status: DISCONTINUED | OUTPATIENT
Start: 2022-08-10 | End: 2022-08-09 | Stop reason: HOSPADM

## 2022-08-09 RX ORDER — ROPINIROLE 0.25 MG/1
0.5 TABLET, FILM COATED ORAL NIGHTLY
Status: DISCONTINUED | OUTPATIENT
Start: 2022-08-09 | End: 2022-08-09 | Stop reason: HOSPADM

## 2022-08-09 RX ORDER — OMEPRAZOLE 40 MG/1
CAPSULE, DELAYED RELEASE ORAL
Qty: 30 CAPSULE | Refills: 1 | Status: SHIPPED | OUTPATIENT
Start: 2022-08-09 | End: 2022-10-04

## 2022-08-09 RX ORDER — LEVETIRACETAM 750 MG/1
750 TABLET ORAL 2 TIMES DAILY
Qty: 30 TABLET | Refills: 1 | Status: SHIPPED | OUTPATIENT
Start: 2022-08-09 | End: 2022-09-20 | Stop reason: SDUPTHER

## 2022-08-09 RX ORDER — NORTRIPTYLINE HYDROCHLORIDE 25 MG/1
50 CAPSULE ORAL NIGHTLY
Status: DISCONTINUED | OUTPATIENT
Start: 2022-08-09 | End: 2022-08-09 | Stop reason: HOSPADM

## 2022-08-09 RX ORDER — ARIPIPRAZOLE 10 MG/1
5 TABLET ORAL DAILY
Status: DISCONTINUED | OUTPATIENT
Start: 2022-08-09 | End: 2022-08-09 | Stop reason: HOSPADM

## 2022-08-09 RX ADMIN — Medication 15 G: at 08:49

## 2022-08-09 RX ADMIN — Medication 15 G: at 02:07

## 2022-08-09 RX ADMIN — ENOXAPARIN SODIUM 30 MG: 100 INJECTION SUBCUTANEOUS at 08:50

## 2022-08-09 RX ADMIN — Medication 15 G: at 15:49

## 2022-08-09 RX ADMIN — LEVETIRACETAM 750 MG: 500 TABLET, FILM COATED ORAL at 08:49

## 2022-08-09 NOTE — PROGRESS NOTES
Pt prefers to have her scripts for discharge sent to the outpatient pharmacy, since she may not be able to  at her usual pharmacy.   Pharmacy information updated in the system

## 2022-08-09 NOTE — DISCHARGE INSTRUCTIONS
New medication: UTI tablets 15 g packet two times a day. Change in dose of your Aptiom: Take 2 tablets of 600 mg at bedtime. Change in dose of your Keppra: Take 1 tablet of 750 mg tablet in the morning and at bedtime. Water restriction: 2 L/day. Continue good protein intake daily. Follow-up with nephrology and your primary care physician. Lab works to do before the visit: SMOOTH.

## 2022-08-09 NOTE — PROGRESS NOTES
Discharge instructions reviewed with pt. Pt given education related to admitting diagnosis and medication schedule to assume at home. Pt verified that all personal belongings are present and accounted for. All questions answered. Pt to leave with significant other.

## 2022-08-09 NOTE — DISCHARGE SUMMARY
Discharge Summary    Name:  Odalys Liu /Age/Sex: 1960  (65 y.o. female)   MRN & CSN:  [id number] & [id number] Admission Date/Time: 2022  9:59 PM   Attending:  Luisa Elise MD Discharging Physician: Luisa Elise MD       Admission Diagnosis:   Severe hyponatremia  Hypokalemia  Epilepsy  Hypertension  COPD  Tobacco abuse  Left foot pain    Discharge Diagnosis:  Euvolemic hypoosmolar hyponatremia, symptomatic, likely medication induced, needs outpatient appropriate cancer screening. Hyponatremia stable on urea sodium tablets and fluid restriction. Hypokalemia, resolved. To decide on oral daily supplement based on outpatient labs. History of epilepsy, no breakthrough seizure, stable. Decreased the dose of carbamazepine and increase the dose of Keppra. Benign essential hypertension, stable. COPD, not in exacerbation, stable  Tobacco abuse, counseling provided. Left foot pain, resolved. Discharge Exam  /88   Pulse 89   Temp 97.8 °F (36.6 °C) (Oral)   Resp 16   Ht 5' 1\" (1.549 m)   Wt 159 lb 13.3 oz (72.5 kg)   SpO2 98%   BMI 30.20 kg/m²     Physical Exam  Constitutional:       General: She is not in acute distress. Appearance: She is well-developed. HENT:      Head: Normocephalic and atraumatic. Right Ear: External ear normal.      Left Ear: External ear normal.      Nose: Nose normal.   Eyes:      General: No scleral icterus. Right eye: No discharge. Left eye: No discharge. Conjunctiva/sclera: Conjunctivae normal.      Pupils: Pupils are equal, round, and reactive to light. Neck:      Thyroid: No thyromegaly. Vascular: No JVD. Trachea: No tracheal deviation. Cardiovascular:      Rate and Rhythm: Normal rate and regular rhythm. Heart sounds: Normal heart sounds. No murmur heard. No friction rub. No gallop. Pulmonary:      Effort: Pulmonary effort is normal. No respiratory distress. Breath sounds: Normal breath sounds. No stridor. No wheezing or rales. Chest:      Chest wall: No tenderness. Abdominal:      General: Bowel sounds are normal. There is no distension. Palpations: Abdomen is soft. There is no mass. Tenderness: There is no abdominal tenderness. There is no guarding or rebound. Hernia: No hernia is present. Genitourinary:     Vagina: Normal. No vaginal discharge. Rectum: Guaiac result negative. Musculoskeletal:         General: No tenderness or deformity. Normal range of motion. Cervical back: Normal range of motion and neck supple. Lymphadenopathy:      Cervical: No cervical adenopathy. Skin:     General: Skin is warm and dry. Capillary Refill: Capillary refill takes less than 2 seconds. Coloration: Skin is not pale. Findings: No erythema or rash. Neurological:      Mental Status: She is alert and oriented to person, place, and time. Cranial Nerves: No cranial nerve deficit. Motor: No abnormal muscle tone. Coordination: Coordination normal.      Deep Tendon Reflexes: Reflexes normal.   Psychiatric:         Behavior: Behavior normal.         Thought Content: Thought content normal.         Judgment: Judgment normal.         Hospital Course:   Génesis Barrett is a 65 y.o.  female  who presents with Hyponatremia    Presented on 8/5/2022 with complaints of dizziness and fatigue. Na 111 on admission. Nephrology following up. Andra 100s, Urine Osmol 500 - indicating likely SIADH. Ct chest with contrast to showed no malignancy. Limited oral fluid intake . Hypertonic saline infused. Once sodium level stabilized started on urea Na tablets. Sodium level continued to improve gradually and is at 130 mmol/L at the time of discharge. Repeat BMP to be done in 1 week. Patient is to follow up with her PCP and nephrology team.    The patient expressed appropriate understanding of and agreement with the discharge recommendations, medications, and plan.      Consults this admission:  IP CONSULT TO NEPHROLOGY  IP CONSULT TO NEUROLOGY      Discharge Instruction:   Handoff to PCP:   Age-appropriate cancer screen should be done to rule out SIADH as a paraneoplastic syndrome. Monitor Na. Repeat BMP ordered for 1 week out. Needs follow up with nephrology. Follow up appointments: PCP and nephrology  Primary care physician: Kandi Mei MD    Diet:   Regular diet with fluid intake limited to 2L/day. Good amount of protein in daily meals. Activity: activity as tolerated  Disposition: Discharged to:   [x]Home, [][facility], []SNF, []Acute Rehab, []Hospice   Condition on discharge: Stable    Discharge Medications:        Medication List        START taking these medications      urea 15 g Pack packet  Commonly known as: URE-NA  Take 15 g by mouth in the morning and at bedtime            CHANGE how you take these medications      eslicarbazepine 994 MG Tabs tablet  Commonly known as: Aptiom  Take 2 tablets by mouth at bedtime  What changed:   medication strength  See the new instructions. levETIRAcetam 750 MG tablet  Commonly known as: KEPPRA  Take 1 tablet by mouth in the morning and 1 tablet before bedtime.   What changed:   medication strength  how much to take            CONTINUE taking these medications      alendronate 70 MG tablet  Commonly known as: FOSAMAX  Take 1 tablet by mouth once a week     ARIPiprazole 5 MG tablet  Commonly known as: ABILIFY  TAKE ONE TABLET BY MOUTH EVERY DAY     magnesium oxide 400 MG tablet  Commonly known as: MAG-OX  TAKE ONE TABLET BY MOUTH EVERY DAY     nortriptyline 25 MG capsule  Commonly known as: PAMELOR  Take 2 capsules by mouth nightly     olmesartan-hydroCHLOROthiazide 40-25 MG per tablet  Commonly known as: BENICAR HCT  TAKE ONE TABLET BY MOUTH EVERY DAY     omeprazole 40 MG delayed release capsule  Commonly known as: PRILOSEC  TAKE ONE CAPSULE BY MOUTH EVERY DAY BEFORE BREAKFAST     oxybutynin 10 MG extended release tablet  Commonly known as: DITROPAN-XL  Take 1 tablet by mouth daily     rOPINIRole 0.5 MG tablet  Commonly known as: Requip  Take 1 tablet by mouth in the morning and at bedtime               Where to Get Your Medications        These medications were sent to [address], [address] [facility] [address], [address] [facility]      Phone: [phone]   eslicarbazepine 456 MG Tabs tablet  levETIRAcetam 750 MG tablet  urea 15 g Pack packet       These medications were sent to [address] [facility] [address], [address] [facility] [address] [phone]  [address], [address] [facility] [address]      Phone: [phone]   omeprazole 40 MG delayed release capsule         Objective Findings at Discharge:       BMP/CBC  Recent Labs     08/08/22  0845 08/08/22  1106 08/08/22  1508 08/09/22  0520   NA  --  128* 123* 130*   K  --  4.4 4.5 4.5   CL  --  93* 91* 96*   CO2  --  26 25 22   BUN  --  43* 42* 46*   CREATININE  --  0.7 0.7 0.7   WBC 6.2  --   --   --    HCT 29.7*  --   --   --      --   --   --        IMAGING:  CT chest with contrast 8/6/2022   Chronic changes the lungs including emphysematous change and likely   superimposed interstitial edema pattern without dominant nodule or mass. Partially visualized portions of the upper abdomen reveal cholelithiasis   noncalcified stones or filling defects within the gallbladder lumen dependent   portion however no wall thickening. X-ray foot and x-ray chest 8/6/2022   Chest: No acute findings. Left foot: No acute fracture or dislocation. CT head without contrast 8/05/2022   1. No acute intracranial abnormality. 2. 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 3. Atherosclerosis contributes to less than 50% stenosis of the proximal   right cervical ICA by NASCET criteria.    4. No flowing stenosis involving the left cervical ICA or left vertebral   artery. 5. No significant stenosis or large vessel occlusion of the [id number]-rn-Xotqtk. CTA head neck with contrast 8/05/2022   1. No acute intracranial abnormality. 2. 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 3. Atherosclerosis contributes to less than 50% stenosis of the proximal   right cervical ICA by NASCET criteria. 4. No flowing stenosis involving the left cervical ICA or left vertebral   artery. 5. No significant stenosis or large vessel occlusion of the jljbsm-dr-Lamiab. Additional Information: Patient seen and examined day of discharge.  For more information regarding patient's care please contact [facility] records [phone]    Discharge Time of 35 minutes    Electronically signed by Louis Andersen MD on 8/9/2022 at 1:59 PM

## 2022-08-09 NOTE — PROGRESS NOTES
Outpatient Pharmacy Progress Note for Meds-to-Beds    Total number of Prescriptions Filled: 1  The following medications were dispensed to the patient during the discharge process:  Keppra    Additional Documentation:  Medication(s) were delivered to the patient's room prior to discharge      Thank you for letting us serve your patients.   [address] [place]    [address], [address] [facility]    Phone: [phone]    Fax: [phone]

## 2022-08-09 NOTE — PROGRESS NOTES
Nephrology Progress Note        [address], [address] [address]  Phone: [phone]  Office Hours: 8:30AM - 4:30PM  Monday - Friday 8/9/2022 8:57 AM  Subjective:   Admit Date: 8/5/2022  PCP: García Ocampo MD  Interval History:   Doing well on room air    Diet: ADULT DIET; Regular; 1800 ml      Data:   Scheduled Meds:   eslicarbazepine acetate  1,200 mg Oral Daily    levETIRAcetam  750 mg Oral BID    urea  15 g Oral Q6H    sodium chloride flush  5-40 mL IntraVENous 2 times per day    enoxaparin  30 mg SubCUTAneous Daily    sodium chloride flush  5-40 mL IntraVENous 2 times per day     Continuous Infusions:   sodium chloride      sodium chloride       PRN Meds:sodium chloride flush, sodium chloride, ondansetron **OR** ondansetron, polyethylene glycol, acetaminophen **OR** acetaminophen, sodium chloride flush, sodium chloride, potassium chloride, magnesium sulfate  I/O last 3 completed shifts:   In: 242.7 [IV Piggyback:242.7]  Out: -   I/O this shift:  In: 240 [P.O.:240]  Out: -     Intake/Output Summary (Last 24 hours) at 8/9/2022 0857  Last data filed at 8/9/2022 0805  Gross per 24 hour   Intake 240 ml   Output --   Net 240 ml       CBC:   Recent Labs     08/08/22  0845   WBC 6.2   HGB 10.3*          BMP:    Recent Labs     08/08/22  1106 08/08/22  1508 08/09/22  0520   * 123* 130*   K 4.4 4.5 4.5   CL 93* 91* 96*   CO2 26 25 22   BUN 43* 42* 46*   CREATININE 0.7 0.7 0.7   GLUCOSE 113* 103* 144*     Hepatic:   Recent Labs     08/08/22  0845   AST 20   ALT 18   BILITOT 0.2   ALKPHOS 131*         Objective:   Vitals: /88   Pulse 89   Temp 97.8 °F (36.6 °C) (Oral)   Resp 18   Ht 5' 1\" (1.549 m)   Wt 159 lb 13.3 oz (72.5 kg)   SpO2 98%   BMI 30.20 kg/m²   General appearance: alert and cooperative with exam, in no acute distress  HEENT: normocephalic, atraumatic,   Neck: supple, trachea midline  Lungs: clear to auscultation bilaterally, breathing comfortably on room air  Heart[de-identified] regular rate and rhythm, S1, S2 normal,  Abdomen: soft, non-tender; non distended, +bowel sounds  Extremities: extremities atraumatic, no cyanosis or edema  Neurologic: alert, oriented, follows commands, interactive    Assessment and Plan:     Hyponatremia  -studies point towards SIADH, CT lung with contrast showed no signs of lung cancer  -recommend urea 15g bid on dc  -discussed oral fluid restriction 2L and making sure to eat some type of protein daily  -needs continued age-appropriate cancer screening  -ok to dc per my stdpt                  Electronically signed by Margarita Keys DO on 8/9/2022 at 8:57 AM    [address],  [facility]  [place], [place] [address]  PHONE: [phone]  FAX: [phone]

## 2022-08-17 ENCOUNTER — TELEPHONE (OUTPATIENT)
Dept: NEUROLOGY | Age: 62
End: 2022-08-17

## 2022-08-17 ENCOUNTER — HOSPITAL ENCOUNTER (OUTPATIENT)
Age: 62
Discharge: HOME | End: 2022-08-17
Payer: MEDICARE

## 2022-08-17 LAB
ANION GAP SERPL CALCULATED.3IONS-SCNC: 12 MMOL/L (ref 4–16)
BUN BLDV-MCNC: 10 MG/DL (ref 6–23)
CALCIUM SERPL-MCNC: 9.3 MG/DL (ref 8.3–10.6)
CHLORIDE BLD-SCNC: 90 MMOL/L (ref 99–110)
CO2: 25 MMOL/L (ref 21–32)
CREAT SERPL-MCNC: 0.8 MG/DL (ref 0.6–1.1)
GFR AFRICAN AMERICAN: >60 ML/MIN/1.73M2
GFR NON-AFRICAN AMERICAN: >60 ML/MIN/1.73M2
GLUCOSE FASTING: 86 MG/DL (ref 70–99)
POTASSIUM SERPL-SCNC: 3.8 MMOL/L (ref 3.5–5.1)
SODIUM BLD-SCNC: 127 MMOL/L (ref 135–145)

## 2022-08-17 PROCEDURE — 36415 COLL VENOUS BLD VENIPUNCTURE: CPT

## 2022-08-17 PROCEDURE — 80048 BASIC METABOLIC PNL TOTAL CA: CPT

## 2022-08-17 NOTE — TELEPHONE ENCOUNTER
Pt called and states her kidney doctor Dr. Louis Gutierrez wants her to take 1 tablet (600 mg)of Aptiom at bedtime instead of 2 (1200). She wants to know if this is okay or if her other medications need to be adjusted. Please advise.

## 2022-08-18 ENCOUNTER — OFFICE VISIT (OUTPATIENT)
Dept: INTERNAL MEDICINE CLINIC | Age: 62
End: 2022-08-18
Payer: MEDICARE

## 2022-08-18 VITALS
BODY MASS INDEX: 31.72 KG/M2 | HEIGHT: 61 IN | HEART RATE: 66 BPM | SYSTOLIC BLOOD PRESSURE: 130 MMHG | RESPIRATION RATE: 18 BRPM | WEIGHT: 168 LBS | DIASTOLIC BLOOD PRESSURE: 80 MMHG | OXYGEN SATURATION: 100 %

## 2022-08-18 DIAGNOSIS — Z12.11 COLON CANCER SCREENING: ICD-10-CM

## 2022-08-18 DIAGNOSIS — Z12.4 CERVICAL CANCER SCREENING: ICD-10-CM

## 2022-08-18 DIAGNOSIS — E22.2 SIADH (SYNDROME OF INAPPROPRIATE ADH PRODUCTION) (HCC): Primary | ICD-10-CM

## 2022-08-18 DIAGNOSIS — Z12.31 ENCOUNTER FOR SCREENING MAMMOGRAM FOR BREAST CANCER: ICD-10-CM

## 2022-08-18 DIAGNOSIS — G40.319 INTRACTABLE GENERALIZED IDIOPATHIC EPILEPSY WITHOUT STATUS EPILEPTICUS (HCC): ICD-10-CM

## 2022-08-18 PROCEDURE — G8417 CALC BMI ABV UP PARAM F/U: HCPCS | Performed by: INTERNAL MEDICINE

## 2022-08-18 PROCEDURE — 1111F DSCHRG MED/CURRENT MED MERGE: CPT | Performed by: INTERNAL MEDICINE

## 2022-08-18 PROCEDURE — 1036F TOBACCO NON-USER: CPT | Performed by: INTERNAL MEDICINE

## 2022-08-18 PROCEDURE — 99214 OFFICE O/P EST MOD 30 MIN: CPT | Performed by: INTERNAL MEDICINE

## 2022-08-18 PROCEDURE — 3017F COLORECTAL CA SCREEN DOC REV: CPT | Performed by: INTERNAL MEDICINE

## 2022-08-18 PROCEDURE — G8427 DOCREV CUR MEDS BY ELIG CLIN: HCPCS | Performed by: INTERNAL MEDICINE

## 2022-08-18 RX ORDER — POTASSIUM CHLORIDE 750 MG/1
TABLET, FILM COATED, EXTENDED RELEASE ORAL
COMMUNITY
Start: 2022-05-23 | End: 2022-09-23

## 2022-08-18 RX ORDER — LEVETIRACETAM 500 MG/1
TABLET ORAL
COMMUNITY
Start: 2022-08-09 | End: 2022-08-18

## 2022-08-18 NOTE — PROGRESS NOTES
8/18/22    Génesis Banks  1960    Chief Complaint   Patient presents with    Other     Follow up        History of Present Illness:  Génesis Matthew is a 65 y.o. pleasant lady presenting today with a chief complaint of hyponatremia. She has a past medical history significant for:  HTN, on Olmesartan 40mg QD  HL (,  on 5/23/20220), not on statin  Prediabetes (HbA1C 6.1% on 5/23/2022), not on meds   HFpEF (G1DD, EF 55-60% on TTE 12/1/2020)  Epilepsy (s/p L anterior temporal lobectomy in 1996), on Aptiom 600mg QHS, Keppra 750mg BID  Depression, on Nortriptyline 50mg QHS   Anxiety, on Abilify 5mg QD   Insomnia, on Hydroxyzine 25-50mg QHS PRN   COPD, on Albuterol PRN, Breztri  Chronic hypoxemic respiratory failure, off 3L O2   Osteoporosis (DEXA 10/4/2021 with osteopenia), on Fosamax 70mg weekly  Vertigo  Vitamin D deficiency (level 31 on 3/9/2021), not on supplementation   RLS, on Ropinirole 0.5mg QHS   Urinary incontinence, on Oxybutynin-XL 10mg QD  Obesity (BMI 37)   S/p partial hysterectomy (cervix status unknown)  S/p L carpal tunnel release (7/30/2020)   Polysubstance abuse (marijuana, PCP, cocaine)   Former smoker (quit 2020)      # Patient was admitted Aug 2022 for symptomatic SIADH. Na was 111. CT chest neg for malignancy. She is s/p hypertonic saline and is now on sodium tabs. Also with K 2.5 and MARY Cr 1.2. Now not taking K-dur. Prior to this admission patient was having some electrolyte disturbances. She did not follow through with close follow up and labs. Is seeing Dr. Asia Thomas. Off HCTZ. Limiting fluid intake to < 2L per day. Patient is now having low Na again. Dr. Asia Thomas recommended lowering dose of Aptiom and she will be discussing with Neurology. # Patient had seizure in May 2020 and April 2020 ~ 6 weeks apart. In the ED on 5/29/2020 [facility] unremarkable. Basic labs with mild hyponatremia (Na 131). Had UTI s/p Rocephin once then PO Keflex on discharge.  UDS was positive for worsening exertional dyspnea. TTE at [facility] in 2019 unremarkable. Stress test unremarkable. PFT shows obstruction wo BD response. She is seeing Dr. Bryan Sheffield from Cardiology. She has COPD. Following with Pulm for BD. She was on 3L O2 but recently during hospitalization Aug 2022 this was discontinued. CT lung screening with 4mm LN being followed.    Sleep study with AHI 0.0       Health maintenance:   Health Maintenance Due   Topic Date Due    Hepatitis C screen  Never done    Shingles vaccine (1 of 2) Never done    COVID-19 Vaccine (3 - Booster for Moderna series) 09/15/2021    Low dose CT lung screening  01/15/2022    Pneumococcal 0-64 years Vaccine (2 - PCV) 06/09/2022    Annual Wellness Visit (AWV)  09/10/2022         Review of Systems:  Constitutional: no fevers, no chills, no night sweats, no weight loss, no weight gain, no fatigue   Pain assessment: no pain  Head: no headaches  Ears: no hearing loss, no tinnitus, no vertigo  Eyes: no blurry vision, no diplopia, no dryness, no itchiness  Mouth: no oral ulcers, no dry mouth, no sore throat  Nose: no nasal congestion, no epistaxis  Cardiac: no chest pain, no palpitations, no leg swelling, no orthopnea, no PND, no syncope  Pulmonary: no dyspnea, no cough, no wheezing, no hemoptysis  GI: no nausea, no vomiting, no diarrhea, no constipation, no abdominal pain, no hematochezia  : no dysuria, no frequency, no urgency, no hematuria, no frothy urine, no dyspareunia, no pelvic pain, no vaginal bleeding, no abnormal vaginal discharge  MSK: no arthralgias, no myalgias, no early morning stiffness, no Raynaud's   Neuro: no focal neurological deficits, seizures  Sleep: no snoring, no daytime somnolence  Psych: depression, no suicidal ideation      Physical Exam:  VITALS:   /80 (Site: Right Upper Arm, Position: Sitting, Cuff Size: Large Adult)   Pulse 66   Resp 18   Ht 5' 1\" (1.549 m)   Wt 168 lb (76.2 kg)   SpO2 100%   BMI 31.74 kg/m²     PHYSICAL EXAMINATION:  General: alert, awake, and oriented to time, place, person, and situation. Not in acute distress  Skin:  no suspicious rashes, no jaundice  Head: normocephalic/atraumatic  Eyes: anicteric sclera, well-injected conjunctiva. Pupils are equally round and reactive to light. Extraocular movements are intact  Nose: no septal deviation evident  Sinuses: no sinus tenderness  Ears: external ears normal  Neck: supple, no cervical lymphadenopathy, thyroid symmetric and not enlarged, no bruits  Heart: regular rate and rhythm, regular S1/S2, no S3/S4, no audible murmurs, no audible friction rub  Lungs: decreased breath sounds bilaterally   Abdomen: normal bowel sounds, soft abdomen, non-tender, no palpable masses  Extremities: no edema, warm, no cyanosis, no clubbing. Good capillary refill  MSK: no tenderness across spinous processes, full ROM in all 4 extremities. No joint swelling or tenderness  Peripheral vascular: 2+ pulses symmetric (radial)  Neuro: gait normal, CN II-XII intact, motor power 5/5 in all 4 extremities, sensation intact and symmetric    Labs   I have personally reviewed labs, and discussed pertinent findings with patient on this date 8/18/2022     Imaging   I have personally reviewed imaging, and discussed pertinent findings with patient on this date 8/18/2022     Other notes  I have personally reviewed other notes, and discussed pertinent findings with patient on this date 8/18/2022       Assessment/Plan:     1. SIADH (syndrome of inappropriate ADH production) ([facility] [address]  Likely multifactorial  CT chest unremarkable for lung mass  HCTZ has been discontinued through Dr. Saravanan Kirkpatrick (not on discharge. ..)  AED Aptiom has been adjusted (lower dose) however continues to be on same dose Keppra. Nephrology in contact with Neurology.  Encouraged patient to follow up on this as well as we need to make sure she does not have a breakthrough seizure  Limit fluid intake  Continue Na tabs  Will get age-appropriate cancer screening:   UTD on [facility] 10/2021    Will refer to GI for colonoscopy   Will get pap smear done next month through me    2. Encounter for screening mammogram for breast cancer  UTD on [facility] 10/2021    3. Cervical cancer screening  Pap smear next month through me    4. Colon cancer screening  - [facility] - Bella Lesches, MD, Gastroenterology, Agency    5. Intractable generalized idiopathic epilepsy without status epilepticus ([address]  AED Aptiom has been adjusted (lower dose) however continues to be on same dose Keppra. Nephrology in contact with Neurology. Encouraged patient to follow up on this as well as we need to make sure she does not have a breakthrough seizure      Care discussed with patient and questions answered. Patient verbalizes understanding and agrees with plan. Discussed with patient the importance of continuity of care. I encouraged patient to schedule next appointment within 1 month with me. Patient prefers to be reached by Phone call at [phone] for future medical correspondence. Encouraged to activate MyChart. I reviewed and reconciled the medications this visit. I reviewed and updated the past medical, surgical, social, and family history during this visit. After visit summary provided.        Apollo Cheatham MD  Internal Medicine  8/18/2022   12:16 PM

## 2022-08-22 ENCOUNTER — HOSPITAL ENCOUNTER (OUTPATIENT)
Age: 62
Discharge: HOME | End: 2022-08-22
Payer: MEDICARE

## 2022-08-22 LAB
ANION GAP SERPL CALCULATED.3IONS-SCNC: 9 MMOL/L (ref 4–16)
BUN BLDV-MCNC: 9 MG/DL (ref 6–23)
CALCIUM SERPL-MCNC: 9.5 MG/DL (ref 8.3–10.6)
CHLORIDE BLD-SCNC: 98 MMOL/L (ref 99–110)
CO2: 25 MMOL/L (ref 21–32)
CREAT SERPL-MCNC: 0.8 MG/DL (ref 0.6–1.1)
GFR AFRICAN AMERICAN: >60 ML/MIN/1.73M2
GFR NON-AFRICAN AMERICAN: >60 ML/MIN/1.73M2
GLUCOSE FASTING: 91 MG/DL (ref 70–99)
POTASSIUM SERPL-SCNC: 4.6 MMOL/L (ref 3.5–5.1)
SODIUM BLD-SCNC: 132 MMOL/L (ref 135–145)

## 2022-08-22 PROCEDURE — 36415 COLL VENOUS BLD VENIPUNCTURE: CPT

## 2022-08-22 PROCEDURE — 80048 BASIC METABOLIC PNL TOTAL CA: CPT

## 2022-08-23 NOTE — TELEPHONE ENCOUNTER
Called Dr. Bob Sandoval office to ask if he is okay with this change.  is going to call back with the doctors decision.

## 2022-08-29 ENCOUNTER — HOSPITAL ENCOUNTER (OUTPATIENT)
Age: 62
Discharge: HOME | End: 2022-08-29
Payer: MEDICARE

## 2022-08-29 ENCOUNTER — HOSPITAL ENCOUNTER (OUTPATIENT)
Dept: CARDIAC REHAB | Age: 62
Discharge: HOME | End: 2022-08-29
Payer: MEDICARE

## 2022-08-29 LAB
ANION GAP SERPL CALCULATED.3IONS-SCNC: 10 MMOL/L (ref 4–16)
BUN BLDV-MCNC: 7 MG/DL (ref 6–23)
CALCIUM SERPL-MCNC: 9.4 MG/DL (ref 8.3–10.6)
CHLORIDE BLD-SCNC: 95 MMOL/L (ref 99–110)
CO2: 24 MMOL/L (ref 21–32)
CREAT SERPL-MCNC: 0.8 MG/DL (ref 0.6–1.1)
GFR AFRICAN AMERICAN: >60 ML/MIN/1.73M2
GFR NON-AFRICAN AMERICAN: >60 ML/MIN/1.73M2
GLUCOSE FASTING: 95 MG/DL (ref 70–99)
POTASSIUM SERPL-SCNC: 4 MMOL/L (ref 3.5–5.1)
SODIUM BLD-SCNC: 129 MMOL/L (ref 135–145)

## 2022-08-29 PROCEDURE — 94060 EVALUATION OF WHEEZING: CPT

## 2022-08-29 PROCEDURE — 94727 GAS DIL/WSHOT DETER LNG VOL: CPT

## 2022-08-29 PROCEDURE — 94729 DIFFUSING CAPACITY: CPT

## 2022-08-29 PROCEDURE — 36415 COLL VENOUS BLD VENIPUNCTURE: CPT

## 2022-08-29 PROCEDURE — 94640 AIRWAY INHALATION TREATMENT: CPT

## 2022-08-29 PROCEDURE — 80048 BASIC METABOLIC PNL TOTAL CA: CPT

## 2022-08-29 NOTE — RESULT ENCOUNTER NOTE
TELL PT:  -SODIUM 129 FROM 132 FROM 127, STAYING STABLE OVERALL  -ARE YOU STILL ON SALT TABS?  -DID YOU MAKE SURE TO STOP THE OLMESARTAN-HCTZ COMBINATION PILL?  -HAVE YOU HAD ANY NEW MEDS RECENTLY?   THX

## 2022-08-31 DIAGNOSIS — G40.319 INTRACTABLE GENERALIZED IDIOPATHIC EPILEPSY WITHOUT STATUS EPILEPTICUS (HCC): Primary | ICD-10-CM

## 2022-09-01 RX ORDER — ESLICARBAZEPINE ACETATE 600 MG/1
TABLET ORAL
Qty: 60 TABLET | Refills: 4 | Status: SHIPPED | OUTPATIENT
Start: 2022-09-01 | End: 2022-09-23

## 2022-09-01 RX ORDER — ESLICARBAZEPINE ACETATE 600 MG/1
600 TABLET ORAL DAILY
Qty: 30 TABLET | Refills: 4 | Status: SHIPPED | OUTPATIENT
Start: 2022-09-01

## 2022-09-01 NOTE — TELEPHONE ENCOUNTER
Pt is to be taking 600 mg at bedtime.  Please send rx to [facility] pharmacy

## 2022-09-13 ENCOUNTER — HOSPITAL ENCOUNTER (OUTPATIENT)
Age: 62
Discharge: HOME | End: 2022-09-13
Payer: MEDICARE

## 2022-09-13 LAB
ANION GAP SERPL CALCULATED.3IONS-SCNC: 10 MMOL/L (ref 4–16)
BUN BLDV-MCNC: 7 MG/DL (ref 6–23)
CALCIUM SERPL-MCNC: 9.3 MG/DL (ref 8.3–10.6)
CHLORIDE BLD-SCNC: 98 MMOL/L (ref 99–110)
CO2: 24 MMOL/L (ref 21–32)
CREAT SERPL-MCNC: 0.7 MG/DL (ref 0.6–1.1)
GFR AFRICAN AMERICAN: >60 ML/MIN/1.73M2
GFR NON-AFRICAN AMERICAN: >60 ML/MIN/1.73M2
GLUCOSE FASTING: 93 MG/DL (ref 70–99)
POTASSIUM SERPL-SCNC: 4.2 MMOL/L (ref 3.5–5.1)
SODIUM BLD-SCNC: 132 MMOL/L (ref 135–145)

## 2022-09-13 PROCEDURE — 80048 BASIC METABOLIC PNL TOTAL CA: CPT

## 2022-09-13 PROCEDURE — 36415 COLL VENOUS BLD VENIPUNCTURE: CPT

## 2022-09-19 ENCOUNTER — TELEMEDICINE (OUTPATIENT)
Dept: INTERNAL MEDICINE CLINIC | Age: 62
End: 2022-09-19
Payer: MEDICARE

## 2022-09-19 ENCOUNTER — TELEPHONE (OUTPATIENT)
Dept: NEUROLOGY | Age: 62
End: 2022-09-19

## 2022-09-19 DIAGNOSIS — N32.81 OVERACTIVE BLADDER: ICD-10-CM

## 2022-09-19 DIAGNOSIS — E22.2 SIADH (SYNDROME OF INAPPROPRIATE ADH PRODUCTION) (HCC): Primary | ICD-10-CM

## 2022-09-19 DIAGNOSIS — G40.109 PARTIAL SYMPTOMATIC EPILEPSY WITH SIMPLE PARTIAL SEIZURES, NOT INTRACTABLE, WITHOUT STATUS EPILEPTICUS (HCC): ICD-10-CM

## 2022-09-19 DIAGNOSIS — G40.319 INTRACTABLE GENERALIZED IDIOPATHIC EPILEPSY WITHOUT STATUS EPILEPTICUS (HCC): ICD-10-CM

## 2022-09-19 PROCEDURE — 99442 PR PHYS/QHP TELEPHONE EVALUATION 11-20 MIN: CPT | Performed by: INTERNAL MEDICINE

## 2022-09-19 NOTE — PROGRESS NOTES
Patient is not on vitamin D supplements despite low vitamin D level years ago. Taking Fosamax for OP. DEXA not in chart. # Patient is s/p L carpal tunnel release. Patient with hyponatremia at the time. Chronic due to AEDs. # Has difficulty losing weight. She is gaining weight. Wanting to be placed on weight loss pills. She is seeing [facility]. She is on non-surgical diet program.   # Was complaining of worsening exertional dyspnea. TTE at [facility] in 2019 unremarkable. Stress test unremarkable. PFT shows obstruction wo BD response. She is seeing Dr. Jose Marsh from Cardiology. She has COPD. Following with Pulm for BD. She was on 3L O2 but recently during hospitalization Aug 2022 this was discontinued. CT lung screening with 4mm LN being followed.    Sleep study with AHI 0.0       Health maintenance:   Health Maintenance Due   Topic Date Due    Hepatitis C screen  Never done    Shingles vaccine (1 of 2) Never done    COVID-19 Vaccine (3 - Booster for Moderna series) 09/15/2021    Low dose CT lung screening  01/15/2022    Pneumococcal 0-64 years Vaccine (2 - PCV) 06/09/2022    Flu vaccine (1) 09/01/2022    Annual Wellness Visit (AWV)  09/10/2022         Review of Systems:  Constitutional: no fevers, no chills, no night sweats, no weight loss, no weight gain, no fatigue   Pain assessment: no pain  Head: no headaches  Ears: no hearing loss, no tinnitus, no vertigo  Eyes: no blurry vision, no diplopia, no dryness, no itchiness  Mouth: no oral ulcers, no dry mouth, no sore throat  Nose: no nasal congestion, no epistaxis  Cardiac: no chest pain, no palpitations, no leg swelling, no orthopnea, no PND, no syncope  Pulmonary: no dyspnea, no cough, no wheezing, no hemoptysis  GI: no nausea, no vomiting, no diarrhea, no constipation, no abdominal pain, no hematochezia  : no dysuria, no frequency, no urgency, no hematuria, no frothy urine, no dyspareunia, no pelvic pain, no vaginal bleeding, no abnormal vaginal discharge  MSK: no arthralgias, no myalgias, no early morning stiffness, no Raynaud's   Neuro: no focal neurological deficits, seizures  Sleep: no snoring, no daytime somnolence  Psych: depression, no suicidal ideation      Physical Exam:  Due to this being a TeleHealth encounter, evaluation of the following organ systems is limited: Vitals/Constitutional/EENT/Resp/CV/GI//MSK/Neuro/Skin/Heme-Lymph-Imm. Labs   I have personally reviewed labs, and discussed pertinent findings with patient on this date 9/19/2022     Imaging   I have personally reviewed imaging, and discussed pertinent findings with patient on this date 9/19/2022     Other notes  I have personally reviewed other notes, and discussed pertinent findings with patient on this date 9/19/2022       Assessment/Plan:     1. SIADH (syndrome of inappropriate ADH production) ([facility]  Likely multifactorial  CT chest unremarkable for lung mass  HCTZ has been discontinued through Dr. Skip Sidhu as outpatient (discharge summary from hospital was not discontinued)   AEDs adjusted. Dr. Skip Sidhu in contact with Neurology. Encouraged patient to follow up on this as well as we need to make sure she does not have a breakthrough seizure  Limit fluid intake  Continue Na tabs  Will get age-appropriate cancer screening:              UTD on [facility] 10/2021                          Will refer to GI for colonoscopy              Will get pap smear done next week through me. 2. Intractable generalized idiopathic epilepsy without status epilepticus ([facility] [address]  Confirmed with patient med dosages: Aptiom 800mg QHS And Keppra 750mg BID per Neurology tel enc today     3. Overactive bladder  DC Oxybutynin per patient request      Care discussed with patient and questions answered. Patient verbalizes understanding and agrees with plan. Discussed with patient the importance of continuity of care. I encouraged patient to schedule next appointment within 1 week for pap smear (already scheduled) with me.      I reviewed and reconciled the medications this visit. I reviewed and updated the past medical, surgical, social, and family history during this visit. I affirm this is a Patient Initiated Episode with an Established Patient who has not had a related appointment within my department in the past 7 days or scheduled within the next 24 hours. Total Time: minutes: 11-20 minutes    Note: not billable if this call serves to triage the patient into an appointment for the relevant concern      Pursuant to the emergency declaration under the [address] [place] [address], [facility] waiver authority and the [employer] Act, this Virtual Visit was conducted, with patient's consent, to reduce the patient's risk of exposure to COVID-19 and provide continuity of care for an established patient. Services were provided through a telephone synchronous discussion virtually to substitute for in-person clinic visit.       Kerri Vargas MD  Internal Medicine  9/19/2022   12:15 PM

## 2022-09-19 NOTE — TELEPHONE ENCOUNTER
Notified pt of info per Rosaura Hinojosa per previous msg and she voiced understanding. She stated she doesn't have rx for keppra 750mg bid and Aptiom 800mg qhs sent to [facility] . Rx pending approval. Scheduled pt for f/u ov 10/11/22.

## 2022-09-19 NOTE — TELEPHONE ENCOUNTER
Received call from pt requesting appt due to having seizures almost daily. She stated that she is taking keppra 500mg bid and Aptiom 600mg qhs. She stated she thought she was supposed to be on keppra 750mg bid but she was given 500mg by the pharmacy. She denies biting tongue, incontinence of bowel or bladder, or thrashing when episodes occur stating she was told they are partial seizures where she stares and is unable to talk. Per 8/17 telephone msg pt was to take Aptiom 800mg qhs and keppra 750mg bid. Is it ok to schedule pt in soon to discuss medication? Please advise.

## 2022-09-20 RX ORDER — LEVETIRACETAM 750 MG/1
750 TABLET ORAL 2 TIMES DAILY
Qty: 30 TABLET | Refills: 1 | Status: SHIPPED | OUTPATIENT
Start: 2022-09-20 | End: 2022-10-05 | Stop reason: SDUPTHER

## 2022-09-20 NOTE — TELEPHONE ENCOUNTER
Received call from Chelle Thomason with [address] requesting confirmation that pt is supposed to be taking Keppra 750mg bid and Aptiom 800mg qhs. Verified.

## 2022-09-23 ENCOUNTER — OFFICE VISIT (OUTPATIENT)
Dept: CARDIOLOGY CLINIC | Age: 62
End: 2022-09-23
Payer: MEDICARE

## 2022-09-23 ENCOUNTER — HOSPITAL ENCOUNTER (OUTPATIENT)
Age: 62
Discharge: HOME | End: 2022-09-23
Payer: MEDICARE

## 2022-09-23 VITALS
BODY MASS INDEX: 31.72 KG/M2 | HEIGHT: 61 IN | OXYGEN SATURATION: 100 % | DIASTOLIC BLOOD PRESSURE: 106 MMHG | SYSTOLIC BLOOD PRESSURE: 150 MMHG | WEIGHT: 168 LBS | HEART RATE: 78 BPM

## 2022-09-23 DIAGNOSIS — I73.9 PAD (PERIPHERAL ARTERY DISEASE) (HCC): ICD-10-CM

## 2022-09-23 DIAGNOSIS — I73.9 INTERMITTENT CLAUDICATION (HCC): Primary | ICD-10-CM

## 2022-09-23 DIAGNOSIS — R06.02 SHORTNESS OF BREATH: ICD-10-CM

## 2022-09-23 DIAGNOSIS — I10 ESSENTIAL HYPERTENSION: ICD-10-CM

## 2022-09-23 DIAGNOSIS — E66.09 CLASS 1 OBESITY DUE TO EXCESS CALORIES WITHOUT SERIOUS COMORBIDITY WITH BODY MASS INDEX (BMI) OF 31.0 TO 31.9 IN ADULT: ICD-10-CM

## 2022-09-23 DIAGNOSIS — Z72.0 NICOTINE ABUSE: ICD-10-CM

## 2022-09-23 LAB
ANION GAP SERPL CALCULATED.3IONS-SCNC: 10 MMOL/L (ref 4–16)
BUN BLDV-MCNC: 8 MG/DL (ref 6–23)
CALCIUM SERPL-MCNC: 9.6 MG/DL (ref 8.3–10.6)
CHLORIDE BLD-SCNC: 94 MMOL/L (ref 99–110)
CO2: 25 MMOL/L (ref 21–32)
CREAT SERPL-MCNC: 0.6 MG/DL (ref 0.6–1.1)
GFR AFRICAN AMERICAN: >60 ML/MIN/1.73M2
GFR NON-AFRICAN AMERICAN: >60 ML/MIN/1.73M2
GLUCOSE BLD-MCNC: 96 MG/DL (ref 70–99)
POTASSIUM SERPL-SCNC: 4.4 MMOL/L (ref 3.5–5.1)
SODIUM BLD-SCNC: 129 MMOL/L (ref 135–145)

## 2022-09-23 PROCEDURE — G8417 CALC BMI ABV UP PARAM F/U: HCPCS | Performed by: INTERNAL MEDICINE

## 2022-09-23 PROCEDURE — 80048 BASIC METABOLIC PNL TOTAL CA: CPT

## 2022-09-23 PROCEDURE — 36415 COLL VENOUS BLD VENIPUNCTURE: CPT

## 2022-09-23 PROCEDURE — 1036F TOBACCO NON-USER: CPT | Performed by: INTERNAL MEDICINE

## 2022-09-23 PROCEDURE — 99214 OFFICE O/P EST MOD 30 MIN: CPT | Performed by: INTERNAL MEDICINE

## 2022-09-23 PROCEDURE — 3017F COLORECTAL CA SCREEN DOC REV: CPT | Performed by: INTERNAL MEDICINE

## 2022-09-23 PROCEDURE — G8427 DOCREV CUR MEDS BY ELIG CLIN: HCPCS | Performed by: INTERNAL MEDICINE

## 2022-09-23 RX ORDER — OXYBUTYNIN CHLORIDE 10 MG/1
10 TABLET, EXTENDED RELEASE ORAL DAILY
COMMUNITY

## 2022-09-23 RX ORDER — AMLODIPINE BESYLATE 5 MG/1
5 TABLET ORAL DAILY
Qty: 30 TABLET | Refills: 3 | Status: SHIPPED | OUTPATIENT
Start: 2022-09-23

## 2022-09-23 NOTE — PROGRESS NOTES
Gio Malik MD                                  CARDIOLOGY  NOTE       Chief Complaint:    Chief Complaint   Patient presents with    1 Year Follow Up    Edema     Feet bilat. ...worse on left       Recent hospitalization for hyponatremia. Off of hydrochlorothiazide    Patient continues to have bilateral lower calf muscle pains/intermittent claudication with ambulation. MPI 11/30/2020     Summary    [facility] . No infarct or ischemia noted. Normal tracer uptake in all segments of myocardium on stress and rest    images. Mild breast tissue attenuation noted    No infarct or ischemia noted. Normal EF 75 % with normal ventricular contractility. Recommendation    Medical Management    Follow up     Echocardiogram 12/1/2020     Summary   Left ventricular systolic function is normal with an ejection fraction of   55-60%. Grade I diastolic dysfunction. No significant valvular disease. Normal pulmonary artery pressure with a RVSP of 30mmHg   No evidence of pericardial effusion. JERMAINE 12/1/2020      Procedure       Type of Study:        Extremities Arteries:Lower Extremities Arterial Duplex, VL LOWER EXTREMITY    ARTERIES BILATERAL, Lower Arterial Plethysmography, VL ANKLE ART BRACHIAL    INDICES EXTREMITY BILATERAL. Indications for Study:Claudication. Conclusions        Summary        No evidence of significant occlusive arterial disease. Normal bilateral lower extremity ankle brachial indices. Recent HPI:     Génesis is a 65y.o. year old female who presents to establish care for shortness of breath with exertion. Patient has a history of hypertension, diet-controlled hyperlipidemia, chronic nicotine abuse, recently diagnosed COPD, chronic dizziness. As per patient she has been feeling fatigued and short of breath for the past several months. Patient denies any dependent edema, denies any PND or orthopnea.   She mentions that she can walk up to half a block before she gets short of breath. She can hardly ambulate 1 flight of stairs before she has to catch breath. She denies any sick contacts fever chills. Patient also complains of bilateral lower extremity intermittent claudication with ambulation. He denies any history of open ulcers. Patient denies any chest pain  No Palpitations     EK2020 sinus rhythm without any ST-T changes. Current Outpatient Medications   Medication Sig Dispense Refill    oxybutynin (DITROPAN-XL) 10 MG extended release tablet Take 10 mg by mouth daily      eslicarbazepine acetate (APTIOM) 800 MG tablet Take 800 mg by mouth daily (Patient taking differently: Take 800 mg by mouth daily Finishing current 600mg before switching) 30 tablet 1    levETIRAcetam (KEPPRA) 750 MG tablet Take 1 tablet by mouth 2 times daily 30 tablet 1    eslicarbazepine (APTIOM) 600 MG TABS tablet Take 1 tablet by mouth daily (Patient taking differently: Take 600 mg by mouth daily Will be changing to 800mg) 30 tablet 4    sodium chloride 1 g tablet Take 1 tablet by mouth in the morning and at bedtime 60 tablet 3    olmesartan (BENICAR) 40 MG tablet Take 1 tablet by mouth daily 30 tablet 2    omeprazole (PRILOSEC) 40 MG delayed release capsule TAKE ONE CAPSULE BY MOUTH EVERY DAY BEFORE BREAKFAST 30 capsule 1    ARIPiprazole (ABILIFY) 5 MG tablet TAKE ONE TABLET BY MOUTH EVERY DAY 30 tablet 5    magnesium oxide (MAG-OX) 400 MG tablet TAKE ONE TABLET BY MOUTH EVERY DAY 30 tablet 3    alendronate (FOSAMAX) 70 MG tablet Take 1 tablet by mouth once a week 12 tablet 1    nortriptyline (PAMELOR) 25 MG capsule Take 2 capsules by mouth nightly 60 capsule 5    rOPINIRole (REQUIP) 0.5 MG tablet Take 1 tablet by mouth in the morning and at bedtime 60 tablet 5    amLODIPine (NORVASC) 5 MG tablet Take 1 tablet by mouth daily 30 tablet 3     No current facility-administered medications for this visit.        Allergies:     Varenicline and Chantix [varenicline tartrate]    Patient History:    Past Medical History:   Diagnosis Date    COPD (chronic obstructive pulmonary disease) ([facility] [address]     Epilepsy ([facility] [address]     Full dentures     upper plate only    H/O dizziness     \"take Meclazine at least twice a day for this\"    H/O Doppler JERMAINE's ultrasound 2020     No evidence of significant occlusive arterial disease of lower EXT. H/O echocardiogram 2020    EF 55-60%, Normal study. History of drug abuse ([facility] [address]     History of nuclear stress test 2020    Normal study.     History of UTI     \"last one May 2020\"    Hypertension     \"the top # use to be over 200 \"- on medication since \"    Seizures ([facility] [address]     hx of epilepsy- last seizure week 2020- hx grand mal seizures    Wears glasses      Past Surgical History:   Procedure Laterality Date    BRAIN SURGERY      s/p L anterior temporal lobectomy in \"did surgery to try an treat the seizures\"    CYSTOSCOPY N/A 10/14/2021    with a bladder biopsy-Dr. Naina Ayala in [facility] 10/14/2021    CYSTOSCOPY BLADDER BIOPSY performed by Yanique Mooney MD at [address] ([facility])      \"not sure if they took the ovaries\"    TUBAL LIGATION      WRIST SURGERY Left 2020    LEFT WRIST TENDON RELEASE performed by Salvador Mathews DO at [facility] History   Problem Relation Age of Onset    Heart Disease Father         cabg    Breast Cancer Maternal Cousin      Social History     Tobacco Use    Smoking status: Former     Packs/day: 1.50     Years: 40.00     Pack years: 60.00     Types: Cigarettes     Quit date: 2020     Years since quittin.1    Smokeless tobacco: Never   Substance Use Topics    Alcohol use: No     Comment: per pt on 2020\"quit drinking age 40- use to drink on weekends\"        Review of Systems:     Constitutional:  No Fever or Weight Loss + intermittent claudication  Eyes: No Decreased Vision  ENT: No Headaches, Hearing Loss or Vertigo  Cardiovascular: No Chest Pain,  + Shortness of breath, No Palpitations. No Edema   Respiratory: No cough or wheezing . No Respiratory distress   Gastrointestinal: No abdominal pain, appetite loss, blood in stools, constipation, diarrhea or heartburn  Genitourinary: No dysuria, trouble voiding, or hematuria  Musculoskeletal:  denies any new  joint aches , or pain   Integumentary: No rash or pruritis  Neurological: No TIA or stroke symptoms  Psychiatric: No anxiety or depression  Endocrine: No malaise, fatigue or temperature intolerance  Hematologic/Lymphatic: No bleeding problems, blood clots or swollen lymph nodes  Allergic/Immunologic: No nasal congestion or hives        Objective:      Physical Exam:    BP (!) 150/106 (Site: Left Upper Arm, Position: Sitting, Cuff Size: Medium Adult)   Pulse 78   Ht 5' 1\" (1.549 m)   Wt 168 lb (76.2 kg)   SpO2 100%   BMI 31.74 kg/m²   Wt Readings from Last 3 Encounters:   09/23/22 168 lb (76.2 kg)   08/18/22 168 lb (76.2 kg)   08/17/22 168 lb (76.2 kg)     Body mass index is 31.74 kg/m². Vitals:    09/23/22 1036   BP: (!) 150/106   Pulse: 78   SpO2: 100%        General Appearance and Constitutional: Conversant, Well developed, Well nourished, No acute distress, Non-toxic appearance. HEENT:  Normocephalic, Atraumatic, Bilateral external ears normal, Oropharynx moist, No oral exudates,   Nose normal.   Neck- Normal range of motion, No tenderness, Supple  Eyes:  Conjunctiva normal, No discharge. Respiratory:  Normal breath sounds, No respiratory distress, No wheezing, No Rales, No Ronchi. No chest tenderness. Cardiovascular: S1-S2, no added heart sounds, No Mumurs appreciated. No gallops, rubs. No Pedal Edema   GI:  Bowel sounds normal, Soft, No tenderness,  :  No costovertebral angle tenderness   Musculoskeletal:  No gross deformities.  Back- No tenderness  Integument:  Well hydrated, no rash   Lymphatic:  No lymphadenopathy noted   Neurologic:  Alert & oriented x 3, Normal motor function, normal sensory function, no focal deficits noted   Psychiatric:  Speech and behavior appropriate       Medical decision making and Data review:    DATA:    Lab Results   Component Value Date    TROPONINT <0.010 08/05/2022     BNP:    Lab Results   Component Value Date    PROBNP 130.1 08/06/2022     PT/INR:  No results found for: PTINR  Lab Results   Component Value Date    LABA1C 6.1 05/23/2022    LABA1C 5.7 03/09/2021     Lab Results   Component Value Date    CHOL 196 04/04/2013    TRIG 126 04/04/2013    HDL 73 05/23/2022    LDLCALC 119 (H) 05/23/2022     Lab Results   Component Value Date    WBC 6.2 08/08/2022    HGB 10.3 (L) 08/08/2022    HCT 29.7 (L) 08/08/2022    MCV 83.2 08/08/2022     08/08/2022     TSH:   Lab Results   Component Value Date    TSH 1.70 11/18/2020     Lab Results   Component Value Date    AST 20 08/08/2022    ALT 18 08/08/2022    BILIDIR 0.2 08/08/2022    BILITOT 0.2 08/08/2022    ALKPHOS 131 (H) 08/08/2022         All labs, medications and tests reviewed by myself including data and history from outside source , patient and available family . 1. Intermittent claudication ([address]    2. Essential hypertension    3. Shortness of breath    4. Nicotine abuse    5. Class 1 obesity due to excess calories without serious comorbidity with body mass index (BMI) of 31.0 to 31.9 in adult    6. PAD (peripheral artery disease) (HCC)           Impression and Plan:      Shortness of breath: Resolved    Patient was previously on oxygen for presumed COPD. Off of oxygen. Pulm medicine follow-up  Echo and Stress MPI within normal limits. Essential hypertension: Blood pressures poorly  controlled. Patient recently hospitalized for hyponatremia. Hydrochlorothiazide was discontinued. Continue with olmesartan  Start patient on Norvasc 5 mg p.o. daily  Blood pressure in the office today 150/106    Intermittent claudication of bilateral lower extremities. ABIs Normal last year. Patient continues to have symptomatic calf muscle pain with ambulation. Cont ASA  Former smoker  Obtain CTA abdomen/runoff      history of smoking: Patient has quit smoking      Now using marijuana . counseled against use. Obesity with BMI of 31.74. Counseled about low-salt low-fat diet and exercise as tolerated    No follow-ups on file. Counseled extensively and medication compliance urged. We discussed that for the  prevention of ASCVD our  goal is aggressive risk modification. Patient is encouraged to exercise even a brisk walk for 30 minutes  at least 3 to 4 times a week   Various goals were discussed and questions answered. Continue current medications. Appropriate prescriptions are addressed and refills ordered. Questions answered and patient verbalizes understanding. Call for any problems, questions, or concerns.

## 2022-09-23 NOTE — PATIENT INSTRUCTIONS
Please be informed that if you contact our office outside of normal business hours the physician on call cannot help with any scheduling or rescheduling issues, procedure instruction questions or any type of medication issue. We advise you for any urgent/emergency that you go to the nearest emergency room! PLEASE CALL OUR OFFICE DURING NORMAL BUSINESS HOURS    Monday - Friday   8 am to 5 pm    [place]: [address]: [phone]    [place]:  [phone]    **It is YOUR responsibilty to bring medication bottles and/or updated medication list to [address] [place] [address].  This will allow us to better serve you and all your healthcare needs**

## 2022-09-23 NOTE — RESULT ENCOUNTER NOTE
TELL PT:  -SODIUM STABLE  FROM 132, HAS BEEN FLUCTATING B/W 129-132  -IF POSSIBLE TRY TO DO YOUR LABS IN THE BEGIINNING OF THE WEEK  - NEXT TIME YOU GO TO [facility], PLEASE STOP BY THEIR PHARMACY AND ASK THEM IF THEY HAVE UREA 15G, ORAL. OVER THE COUNTER AND LET ME KNOW    [address]

## 2022-09-28 ENCOUNTER — HOSPITAL ENCOUNTER (OUTPATIENT)
Age: 62
Discharge: HOME | End: 2022-09-28
Payer: MEDICARE

## 2022-09-28 ENCOUNTER — OFFICE VISIT (OUTPATIENT)
Dept: INTERNAL MEDICINE CLINIC | Age: 62
End: 2022-09-28
Payer: MEDICARE

## 2022-09-28 VITALS
OXYGEN SATURATION: 99 % | RESPIRATION RATE: 18 BRPM | BODY MASS INDEX: 29.45 KG/M2 | WEIGHT: 156 LBS | SYSTOLIC BLOOD PRESSURE: 134 MMHG | HEIGHT: 61 IN | DIASTOLIC BLOOD PRESSURE: 82 MMHG | HEART RATE: 91 BPM

## 2022-09-28 DIAGNOSIS — Z12.4 CERVICAL CANCER SCREENING: Primary | ICD-10-CM

## 2022-09-28 LAB
ANION GAP SERPL CALCULATED.3IONS-SCNC: 12 MMOL/L (ref 4–16)
BUN BLDV-MCNC: 5 MG/DL (ref 6–23)
CALCIUM SERPL-MCNC: 9.3 MG/DL (ref 8.3–10.6)
CHLORIDE BLD-SCNC: 91 MMOL/L (ref 99–110)
CO2: 23 MMOL/L (ref 21–32)
CREAT SERPL-MCNC: 0.6 MG/DL (ref 0.6–1.1)
GFR AFRICAN AMERICAN: >60 ML/MIN/1.73M2
GFR NON-AFRICAN AMERICAN: >60 ML/MIN/1.73M2
GLUCOSE FASTING: 98 MG/DL (ref 70–99)
POTASSIUM SERPL-SCNC: 3.8 MMOL/L (ref 3.5–5.1)
SODIUM BLD-SCNC: 126 MMOL/L (ref 135–145)

## 2022-09-28 PROCEDURE — 99396 PREV VISIT EST AGE 40-64: CPT | Performed by: INTERNAL MEDICINE

## 2022-09-28 PROCEDURE — 36415 COLL VENOUS BLD VENIPUNCTURE: CPT

## 2022-09-28 PROCEDURE — 80048 BASIC METABOLIC PNL TOTAL CA: CPT

## 2022-09-28 RX ORDER — LEVETIRACETAM 500 MG/1
TABLET ORAL
COMMUNITY
Start: 2022-09-06 | End: 2022-09-28

## 2022-09-28 SDOH — ECONOMIC STABILITY: FOOD INSECURITY: WITHIN THE PAST 12 MONTHS, YOU WORRIED THAT YOUR FOOD WOULD RUN OUT BEFORE YOU GOT MONEY TO BUY MORE.: NEVER TRUE

## 2022-09-28 SDOH — ECONOMIC STABILITY: FOOD INSECURITY: WITHIN THE PAST 12 MONTHS, THE FOOD YOU BOUGHT JUST DIDN'T LAST AND YOU DIDN'T HAVE MONEY TO GET MORE.: NEVER TRUE

## 2022-09-28 ASSESSMENT — SOCIAL DETERMINANTS OF HEALTH (SDOH): HOW HARD IS IT FOR YOU TO PAY FOR THE VERY BASICS LIKE FOOD, HOUSING, MEDICAL CARE, AND HEATING?: NOT VERY HARD

## 2022-09-28 NOTE — RESULT ENCOUNTER NOTE
TELL PT;  -SODIUM 126  -HAVE YOU BEEN TAKING THE SALT TABS?  -DID YOU GET TO ASK YOU PHARMACY ABUT THE UREA OVER THE COUNTER?

## 2022-09-28 NOTE — PROGRESS NOTES
9/28/22    Génesis Banks  1960    Chief Complaint   Patient presents with    Gynecologic Exam       History of Present Illness:  Melody L Candance Piano is a 65 y.o. pleasant lady presenting today for pap smear and pelvic examination.   She has a past medical history significant for:  HTN, on Olmesartan 40mg QD, Amlodipine 5mg QD   HL (,  on 5/23/20220), not on statin  Prediabetes (HbA1C 6.1% on 5/23/2022), not on meds   HFpEF (G1DD, EF 55-60% on TTE 12/1/2020)  Epilepsy (s/p L anterior temporal lobectomy in 1996), on Aptiom 800mg QHS, Keppra 750mg BID  Depression, on Nortriptyline 50mg QHS   Anxiety, on Abilify 5mg QD   Insomnia, on Hydroxyzine 25-50mg QHS PRN   GERD, on Prilosec 40mg QD   COPD, on Albuterol PRN, Breztri  Chronic hypoxemic respiratory failure, off 3L O2   Osteoporosis (DEXA 10/4/2021 with osteopenia), on Fosamax 70mg weekly  Vertigo  Vitamin D deficiency (level 31 on 3/9/2021), not on supplementation   RLS, on Ropinirole 0.5mg QHS   Urinary incontinence/overactive bladder, on Oxybutynin-XL 10mg QD  Obesity (BMI 31)   S/p partial hysterectomy (cervix status unknown)  S/p L carpal tunnel release (7/30/2020)   Polysubstance abuse (marijuana, PCP, cocaine)   Former smoker (quit 2020)     Gynecologic History  Patient's last menstrual period was 40's (surgical menopause)  Has some vaginal dryness       Health maintenance:   Health Maintenance Due   Topic Date Due    Hepatitis C screen  Never done    Shingles vaccine (1 of 2) Never done    COVID-19 Vaccine (3 - Booster for Moderna series) 09/15/2021    Low dose CT lung screening  01/15/2022    Flu vaccine (1) 08/01/2022    Annual Wellness Visit (AWV)  09/10/2022         Review of Systems:  : overactive bladder      Physical Exam:  VITALS:   /82 (Site: Right Upper Arm, Position: Sitting, Cuff Size: Large Adult)   Pulse 91   Resp 18   Ht 5' 1\" (1.549 m)   Wt 156 lb (70.8 kg)   SpO2 99%   BMI 29.48 kg/m²     PHYSICAL EXAMINATION:  General: alert, awake, and oriented to time, place, person, and situation. Not in acute distress   Pelvic exam (with female chaperone present in the room during examination): normal external genitalia, vulva, vagina, cervix, uterus, and adnexa. No rash or lesions noted on the right labia. No rash or lesions noted on the left labia. No cervical motion tenderness, discharge, or friability. No mass, tenderness, or fullness appreciated in right adnexum. No masses, tenderness, or fullness appreciated in left adnexum. No right inguinal adenopathy present. No left inguinal adenopathy present. Specimens obtained for pap smear. Patient tolerated exam well. Labs   I have personally reviewed labs, and discussed pertinent findings with patient     Imaging   I have personally reviewed imaging, and discussed pertinent findings with patient    Other notes  I have personally reviewed other notes, and discussed pertinent findings with patient      Assessment/Plan:     1. Cervical cancer screening  - PAP SMEAR      Care discussed with patient and questions answered. Patient verbalizes understanding and agrees with plan. Discussed with patient the importance of continuity of care. I encouraged patient to schedule next appointment within 3 months with me. Patient prefers to be reached by Phone call at [phone] for future medical correspondence. Encouraged to activate [employer]. I reviewed and reconciled the medications this visit. I reviewed and updated the past medical, surgical, social, and family history during this visit. After visit summary provided.        Sheldon Carroll MD  Internal Medicine  9/28/2022   1:03 PM

## 2022-09-29 RX ORDER — SODIUM CHLORIDE 1000 MG
1 TABLET, SOLUBLE MISCELLANEOUS 3 TIMES DAILY
Qty: 90 TABLET | Refills: 1 | Status: SHIPPED | OUTPATIENT
Start: 2022-09-29 | End: 2022-10-04 | Stop reason: SDUPTHER

## 2022-10-04 DIAGNOSIS — G40.109 PARTIAL SYMPTOMATIC EPILEPSY WITH SIMPLE PARTIAL SEIZURES, NOT INTRACTABLE, WITHOUT STATUS EPILEPTICUS (HCC): ICD-10-CM

## 2022-10-04 DIAGNOSIS — R12 HEARTBURN: ICD-10-CM

## 2022-10-04 LAB
HPV COMMENT: NORMAL
HPV TYPE 16: NOT DETECTED
HPV TYPE 18: NOT DETECTED
HPVOH (OTHER TYPES): NOT DETECTED

## 2022-10-04 RX ORDER — OMEPRAZOLE 40 MG/1
CAPSULE, DELAYED RELEASE ORAL
Qty: 28 CAPSULE | Refills: 1 | Status: SHIPPED | OUTPATIENT
Start: 2022-10-04

## 2022-10-04 RX ORDER — SODIUM CHLORIDE 1000 MG
2 TABLET, SOLUBLE MISCELLANEOUS 3 TIMES DAILY
Qty: 90 TABLET | Refills: 1
Start: 2022-10-04 | End: 2022-10-04 | Stop reason: SDUPTHER

## 2022-10-04 RX ORDER — TOLVAPTAN 15 MG/1
15 TABLET ORAL DAILY
Qty: 30 TABLET | Refills: 1 | Status: SHIPPED | OUTPATIENT
Start: 2022-10-04 | End: 2022-10-14 | Stop reason: SDUPTHER

## 2022-10-04 NOTE — TELEPHONE ENCOUNTER
Requested Prescriptions     Pending Prescriptions Disp Refills    levETIRAcetam (KEPPRA) 750 MG tablet 30 tablet 1     Sig: Take 1 tablet by mouth 2 times daily

## 2022-10-05 ENCOUNTER — HOSPITAL ENCOUNTER (OUTPATIENT)
Age: 62
Discharge: HOME | End: 2022-10-05
Payer: MEDICARE

## 2022-10-05 ENCOUNTER — HOSPITAL ENCOUNTER (OUTPATIENT)
Dept: MAMMOGRAPHY | Age: 62
Discharge: HOME | End: 2022-10-05
Payer: MEDICARE

## 2022-10-05 DIAGNOSIS — Z12.31 SCREENING MAMMOGRAM, ENCOUNTER FOR: ICD-10-CM

## 2022-10-05 LAB
ALBUMIN SERPL-MCNC: 4.5 GM/DL (ref 3.4–5)
ALP BLD-CCNC: 121 IU/L (ref 40–129)
ALT SERPL-CCNC: 11 U/L (ref 10–40)
ANION GAP SERPL CALCULATED.3IONS-SCNC: 11 MMOL/L (ref 4–16)
AST SERPL-CCNC: 14 IU/L (ref 15–37)
BASOPHILS ABSOLUTE: 0.1 K/CU MM
BASOPHILS RELATIVE PERCENT: 1.1 % (ref 0–1)
BILIRUB SERPL-MCNC: 0.2 MG/DL (ref 0–1)
BUN BLDV-MCNC: 6 MG/DL (ref 6–23)
CALCIUM SERPL-MCNC: 9.6 MG/DL (ref 8.3–10.6)
CHLORIDE BLD-SCNC: 94 MMOL/L (ref 99–110)
CO2: 26 MMOL/L (ref 21–32)
CREAT SERPL-MCNC: 0.6 MG/DL (ref 0.6–1.1)
DIFFERENTIAL TYPE: ABNORMAL
EOSINOPHILS ABSOLUTE: 0.1 K/CU MM
EOSINOPHILS RELATIVE PERCENT: 1.4 % (ref 0–3)
FERRITIN: 68 NG/ML (ref 15–150)
FOLATE: 6.2 NG/ML (ref 3.1–17.5)
GFR AFRICAN AMERICAN: >60 ML/MIN/1.73M2
GFR NON-AFRICAN AMERICAN: >60 ML/MIN/1.73M2
GLUCOSE FASTING: 99 MG/DL (ref 70–99)
HCT VFR BLD CALC: 37.9 % (ref 37–47)
HEMOGLOBIN: 12.8 GM/DL (ref 12.5–16)
IMMATURE NEUTROPHIL %: 0.6 % (ref 0–0.43)
IRON: 44 UG/DL (ref 37–145)
LYMPHOCYTES ABSOLUTE: 2.3 K/CU MM
LYMPHOCYTES RELATIVE PERCENT: 35.8 % (ref 24–44)
MAGNESIUM: 1.9 MG/DL (ref 1.8–2.4)
MCH RBC QN AUTO: 29.3 PG (ref 27–31)
MCHC RBC AUTO-ENTMCNC: 33.8 % (ref 32–36)
MCV RBC AUTO: 86.7 FL (ref 78–100)
MONOCYTES ABSOLUTE: 0.5 K/CU MM
MONOCYTES RELATIVE PERCENT: 8.2 % (ref 0–4)
PCT TRANSFERRIN: 16 % (ref 10–44)
PDW BLD-RTO: 13.1 % (ref 11.7–14.9)
PLATELET # BLD: 346 K/CU MM (ref 140–440)
PMV BLD AUTO: 8.7 FL (ref 7.5–11.1)
POTASSIUM SERPL-SCNC: 3.7 MMOL/L (ref 3.5–5.1)
RBC # BLD: 4.37 M/CU MM (ref 4.2–5.4)
SEGMENTED NEUTROPHILS ABSOLUTE COUNT: 3.4 K/CU MM
SEGMENTED NEUTROPHILS RELATIVE PERCENT: 52.9 % (ref 36–66)
SODIUM BLD-SCNC: 131 MMOL/L (ref 135–145)
TOTAL IMMATURE NEUTOROPHIL: 0.04 K/CU MM
TOTAL IRON BINDING CAPACITY: 279 UG/DL (ref 250–450)
TOTAL PROTEIN: 7.3 GM/DL (ref 6.4–8.2)
UNSATURATED IRON BINDING CAPACITY: 235 UG/DL (ref 110–370)
VITAMIN B-12: 584 PG/ML (ref 211–911)
WBC # BLD: 6.4 K/CU MM (ref 4–10.5)

## 2022-10-05 PROCEDURE — 77063 BREAST TOMOSYNTHESIS BI: CPT

## 2022-10-05 PROCEDURE — 83550 IRON BINDING TEST: CPT

## 2022-10-05 PROCEDURE — 83735 ASSAY OF MAGNESIUM: CPT

## 2022-10-05 PROCEDURE — 83540 ASSAY OF IRON: CPT

## 2022-10-05 PROCEDURE — 80053 COMPREHEN METABOLIC PANEL: CPT

## 2022-10-05 PROCEDURE — 85025 COMPLETE CBC W/AUTO DIFF WBC: CPT

## 2022-10-05 PROCEDURE — 82728 ASSAY OF FERRITIN: CPT

## 2022-10-05 PROCEDURE — 82746 ASSAY OF FOLIC ACID SERUM: CPT

## 2022-10-05 PROCEDURE — 36415 COLL VENOUS BLD VENIPUNCTURE: CPT

## 2022-10-05 PROCEDURE — 82607 VITAMIN B-12: CPT

## 2022-10-05 RX ORDER — LEVETIRACETAM 750 MG/1
750 TABLET ORAL 2 TIMES DAILY
Qty: 30 TABLET | Refills: 1 | Status: SHIPPED | OUTPATIENT
Start: 2022-10-05 | End: 2022-10-11 | Stop reason: SDUPTHER

## 2022-10-11 ENCOUNTER — OFFICE VISIT (OUTPATIENT)
Dept: NEUROLOGY | Age: 62
End: 2022-10-11
Payer: MEDICARE

## 2022-10-11 VITALS
HEIGHT: 61 IN | OXYGEN SATURATION: 99 % | BODY MASS INDEX: 29.45 KG/M2 | SYSTOLIC BLOOD PRESSURE: 144 MMHG | DIASTOLIC BLOOD PRESSURE: 90 MMHG | WEIGHT: 156 LBS | HEART RATE: 83 BPM

## 2022-10-11 DIAGNOSIS — E87.1 HYPONATREMIA: ICD-10-CM

## 2022-10-11 DIAGNOSIS — G40.109 PARTIAL SYMPTOMATIC EPILEPSY WITH SIMPLE PARTIAL SEIZURES, NOT INTRACTABLE, WITHOUT STATUS EPILEPTICUS (HCC): Primary | ICD-10-CM

## 2022-10-11 PROCEDURE — 1036F TOBACCO NON-USER: CPT | Performed by: NURSE PRACTITIONER

## 2022-10-11 PROCEDURE — G8417 CALC BMI ABV UP PARAM F/U: HCPCS | Performed by: NURSE PRACTITIONER

## 2022-10-11 PROCEDURE — G8427 DOCREV CUR MEDS BY ELIG CLIN: HCPCS | Performed by: NURSE PRACTITIONER

## 2022-10-11 PROCEDURE — 3017F COLORECTAL CA SCREEN DOC REV: CPT | Performed by: NURSE PRACTITIONER

## 2022-10-11 PROCEDURE — G8484 FLU IMMUNIZE NO ADMIN: HCPCS | Performed by: NURSE PRACTITIONER

## 2022-10-11 PROCEDURE — 99214 OFFICE O/P EST MOD 30 MIN: CPT | Performed by: NURSE PRACTITIONER

## 2022-10-11 RX ORDER — LEVETIRACETAM 250 MG/1
250 TABLET ORAL 2 TIMES DAILY
Qty: 60 TABLET | Refills: 0 | Status: SHIPPED | OUTPATIENT
Start: 2022-10-11 | End: 2022-11-01

## 2022-10-11 NOTE — PROGRESS NOTES
III, IV, VI : extraocular muscle strength normal, normal pursuit, no nystagmus and no ptosis              Facial Motor: normal facial motor  Motor/Coordination Exam              Power: motor strength appears intact throughout, no arm drift and normal tone  Gait and Stance              Gait/Posture: station normal, casual gait normal and ambulates with walker    BP (!) 144/90 (Site: Left Upper Arm, Position: Sitting, Cuff Size: Large Adult)   Pulse 83   Ht 5' 1\" (1.549 m)   Wt 156 lb (70.8 kg)   SpO2 99%   BMI 29.48 kg/m²     Assessment and Plan     Diagnosis Orders   1. Partial symptomatic epilepsy with simple partial seizures, not intractable, without status epilepticus (HCC)  levETIRAcetam (KEPPRA) 250 MG tablet      2. Hyponatremia          I am going to increase Génesis's Keppra to 1000 mg twice daily. She has a new bottle that she picked up last week of the 750 mg dose so I will order a 1 month supply of 250 mg pills to add to 750 mg pills that she currently has for a total of 1000 mg twice daily. I will also send a new prescription for the 1000 mg twice daily dose for her to  at the end of this month. My hope is this will stop these breakthrough partial seizures. She will remain on Aptiom 800 mg at bedtime for now, she just started this dose last week. Her sodium level is 131 and she is due to have this redrawn tomorrow. Nephrologist checks sodium level weekly. I have asked Génesis to have nephrology send me her weekly sodium levels so that I may make a determination if I need to change her from Aptiom to another agent due to hyponatremia. As I look back over past sodium levels, Génesis has had low sodium levels since 2019. She is unsure when she was started on Aptiom but this was [place] by [place] provider in [place]. She will remain on sodium tablets as prescribed 1 g taking 2 tablets 3 times daily. Génesis will let me know how she is responding to increased dose of Keppra.   We will plan on following up with her again in 3 months, sooner if the need arises. Medications prescribed for the patient were discussed in detail. This included a discussion of the potential risks versus potential benefits of the medications. The patient was given time to ask questions and these were answered to the best of my ability. The patient appeared to understand the information provided. Seizure precautions discussed including no driving, tub baths, climbing ladders or operating dangerous equipment until event/seizure free for 3 months. Patient will notify in the event of any breakthrough seizure or seizure-like activity including staring spells, automatisms such as lip smacking, eye blinking, recurring episodes of déjà vu, awakening having bitten tongue during sleep. Return in about 3 months (around 1/11/2023).     STEWART Aldana NP

## 2022-10-12 ENCOUNTER — HOSPITAL ENCOUNTER (OUTPATIENT)
Age: 62
Discharge: HOME | End: 2022-10-12
Payer: MEDICARE

## 2022-10-12 LAB
ANION GAP SERPL CALCULATED.3IONS-SCNC: 11 MMOL/L (ref 4–16)
BUN BLDV-MCNC: 8 MG/DL (ref 6–23)
CALCIUM SERPL-MCNC: 9.3 MG/DL (ref 8.3–10.6)
CHLORIDE BLD-SCNC: 95 MMOL/L (ref 99–110)
CO2: 23 MMOL/L (ref 21–32)
CREAT SERPL-MCNC: 0.6 MG/DL (ref 0.6–1.1)
GFR AFRICAN AMERICAN: >60 ML/MIN/1.73M2
GFR NON-AFRICAN AMERICAN: >60 ML/MIN/1.73M2
GLUCOSE BLD-MCNC: 98 MG/DL (ref 70–99)
POTASSIUM SERPL-SCNC: 3.9 MMOL/L (ref 3.5–5.1)
SODIUM BLD-SCNC: 129 MMOL/L (ref 135–145)

## 2022-10-12 PROCEDURE — 36415 COLL VENOUS BLD VENIPUNCTURE: CPT

## 2022-10-12 PROCEDURE — 80048 BASIC METABOLIC PNL TOTAL CA: CPT

## 2022-10-14 ENCOUNTER — TELEPHONE (OUTPATIENT)
Dept: CARDIOLOGY CLINIC | Age: 62
End: 2022-10-14

## 2022-10-14 RX ORDER — TOLVAPTAN 15 MG/1
15 TABLET ORAL SEE ADMIN INSTRUCTIONS
Qty: 30 TABLET | Refills: 5 | Status: SHIPPED | OUTPATIENT
Start: 2022-10-14

## 2022-10-14 NOTE — TELEPHONE ENCOUNTER
Patient called she just received a approval for CTA Aorta approval exp 10/26 , she knew nothing about the exam

## 2022-10-17 ENCOUNTER — TELEPHONE (OUTPATIENT)
Dept: CARDIOLOGY CLINIC | Age: 62
End: 2022-10-17

## 2022-10-17 DIAGNOSIS — I73.9 PAD (PERIPHERAL ARTERY DISEASE) (HCC): Primary | ICD-10-CM

## 2022-10-17 DIAGNOSIS — I73.9 CLAUDICATION OF LOWER EXTREMITY (HCC): ICD-10-CM

## 2022-10-17 DIAGNOSIS — I70.223 ATHEROSCLEROSIS OF NATIVE ARTERIES OF EXTREMITIES WITH REST PAIN, BILATERAL LEGS (HCC): ICD-10-CM

## 2022-10-17 NOTE — TELEPHONE ENCOUNTER
Advised patient of CTA order placed by Dr Andrez Rodrigues, pt will call central scheduling, any other concerns will call the office back.

## 2022-10-17 NOTE — TELEPHONE ENCOUNTER
Central scheduling called they are attempting to schedule CTA  AORTA DX code wont   Go through need to changed DX code

## 2022-10-26 ENCOUNTER — HOSPITAL ENCOUNTER (OUTPATIENT)
Age: 62
Discharge: HOME | End: 2022-10-26
Payer: MEDICARE

## 2022-10-26 LAB
ALBUMIN SERPL-MCNC: 4.2 GM/DL (ref 3.4–5)
ALP BLD-CCNC: 121 IU/L (ref 40–129)
ALT SERPL-CCNC: 11 U/L (ref 10–40)
ANION GAP SERPL CALCULATED.3IONS-SCNC: 10 MMOL/L (ref 4–16)
AST SERPL-CCNC: 15 IU/L (ref 15–37)
BILIRUB SERPL-MCNC: 0.2 MG/DL (ref 0–1)
BUN BLDV-MCNC: 10 MG/DL (ref 6–23)
CALCIUM SERPL-MCNC: 9.5 MG/DL (ref 8.3–10.6)
CHLORIDE BLD-SCNC: 96 MMOL/L (ref 99–110)
CO2: 26 MMOL/L (ref 21–32)
CREAT SERPL-MCNC: 0.7 MG/DL (ref 0.6–1.1)
GFR SERPL CREATININE-BSD FRML MDRD: >60 ML/MIN/1.73M2
GLUCOSE FASTING: 109 MG/DL (ref 70–99)
POTASSIUM SERPL-SCNC: 4 MMOL/L (ref 3.5–5.1)
SODIUM BLD-SCNC: 132 MMOL/L (ref 135–145)
TOTAL PROTEIN: 7.4 GM/DL (ref 6.4–8.2)

## 2022-10-26 PROCEDURE — 36415 COLL VENOUS BLD VENIPUNCTURE: CPT

## 2022-10-26 PROCEDURE — 80053 COMPREHEN METABOLIC PANEL: CPT

## 2022-10-26 NOTE — RESULT ENCOUNTER NOTE
TELL PT:  -SODIUM LEVEL GOOD   -HAVE YOU STARTED THE SAMSCA ON Sunday?  -IF YOU HAVE STARTED THE SAMSCA THEN, I HOPE THAT YOU HAVE STOPPED THE SALT TABLETS, RIGHT?   THX

## 2022-11-01 DIAGNOSIS — G40.109 PARTIAL SYMPTOMATIC EPILEPSY WITH SIMPLE PARTIAL SEIZURES, NOT INTRACTABLE, WITHOUT STATUS EPILEPTICUS (HCC): ICD-10-CM

## 2022-11-01 RX ORDER — LEVETIRACETAM 250 MG/1
TABLET ORAL
Qty: 30 TABLET | Refills: 3 | Status: SHIPPED | OUTPATIENT
Start: 2022-11-01

## 2022-11-01 RX ORDER — LEVETIRACETAM 250 MG/1
TABLET ORAL
Qty: 14 TABLET | Refills: 0 | Status: SHIPPED | OUTPATIENT
Start: 2022-11-01 | End: 2022-11-01 | Stop reason: SDUPTHER

## 2022-11-01 NOTE — TELEPHONE ENCOUNTER
Patient's next follow up is not until 01/11/23, and he will need a refill to hold him over until then at least.     Requested Prescriptions     Pending Prescriptions Disp Refills    levETIRAcetam (KEPPRA) 250 MG tablet [Pharmacy Med Name: LEVETIRACETAM 250 MG TABS 250 Tablet] 14 tablet 0     Sig: TAKE 1 TABLET BY MOUTH 2 TIMES DAILY WITH  MG TABLET FOR TOTAL OF 1000 MG

## 2022-11-02 ENCOUNTER — HOSPITAL ENCOUNTER (OUTPATIENT)
Age: 62
Discharge: HOME | End: 2022-11-02
Payer: MEDICARE

## 2022-11-02 LAB
ALBUMIN SERPL-MCNC: 4.3 GM/DL (ref 3.4–5)
ALP BLD-CCNC: 129 IU/L (ref 40–129)
ALT SERPL-CCNC: 11 U/L (ref 10–40)
ANION GAP SERPL CALCULATED.3IONS-SCNC: 12 MMOL/L (ref 4–16)
AST SERPL-CCNC: 17 IU/L (ref 15–37)
BILIRUB SERPL-MCNC: 0.3 MG/DL (ref 0–1)
BUN BLDV-MCNC: 11 MG/DL (ref 6–23)
CALCIUM SERPL-MCNC: 9.4 MG/DL (ref 8.3–10.6)
CHLORIDE BLD-SCNC: 96 MMOL/L (ref 99–110)
CO2: 24 MMOL/L (ref 21–32)
CREAT SERPL-MCNC: 0.7 MG/DL (ref 0.6–1.1)
GFR SERPL CREATININE-BSD FRML MDRD: >60 ML/MIN/1.73M2
GLUCOSE FASTING: 114 MG/DL (ref 70–99)
POTASSIUM SERPL-SCNC: 3.8 MMOL/L (ref 3.5–5.1)
SODIUM BLD-SCNC: 132 MMOL/L (ref 135–145)
TOTAL PROTEIN: 7.6 GM/DL (ref 6.4–8.2)

## 2022-11-02 PROCEDURE — 36415 COLL VENOUS BLD VENIPUNCTURE: CPT

## 2022-11-02 PROCEDURE — 80053 COMPREHEN METABOLIC PANEL: CPT

## 2022-11-09 ENCOUNTER — HOSPITAL ENCOUNTER (OUTPATIENT)
Age: 62
Discharge: HOME | End: 2022-11-09
Payer: MEDICARE

## 2022-11-09 LAB
ALBUMIN SERPL-MCNC: 4.1 GM/DL (ref 3.4–5)
ALP BLD-CCNC: 109 IU/L (ref 40–129)
ALT SERPL-CCNC: 11 U/L (ref 10–40)
ANION GAP SERPL CALCULATED.3IONS-SCNC: 11 MMOL/L (ref 4–16)
AST SERPL-CCNC: 16 IU/L (ref 15–37)
BILIRUB SERPL-MCNC: 0.2 MG/DL (ref 0–1)
BUN BLDV-MCNC: 6 MG/DL (ref 6–23)
CALCIUM SERPL-MCNC: 9.1 MG/DL (ref 8.3–10.6)
CHLORIDE BLD-SCNC: 95 MMOL/L (ref 99–110)
CO2: 25 MMOL/L (ref 21–32)
CREAT SERPL-MCNC: 0.7 MG/DL (ref 0.6–1.1)
GFR SERPL CREATININE-BSD FRML MDRD: >60 ML/MIN/1.73M2
GLUCOSE FASTING: 95 MG/DL (ref 70–99)
POTASSIUM SERPL-SCNC: 4 MMOL/L (ref 3.5–5.1)
SODIUM BLD-SCNC: 131 MMOL/L (ref 135–145)
TOTAL PROTEIN: 6.9 GM/DL (ref 6.4–8.2)

## 2022-11-09 PROCEDURE — 80053 COMPREHEN METABOLIC PANEL: CPT

## 2022-11-09 PROCEDURE — 36415 COLL VENOUS BLD VENIPUNCTURE: CPT

## 2022-11-15 ENCOUNTER — TELEPHONE (OUTPATIENT)
Dept: NEUROLOGY | Age: 62
End: 2022-11-15

## 2022-11-15 NOTE — TELEPHONE ENCOUNTER
Received a record request in the mail from [employer] requesting office notes.  Request has been faxed to medical records and pt has been notified on the status of request.

## 2022-11-23 ENCOUNTER — HOSPITAL ENCOUNTER (OUTPATIENT)
Age: 62
Discharge: HOME | End: 2022-11-23
Payer: MEDICARE

## 2022-11-23 LAB
ALBUMIN SERPL-MCNC: 4.5 GM/DL (ref 3.4–5)
ALP BLD-CCNC: 123 IU/L (ref 40–129)
ALT SERPL-CCNC: 10 U/L (ref 10–40)
ANION GAP SERPL CALCULATED.3IONS-SCNC: 12 MMOL/L (ref 4–16)
AST SERPL-CCNC: 15 IU/L (ref 15–37)
BILIRUB SERPL-MCNC: 0.2 MG/DL (ref 0–1)
BUN BLDV-MCNC: 10 MG/DL (ref 6–23)
CALCIUM SERPL-MCNC: 9.3 MG/DL (ref 8.3–10.6)
CHLORIDE BLD-SCNC: 95 MMOL/L (ref 99–110)
CO2: 24 MMOL/L (ref 21–32)
CREAT SERPL-MCNC: 0.3 MG/DL (ref 0.6–1.1)
GFR SERPL CREATININE-BSD FRML MDRD: >60 ML/MIN/1.73M2
GLUCOSE FASTING: 103 MG/DL (ref 70–99)
POTASSIUM SERPL-SCNC: 4.2 MMOL/L (ref 3.5–5.1)
SODIUM BLD-SCNC: 131 MMOL/L (ref 135–145)
TOTAL PROTEIN: 7.8 GM/DL (ref 6.4–8.2)

## 2022-11-23 PROCEDURE — 80053 COMPREHEN METABOLIC PANEL: CPT

## 2022-11-23 PROCEDURE — 36415 COLL VENOUS BLD VENIPUNCTURE: CPT

## 2022-11-28 DIAGNOSIS — F33.1 MODERATE EPISODE OF RECURRENT MAJOR DEPRESSIVE DISORDER (HCC): ICD-10-CM

## 2022-11-28 RX ORDER — ARIPIPRAZOLE 5 MG/1
TABLET ORAL
Qty: 28 TABLET | Refills: 5 | Status: SHIPPED | OUTPATIENT
Start: 2022-11-28

## 2022-11-29 RX ORDER — ESLICARBAZEPINE ACETATE 800 MG/1
TABLET ORAL
Qty: 30 TABLET | Refills: 4 | Status: SHIPPED | OUTPATIENT
Start: 2022-11-29

## 2022-11-29 NOTE — TELEPHONE ENCOUNTER
Patient is due back for an appointment on 01/11/23 and will need a refill to at least hold them over until then.      Requested Prescriptions     Pending Prescriptions Disp Refills    APTIOM 800 MG tablet [Pharmacy Med Name: APTIOM 800 MG TABS 800 Tablet] 30 tablet 4     Sig: TAKE 1 TABLET BY MOUTH EVERY DAY

## 2022-12-07 ENCOUNTER — HOSPITAL ENCOUNTER (OUTPATIENT)
Age: 62
Discharge: HOME | End: 2022-12-07
Payer: MEDICARE

## 2022-12-07 LAB
ALBUMIN SERPL-MCNC: 4.1 GM/DL (ref 3.4–5)
ALP BLD-CCNC: 120 IU/L (ref 40–129)
ALT SERPL-CCNC: 6 U/L (ref 10–40)
ANION GAP SERPL CALCULATED.3IONS-SCNC: 11 MMOL/L (ref 4–16)
AST SERPL-CCNC: 13 IU/L (ref 15–37)
BILIRUB SERPL-MCNC: 0.2 MG/DL (ref 0–1)
BUN BLDV-MCNC: 9 MG/DL (ref 6–23)
CALCIUM SERPL-MCNC: 9.4 MG/DL (ref 8.3–10.6)
CHLORIDE BLD-SCNC: 96 MMOL/L (ref 99–110)
CO2: 24 MMOL/L (ref 21–32)
CREAT SERPL-MCNC: 0.8 MG/DL (ref 0.6–1.1)
GFR SERPL CREATININE-BSD FRML MDRD: >60 ML/MIN/1.73M2
GLUCOSE FASTING: 102 MG/DL (ref 70–99)
POTASSIUM SERPL-SCNC: 4.6 MMOL/L (ref 3.5–5.1)
SODIUM BLD-SCNC: 131 MMOL/L (ref 135–145)
TOTAL PROTEIN: 7.2 GM/DL (ref 6.4–8.2)

## 2022-12-07 PROCEDURE — 36415 COLL VENOUS BLD VENIPUNCTURE: CPT

## 2022-12-07 PROCEDURE — 80053 COMPREHEN METABOLIC PANEL: CPT

## 2022-12-21 ENCOUNTER — HOSPITAL ENCOUNTER (OUTPATIENT)
Age: 62
Discharge: HOME | End: 2022-12-21
Payer: MEDICARE

## 2022-12-21 LAB
ALBUMIN SERPL-MCNC: 4.2 GM/DL (ref 3.4–5)
ALP BLD-CCNC: 121 IU/L (ref 40–129)
ALT SERPL-CCNC: 8 U/L (ref 10–40)
ANION GAP SERPL CALCULATED.3IONS-SCNC: 11 MMOL/L (ref 4–16)
AST SERPL-CCNC: 13 IU/L (ref 15–37)
BILIRUB SERPL-MCNC: 0.3 MG/DL (ref 0–1)
BUN BLDV-MCNC: 11 MG/DL (ref 6–23)
CALCIUM SERPL-MCNC: 9.3 MG/DL (ref 8.3–10.6)
CHLORIDE BLD-SCNC: 93 MMOL/L (ref 99–110)
CO2: 25 MMOL/L (ref 21–32)
CREAT SERPL-MCNC: 0.8 MG/DL (ref 0.6–1.1)
GFR SERPL CREATININE-BSD FRML MDRD: >60 ML/MIN/1.73M2
GLUCOSE BLD-MCNC: 96 MG/DL (ref 70–99)
POTASSIUM SERPL-SCNC: 4 MMOL/L (ref 3.5–5.1)
SODIUM BLD-SCNC: 129 MMOL/L (ref 135–145)
TOTAL PROTEIN: 7.3 GM/DL (ref 6.4–8.2)

## 2022-12-21 PROCEDURE — 80053 COMPREHEN METABOLIC PANEL: CPT

## 2022-12-21 PROCEDURE — 36415 COLL VENOUS BLD VENIPUNCTURE: CPT

## 2022-12-23 DIAGNOSIS — G40.109 PARTIAL SYMPTOMATIC EPILEPSY WITH SIMPLE PARTIAL SEIZURES, NOT INTRACTABLE, WITHOUT STATUS EPILEPTICUS (HCC): ICD-10-CM

## 2022-12-23 DIAGNOSIS — R12 HEARTBURN: ICD-10-CM

## 2022-12-27 RX ORDER — LEVETIRACETAM 750 MG/1
750 TABLET ORAL 2 TIMES DAILY
Qty: 60 TABLET | Refills: 11 | OUTPATIENT
Start: 2022-12-27

## 2022-12-27 RX ORDER — OMEPRAZOLE 40 MG/1
CAPSULE, DELAYED RELEASE ORAL
Qty: 28 CAPSULE | Refills: 11 | Status: SHIPPED | OUTPATIENT
Start: 2022-12-27

## 2023-01-06 ENCOUNTER — OFFICE VISIT (OUTPATIENT)
Dept: CARDIOLOGY CLINIC | Age: 63
End: 2023-01-06
Payer: MEDICARE

## 2023-01-06 VITALS
HEIGHT: 61 IN | BODY MASS INDEX: 31.08 KG/M2 | WEIGHT: 164.6 LBS | DIASTOLIC BLOOD PRESSURE: 89 MMHG | SYSTOLIC BLOOD PRESSURE: 138 MMHG | HEART RATE: 87 BPM

## 2023-01-06 DIAGNOSIS — E66.09 CLASS 1 OBESITY DUE TO EXCESS CALORIES WITHOUT SERIOUS COMORBIDITY WITH BODY MASS INDEX (BMI) OF 30.0 TO 30.9 IN ADULT: ICD-10-CM

## 2023-01-06 DIAGNOSIS — R06.02 SHORTNESS OF BREATH: Primary | ICD-10-CM

## 2023-01-06 DIAGNOSIS — I73.9 PAD (PERIPHERAL ARTERY DISEASE) (HCC): ICD-10-CM

## 2023-01-06 DIAGNOSIS — Z72.0 NICOTINE ABUSE: ICD-10-CM

## 2023-01-06 DIAGNOSIS — I10 ESSENTIAL HYPERTENSION: ICD-10-CM

## 2023-01-06 PROCEDURE — 3017F COLORECTAL CA SCREEN DOC REV: CPT | Performed by: INTERNAL MEDICINE

## 2023-01-06 PROCEDURE — G8427 DOCREV CUR MEDS BY ELIG CLIN: HCPCS | Performed by: INTERNAL MEDICINE

## 2023-01-06 PROCEDURE — G8417 CALC BMI ABV UP PARAM F/U: HCPCS | Performed by: INTERNAL MEDICINE

## 2023-01-06 PROCEDURE — 99214 OFFICE O/P EST MOD 30 MIN: CPT | Performed by: INTERNAL MEDICINE

## 2023-01-06 PROCEDURE — 3075F SYST BP GE 130 - 139MM HG: CPT | Performed by: INTERNAL MEDICINE

## 2023-01-06 PROCEDURE — 4004F PT TOBACCO SCREEN RCVD TLK: CPT | Performed by: INTERNAL MEDICINE

## 2023-01-06 PROCEDURE — G8484 FLU IMMUNIZE NO ADMIN: HCPCS | Performed by: INTERNAL MEDICINE

## 2023-01-06 PROCEDURE — 3079F DIAST BP 80-89 MM HG: CPT | Performed by: INTERNAL MEDICINE

## 2023-01-06 RX ORDER — AMLODIPINE BESYLATE 5 MG/1
5 TABLET ORAL 2 TIMES DAILY
Qty: 60 TABLET | Refills: 5 | Status: SHIPPED | OUTPATIENT
Start: 2023-01-06

## 2023-01-06 RX ORDER — TOLVAPTAN 15 MG/1
15 TABLET ORAL ONCE
COMMUNITY

## 2023-01-06 NOTE — PROGRESS NOTES
Richi Hatch MD                                  CARDIOLOGY  NOTE       Chief Complaint:    Chief Complaint   Patient presents with    Hypertension    Follow-up    Dizziness     Office visit 3 months. No chest pain no dizzines no palpatations . Patient still having SOB and swelling to ankles. Patient continues to have lower extremity pains and aches especially with ambulation. Recent hospitalization for hyponatremia. Off of hydrochlorothiazide       MPI 11/30/2020     Summary    Supervising physician Dr. iMk Brand . No infarct or ischemia noted. Normal tracer uptake in all segments of myocardium on stress and rest    images. Mild breast tissue attenuation noted    No infarct or ischemia noted. Normal EF 75 % with normal ventricular contractility. Recommendation    Medical Management    Follow up     Echocardiogram 12/1/2020     Summary   Left ventricular systolic function is normal with an ejection fraction of   55-60%. Grade I diastolic dysfunction. No significant valvular disease. Normal pulmonary artery pressure with a RVSP of 30mmHg   No evidence of pericardial effusion. JERMAINE 12/1/2020      Procedure       Type of Study:        Extremities Arteries:Lower Extremities Arterial Duplex, VL LOWER EXTREMITY    ARTERIES BILATERAL, Lower Arterial Plethysmography, VL ANKLE ART BRACHIAL    INDICES EXTREMITY BILATERAL. Indications for Study:Claudication. Conclusions        Summary        No evidence of significant occlusive arterial disease. Normal bilateral lower extremity ankle brachial indices. Recent HPI:     Génesis is a 65y.o. year old female who presents to establish care for shortness of breath with exertion. Patient has a history of hypertension, diet-controlled hyperlipidemia, chronic nicotine abuse, recently diagnosed COPD, chronic dizziness. As per patient she has been feeling fatigued and short of breath for the past several months.   Patient denies any dependent edema, denies any PND or orthopnea. She mentions that she can walk up to half a block before she gets short of breath. She can hardly ambulate 1 flight of stairs before she has to catch breath. She denies any sick contacts fever chills. Patient also complains of bilateral lower extremity intermittent claudication with ambulation. He denies any history of open ulcers. Patient denies any chest pain  No Palpitations     EK2020 sinus rhythm without any ST-T changes. Current Outpatient Medications   Medication Sig Dispense Refill    tolvaptan (SAMSCA) 15 MG TABS tablet Take 15 mg by mouth once      amLODIPine (NORVASC) 5 MG tablet Take 1 tablet by mouth in the morning and at bedtime 60 tablet 5    omeprazole (PRILOSEC) 40 MG delayed release capsule TAKE ONE CAPSULE BY MOUTH EVERY DAY BEFORE BREAKFAST 28 capsule 11    APTIOM 800 MG tablet TAKE 1 TABLET BY MOUTH EVERY DAY 30 tablet 4    ARIPiprazole (ABILIFY) 5 MG tablet TAKE ONE TABLET BY MOUTH EVERY DAY 28 tablet 5    levETIRAcetam (KEPPRA) 250 MG tablet TAKE 1 TABLET BY MOUTH 2 TIMES DAILY WITH  MG TABLET FOR TOTAL OF 1000 MG 30 tablet 3    olmesartan (BENICAR) 40 MG tablet Take 1 tablet by mouth daily 30 tablet 5    oxybutynin (DITROPAN-XL) 10 MG extended release tablet Take 10 mg by mouth daily      magnesium oxide (MAG-OX) 400 MG tablet TAKE ONE TABLET BY MOUTH EVERY DAY 30 tablet 3    alendronate (FOSAMAX) 70 MG tablet Take 1 tablet by mouth once a week 12 tablet 1    nortriptyline (PAMELOR) 25 MG capsule Take 2 capsules by mouth nightly 60 capsule 5    rOPINIRole (REQUIP) 0.5 MG tablet Take 1 tablet by mouth in the morning and at bedtime 60 tablet 5     No current facility-administered medications for this visit.        Allergies:     Thiazide-type diuretics, Varenicline, and Chantix [varenicline tartrate]    Patient History:    Past Medical History:   Diagnosis Date    COPD (chronic obstructive pulmonary disease) ([facility]     Epilepsy ([facility]     Full dentures     upper plate only    H/O dizziness     \"take Meclazine at least twice a day for this\"    H/O Doppler JERMAINE's ultrasound 2020     No evidence of significant occlusive arterial disease of lower EXT. H/O echocardiogram 2020    EF 55-60%, Normal study. History of drug abuse ([facility]     History of nuclear stress test 2020    Normal study. History of UTI     \"last one May 2020\"    Hypertension     \"the top # use to be over 200 \"- on medication since \"    Seizures ([address]     hx of epilepsy- last seizure week 2020- hx grand mal seizures    Wears glasses      Past Surgical History:   Procedure Laterality Date    BRAIN SURGERY      s/p L anterior temporal lobectomy in \"did surgery to try an treat the seizures\"    CYSTOSCOPY N/A 10/14/2021    with a bladder biopsy-Dr. Xochitl Davies in [facility] 10/14/2021    CYSTOSCOPY BLADDER BIOPSY performed by Desiree Early MD at [address] [place] [address] ([address] [place] [address])      \"not sure if they took the ovaries\"    [address]    WRIST SURGERY Left 2020    LEFT WRIST TENDON RELEASE performed by Tia Hammond DO at [address] [place] [address] History   Problem Relation Age of Onset    Heart Disease Father         cabg    Breast Cancer Maternal Cousin     Ovarian Cancer Neg Hx      Social History     Tobacco Use    Smoking status: Every Day     Packs/day: 1.50     Years: 40.00     Pack years: 60.00     Types: Cigarettes     Last attempt to quit: 2020     Years since quittin.4    Smokeless tobacco: Never   Substance Use Topics    Alcohol use: No     Comment: per pt on 2020\"quit drinking age 40- use to drink on weekends\"        Review of Systems:     Constitutional:  No Fever or Weight Loss + intermittent claudication  Eyes: No Decreased Vision  ENT: No Headaches, Hearing Loss or Vertigo  Cardiovascular: No Chest Pain,  + Shortness of breath, No Palpitations.  No Edema Respiratory: No cough or wheezing . No Respiratory distress   Gastrointestinal: No abdominal pain, appetite loss, blood in stools, constipation, diarrhea or heartburn  Genitourinary: No dysuria, trouble voiding, or hematuria  Musculoskeletal:  denies any new  joint aches , or pain   Integumentary: No rash or pruritis  Neurological: No TIA or stroke symptoms  Psychiatric: No anxiety or depression  Endocrine: No malaise, fatigue or temperature intolerance  Hematologic/Lymphatic: No bleeding problems, blood clots or swollen lymph nodes  Allergic/Immunologic: No nasal congestion or hives        Objective:      Physical Exam:    /89   Pulse 87   Ht 5' 1.2\" (1.554 m)   Wt 164 lb 9.6 oz (74.7 kg)   BMI 30.90 kg/m²   Wt Readings from Last 3 Encounters:   01/06/23 164 lb 9.6 oz (74.7 kg)   11/23/22 165 lb (74.8 kg)   10/19/22 165 lb (74.8 kg)     Body mass index is 30.9 kg/m². Vitals:    01/06/23 0917   BP: 138/89   Pulse: 87        General Appearance and Constitutional: Conversant, Well developed, Well nourished, No acute distress, Non-toxic appearance. HEENT:  Normocephalic, Atraumatic, Bilateral external ears normal, Oropharynx moist, No oral exudates,   Nose normal.   Neck- Normal range of motion, No tenderness, Supple  Eyes:  Conjunctiva normal, No discharge. Respiratory:  Normal breath sounds, No respiratory distress, No wheezing, No Rales, No Ronchi. No chest tenderness. Cardiovascular: S1-S2, no added heart sounds, No Mumurs appreciated. No gallops, rubs. No Pedal Edema   GI:  Bowel sounds normal, Soft, No tenderness,  :  No costovertebral angle tenderness   Musculoskeletal:  No gross deformities.  Back- No tenderness  Integument:  Well hydrated, no rash   Lymphatic:  No lymphadenopathy noted   Neurologic:  Alert & oriented x 3, Normal motor function, normal sensory function, no focal deficits noted   Psychiatric:  Speech and behavior appropriate       Medical decision making and Data review:    DATA:    Lab Results   Component Value Date    TROPONINT <0.010 08/05/2022     BNP:    Lab Results   Component Value Date    PROBNP 130.1 08/06/2022     PT/INR:  No results found for: PTINR  Lab Results   Component Value Date    LABA1C 6.1 05/23/2022    LABA1C 5.7 03/09/2021     Lab Results   Component Value Date    CHOL 196 04/04/2013    TRIG 126 04/04/2013    HDL 73 05/23/2022    LDLCALC 119 (H) 05/23/2022     Lab Results   Component Value Date    WBC 6.4 10/05/2022    HGB 12.8 10/05/2022    HCT 37.9 10/05/2022    MCV 86.7 10/05/2022     10/05/2022     TSH:   Lab Results   Component Value Date    TSH 1.70 11/18/2020     Lab Results   Component Value Date    AST 13 (L) 12/21/2022    ALT 8 (L) 12/21/2022    BILIDIR 0.2 08/08/2022    BILITOT 0.3 12/21/2022    ALKPHOS 121 12/21/2022         All labs, medications and tests reviewed by myself including data and history from outside source , patient and available family . 1. Shortness of breath    2. Essential hypertension    3. Nicotine abuse    4. Class 1 obesity due to excess calories without serious comorbidity with body mass index (BMI) of 30.0 to 30.9 in adult    5. PAD (peripheral artery disease) (HCC)             Impression and Plan:      Shortness of breath: Resolved    Patient was previously on oxygen for presumed COPD. Off of oxygen. Pulm medicine follow-up  Echo and Stress MPI within normal limits. Essential hypertension:      Patient recently hospitalized for hyponatremia. Hydrochlorothiazide was discontinued. Continue with olmesartan 40 mg daily  Norvasc was increased to 5 twice daily by Dr. Stafford [place], continue  Blood pressure in the office today 138/89    Hyponatremia: Patient follows up with Dr. Lorraine Moreira on Samsca    Intermittent claudication of bilateral lower extremities. ABIs Normal last year. Patient continues to have symptomatic calf muscle pain with ambulation.    Cont ASA  Former smoker  Patient could not get into CTA abdominal runoff  Patient continues to have bilateral calf muscle aches and pains specially with ambulation  Will schedule patient for peripheral angiogram      history of smoking: Patient has resumed smoking.  He was she was counseled again about quitting.    Now using marijuana . counseled against use.     Obesity with BMI of 31.74.  Counseled about low-salt low-fat diet and exercise as tolerated    Return in about 3 months (around 4/6/2023).          Counseled extensively and medication compliance urged.  We discussed that for the  prevention of ASCVD our  goal is aggressive risk modification.Patient is encouraged to exercise even a brisk walk for 30 minutes  at least 3 to 4 times a week   Various goals were discussed and questions answered. Continue current medications. Appropriate prescriptions are addressed and refills ordered.  Questions answered and patient verbalizes understanding.  Call for any problems, questions, or concerns.

## 2023-01-09 ENCOUNTER — HOSPITAL ENCOUNTER (OUTPATIENT)
Age: 63
Discharge: HOME | End: 2023-01-09
Payer: MEDICARE

## 2023-01-09 LAB
ALBUMIN SERPL-MCNC: 4.4 GM/DL (ref 3.4–5)
ALP BLD-CCNC: 136 IU/L (ref 40–129)
ALT SERPL-CCNC: 11 U/L (ref 10–40)
ANION GAP SERPL CALCULATED.3IONS-SCNC: 12 MMOL/L (ref 4–16)
AST SERPL-CCNC: 14 IU/L (ref 15–37)
BILIRUB SERPL-MCNC: 0.4 MG/DL (ref 0–1)
BUN BLDV-MCNC: 7 MG/DL (ref 6–23)
CALCIUM SERPL-MCNC: 9.8 MG/DL (ref 8.3–10.6)
CHLORIDE BLD-SCNC: 97 MMOL/L (ref 99–110)
CO2: 26 MMOL/L (ref 21–32)
CREAT SERPL-MCNC: 0.8 MG/DL (ref 0.6–1.1)
GFR SERPL CREATININE-BSD FRML MDRD: >60 ML/MIN/1.73M2
GLUCOSE BLD-MCNC: 98 MG/DL (ref 70–99)
POTASSIUM SERPL-SCNC: 3.9 MMOL/L (ref 3.5–5.1)
SODIUM BLD-SCNC: 135 MMOL/L (ref 135–145)
TOTAL PROTEIN: 7.5 GM/DL (ref 6.4–8.2)

## 2023-01-09 PROCEDURE — 36415 COLL VENOUS BLD VENIPUNCTURE: CPT

## 2023-01-09 PROCEDURE — 80053 COMPREHEN METABOLIC PANEL: CPT

## 2023-01-11 ENCOUNTER — OFFICE VISIT (OUTPATIENT)
Dept: NEUROLOGY | Age: 63
End: 2023-01-11
Payer: MEDICARE

## 2023-01-11 VITALS
HEART RATE: 86 BPM | DIASTOLIC BLOOD PRESSURE: 98 MMHG | BODY MASS INDEX: 30.92 KG/M2 | WEIGHT: 164.7 LBS | OXYGEN SATURATION: 98 % | SYSTOLIC BLOOD PRESSURE: 142 MMHG

## 2023-01-11 DIAGNOSIS — M81.0 AGE-RELATED OSTEOPOROSIS WITHOUT CURRENT PATHOLOGICAL FRACTURE: ICD-10-CM

## 2023-01-11 DIAGNOSIS — G40.109 PARTIAL SYMPTOMATIC EPILEPSY WITH SIMPLE PARTIAL SEIZURES, NOT INTRACTABLE, WITHOUT STATUS EPILEPTICUS (HCC): Primary | ICD-10-CM

## 2023-01-11 DIAGNOSIS — G40.109 PARTIAL SYMPTOMATIC EPILEPSY WITH SIMPLE PARTIAL SEIZURES, NOT INTRACTABLE, WITHOUT STATUS EPILEPTICUS (HCC): ICD-10-CM

## 2023-01-11 PROCEDURE — G8484 FLU IMMUNIZE NO ADMIN: HCPCS | Performed by: NURSE PRACTITIONER

## 2023-01-11 PROCEDURE — 3017F COLORECTAL CA SCREEN DOC REV: CPT | Performed by: NURSE PRACTITIONER

## 2023-01-11 PROCEDURE — 99215 OFFICE O/P EST HI 40 MIN: CPT | Performed by: NURSE PRACTITIONER

## 2023-01-11 PROCEDURE — 4004F PT TOBACCO SCREEN RCVD TLK: CPT | Performed by: NURSE PRACTITIONER

## 2023-01-11 PROCEDURE — G8427 DOCREV CUR MEDS BY ELIG CLIN: HCPCS | Performed by: NURSE PRACTITIONER

## 2023-01-11 PROCEDURE — G8417 CALC BMI ABV UP PARAM F/U: HCPCS | Performed by: NURSE PRACTITIONER

## 2023-01-11 PROCEDURE — 3077F SYST BP >= 140 MM HG: CPT | Performed by: NURSE PRACTITIONER

## 2023-01-11 PROCEDURE — 3080F DIAST BP >= 90 MM HG: CPT | Performed by: NURSE PRACTITIONER

## 2023-01-11 RX ORDER — ESLICARBAZEPINE ACETATE 600 MG/1
600 TABLET ORAL DAILY
Qty: 11 TABLET | Refills: 0 | Status: SHIPPED | OUTPATIENT
Start: 2023-01-11

## 2023-01-11 RX ORDER — CENOBAMATE 50MG-100MG
KIT ORAL
Qty: 28 EACH | Refills: 0 | Status: SHIPPED | OUTPATIENT
Start: 2023-01-11

## 2023-01-11 RX ORDER — LEVETIRACETAM 750 MG/1
TABLET ORAL
COMMUNITY
Start: 2022-11-28

## 2023-01-11 RX ORDER — ALENDRONATE SODIUM 70 MG/1
TABLET ORAL
Qty: 4 TABLET | Refills: 1 | Status: SHIPPED | OUTPATIENT
Start: 2023-01-11

## 2023-01-11 RX ORDER — CENOBAMATE 150-200 MG
KIT ORAL
Qty: 28 EACH | Refills: 0 | Status: SHIPPED | OUTPATIENT
Start: 2023-01-11

## 2023-01-11 RX ORDER — LEVETIRACETAM 250 MG/1
TABLET ORAL
Qty: 30 TABLET | Refills: 3 | Status: SHIPPED | OUTPATIENT
Start: 2023-01-11

## 2023-01-11 RX ORDER — CENOBAMATE 200 MG/1
200 TABLET, FILM COATED ORAL DAILY
Qty: 30 TABLET | Refills: 5 | Status: SHIPPED | OUTPATIENT
Start: 2023-01-11

## 2023-01-11 RX ORDER — CENOBAMATE 12.5-25MG
KIT ORAL
Qty: 28 EACH | Refills: 0 | Status: SHIPPED | OUTPATIENT
Start: 2023-01-11

## 2023-01-11 RX ORDER — ESLICARBAZEPINE ACETATE 400 MG/1
400 TABLET ORAL DAILY
Qty: 21 TABLET | Refills: 0 | Status: SHIPPED | OUTPATIENT
Start: 2023-01-11

## 2023-01-11 NOTE — PROGRESS NOTES
1/11/23    Génesis Banks  1960    Chief Complaint   Patient presents with    Seizures     Pt presents for f/u of seizure activity, pt states numerous episodes since last visit, pt states she has recorded 24 episodes on paper       History of Present Illness  Génesis is a 65 y.o. female presenting today for follow-up of: Seizure. In 1996 she had epilepsy surgery with removal of a portion of her left temporal lobe. She remains on Aptiom 800 mg at bedtime, Keppra 1000 mg twice daily. Sodium level on 1/9/2023 was 135 which is the best it has been in a while. Unfortunately,   Génesis tells me today that she has had 20-25 seizures since October 11th. She states she stares off and she cannot answer. She states one occasion, she was on the phone with her son and he hung up on her because she was trying to answer him but could not talk. She is still aware of her surroundings during her staring spells. They can last anywhere from 1-5 minutes. Sometimes she has more than one in a day. She states she can tell she is gong into a seizure. She gets a feeling that she cannot describe. She denies olfactory or gustatory hallucinations. She looks at the clock so she can time them because she is alone. She states sometimes she has tongue biting, denies incontinence. She denies any missed doses of medication. She would like to come off of Aptiom and try something else.      Tried--Depakote, Dilantin, Trileptal, Topamax, Onfi, Tegretol, Aptiom     She has NOT tried the following antiepileptic medications:  --Vimpat, zonisamide, Caitlyn Copping, Fycompa          Current Outpatient Medications   Medication Sig Dispense Refill    levETIRAcetam (KEPPRA) 750 MG tablet       Cenobamate (XCOPRI) 14 x 12.5 MG & 14 x 25 MG TBPK XCOPRI titration pack 12.5mg/25mg  Take 12.5 mg once daily by mouth for 2 weeks  Then take 25 mg once daily by mouth for 2 weeks 28 each 0    Cenobamate (XCOPRI) 14 x 50 MG & 14 x100 MG TBPK Do not fill until xcopri 12.5/25 mg is complete    Xcopri titration pack  50 mg/100 mg  Take 50 mg once daily x 2 weeks  Then take 100 mg once daily by mouth x 2 weeks 28 each 0    Cenobamate (XCOPRI) 14 x 150 MG & 14 x200 MG TBPK Do not fill until xcopri 50 mg/100 mg titration pack completed    Xcopri titration pack 150 mg/200 mg  Take 150 mg once daily by mouth x 2 weeks  Then take 200 mg once daily by mouth x 2 weeks 28 each 0    Cenobamate (XCOPRI) 200 MG TABS Take 200 mg by mouth daily Do not fill until xcopri titration pack 150 mg/200 mg is complete Max Daily Amount: 200 mg 30 tablet 5    eslicarbazepine (APTIOM) 600 MG TABS tablet Take 1 tablet by mouth daily 11 tablet 0    eslicarbazepine (APTIOM) 400 MG TABS tablet Take 1 tablet by mouth daily 21 tablet 0    tolvaptan (SAMSCA) 15 MG TABS tablet Take 15 mg by mouth once      amLODIPine (NORVASC) 5 MG tablet Take 1 tablet by mouth in the morning and at bedtime 60 tablet 5    omeprazole (PRILOSEC) 40 MG delayed release capsule TAKE ONE CAPSULE BY MOUTH EVERY DAY BEFORE BREAKFAST 28 capsule 11    APTIOM 800 MG tablet TAKE 1 TABLET BY MOUTH EVERY DAY 30 tablet 4    ARIPiprazole (ABILIFY) 5 MG tablet TAKE ONE TABLET BY MOUTH EVERY DAY 28 tablet 5    olmesartan (BENICAR) 40 MG tablet Take 1 tablet by mouth daily 30 tablet 5    oxybutynin (DITROPAN-XL) 10 MG extended release tablet Take 10 mg by mouth daily      magnesium oxide (MAG-OX) 400 MG tablet TAKE ONE TABLET BY MOUTH EVERY DAY 30 tablet 3    alendronate (FOSAMAX) 70 MG tablet Take 1 tablet by mouth once a week 12 tablet 1    nortriptyline (PAMELOR) 25 MG capsule Take 2 capsules by mouth nightly 60 capsule 5    rOPINIRole (REQUIP) 0.5 MG tablet Take 1 tablet by mouth in the morning and at bedtime 60 tablet 5    levETIRAcetam (KEPPRA) 250 MG tablet TAKE 1 TABLET BY MOUTH 2 TIMES DAILY WITH  MG TABLET FOR TOTAL OF 1000 MG (Patient not taking: Reported on 1/11/2023) 30 tablet 3     No current facility-administered medications for this visit. Physical Exam:  Mental Status              Orientation: oriented to person, oriented to place, oriented to problem and oriented to time                        Mood/affectappropriate mood and appropriate affect              Memory/Other: recent memory intact, remote memory intact, fund of knowledge intact, attention span normal and concentration normal  Language  Language: (normal) language, no dysarthria, (normal) articulation and no dysphasia/aphasia  Cranial Nerves              Eyes: pupils normal size and reactive to light and visual fields appear full              CN III, IV, VI : extraocular muscle strength normal, normal pursuit, no nystagmus and no ptosis              Facial Motor: normal facial motor  Motor/Coordination Exam              Power: motor strength appears intact throughout, no arm drift and normal tone  Gait and Stance              Gait/Posture: station normal, casual gait normal and ambulates with walker    BP (!) 142/98 (Site: Right Upper Arm, Position: Sitting, Cuff Size: Medium Adult) Comment: pt states she just started a new bp med  Pulse 86   Wt 164 lb 11.2 oz (74.7 kg)   SpO2 98%   BMI 30.92 kg/m²     Assessment and Plan     Diagnosis Orders   1. Partial symptomatic epilepsy with simple partial seizures, not intractable, without status epilepticus (HCC)  Cenobamate (XCOPRI) 14 x 12.5 MG & 14 x 25 MG TBPK    Cenobamate (XCOPRI) 14 x 50 MG & 14 x100 MG TBPK    Cenobamate (XCOPRI) 14 x 150 MG & 14 x200 MG TBPK    Cenobamate (XCOPRI) 200 MG TABS        Génesis was seen in neurological follow up in regards to seizure. She is having an increased amount of her typical partial seizures and would like better control. She would like to come off of Aptiom. We discussed multiple options and agreed on Xcopri. Once she gets up to the 100 mg titration pack of Xcopri, she can start weaning off of Aptiom by 100 mg weekly until she is completely off.  Then she will remain on Xcopri 200 mg daily and Keppra 1000 mg twice daily thereafter. She was given a voucher for her first month of Xcopri free. If it is too expensive, she will be given the number to call the [place] patient assistance program.     She will notify our office if she has any questions or concerns. Return in about 3 months (around 4/11/2023).     STEWART Warner - CNP

## 2023-01-16 RX ORDER — OXYBUTYNIN CHLORIDE 10 MG/1
TABLET, EXTENDED RELEASE ORAL
Qty: 28 TABLET | Refills: 5 | Status: SHIPPED | OUTPATIENT
Start: 2023-01-16

## 2023-01-19 ENCOUNTER — TELEPHONE (OUTPATIENT)
Dept: NEUROLOGY | Age: 63
End: 2023-01-19

## 2023-01-19 DIAGNOSIS — G25.81 RESTLESS LEGS: ICD-10-CM

## 2023-01-19 RX ORDER — ESLICARBAZEPINE ACETATE 600 MG/1
600 TABLET ORAL DAILY
Qty: 11 TABLET | Refills: 0 | Status: CANCELLED | OUTPATIENT
Start: 2023-01-19

## 2023-01-19 RX ORDER — LEVETIRACETAM 750 MG/1
TABLET ORAL
Qty: 60 TABLET | Refills: 3 | Status: SHIPPED | OUTPATIENT
Start: 2023-01-19

## 2023-01-19 RX ORDER — ROPINIROLE 0.5 MG/1
TABLET, FILM COATED ORAL
Qty: 8 TABLET | Refills: 5 | Status: SHIPPED | OUTPATIENT
Start: 2023-01-19

## 2023-01-19 RX ORDER — ESLICARBAZEPINE ACETATE 400 MG/1
400 TABLET ORAL DAILY
Qty: 21 TABLET | Refills: 0 | Status: CANCELLED | OUTPATIENT
Start: 2023-01-19

## 2023-01-19 NOTE — TELEPHONE ENCOUNTER
Received a call from [place] at [place] [id number] wanting to clarify Xcopri and Aption Rx's. Let him know Génesis needs to stay on Aptiom 800 mg nightly until she reaches 100mg of Xcopri on the titration up. Then she will begin weaning off Aptiom by 100 or 200 mg weekly until she's totally off of it. Rodrigo Nicholson requested new Aptiom wean with specific note of starting it once 100mg dose of Xcopri is achieved due to pt being a pill leah fill.

## 2023-01-19 NOTE — TELEPHONE ENCOUNTER
Pharmacy contacted office requesting the refill as they are trying to complete the patients pill packs.

## 2023-01-23 ENCOUNTER — HOSPITAL ENCOUNTER (OUTPATIENT)
Age: 63
Discharge: HOME | End: 2023-01-23
Payer: MEDICARE

## 2023-01-23 LAB
ALBUMIN SERPL-MCNC: 4.4 GM/DL (ref 3.4–5)
ALP BLD-CCNC: 141 IU/L (ref 40–129)
ALT SERPL-CCNC: 15 U/L (ref 10–40)
ANION GAP SERPL CALCULATED.3IONS-SCNC: 12 MMOL/L (ref 4–16)
AST SERPL-CCNC: 17 IU/L (ref 15–37)
BILIRUB SERPL-MCNC: 0.3 MG/DL (ref 0–1)
BUN BLDV-MCNC: 12 MG/DL (ref 6–23)
CALCIUM SERPL-MCNC: 9.9 MG/DL (ref 8.3–10.6)
CHLORIDE BLD-SCNC: 98 MMOL/L (ref 99–110)
CO2: 26 MMOL/L (ref 21–32)
CREAT SERPL-MCNC: 0.8 MG/DL (ref 0.6–1.1)
GFR SERPL CREATININE-BSD FRML MDRD: >60 ML/MIN/1.73M2
GLUCOSE FASTING: 105 MG/DL (ref 70–99)
POTASSIUM SERPL-SCNC: 4.1 MMOL/L (ref 3.5–5.1)
SODIUM BLD-SCNC: 136 MMOL/L (ref 135–145)
TOTAL PROTEIN: 7.9 GM/DL (ref 6.4–8.2)

## 2023-01-23 PROCEDURE — 80053 COMPREHEN METABOLIC PANEL: CPT

## 2023-01-23 PROCEDURE — 36415 COLL VENOUS BLD VENIPUNCTURE: CPT

## 2023-01-24 ENCOUNTER — TELEPHONE (OUTPATIENT)
Dept: INTERNAL MEDICINE CLINIC | Age: 63
End: 2023-01-24

## 2023-01-26 ENCOUNTER — HOSPITAL ENCOUNTER (OUTPATIENT)
Age: 63
End: 2023-01-26
Payer: MEDICARE

## 2023-01-26 ENCOUNTER — HOSPITAL ENCOUNTER (OUTPATIENT)
Age: 63
Discharge: HOME | End: 2023-01-26
Payer: MEDICARE

## 2023-01-26 LAB
ABO/RH: NORMAL
ANION GAP SERPL CALCULATED.3IONS-SCNC: 14 MMOL/L (ref 4–16)
ANTIBODY SCREEN: NEGATIVE
BASOPHILS ABSOLUTE: 0.1 K/CU MM
BASOPHILS RELATIVE PERCENT: 1.1 % (ref 0–1)
BUN BLDV-MCNC: 10 MG/DL (ref 6–23)
CALCIUM SERPL-MCNC: 9.2 MG/DL (ref 8.3–10.6)
CHLORIDE BLD-SCNC: 90 MMOL/L (ref 99–110)
CO2: 21 MMOL/L (ref 21–32)
COMMENT: NORMAL
CREAT SERPL-MCNC: 0.7 MG/DL (ref 0.6–1.1)
DIFFERENTIAL TYPE: ABNORMAL
EOSINOPHILS ABSOLUTE: 0.1 K/CU MM
EOSINOPHILS RELATIVE PERCENT: 0.6 % (ref 0–3)
GFR SERPL CREATININE-BSD FRML MDRD: >60 ML/MIN/1.73M2
GLUCOSE FASTING: 95 MG/DL (ref 70–99)
HCT VFR BLD CALC: 37.4 % (ref 37–47)
HEMOGLOBIN: 12.7 GM/DL (ref 12.5–16)
IMMATURE NEUTROPHIL %: 0.4 % (ref 0–0.43)
LYMPHOCYTES ABSOLUTE: 2.6 K/CU MM
LYMPHOCYTES RELATIVE PERCENT: 32.3 % (ref 24–44)
MCH RBC QN AUTO: 28.4 PG (ref 27–31)
MCHC RBC AUTO-ENTMCNC: 34 % (ref 32–36)
MCV RBC AUTO: 83.7 FL (ref 78–100)
MONOCYTES ABSOLUTE: 0.5 K/CU MM
MONOCYTES RELATIVE PERCENT: 6.4 % (ref 0–4)
PDW BLD-RTO: 12.8 % (ref 11.7–14.9)
PLATELET # BLD: 285 K/CU MM (ref 140–440)
PMV BLD AUTO: 8.3 FL (ref 7.5–11.1)
POTASSIUM SERPL-SCNC: 4.2 MMOL/L (ref 3.5–5.1)
RBC # BLD: 4.47 M/CU MM (ref 4.2–5.4)
SEGMENTED NEUTROPHILS ABSOLUTE COUNT: 4.7 K/CU MM
SEGMENTED NEUTROPHILS RELATIVE PERCENT: 59.2 % (ref 36–66)
SODIUM BLD-SCNC: 125 MMOL/L (ref 135–145)
TOTAL IMMATURE NEUTOROPHIL: 0.03 K/CU MM
WBC # BLD: 8 K/CU MM (ref 4–10.5)

## 2023-01-26 PROCEDURE — 85025 COMPLETE CBC W/AUTO DIFF WBC: CPT

## 2023-01-26 PROCEDURE — 86900 BLOOD TYPING SEROLOGIC ABO: CPT

## 2023-01-26 PROCEDURE — 36415 COLL VENOUS BLD VENIPUNCTURE: CPT

## 2023-01-26 PROCEDURE — 80048 BASIC METABOLIC PNL TOTAL CA: CPT

## 2023-01-26 PROCEDURE — 86901 BLOOD TYPING SEROLOGIC RH(D): CPT

## 2023-01-26 PROCEDURE — 86850 RBC ANTIBODY SCREEN: CPT

## 2023-01-30 ENCOUNTER — HOSPITAL ENCOUNTER (OUTPATIENT)
Dept: CARDIAC CATH/INVASIVE PROCEDURES | Age: 63
Discharge: HOME | End: 2023-01-30
Attending: INTERNAL MEDICINE | Admitting: INTERNAL MEDICINE
Payer: MEDICARE

## 2023-01-30 VITALS
WEIGHT: 165 LBS | HEIGHT: 61 IN | OXYGEN SATURATION: 98 % | TEMPERATURE: 95.9 F | DIASTOLIC BLOOD PRESSURE: 92 MMHG | RESPIRATION RATE: 14 BRPM | SYSTOLIC BLOOD PRESSURE: 152 MMHG | BODY MASS INDEX: 31.15 KG/M2 | HEART RATE: 84 BPM

## 2023-01-30 LAB
ALBUMIN SERPL-MCNC: 4.6 GM/DL (ref 3.4–5)
ALP BLD-CCNC: 136 IU/L (ref 40–129)
ALT SERPL-CCNC: 13 U/L (ref 10–40)
ANION GAP SERPL CALCULATED.3IONS-SCNC: 10 MMOL/L (ref 4–16)
AST SERPL-CCNC: 13 IU/L (ref 15–37)
BILIRUB SERPL-MCNC: 0.3 MG/DL (ref 0–1)
BUN BLDV-MCNC: 9 MG/DL (ref 6–23)
CALCIUM SERPL-MCNC: 9.5 MG/DL (ref 8.3–10.6)
CHLORIDE BLD-SCNC: 100 MMOL/L (ref 99–110)
CO2: 27 MMOL/L (ref 21–32)
CREAT SERPL-MCNC: 0.9 MG/DL (ref 0.6–1.1)
GFR SERPL CREATININE-BSD FRML MDRD: >60 ML/MIN/1.73M2
GLUCOSE BLD-MCNC: 111 MG/DL (ref 70–99)
POTASSIUM SERPL-SCNC: 4.2 MMOL/L (ref 3.5–5.1)
SODIUM BLD-SCNC: 137 MMOL/L (ref 135–145)
TOTAL PROTEIN: 7.9 GM/DL (ref 6.4–8.2)

## 2023-01-30 PROCEDURE — 80053 COMPREHEN METABOLIC PANEL: CPT

## 2023-01-30 PROCEDURE — 2500000003 HC RX 250 WO HCPCS

## 2023-01-30 PROCEDURE — 75630 X-RAY AORTA LEG ARTERIES: CPT

## 2023-01-30 PROCEDURE — C1894 INTRO/SHEATH, NON-LASER: HCPCS

## 2023-01-30 PROCEDURE — 2709999900 HC NON-CHARGEABLE SUPPLY

## 2023-01-30 PROCEDURE — 36246 INS CATH ABD/L-EXT ART 2ND: CPT

## 2023-01-30 PROCEDURE — 6370000000 HC RX 637 (ALT 250 FOR IP): Performed by: INTERNAL MEDICINE

## 2023-01-30 PROCEDURE — C1769 GUIDE WIRE: HCPCS

## 2023-01-30 PROCEDURE — 2580000003 HC RX 258: Performed by: INTERNAL MEDICINE

## 2023-01-30 PROCEDURE — 6360000002 HC RX W HCPCS

## 2023-01-30 PROCEDURE — 6360000004 HC RX CONTRAST MEDICATION

## 2023-01-30 PROCEDURE — 75605 CONTRAST EXAM THORACIC AORTA: CPT

## 2023-01-30 RX ORDER — ONDANSETRON 2 MG/ML
4 INJECTION INTRAMUSCULAR; INTRAVENOUS EVERY 6 HOURS PRN
Status: DISCONTINUED | OUTPATIENT
Start: 2023-01-30 | End: 2023-01-30 | Stop reason: HOSPADM

## 2023-01-30 RX ORDER — ACETAMINOPHEN 325 MG/1
650 TABLET ORAL EVERY 4 HOURS PRN
Status: DISCONTINUED | OUTPATIENT
Start: 2023-01-30 | End: 2023-01-30 | Stop reason: HOSPADM

## 2023-01-30 RX ORDER — SODIUM CHLORIDE 9 MG/ML
INJECTION, SOLUTION INTRAVENOUS PRN
Status: DISCONTINUED | OUTPATIENT
Start: 2023-01-30 | End: 2023-01-30 | Stop reason: HOSPADM

## 2023-01-30 RX ORDER — SODIUM CHLORIDE 0.9 % (FLUSH) 0.9 %
5-40 SYRINGE (ML) INJECTION EVERY 12 HOURS SCHEDULED
Status: DISCONTINUED | OUTPATIENT
Start: 2023-01-30 | End: 2023-01-30 | Stop reason: HOSPADM

## 2023-01-30 RX ORDER — DIPHENHYDRAMINE HCL 25 MG
25 TABLET ORAL ONCE
Status: COMPLETED | OUTPATIENT
Start: 2023-01-30 | End: 2023-01-30

## 2023-01-30 RX ORDER — SODIUM CHLORIDE 0.9 % (FLUSH) 0.9 %
5-40 SYRINGE (ML) INJECTION PRN
Status: DISCONTINUED | OUTPATIENT
Start: 2023-01-30 | End: 2023-01-30 | Stop reason: HOSPADM

## 2023-01-30 RX ORDER — 0.9 % SODIUM CHLORIDE 0.9 %
500 INTRAVENOUS SOLUTION INTRAVENOUS ONCE
Status: DISCONTINUED | OUTPATIENT
Start: 2023-01-30 | End: 2023-01-30 | Stop reason: HOSPADM

## 2023-01-30 RX ORDER — SODIUM CHLORIDE 9 MG/ML
INJECTION, SOLUTION INTRAVENOUS CONTINUOUS
Status: DISCONTINUED | OUTPATIENT
Start: 2023-01-30 | End: 2023-01-30 | Stop reason: HOSPADM

## 2023-01-30 RX ORDER — DIAZEPAM 5 MG/1
5 TABLET ORAL ONCE
Status: COMPLETED | OUTPATIENT
Start: 2023-01-30 | End: 2023-01-30

## 2023-01-30 RX ADMIN — DIPHENHYDRAMINE HYDROCHLORIDE 25 MG: 25 TABLET ORAL at 10:09

## 2023-01-30 RX ADMIN — SODIUM CHLORIDE: 9 INJECTION, SOLUTION INTRAVENOUS at 10:10

## 2023-01-30 RX ADMIN — DIAZEPAM 5 MG: 5 TABLET ORAL at 10:09

## 2023-01-30 NOTE — PROGRESS NOTES
Patient brought her home medications, including seizure medications. Patient taking all of her home medications right now; patient hypertensive at this time.

## 2023-01-30 NOTE — PROCEDURES
Indication bilateral lower extremity intermittent claudication    Procedure performed:    Abdominal aortogram  Selective Left and Right (lower extremity) femoral angiogram via cathether with run off      EBL: 5 cc    Description of procedure:  After informed consent was obtained the patient was brought to cardiac Cath Lab in a fasting state. Bilateral groins were draped prepped and sterile and usual fashion. Rt common femoral artery was accessed via ultrasound with 4 Croatian sheath. On ultrasound the rt common femoral artery was noted to be patent, needle was visualized accessing the artery. USG images were saved for medical records       This was followed by insertion of advantage wire and rim catheter. Distal aortogram was performed which revealed patent distal aorta bifurcating into left and right common iliac artery. Renal arteries bilateral were noted be widely patent     Peripheral angiogram revealed bilateral patent common internal and external iliac arteries common femoral arteries superficial femoral artery popliteal arteries three-vessel runoff in lower extremities.        Conclusion:    Normal lower extremity peripheral angiogram    Recommend medical management

## 2023-01-30 NOTE — PROGRESS NOTES
Discharge instructions reviewed with patient and family per Coretta Dubin. Voices understanding. Ambulated to  without difficulty. Right groin site remains free from any active bleeding, oozing, or hematoma noted.

## 2023-01-30 NOTE — DISCHARGE INSTRUCTIONS
Discharge Home Instuctions  Name:Génesis Banks  :1960    Your instructions:    Shower only for the first 5 days, NO TUB BATHS. Wash procedure site with mild soap, rinse and pat dry. Do not rub over the procedure site for two (2) days. Remove dressing and replace with a band-aid in 2 days. Site observations-Observe the area for bleeding or hematoma (lump under the skin caused by bleeding.)      A. Painless bruising is normal.  B. If a firmness/swelling seems to be increasing or there is bright red bleeding from site       (hold pressure over procedure site) and  CALL 911 IMMEDIATELY. What to do after you leave the hospital:    Recommended activity: no lifting, Driving, or Strenuous exercise for 3 days. DO NOT lift more than 10lbs. For your procedure you may have been given a sedative to help you relax. This drug will make you sleepy. It is usually given in a vein (by IV). Don't do anything for 24 hours that requires attention to detail. It takes time for the medicine effects to completely wear off. Do not sign any legally binding contracts or documents over the next 24 hours. For your safety, you should not drive or operate any machinery that could be dangerous until the medicine wears off and you can think clearly and react easily. Follow-up with physician in 7-10 days. Take your medication as ordered. If you have any questions, you may call [phone] or your physician's office.

## 2023-01-30 NOTE — H&P
Kiara Gan MD                                  CARDIOLOGY  NOTE       Chief Complaint:    LE pain    Patient continues to have lower extremity pains and aches especially with ambulation. Recent hospitalization for hyponatremia. Off of hydrochlorothiazide       MPI 11/30/2020     Summary    Supervising physician Dr. Meli Rojas . No infarct or ischemia noted. Normal tracer uptake in all segments of myocardium on stress and rest    images. Mild breast tissue attenuation noted    No infarct or ischemia noted. Normal EF 75 % with normal ventricular contractility. Recommendation    Medical Management    Follow up     Echocardiogram 12/1/2020     Summary   Left ventricular systolic function is normal with an ejection fraction of   55-60%. Grade I diastolic dysfunction. No significant valvular disease. Normal pulmonary artery pressure with a RVSP of 30mmHg   No evidence of pericardial effusion. JERMAINE 12/1/2020      Procedure       Type of Study:        Extremities Arteries:Lower Extremities Arterial Duplex, VL LOWER EXTREMITY    ARTERIES BILATERAL, Lower Arterial Plethysmography, VL ANKLE ART BRACHIAL    INDICES EXTREMITY BILATERAL. Indications for Study:Claudication. Conclusions        Summary        No evidence of significant occlusive arterial disease. Normal bilateral lower extremity ankle brachial indices. Recent HPI:     Génesis is a 65y.o. year old female who presents to establish care for shortness of breath with exertion. Patient has a history of hypertension, diet-controlled hyperlipidemia, chronic nicotine abuse, recently diagnosed COPD, chronic dizziness. As per patient she has been feeling fatigued and short of breath for the past several months. Patient denies any dependent edema, denies any PND or orthopnea. She mentions that she can walk up to half a block before she gets short of breath.   She can hardly ambulate 1 flight of stairs before she has to catch breath. She denies any sick contacts fever chills. Patient also complains of bilateral lower extremity intermittent claudication with ambulation. He denies any history of open ulcers. Patient denies any chest pain  No Palpitations     EK2020 sinus rhythm without any ST-T changes. Current Facility-Administered Medications   Medication Dose Route Frequency Provider Last Rate Last Admin    0.9 % sodium chloride infusion   IntraVENous Continuous Yoel Rosado MD 75 mL/hr at 23 1010 New Bag at 23 1010       Allergies:     Thiazide-type diuretics, Varenicline, and Chantix [varenicline tartrate]    Patient History:    Past Medical History:   Diagnosis Date    COPD (chronic obstructive pulmonary disease) ([facility] [address]     Epilepsy ([facility] [address]     Full dentures     upper plate only    H/O dizziness     \"take Meclazine at least twice a day for this\"    H/O Doppler JERMAINE's ultrasound 2020     No evidence of significant occlusive arterial disease of lower EXT. H/O echocardiogram 2020    EF 55-60%, Normal study. History of drug abuse ([facility] [address]     History of nuclear stress test 2020    Normal study.     History of UTI     \"last one May 2020\"    Hypertension     \"the top # use to be over 200 \"- on medication since \"    Seizures ([facility] [address]     hx of epilepsy- last seizure week 2020- hx grand mal seizures    Wears glasses      Past Surgical History:   Procedure Laterality Date    BRAIN SURGERY      s/p L anterior temporal lobectomy in \"did surgery to try an treat the seizures\"    CYSTOSCOPY N/A 10/14/2021    with a bladder biopsy-Dr. Sukhwinder Dee in [facility] 10/14/2021    CYSTOSCOPY BLADDER BIOPSY performed by Rodger Sarah MD at [address] [facility] ([facility])      \"not sure if they took the ovaries\"    [address] [facility] Left 2020    LEFT WRIST TENDON RELEASE performed by Timbo Lui DO at [address] [facility] History   Problem Relation Age of Onset    Heart Disease Father         cabg    Breast Cancer Maternal Cousin     Ovarian Cancer Neg Hx      Social History     Tobacco Use    Smoking status: Every Day     Packs/day: 1.50     Years: 40.00     Pack years: 60.00     Types: Cigarettes     Last attempt to quit: 2020     Years since quittin.4    Smokeless tobacco: Never   Substance Use Topics    Alcohol use: No     Comment: per pt on 2020\"quit drinking age 40- use to drink on weekends\"        Review of Systems:     Constitutional:  No Fever or Weight Loss + intermittent claudication  Eyes: No Decreased Vision  ENT: No Headaches, Hearing Loss or Vertigo  Cardiovascular: No Chest Pain,  + Shortness of breath, No Palpitations. No Edema   Respiratory: No cough or wheezing . No Respiratory distress   Gastrointestinal: No abdominal pain, appetite loss, blood in stools, constipation, diarrhea or heartburn  Genitourinary: No dysuria, trouble voiding, or hematuria  Musculoskeletal:  denies any new  joint aches , or pain   Integumentary: No rash or pruritis  Neurological: No TIA or stroke symptoms  Psychiatric: No anxiety or depression  Endocrine: No malaise, fatigue or temperature intolerance  Hematologic/Lymphatic: No bleeding problems, blood clots or swollen lymph nodes  Allergic/Immunologic: No nasal congestion or hives        Objective:      Physical Exam:    BP (!) (P) 151/106   Pulse 84   Temp (!) 95.9 °F (35.5 °C) (Temporal)   Resp 19   Ht 5' 1\" (1.549 m)   Wt 165 lb (74.8 kg)   SpO2 98%   BMI 31.18 kg/m²   Wt Readings from Last 3 Encounters:   23 165 lb (74.8 kg)   23 164 lb 11.2 oz (74.7 kg)   23 164 lb 9.6 oz (74.7 kg)     Body mass index is 31.18 kg/m². Vitals:    23 1016   BP: (!) (P) 151/106   Pulse: 84   Resp: 19   Temp:    SpO2:         General Appearance and Constitutional: Conversant, Well developed, Well nourished, No acute distress, Non-toxic appearance.    HEENT: Normocephalic, Atraumatic, Bilateral external ears normal, Oropharynx moist, No oral exudates,   Nose normal.   Neck- Normal range of motion, No tenderness, Supple  Eyes:  Conjunctiva normal, No discharge. Respiratory:  Normal breath sounds, No respiratory distress, No wheezing, No Rales, No Ronchi. No chest tenderness. Cardiovascular: S1-S2, no added heart sounds, No Mumurs appreciated. No gallops, rubs. No Pedal Edema   GI:  Bowel sounds normal, Soft, No tenderness,  :  No costovertebral angle tenderness   Musculoskeletal:  No gross deformities. Back- No tenderness  Integument:  Well hydrated, no rash   Lymphatic:  No lymphadenopathy noted   Neurologic:  Alert & oriented x 3, Normal motor function, normal sensory function, no focal deficits noted   Psychiatric:  Speech and behavior appropriate       Medical decision making and Data review:    DATA:    Lab Results   Component Value Date    TROPONINT <0.010 08/05/2022     BNP:    Lab Results   Component Value Date    PROBNP 130.1 08/06/2022     PT/INR:  No results found for: PTINR  Lab Results   Component Value Date    LABA1C 6.1 05/23/2022    LABA1C 5.7 03/09/2021     Lab Results   Component Value Date    CHOL 196 04/04/2013    TRIG 126 04/04/2013    HDL 73 05/23/2022    LDLCALC 119 (H) 05/23/2022     Lab Results   Component Value Date    WBC 8.0 01/26/2023    HGB 12.7 01/26/2023    HCT 37.4 01/26/2023    MCV 83.7 01/26/2023     01/26/2023     TSH:   Lab Results   Component Value Date    TSH 1.70 11/18/2020     Lab Results   Component Value Date    AST 13 (L) 01/30/2023    ALT 13 01/30/2023    BILIDIR 0.2 08/08/2022    BILITOT 0.3 01/30/2023    ALKPHOS 136 (H) 01/30/2023         All labs, medications and tests reviewed by myself including data and history from outside source , patient and available family . Intermittent claudication of bilateral lower extremities. ABIs Normal last year.   Patient continues to have symptomatic calf muscle pain with ambulation.    Cont ASA  Former smoker  Patient could not get into CTA abdominal runoff  Patient continues to have bilateral calf muscle aches and pains specially with ambulation  patient for peripheral angiogram

## 2023-01-30 NOTE — FLOWSHEET NOTE
Pt to pt  in wheelchair per RN for d/c home in private vehicle with sig other. Right groin free of bleeding/hematoma at time of d/c

## 2023-01-30 NOTE — PROGRESS NOTES
Received from cath lab. Monitor and alarms on. Call Light in reach. Bed in the lowest position. Family at bedside. Procedure site assessment completed per Benito Jimenez RN and Lynne Fuentes RN. No hematoma or bleeding noted.

## 2023-02-15 DIAGNOSIS — M81.0 AGE-RELATED OSTEOPOROSIS WITHOUT CURRENT PATHOLOGICAL FRACTURE: ICD-10-CM

## 2023-02-15 DIAGNOSIS — G40.109 PARTIAL SYMPTOMATIC EPILEPSY WITH SIMPLE PARTIAL SEIZURES, NOT INTRACTABLE, WITHOUT STATUS EPILEPTICUS (HCC): ICD-10-CM

## 2023-02-15 RX ORDER — LEVETIRACETAM 250 MG/1
TABLET ORAL
Qty: 60 TABLET | Refills: 5 | OUTPATIENT
Start: 2023-02-15

## 2023-02-15 RX ORDER — ALENDRONATE SODIUM 70 MG/1
TABLET ORAL
Qty: 4 TABLET | Refills: 1 | Status: SHIPPED | OUTPATIENT
Start: 2023-02-15

## 2023-03-01 ENCOUNTER — HOSPITAL ENCOUNTER (OUTPATIENT)
Age: 63
Discharge: HOME | End: 2023-03-01
Payer: MEDICARE

## 2023-03-01 DIAGNOSIS — E87.1 HYPONATREMIA: ICD-10-CM

## 2023-03-01 LAB
ALBUMIN SERPL-MCNC: 4.4 GM/DL (ref 3.4–5)
ALP BLD-CCNC: 116 IU/L (ref 40–129)
ALT SERPL-CCNC: 10 U/L (ref 10–40)
ANION GAP SERPL CALCULATED.3IONS-SCNC: 9 MMOL/L (ref 4–16)
AST SERPL-CCNC: 15 IU/L (ref 15–37)
BILIRUB SERPL-MCNC: 0.2 MG/DL (ref 0–1)
BUN SERPL-MCNC: 12 MG/DL (ref 6–23)
CALCIUM SERPL-MCNC: 9.6 MG/DL (ref 8.3–10.6)
CHLORIDE BLD-SCNC: 99 MMOL/L (ref 99–110)
CO2: 26 MMOL/L (ref 21–32)
CREAT SERPL-MCNC: 0.8 MG/DL (ref 0.6–1.1)
GFR SERPL CREATININE-BSD FRML MDRD: >60 ML/MIN/1.73M2
GLUCOSE SERPL-MCNC: 96 MG/DL (ref 70–99)
POTASSIUM SERPL-SCNC: 4.3 MMOL/L (ref 3.5–5.1)
SODIUM BLD-SCNC: 134 MMOL/L (ref 135–145)
TOTAL PROTEIN: 7.8 GM/DL (ref 6.4–8.2)

## 2023-03-01 PROCEDURE — 36415 COLL VENOUS BLD VENIPUNCTURE: CPT

## 2023-03-01 PROCEDURE — 80053 COMPREHEN METABOLIC PANEL: CPT

## 2023-03-02 ENCOUNTER — OFFICE VISIT (OUTPATIENT)
Dept: CARDIOLOGY CLINIC | Age: 63
End: 2023-03-02
Payer: MEDICARE

## 2023-03-02 VITALS
BODY MASS INDEX: 32.06 KG/M2 | HEIGHT: 61 IN | SYSTOLIC BLOOD PRESSURE: 152 MMHG | WEIGHT: 169.8 LBS | HEART RATE: 78 BPM | DIASTOLIC BLOOD PRESSURE: 102 MMHG

## 2023-03-02 DIAGNOSIS — R06.02 SHORTNESS OF BREATH: ICD-10-CM

## 2023-03-02 DIAGNOSIS — E66.09 CLASS 1 OBESITY DUE TO EXCESS CALORIES WITHOUT SERIOUS COMORBIDITY WITH BODY MASS INDEX (BMI) OF 33.0 TO 33.9 IN ADULT: ICD-10-CM

## 2023-03-02 DIAGNOSIS — E87.1 HYPONATREMIA: ICD-10-CM

## 2023-03-02 DIAGNOSIS — I10 ESSENTIAL HYPERTENSION: Primary | ICD-10-CM

## 2023-03-02 DIAGNOSIS — Z72.0 NICOTINE ABUSE: ICD-10-CM

## 2023-03-02 PROCEDURE — 3080F DIAST BP >= 90 MM HG: CPT | Performed by: INTERNAL MEDICINE

## 2023-03-02 PROCEDURE — 99214 OFFICE O/P EST MOD 30 MIN: CPT | Performed by: INTERNAL MEDICINE

## 2023-03-02 PROCEDURE — G8417 CALC BMI ABV UP PARAM F/U: HCPCS | Performed by: INTERNAL MEDICINE

## 2023-03-02 PROCEDURE — 93000 ELECTROCARDIOGRAM COMPLETE: CPT | Performed by: INTERNAL MEDICINE

## 2023-03-02 PROCEDURE — G8484 FLU IMMUNIZE NO ADMIN: HCPCS | Performed by: INTERNAL MEDICINE

## 2023-03-02 PROCEDURE — 4004F PT TOBACCO SCREEN RCVD TLK: CPT | Performed by: INTERNAL MEDICINE

## 2023-03-02 PROCEDURE — 3077F SYST BP >= 140 MM HG: CPT | Performed by: INTERNAL MEDICINE

## 2023-03-02 PROCEDURE — G8427 DOCREV CUR MEDS BY ELIG CLIN: HCPCS | Performed by: INTERNAL MEDICINE

## 2023-03-02 PROCEDURE — 3017F COLORECTAL CA SCREEN DOC REV: CPT | Performed by: INTERNAL MEDICINE

## 2023-03-02 RX ORDER — METOPROLOL SUCCINATE 25 MG/1
25 TABLET, EXTENDED RELEASE ORAL DAILY
Qty: 30 TABLET | Refills: 3 | Status: SHIPPED | OUTPATIENT
Start: 2023-03-02

## 2023-03-02 NOTE — PROGRESS NOTES
Bob Maddox MD                                  CARDIOLOGY  NOTE       Chief Complaint:    Chief Complaint   Patient presents with    Hypertension    Hyperlipidemia    Shortness of Breath     No chest pain,SOB,dizziness,swelling,palpitations       Follow-up hypertension  Denies any chest pain shortness of breath or palpitation    Peripheral Angiogram 2/2/2023    Peripheral angiogram revealed bilateral patent common internal and external iliac arteries common femoral arteries superficial femoral artery popliteal arteries three-vessel runoff in lower extremities. Conclusion:  Normal lower extremity peripheral angiogram  Recommend medical management        MPI 11/30/2020     Summary    Supervising physician Dr. Sagar Vasquez . No infarct or ischemia noted. Normal tracer uptake in all segments of myocardium on stress and rest    images. Mild breast tissue attenuation noted    No infarct or ischemia noted. Normal EF 75 % with normal ventricular contractility. Recommendation    Medical Management    Follow up     Echocardiogram 12/1/2020     Summary   Left ventricular systolic function is normal with an ejection fraction of   55-60%. Grade I diastolic dysfunction. No significant valvular disease. Normal pulmonary artery pressure with a RVSP of 30mmHg   No evidence of pericardial effusion. JERMAINE 12/1/2020      Procedure       Type of Study:        Extremities Arteries:Lower Extremities Arterial Duplex, VL LOWER EXTREMITY    ARTERIES BILATERAL, Lower Arterial Plethysmography, VL ANKLE ART BRACHIAL    INDICES EXTREMITY BILATERAL. Indications for Study:Claudication. Conclusions        Summary        No evidence of significant occlusive arterial disease. Normal bilateral lower extremity ankle brachial indices. Recent HPI:     Génesis is a 65y.o. year old female who presents to establish care for shortness of breath with exertion.     Patient has a history of hypertension, diet-controlled hyperlipidemia, chronic nicotine abuse, recently diagnosed COPD, chronic dizziness. As per patient she has been feeling fatigued and short of breath for the past several months. Patient denies any dependent edema, denies any PND or orthopnea. She mentions that she can walk up to half a block before she gets short of breath. She can hardly ambulate 1 flight of stairs before she has to catch breath. She denies any sick contacts fever chills. Patient also complains of bilateral lower extremity intermittent claudication with ambulation. He denies any history of open ulcers. Patient denies any chest pain  No Palpitations     EK2020 sinus rhythm without any ST-T changes. Current Outpatient Medications   Medication Sig Dispense Refill    metoprolol succinate (TOPROL XL) 25 MG extended release tablet Take 1 tablet by mouth daily 30 tablet 3    tolvaptan (SAMSCA) 15 MG TABS tablet Take 1 tablet by mouth See Admin Instructions Sundays and  30 tablet 5    alendronate (FOSAMAX) 70 MG tablet TAKE 1 TABLET BY MOUTH EVERY 7 DAYS WITH A FULL GLASS OF WATER, 30 MINUTES BEFORE EATING. DO NOT LIE DOWN FOR AT LEAST 30 MINUTES 4 tablet 1    eslicarbazepine acetate (APTIOM) 800 MG tablet Continue Aptiom 800 mg at bedtime until on Xcopri 100 mg dose and then start weaning off of Aptiom by 100-200 mg weekly until it is discontinued.  30 tablet 4    rOPINIRole (REQUIP) 0.5 MG tablet TAKE 1 TABLET BY MOUTH EVERY EVENING 8 tablet 5    levETIRAcetam (KEPPRA) 750 MG tablet TAKE 1 TABLET BY MOUTH 2 TIMES DAILY 60 tablet 3    oxybutynin (DITROPAN-XL) 10 MG extended release tablet TAKE 1 TABLET BY MOUTH DAILY 28 tablet 5    Cenobamate (XCOPRI) 14 x 12.5 MG & 14 x 25 MG TBPK XCOPRI titration pack 12.5mg/25mg  Take 12.5 mg once daily by mouth for 2 weeks  Then take 25 mg once daily by mouth for 2 weeks 28 each 0    Cenobamate (XCOPRI) 14 x 50 MG & 14 x100 MG TBPK Do not fill until xcopri 12.5/25 mg is complete    Xcopri titration pack  50 mg/100 mg  Take 50 mg once daily x 2 weeks  Then take 100 mg once daily by mouth x 2 weeks 28 each 0    Cenobamate (XCOPRI) 14 x 150 MG & 14 x200 MG TBPK Do not fill until xcopri 50 mg/100 mg titration pack completed    Xcopri titration pack 150 mg/200 mg  Take 150 mg once daily by mouth x 2 weeks  Then take 200 mg once daily by mouth x 2 weeks 28 each 0    Cenobamate (XCOPRI) 200 MG TABS Take 200 mg by mouth daily Do not fill until xcopri titration pack 150 mg/200 mg is complete Max Daily Amount: 200 mg 30 tablet 5    levETIRAcetam (KEPPRA) 250 MG tablet TAKE 1 TABLET BY MOUTH 2 TIMES DAILY WITH  MG TABLET FOR TOTAL OF 1000 MG 30 tablet 3    amLODIPine (NORVASC) 5 MG tablet Take 1 tablet by mouth in the morning and at bedtime 60 tablet 5    omeprazole (PRILOSEC) 40 MG delayed release capsule TAKE ONE CAPSULE BY MOUTH EVERY DAY BEFORE BREAKFAST 28 capsule 11    ARIPiprazole (ABILIFY) 5 MG tablet TAKE ONE TABLET BY MOUTH EVERY DAY 28 tablet 5    olmesartan (BENICAR) 40 MG tablet Take 1 tablet by mouth daily 30 tablet 5    magnesium oxide (MAG-OX) 400 MG tablet TAKE ONE TABLET BY MOUTH EVERY DAY 30 tablet 3    nortriptyline (PAMELOR) 25 MG capsule Take 2 capsules by mouth nightly 60 capsule 5     No current facility-administered medications for this visit. Allergies:     Thiazide-type diuretics, Varenicline, and Chantix [varenicline tartrate]    Patient History:    Past Medical History:   Diagnosis Date    COPD (chronic obstructive pulmonary disease) ([facility] [address]     Epilepsy ([facility] [address]     Full dentures     upper plate only    H/O dizziness     \"take Meclazine at least twice a day for this\"    H/O Doppler JERMAINE's ultrasound 12/01/2020     No evidence of significant occlusive arterial disease of lower EXT. H/O echocardiogram 12/01/2020    EF 55-60%, Normal study. History of drug abuse ([facility] [address]     History of nuclear stress test 11/30/2020    Normal study.     History of UTI \"last one May 2020\"    Hypertension     \"the top # use to be over 200 \"- on medication since 2018\"    Seizures ([address]     hx of epilepsy- last seizure week 2020- hx grand mal seizures    Wears glasses      Past Surgical History:   Procedure Laterality Date    BRAIN SURGERY      s/p L anterior temporal lobectomy in \"did surgery to try an treat the seizures\"    CYSTOSCOPY N/A 10/14/2021    with a bladder biopsy-Dr. Lia Pleitez in [address] [facility] 10/14/2021    CYSTOSCOPY BLADDER BIOPSY performed by Fina Shepherd MD at [address] ([address] [place] [address])      \"not sure if they took the ovaries\"    [address]    WRIST SURGERY Left 2020    LEFT WRIST TENDON RELEASE performed by April Caceres DO at [address] [facility] History   Problem Relation Age of Onset    Heart Disease Father         cabg    Breast Cancer Maternal Cousin     Ovarian Cancer Neg Hx      Social History     Tobacco Use    Smoking status: Every Day     Packs/day: 0.25     Years: 40.00     Pack years: 10.00     Types: Cigarettes     Last attempt to quit: 2020     Years since quittin.5    Smokeless tobacco: Never   Substance Use Topics    Alcohol use: No     Comment: per pt on 2020\"quit drinking age 40- use to drink on weekends\"        Review of Systems:     Constitutional:  No Fever or Weight Loss + intermittent claudication  Eyes: No Decreased Vision  ENT: No Headaches, Hearing Loss or Vertigo  Cardiovascular: No Chest Pain,  + Shortness of breath, No Palpitations. No Edema   Respiratory: No cough or wheezing .  No Respiratory distress   Gastrointestinal: No abdominal pain, appetite loss, blood in stools, constipation, diarrhea or heartburn  Genitourinary: No dysuria, trouble voiding, or hematuria  Musculoskeletal:  denies any new  joint aches , or pain   Integumentary: No rash or pruritis  Neurological: No TIA or stroke symptoms  Psychiatric: No anxiety or depression  Endocrine: No malaise, fatigue or temperature intolerance  Hematologic/Lymphatic: No bleeding problems, blood clots or swollen lymph nodes  Allergic/Immunologic: No nasal congestion or hives        Objective:      Physical Exam:    BP (!) 152/102   Pulse 78   Ht 5' 1.2\" (1.554 m)   Wt 169 lb 12.8 oz (77 kg)   BMI 31.87 kg/m²   Wt Readings from Last 3 Encounters:   03/02/23 169 lb 12.8 oz (77 kg)   01/30/23 165 lb (74.8 kg)   01/11/23 164 lb 11.2 oz (74.7 kg)     Body mass index is 31.87 kg/m². Vitals:    03/02/23 1348   BP: (!) 152/102   Pulse:         General Appearance and Constitutional: Conversant, Well developed, Well nourished, No acute distress, Non-toxic appearance. HEENT:  Normocephalic, Atraumatic, Bilateral external ears normal, Oropharynx moist, No oral exudates,   Nose normal.   Neck- Normal range of motion, No tenderness, Supple  Eyes:  Conjunctiva normal, No discharge. Respiratory:  Normal breath sounds, No respiratory distress, No wheezing, No Rales, No Ronchi. No chest tenderness. Cardiovascular: S1-S2, no added heart sounds, No Mumurs appreciated. No gallops, rubs. No Pedal Edema   GI:  Bowel sounds normal, Soft, No tenderness,  :  No costovertebral angle tenderness   Musculoskeletal:  No gross deformities.  Back- No tenderness  Integument:  Well hydrated, no rash   Lymphatic:  No lymphadenopathy noted   Neurologic:  Alert & oriented x 3, Normal motor function, normal sensory function, no focal deficits noted   Psychiatric:  Speech and behavior appropriate       Medical decision making and Data review:    DATA:    Lab Results   Component Value Date    TROPONINT <0.010 08/05/2022     BNP:    Lab Results   Component Value Date    PROBNP 130.1 08/06/2022     PT/INR:  No results found for: PTINR  Lab Results   Component Value Date    LABA1C 6.1 05/23/2022    LABA1C 5.7 03/09/2021     Lab Results   Component Value Date    CHOL 196 04/04/2013    TRIG 126 04/04/2013    HDL 73 05/23/2022    LDLCALC 119 (H) 05/23/2022     Lab Results   Component Value Date    WBC 8.0 01/26/2023    HGB 12.7 01/26/2023    HCT 37.4 01/26/2023    MCV 83.7 01/26/2023     01/26/2023     TSH:   Lab Results   Component Value Date    TSH 1.70 11/18/2020     Lab Results   Component Value Date    AST 15 03/01/2023    ALT 10 03/01/2023    BILIDIR 0.2 08/08/2022    BILITOT 0.2 03/01/2023    ALKPHOS 116 03/01/2023         All labs, medications and tests reviewed by myself including data and history from outside source , patient and available family . 1. Essential hypertension    2. Shortness of breath    3. Hyponatremia    4. Class 1 obesity due to excess calories without serious comorbidity with body mass index (BMI) of 33.0 to 33.9 in adult    5. Nicotine abuse             Impression and Plan:      Shortness of breath: Resolved    Patient was previously on oxygen for presumed COPD. Off of oxygen. Pulm medicine follow-up  Echo and Stress MPI within normal limits. Essential hypertension:      Patient was previously hospitalized for hyponatremia. Hydrochlorothiazide was discontinued. Continue with olmesartan 40 mg daily  Currently on Norvasc 5 twice daily  Add low-dose Toprol-XL 25 daily    Hyponatremia: Patient follows up with Dr. Mundo Sales on Samsca    Intermittent claudication of bilateral lower extremities. Patient underwent peripheral angiogram which revealed normal peripheral angiogram with three-vessel runoff bilaterally. She was counseled         history of smoking: Patient has resumed smoking. He was she was counseled again about quitting. Now using marijuana . counseled against use. Obesity with BMI of 31.74. Counseled about low-salt low-fat diet and exercise as tolerated    Return in about 6 months (around 9/2/2023). Counseled extensively and medication compliance urged. We discussed that for the  prevention of ASCVD our  goal is aggressive risk modification. Patient is encouraged to exercise even a brisk walk for 30 minutes  at least 3 to 4 times a week   Various goals were discussed and questions answered. Continue current medications. Appropriate prescriptions are addressed and refills ordered. Questions answered and patient verbalizes understanding. Call for any problems, questions, or concerns.

## 2023-03-11 DIAGNOSIS — G40.109 PARTIAL SYMPTOMATIC EPILEPSY WITH SIMPLE PARTIAL SEIZURES, NOT INTRACTABLE, WITHOUT STATUS EPILEPTICUS (HCC): ICD-10-CM

## 2023-03-13 RX ORDER — LEVETIRACETAM 250 MG/1
TABLET ORAL
Qty: 60 TABLET | Refills: 5 | Status: SHIPPED | OUTPATIENT
Start: 2023-03-13

## 2023-03-14 ENCOUNTER — TELEPHONE (OUTPATIENT)
Dept: INTERNAL MEDICINE CLINIC | Age: 63
End: 2023-03-14

## 2023-03-14 DIAGNOSIS — F33.1 MODERATE EPISODE OF RECURRENT MAJOR DEPRESSIVE DISORDER (HCC): ICD-10-CM

## 2023-03-14 RX ORDER — NORTRIPTYLINE HYDROCHLORIDE 25 MG/1
50 CAPSULE ORAL NIGHTLY
Qty: 60 CAPSULE | Refills: 5 | Status: SHIPPED | OUTPATIENT
Start: 2023-03-14

## 2023-03-14 NOTE — TELEPHONE ENCOUNTER
Pharmacy contacted office and requested a refill on the nortriptyline as he is making the pill packs for patient and would like to know if this can be sent.

## 2023-03-16 ENCOUNTER — OFFICE VISIT (OUTPATIENT)
Dept: INTERNAL MEDICINE CLINIC | Age: 63
End: 2023-03-16
Payer: MEDICARE

## 2023-03-16 VITALS
HEIGHT: 61 IN | OXYGEN SATURATION: 100 % | WEIGHT: 168.8 LBS | SYSTOLIC BLOOD PRESSURE: 128 MMHG | BODY MASS INDEX: 31.87 KG/M2 | HEART RATE: 73 BPM | DIASTOLIC BLOOD PRESSURE: 86 MMHG | RESPIRATION RATE: 18 BRPM

## 2023-03-16 DIAGNOSIS — R73.03 PREDIABETES: ICD-10-CM

## 2023-03-16 DIAGNOSIS — E78.5 DYSLIPIDEMIA: ICD-10-CM

## 2023-03-16 DIAGNOSIS — G25.81 RESTLESS LEGS: ICD-10-CM

## 2023-03-16 DIAGNOSIS — E22.2 SIADH (SYNDROME OF INAPPROPRIATE ADH PRODUCTION) (HCC): Primary | ICD-10-CM

## 2023-03-16 DIAGNOSIS — Z12.11 COLON CANCER SCREENING: ICD-10-CM

## 2023-03-16 DIAGNOSIS — I10 ESSENTIAL HYPERTENSION: ICD-10-CM

## 2023-03-16 PROCEDURE — 99214 OFFICE O/P EST MOD 30 MIN: CPT | Performed by: INTERNAL MEDICINE

## 2023-03-16 PROCEDURE — 4004F PT TOBACCO SCREEN RCVD TLK: CPT | Performed by: INTERNAL MEDICINE

## 2023-03-16 PROCEDURE — G8427 DOCREV CUR MEDS BY ELIG CLIN: HCPCS | Performed by: INTERNAL MEDICINE

## 2023-03-16 PROCEDURE — G8484 FLU IMMUNIZE NO ADMIN: HCPCS | Performed by: INTERNAL MEDICINE

## 2023-03-16 PROCEDURE — 3079F DIAST BP 80-89 MM HG: CPT | Performed by: INTERNAL MEDICINE

## 2023-03-16 PROCEDURE — G8417 CALC BMI ABV UP PARAM F/U: HCPCS | Performed by: INTERNAL MEDICINE

## 2023-03-16 PROCEDURE — 3074F SYST BP LT 130 MM HG: CPT | Performed by: INTERNAL MEDICINE

## 2023-03-16 PROCEDURE — 3017F COLORECTAL CA SCREEN DOC REV: CPT | Performed by: INTERNAL MEDICINE

## 2023-03-16 RX ORDER — AMLODIPINE BESYLATE 5 MG/1
5 TABLET ORAL 2 TIMES DAILY
Qty: 180 TABLET | Refills: 1 | Status: SHIPPED | OUTPATIENT
Start: 2023-03-16

## 2023-03-16 RX ORDER — ROPINIROLE 0.5 MG/1
TABLET, FILM COATED ORAL
Qty: 90 TABLET | Refills: 1 | Status: SHIPPED | OUTPATIENT
Start: 2023-03-16

## 2023-03-16 SDOH — ECONOMIC STABILITY: FOOD INSECURITY: WITHIN THE PAST 12 MONTHS, YOU WORRIED THAT YOUR FOOD WOULD RUN OUT BEFORE YOU GOT MONEY TO BUY MORE.: NEVER TRUE

## 2023-03-16 SDOH — ECONOMIC STABILITY: INCOME INSECURITY: HOW HARD IS IT FOR YOU TO PAY FOR THE VERY BASICS LIKE FOOD, HOUSING, MEDICAL CARE, AND HEATING?: SOMEWHAT HARD

## 2023-03-16 SDOH — ECONOMIC STABILITY: FOOD INSECURITY: WITHIN THE PAST 12 MONTHS, THE FOOD YOU BOUGHT JUST DIDN'T LAST AND YOU DIDN'T HAVE MONEY TO GET MORE.: NEVER TRUE

## 2023-03-16 SDOH — ECONOMIC STABILITY: HOUSING INSECURITY
IN THE LAST 12 MONTHS, WAS THERE A TIME WHEN YOU DID NOT HAVE A STEADY PLACE TO SLEEP OR SLEPT IN A SHELTER (INCLUDING NOW)?: NO

## 2023-03-16 ASSESSMENT — PATIENT HEALTH QUESTIONNAIRE - PHQ9
2. FEELING DOWN, DEPRESSED OR HOPELESS: 3
9. THOUGHTS THAT YOU WOULD BE BETTER OFF DEAD, OR OF HURTING YOURSELF: 0
3. TROUBLE FALLING OR STAYING ASLEEP: 3
7. TROUBLE CONCENTRATING ON THINGS, SUCH AS READING THE NEWSPAPER OR WATCHING TELEVISION: 3
SUM OF ALL RESPONSES TO PHQ9 QUESTIONS 1 & 2: 6
SUM OF ALL RESPONSES TO PHQ QUESTIONS 1-9: 15
SUM OF ALL RESPONSES TO PHQ QUESTIONS 1-9: 15
4. FEELING TIRED OR HAVING LITTLE ENERGY: 3
5. POOR APPETITE OR OVEREATING: 0
SUM OF ALL RESPONSES TO PHQ QUESTIONS 1-9: 15
8. MOVING OR SPEAKING SO SLOWLY THAT OTHER PEOPLE COULD HAVE NOTICED. OR THE OPPOSITE, BEING SO FIGETY OR RESTLESS THAT YOU HAVE BEEN MOVING AROUND A LOT MORE THAN USUAL: 0
SUM OF ALL RESPONSES TO PHQ QUESTIONS 1-9: 15
1. LITTLE INTEREST OR PLEASURE IN DOING THINGS: 3
10. IF YOU CHECKED OFF ANY PROBLEMS, HOW DIFFICULT HAVE THESE PROBLEMS MADE IT FOR YOU TO DO YOUR WORK, TAKE CARE OF THINGS AT HOME, OR GET ALONG WITH OTHER PEOPLE: 3
6. FEELING BAD ABOUT YOURSELF - OR THAT YOU ARE A FAILURE OR HAVE LET YOURSELF OR YOUR FAMILY DOWN: 0

## 2023-03-16 NOTE — PROGRESS NOTES
3/16/23    Génesis Banks  1960    Chief Complaint   Patient presents with    Follow-up       History of Present Illness:  Génesis Rowell is a 65 y.o. pleasant lady presenting today with a chief complaint of HTN, hyponatremia. She has a past medical history significant for:  Hyponatremia, on Tolvaptan 15mg BIW   HTN, on Olmesartan 40mg QD, Amlodipine 5mg BID, Metoprolol succinate 25mg QD   HL (,  on 5/23/20220), not on statin  Prediabetes (HbA1C 6.1% on 5/23/2022), not on meds   HFpEF (G1DD, EF 55-60% on TTE 12/1/2020)  Epilepsy (s/p L anterior temporal lobectomy in 1996), on Aptiom 800mg QHS, Keppra 1000mg BID, Xcopri   Depression, on Nortriptyline 50mg QHS   Anxiety, on Abilify 5mg QD   Insomnia, on Hydroxyzine 25-50mg QHS PRN   GERD, on Prilosec 40mg QD   COPD, on Albuterol PRN, Breztri  Chronic hypoxemic respiratory failure, off 3L O2   Osteoporosis (DEXA 10/4/2021 with osteopenia), on Fosamax 70mg weekly  Vertigo  Vitamin D deficiency (level 31 on 3/9/2021), not on supplementation   RLS, on Ropinirole 0.5mg QHS   Urinary incontinence/overactive bladder, on Oxybutynin-XL 10mg QD  Obesity (BMI 31)   S/p partial hysterectomy (cervix status unknown)  S/p L carpal tunnel release (7/30/2020)   Polysubstance abuse (marijuana, PCP, cocaine)   Current smoker  Marijuana use     # Patient was admitted Aug 2022 for symptomatic SIADH. Na was 111. CT chest neg for malignancy. She is s/p hypertonic saline and is now on sodium tabs. Also with K 2.5 and MARY Cr 1.2. Now not taking K-dur. Prior to this admission patient was having some electrolyte disturbances. She did not follow through with close follow up and labs. Is seeing Dr. Rei Baker. Off HCTZ. Limiting fluid intake to < 2L per day. Adjusting AEDs through Neurology. # Patient was diagnosed with epilepsy at age 25. Used to see Epilepsy specialist at [facility] in [place]. Patient does not drive due to h/o seizures.  I referred her to Neurology Epilepsy locally.   She is now on Aptiom (being tapered off), Xcopri (being tapered up), and Keppra.   # Patient was on Abilify, Topamax, Venlafaxine, Cymbalta, and many other meds that she self-discontinued. She reports she is only depressed but does not have bipolar. Patient's mood continues to be down. ? Has tardive dyskinesia but not taking Benztropine. Has h/o non-compliance with pills. She is taking Nortriptyline only. I referred to Psychiatry. She is interested in seeing someone at [facility]. Waiting to hear back. She is seeing Dr. Guzman as well.   # Patient having difficulty sleeping. Taking Hydroxyzine and Melatonin.  Sleep study negative.    # BLE pain. Normal peripheral angiogram (Feb 2023) through Cardiology.  Has not had EMG done.   However Ropinirole has helped tremendously.   # Meclizine has not helped with vertigo. Discontinued.   # Quit smoking recently through Nicotine patch.    # Seeing Urology for incontinence. She is on Oxybutynin. Had surgery done May 2022.   # She has prediabetes and dyslipidemia. Not on meds.  # Patient is not on vitamin D supplements despite low vitamin D level years ago. Taking Fosamax for OP. DEXA not in chart.   # Patient is s/p L carpal tunnel release.   # Was complaining of worsening exertional dyspnea. TTE at [facility] in 2019 unremarkable. Stress test unremarkable. PFT shows obstruction wo BD response. She is seeing Dr. Wilson from Cardiology.   She has COPD. Following with Pulm for BD.   She was on 3L O2 but recently during hospitalization Aug 2022 this was discontinued.   CT lung screening with 4mm LN being followed.   Sleep study with AHI 0.0   # Colonoscopy done in 2017 with tubular adenoma at [facility].   FIT test May 2022 unremarkable (done through home care/insurance?)       Health maintenance:   Health Maintenance Due   Topic Date Due    Hepatitis C screen  Never done    Shingles vaccine (1 of 2) Never done    Flu vaccine (1) 08/01/2022    Annual Wellness Visit  (AWV)  09/10/2022    Depression Monitoring  01/06/2023         Review of Systems:  Constitutional: no fevers, no chills   Ears: no hearing loss, no tinnitus, no vertigo  Eyes: no blurry vision, no diplopia, no dryness, no itchiness  Mouth: no oral ulcers, no dry mouth, no sore throat  Nose: no nasal congestion, no epistaxis  Cardiac: no chest pain, no palpitations, no leg swelling, no orthopnea, no PND, no syncope  Pulmonary: no dyspnea, no cough, no wheezing, no hemoptysis  GI: no nausea, no vomiting, no diarrhea, no constipation, no abdominal pain, no hematochezia  : no dysuria, no frequency, no urgency, no hematuria, no frothy urine, no dyspareunia, no pelvic pain, no vaginal bleeding, no abnormal vaginal discharge  MSK: no arthralgias, no myalgias, no early morning stiffness, no Raynaud's   Neuro: no focal neurological deficits, seizures, restless legs  Psych: depression, no suicidal ideation      Physical Exam:  VITALS:   /86 (Site: Left Upper Arm)   Pulse 73   Resp 18   Ht 5' 1.2\" (1.554 m)   Wt 168 lb 12.8 oz (76.6 kg)   SpO2 100%   BMI 31.69 kg/m²     PHYSICAL EXAMINATION:  General: alert, awake, and oriented to time, place, person, and situation. Not in acute distress   Skin:  no suspicious rashes, no jaundice  Head: normocephalic/atraumatic  Eyes: anicteric sclera, well-injected conjunctiva. Pupils are equally round and reactive to light. Extraocular movements are intact   Nose: no septal deviation evident  Sinuses: no sinus tenderness  Ears: external ears normal  Neck: supple, no cervical lymphadenopathy, thyroid symmetric and not enlarged, no bruits   Heart: regular rate and rhythm, regular S1/S2, no S3/S4, no audible murmurs, no audible friction rub  Lungs: clear to auscultation bilaterally, no audible crackles, no audible wheezes, no audible rhonchi    Abdomen: normal bowel sounds, soft abdomen, non-tender, no palpable masses  Extremities: no edema, warm, no cyanosis, no clubbing.  Good capillary refill   MSK: no tenderness across spinous processes, full ROM in all 4 extremities. No joint swelling or tenderness   Peripheral vascular: 2+ pulses symmetric (radial)  Neuro: gait normal, CN II-XII intact, motor power 5/5 in all 4 extremities, sensation intact and symmetric    Labs   I have personally reviewed labs, and discussed pertinent findings with patient on this date 3/16/2023     Imaging   I have personally reviewed imaging, and discussed pertinent findings with patient on this date 3/16/2023     Other notes  I have personally reviewed other notes, and discussed pertinent findings with patient on this date 3/16/2023       Assessment/Plan:     1. SIADH (syndrome of inappropriate ADH production) ([facility] [address]  UTD on age-appropriate cancer screening. Will be due for colonoscopy ~ May 2023. Referral placed today to GI  Continue Tolvaptan BIW   Frequent blood work to monitor Na levels   - Comprehensive Metabolic Panel; Future    2. Essential hypertension  Patient has been taking Amlodipine 5mg QD not BID, as well as Olmesartan 40mg QD, when Cardiology added Metoprolol succinate 25mg. Recommend patient take Olmesartan 40mg QD, Amlodipine 5mg BID, Metoprolol succinate 25mg QD -- will have her come back in 2 weeks for BP check to ensure BP is not running too low on additional Amlodipine dose which sh will start taking as she was not taking previously, as well as Metoprolol that was added by Cardiology recently   - CBC with Auto Differential; Future  - Comprehensive Metabolic Panel; Future    3. Dyslipidemia  ,  May 2020  Needs updated labs  Not on statin  - Lipid, Fasting; Future    4. Prediabetes  A1C 6.1% May 2022  Needs updated labs  Diet controlled  - Hemoglobin A1C; Future    5. Restless legs  Continue Ropinirole 0.5mg QHS     6. Colon cancer screening  Colonoscopy in 2017 at [facility] with tubular adenoma  FIT test (through home care/insurance) done 05/2022.  Due for colonoscopy ~ 05/2023   [facility] Tariq Baker MD, Gastroenterology, [facility] discussed with patient and questions answered. Patient verbalizes understanding and agrees with plan. Discussed with patient the importance of continuity of care. I encouraged patient to schedule next appointment within 2 weeks with me. Patient prefers to be reached by Phone call at [phone] for future medical correspondence. Encouraged to activate MyChart. I reviewed and reconciled the medications this visit. I reviewed and updated the past medical, surgical, social, and family history during this visit. After visit summary provided.        Chadwick Rush MD, MPH   Internal Medicine  3/16/2023   1:54 PM

## 2023-03-16 NOTE — PATIENT INSTRUCTIONS
Phone number for [address] [place], [place]: [phone]             Fasting for a blood test: taking the right steps before testing helps ensure your results will be accurate. Why do I need to fast before my blood test?  If your healthcare provider has told you to fast before a blood test, it means you should not eat or drink anything, except water, for several hours before your test. When you eat and drink normally, those foods and beverages are absorbed into your bloodstream. That could affect the results of certain types of blood tests. What types of blood tests require fasting? The most common tests that require fasting are:  Glucose tests, which measure your blood sugar. Lipid tests, which measure cholesterol and triglycerides. You do not need to be fasting for HbA1C test.     How long do I have to fast before the test?  You usually need to fast for 8-12 hours before the test. Most tests that require fasting are scheduled for early in the morning. That way, most of your fasting time will be overnight. Can I drink anything besides water during a fast?  No. Juice, coffee, soda, and other beverages can get in your bloodstream and affect your results. In addition, you should not:  Chew gum   Smoke   Exercise  These activities can also affect your results. But you can drink water. It's actually encouraged that you drink 2 glasses of water before any blood test. It helps keep more fluid in your veins, which can make it easier to draw blood. If you are dehydrated, your blood draw experience may be unpleasant. Can I continue taking medicine during a fast?  Most of the time it's OK to take your usual medicines with water, unless otherwise specified by your healthcare provider. You may need to avoid certain medicines that you normally take with food.      What if I make a mistake and have something to eat or drink besides water during my fast?  Tell your healthcare provider before your test. Your test will most likely have to be re-scheduled for another time when you are able to complete your fast.    When can I eat and drink normally again? As soon as your test is over. You may want to bring a snack with you, so you can eat right away. Is there anything else I need to know about fasting before a blood test?  Be sure to talk to your healthcare provider if you have any questions or concerns about fasting. You should talk to your provider before taking any lab test. Most tests don't require fasting or other special preparations. For others, you may need to avoid certain foods, medicines, or activities.        [facility] Internal Medicine  [phone]

## 2023-03-21 ENCOUNTER — TELEPHONE (OUTPATIENT)
Dept: GASTROENTEROLOGY | Age: 63
End: 2023-03-21

## 2023-03-21 NOTE — TELEPHONE ENCOUNTER
Called pt. In regards to a referral for a colon screening.  Made appt for pt to see rosemarie on 4/3/23 @11am

## 2023-03-31 ENCOUNTER — HOSPITAL ENCOUNTER (OUTPATIENT)
Age: 63
Discharge: HOME | End: 2023-03-31
Payer: MEDICARE

## 2023-03-31 ENCOUNTER — OFFICE VISIT (OUTPATIENT)
Dept: INTERNAL MEDICINE CLINIC | Age: 63
End: 2023-03-31
Payer: MEDICARE

## 2023-03-31 ENCOUNTER — HOSPITAL ENCOUNTER (OUTPATIENT)
Dept: GENERAL RADIOLOGY | Age: 63
Discharge: HOME | End: 2023-03-31
Payer: MEDICARE

## 2023-03-31 VITALS
WEIGHT: 168 LBS | BODY MASS INDEX: 31.72 KG/M2 | SYSTOLIC BLOOD PRESSURE: 130 MMHG | DIASTOLIC BLOOD PRESSURE: 86 MMHG | OXYGEN SATURATION: 100 % | RESPIRATION RATE: 18 BRPM | HEIGHT: 61 IN | HEART RATE: 86 BPM

## 2023-03-31 DIAGNOSIS — M79.675 GREAT TOE PAIN, LEFT: Primary | ICD-10-CM

## 2023-03-31 DIAGNOSIS — I10 ESSENTIAL HYPERTENSION: ICD-10-CM

## 2023-03-31 PROCEDURE — 99213 OFFICE O/P EST LOW 20 MIN: CPT | Performed by: INTERNAL MEDICINE

## 2023-03-31 PROCEDURE — 4004F PT TOBACCO SCREEN RCVD TLK: CPT | Performed by: INTERNAL MEDICINE

## 2023-03-31 PROCEDURE — G8484 FLU IMMUNIZE NO ADMIN: HCPCS | Performed by: INTERNAL MEDICINE

## 2023-03-31 PROCEDURE — 3079F DIAST BP 80-89 MM HG: CPT | Performed by: INTERNAL MEDICINE

## 2023-03-31 PROCEDURE — G8427 DOCREV CUR MEDS BY ELIG CLIN: HCPCS | Performed by: INTERNAL MEDICINE

## 2023-03-31 PROCEDURE — 3017F COLORECTAL CA SCREEN DOC REV: CPT | Performed by: INTERNAL MEDICINE

## 2023-03-31 PROCEDURE — 73630 X-RAY EXAM OF FOOT: CPT

## 2023-03-31 PROCEDURE — G8417 CALC BMI ABV UP PARAM F/U: HCPCS | Performed by: INTERNAL MEDICINE

## 2023-03-31 PROCEDURE — 3075F SYST BP GE 130 - 139MM HG: CPT | Performed by: INTERNAL MEDICINE

## 2023-03-31 RX ORDER — OLMESARTAN MEDOXOMIL 40 MG/1
40 TABLET ORAL DAILY
Qty: 90 TABLET | Refills: 1 | Status: SHIPPED | OUTPATIENT
Start: 2023-03-31

## 2023-03-31 RX ORDER — METOPROLOL SUCCINATE 25 MG/1
25 TABLET, EXTENDED RELEASE ORAL DAILY
Qty: 90 TABLET | Refills: 1 | Status: SHIPPED | OUTPATIENT
Start: 2023-03-31

## 2023-03-31 RX ORDER — AMLODIPINE BESYLATE 5 MG/1
5 TABLET ORAL 2 TIMES DAILY
Qty: 180 TABLET | Refills: 1 | Status: SHIPPED | OUTPATIENT
Start: 2023-03-31

## 2023-03-31 NOTE — PROGRESS NOTES
3/31/23    Génesis Banks  1960    Chief Complaint   Patient presents with    Hypertension       History of Present Illness:  Génesis Sandhu is a 65 y.o. pleasant lady presenting today with a chief complaint of HTN, L big toe pain. She has a past medical history significant for:  Hyponatremia, on Tolvaptan 15mg BIW   HTN, on Olmesartan 40mg QD, Amlodipine 5mg BID, Metoprolol succinate 25mg QD   HL (,  on 5/23/20220), not on statin  Prediabetes (HbA1C 6.1% on 5/23/2022), not on meds   HFpEF (G1DD, EF 55-60% on TTE 12/1/2020)  Epilepsy (s/p L anterior temporal lobectomy in 1996), on Aptiom 800mg QHS, Keppra 1000mg BID, Xcopri   Depression, on Nortriptyline 50mg QHS   Anxiety, on Abilify 5mg QD   Insomnia, on Hydroxyzine 25-50mg QHS PRN   GERD, on Prilosec 40mg QD   COPD, on Albuterol PRN, Breztri  Chronic hypoxemic respiratory failure, off 3L O2   Osteoporosis (DEXA 10/4/2021 with osteopenia), on Fosamax 70mg weekly  Vertigo  Vitamin D deficiency (level 31 on 3/9/2021), not on supplementation   RLS, on Ropinirole 0.5mg QHS   Urinary incontinence/overactive bladder, on Oxybutynin-XL 10mg QD  Obesity (BMI 31)   S/p partial hysterectomy (cervix status unknown)  S/p L carpal tunnel release (7/30/2020)   Polysubstance abuse (marijuana, PCP, cocaine)   Current smoker  Marijuana use     # Patient reports she stubbed her L big toe earlier today. Has some redness. Has pain. # Patient was admitted Aug 2022 for symptomatic SIADH. Na was 111. CT chest neg for malignancy. She is s/p hypertonic saline and is now on sodium tabs. Also with K 2.5 and MAYR Cr 1.2. Now not taking K-dur. Prior to this admission patient was having some electrolyte disturbances. She did not follow through with close follow up and labs. Is seeing Dr. Priscilla Nash. Off HCTZ. Limiting fluid intake to < 2L per day. Adjusting AEDs through Neurology. BP at home has been 120's/80's. BP today 130/86.    # Patient was diagnosed with

## 2023-04-03 ENCOUNTER — OFFICE VISIT (OUTPATIENT)
Dept: GASTROENTEROLOGY | Age: 63
End: 2023-04-03
Payer: MEDICARE

## 2023-04-03 VITALS
BODY MASS INDEX: 32.44 KG/M2 | SYSTOLIC BLOOD PRESSURE: 152 MMHG | TEMPERATURE: 97 F | DIASTOLIC BLOOD PRESSURE: 92 MMHG | HEART RATE: 68 BPM | OXYGEN SATURATION: 100 % | HEIGHT: 61 IN | WEIGHT: 171.8 LBS

## 2023-04-03 DIAGNOSIS — K59.00 CONSTIPATION, UNSPECIFIED CONSTIPATION TYPE: ICD-10-CM

## 2023-04-03 DIAGNOSIS — D12.8 ADENOMATOUS RECTAL POLYP: ICD-10-CM

## 2023-04-03 DIAGNOSIS — K59.09 CHRONIC CONSTIPATION: ICD-10-CM

## 2023-04-03 DIAGNOSIS — Z86.010 HISTORY OF COLON POLYPS: Primary | ICD-10-CM

## 2023-04-03 PROCEDURE — 99204 OFFICE O/P NEW MOD 45 MIN: CPT | Performed by: NURSE PRACTITIONER

## 2023-04-03 PROCEDURE — G8417 CALC BMI ABV UP PARAM F/U: HCPCS | Performed by: NURSE PRACTITIONER

## 2023-04-03 PROCEDURE — G8427 DOCREV CUR MEDS BY ELIG CLIN: HCPCS | Performed by: NURSE PRACTITIONER

## 2023-04-03 PROCEDURE — 3074F SYST BP LT 130 MM HG: CPT | Performed by: NURSE PRACTITIONER

## 2023-04-03 PROCEDURE — 3017F COLORECTAL CA SCREEN DOC REV: CPT | Performed by: NURSE PRACTITIONER

## 2023-04-03 PROCEDURE — 4004F PT TOBACCO SCREEN RCVD TLK: CPT | Performed by: NURSE PRACTITIONER

## 2023-04-03 PROCEDURE — 3078F DIAST BP <80 MM HG: CPT | Performed by: NURSE PRACTITIONER

## 2023-04-03 RX ORDER — POLYETHYLENE GLYCOL 3350, SODIUM SULFATE ANHYDROUS, SODIUM BICARBONATE, SODIUM CHLORIDE, POTASSIUM CHLORIDE 236; 22.74; 6.74; 5.86; 2.97 G/4L; G/4L; G/4L; G/4L; G/4L
4 POWDER, FOR SOLUTION ORAL ONCE
Qty: 4000 ML | Refills: 0 | Status: SHIPPED | OUTPATIENT
Start: 2023-04-03 | End: 2023-04-03

## 2023-04-03 RX ORDER — DOCUSATE SODIUM 100 MG/1
100 CAPSULE, LIQUID FILLED ORAL 2 TIMES DAILY
Qty: 60 CAPSULE | Refills: 3 | Status: SHIPPED | OUTPATIENT
Start: 2023-04-03 | End: 2023-05-03

## 2023-04-03 RX ORDER — POLYETHYLENE GLYCOL 3350 17 G/17G
17 POWDER, FOR SOLUTION ORAL 2 TIMES DAILY
Qty: 527 G | Refills: 3 | Status: SHIPPED | OUTPATIENT
Start: 2023-04-03 | End: 2023-05-03

## 2023-04-03 ASSESSMENT — ENCOUNTER SYMPTOMS
BLOOD IN STOOL: 0
SHORTNESS OF BREATH: 0
EYE PAIN: 0
VOMITING: 0
COLOR CHANGE: 0
NAUSEA: 0
COUGH: 0
DIARRHEA: 0
PHOTOPHOBIA: 0
WHEEZING: 0
BACK PAIN: 0
CONSTIPATION: 1
ABDOMINAL PAIN: 0

## 2023-04-03 NOTE — PROGRESS NOTES
alcohol. Family history:  Her family history includes Breast Cancer in her maternal cousin; Heart Disease in her father. Objective    Vitals:    04/03/23 1105   BP: (!) 152/92   Pulse: 68   Temp: 97 °F (36.1 °C)   SpO2: 100%        Physical exam    Physical Exam  Vitals reviewed. Constitutional:       General: She is not in acute distress. Appearance: Normal appearance. She is well-developed. She is obese. She is not ill-appearing, toxic-appearing or diaphoretic. HENT:      Head: Normocephalic and atraumatic. Nose: Nose normal.      Mouth/Throat:      Mouth: Mucous membranes are moist.   Eyes:      Conjunctiva/sclera: Conjunctivae normal.      Pupils: Pupils are equal, round, and reactive to light. Neck:      Thyroid: No thyromegaly. Vascular: No JVD. Trachea: No tracheal deviation. Cardiovascular:      Rate and Rhythm: Normal rate and regular rhythm. Pulses: Normal pulses. Heart sounds: Normal heart sounds. No murmur heard. No friction rub. No gallop. Pulmonary:      Effort: Pulmonary effort is normal. No respiratory distress. Breath sounds: Normal breath sounds. No stridor. No wheezing, rhonchi or rales. Chest:      Chest wall: No tenderness. Abdominal:      General: Bowel sounds are normal. There is no distension. Palpations: Abdomen is soft. There is no mass. Tenderness: There is no abdominal tenderness. There is no guarding or rebound. Hernia: No hernia is present. Musculoskeletal:         General: Normal range of motion. Cervical back: Normal range of motion and neck supple. Lymphadenopathy:      Cervical: No cervical adenopathy. Skin:     General: Skin is warm and dry. Neurological:      Mental Status: She is alert and oriented to person, place, and time.    Psychiatric:         Mood and Affect: Mood normal.       Hospital Outpatient Visit on 03/01/2023   Component Date Value Ref Range Status    Sodium 03/01/2023 134 (L)  135

## 2023-04-04 PROBLEM — D12.8 ADENOMATOUS RECTAL POLYP: Status: ACTIVE | Noted: 2017-05-19

## 2023-04-04 PROBLEM — K59.09 CHRONIC CONSTIPATION: Status: ACTIVE | Noted: 2017-05-19

## 2023-04-05 ENCOUNTER — TELEPHONE (OUTPATIENT)
Dept: GASTROENTEROLOGY | Age: 63
End: 2023-04-05

## 2023-04-20 ENCOUNTER — PREP FOR PROCEDURE (OUTPATIENT)
Dept: GASTROENTEROLOGY | Age: 63
End: 2023-04-20

## 2023-04-20 ENCOUNTER — TELEPHONE (OUTPATIENT)
Dept: GASTROENTEROLOGY | Age: 63
End: 2023-04-20

## 2023-04-20 DIAGNOSIS — M81.0 AGE-RELATED OSTEOPOROSIS WITHOUT CURRENT PATHOLOGICAL FRACTURE: ICD-10-CM

## 2023-04-20 RX ORDER — SODIUM CHLORIDE 9 MG/ML
25 INJECTION, SOLUTION INTRAVENOUS PRN
Status: CANCELLED | OUTPATIENT
Start: 2023-04-20

## 2023-04-20 RX ORDER — SODIUM CHLORIDE 0.9 % (FLUSH) 0.9 %
5-40 SYRINGE (ML) INJECTION EVERY 12 HOURS SCHEDULED
Status: CANCELLED | OUTPATIENT
Start: 2023-04-20

## 2023-04-20 RX ORDER — SODIUM CHLORIDE, SODIUM LACTATE, POTASSIUM CHLORIDE, CALCIUM CHLORIDE 600; 310; 30; 20 MG/100ML; MG/100ML; MG/100ML; MG/100ML
INJECTION, SOLUTION INTRAVENOUS CONTINUOUS
Status: CANCELLED | OUTPATIENT
Start: 2023-04-20

## 2023-04-20 RX ORDER — SODIUM CHLORIDE 0.9 % (FLUSH) 0.9 %
5-40 SYRINGE (ML) INJECTION PRN
Status: CANCELLED | OUTPATIENT
Start: 2023-04-20

## 2023-04-20 RX ORDER — POLYETHYLENE GLYCOL 3350 17 G/17G
17 POWDER, FOR SOLUTION ORAL 2 TIMES DAILY
Qty: 527 G | Refills: 3 | Status: SHIPPED | OUTPATIENT
Start: 2023-04-20 | End: 2023-05-20

## 2023-04-20 RX ORDER — POLYETHYLENE GLYCOL 3350, SODIUM SULFATE ANHYDROUS, SODIUM BICARBONATE, SODIUM CHLORIDE, POTASSIUM CHLORIDE 236; 22.74; 6.74; 5.86; 2.97 G/4L; G/4L; G/4L; G/4L; G/4L
4 POWDER, FOR SOLUTION ORAL ONCE
Qty: 4000 ML | Refills: 0 | Status: SHIPPED | OUTPATIENT
Start: 2023-04-20 | End: 2023-04-20

## 2023-04-21 ENCOUNTER — ANESTHESIA EVENT (OUTPATIENT)
Dept: OPERATING ROOM | Age: 63
End: 2023-04-21
Payer: MEDICARE

## 2023-04-21 RX ORDER — ALENDRONATE SODIUM 70 MG/1
TABLET ORAL
Qty: 4 TABLET | Refills: 3 | Status: SHIPPED | OUTPATIENT
Start: 2023-04-21

## 2023-04-21 ASSESSMENT — LIFESTYLE VARIABLES: SMOKING_STATUS: 1

## 2023-04-24 ENCOUNTER — ANESTHESIA (OUTPATIENT)
Dept: OPERATING ROOM | Age: 63
End: 2023-04-24
Payer: MEDICARE

## 2023-04-24 ENCOUNTER — HOSPITAL ENCOUNTER (OUTPATIENT)
Age: 63
Setting detail: OUTPATIENT SURGERY
Discharge: HOME | End: 2023-04-24
Attending: INTERNAL MEDICINE | Admitting: INTERNAL MEDICINE
Payer: MEDICARE

## 2023-04-24 VITALS
HEART RATE: 68 BPM | WEIGHT: 170 LBS | OXYGEN SATURATION: 98 % | HEIGHT: 61 IN | BODY MASS INDEX: 32.1 KG/M2 | TEMPERATURE: 97.6 F | DIASTOLIC BLOOD PRESSURE: 90 MMHG | SYSTOLIC BLOOD PRESSURE: 131 MMHG | RESPIRATION RATE: 16 BRPM

## 2023-04-24 DIAGNOSIS — Z86.010 HISTORY OF COLON POLYPS: ICD-10-CM

## 2023-04-24 DIAGNOSIS — K59.00 CONSTIPATION, UNSPECIFIED CONSTIPATION TYPE: ICD-10-CM

## 2023-04-24 PROCEDURE — 45385 COLONOSCOPY W/LESION REMOVAL: CPT | Performed by: INTERNAL MEDICINE

## 2023-04-24 PROCEDURE — 7100000010 HC PHASE II RECOVERY - FIRST 15 MIN: Performed by: INTERNAL MEDICINE

## 2023-04-24 PROCEDURE — 88305 TISSUE EXAM BY PATHOLOGIST: CPT

## 2023-04-24 PROCEDURE — 3609010600 HC COLONOSCOPY POLYPECTOMY SNARE/COLD BIOPSY: Performed by: INTERNAL MEDICINE

## 2023-04-24 PROCEDURE — 3700000001 HC ADD 15 MINUTES (ANESTHESIA): Performed by: INTERNAL MEDICINE

## 2023-04-24 PROCEDURE — 2580000003 HC RX 258: Performed by: NURSE PRACTITIONER

## 2023-04-24 PROCEDURE — 6360000002 HC RX W HCPCS

## 2023-04-24 PROCEDURE — 2500000003 HC RX 250 WO HCPCS

## 2023-04-24 PROCEDURE — 3700000000 HC ANESTHESIA ATTENDED CARE: Performed by: INTERNAL MEDICINE

## 2023-04-24 PROCEDURE — 7100000011 HC PHASE II RECOVERY - ADDTL 15 MIN: Performed by: INTERNAL MEDICINE

## 2023-04-24 PROCEDURE — 2709999900 HC NON-CHARGEABLE SUPPLY: Performed by: INTERNAL MEDICINE

## 2023-04-24 RX ORDER — SODIUM CHLORIDE 9 MG/ML
25 INJECTION, SOLUTION INTRAVENOUS PRN
Status: DISCONTINUED | OUTPATIENT
Start: 2023-04-24 | End: 2023-04-24 | Stop reason: HOSPADM

## 2023-04-24 RX ORDER — LIDOCAINE HYDROCHLORIDE 20 MG/ML
INJECTION, SOLUTION INTRAVENOUS PRN
Status: DISCONTINUED | OUTPATIENT
Start: 2023-04-24 | End: 2023-04-24 | Stop reason: SDUPTHER

## 2023-04-24 RX ORDER — SODIUM CHLORIDE 0.9 % (FLUSH) 0.9 %
5-40 SYRINGE (ML) INJECTION EVERY 12 HOURS SCHEDULED
Status: DISCONTINUED | OUTPATIENT
Start: 2023-04-24 | End: 2023-04-24 | Stop reason: HOSPADM

## 2023-04-24 RX ORDER — SODIUM CHLORIDE 0.9 % (FLUSH) 0.9 %
5-40 SYRINGE (ML) INJECTION PRN
Status: DISCONTINUED | OUTPATIENT
Start: 2023-04-24 | End: 2023-04-24 | Stop reason: HOSPADM

## 2023-04-24 RX ORDER — SODIUM CHLORIDE, SODIUM LACTATE, POTASSIUM CHLORIDE, CALCIUM CHLORIDE 600; 310; 30; 20 MG/100ML; MG/100ML; MG/100ML; MG/100ML
INJECTION, SOLUTION INTRAVENOUS CONTINUOUS
Status: DISCONTINUED | OUTPATIENT
Start: 2023-04-24 | End: 2023-04-24 | Stop reason: HOSPADM

## 2023-04-24 RX ORDER — LABETALOL HYDROCHLORIDE 5 MG/ML
INJECTION, SOLUTION INTRAVENOUS PRN
Status: DISCONTINUED | OUTPATIENT
Start: 2023-04-24 | End: 2023-04-24 | Stop reason: SDUPTHER

## 2023-04-24 RX ORDER — PROPOFOL 10 MG/ML
INJECTION, EMULSION INTRAVENOUS PRN
Status: DISCONTINUED | OUTPATIENT
Start: 2023-04-24 | End: 2023-04-24 | Stop reason: SDUPTHER

## 2023-04-24 RX ORDER — SODIUM CHLORIDE, SODIUM LACTATE, POTASSIUM CHLORIDE, CALCIUM CHLORIDE 600; 310; 30; 20 MG/100ML; MG/100ML; MG/100ML; MG/100ML
INJECTION, SOLUTION INTRAVENOUS CONTINUOUS
Status: DISCONTINUED | OUTPATIENT
Start: 2023-04-24 | End: 2023-04-24 | Stop reason: ALTCHOICE

## 2023-04-24 RX ADMIN — LABETALOL HYDROCHLORIDE 5 MG: 5 INJECTION, SOLUTION INTRAVENOUS at 10:48

## 2023-04-24 RX ADMIN — LIDOCAINE HYDROCHLORIDE 100 MG: 20 INJECTION, SOLUTION INTRAVENOUS at 10:33

## 2023-04-24 RX ADMIN — SODIUM CHLORIDE, POTASSIUM CHLORIDE, SODIUM LACTATE AND CALCIUM CHLORIDE: 600; 310; 30; 20 INJECTION, SOLUTION INTRAVENOUS at 10:24

## 2023-04-24 RX ADMIN — PROPOFOL 520 MG: 10 INJECTION, EMULSION INTRAVENOUS at 10:33

## 2023-04-24 RX ADMIN — LABETALOL HYDROCHLORIDE 5 MG: 5 INJECTION, SOLUTION INTRAVENOUS at 10:44

## 2023-04-24 ASSESSMENT — PAIN - FUNCTIONAL ASSESSMENT: PAIN_FUNCTIONAL_ASSESSMENT: 0-10

## 2023-04-24 ASSESSMENT — PAIN SCALES - GENERAL: PAINLEVEL_OUTOF10: 0

## 2023-04-24 ASSESSMENT — LIFESTYLE VARIABLES: SMOKING_STATUS: 1

## 2023-04-24 NOTE — ANESTHESIA PRE PROCEDURE
Department of Anesthesiology  Preprocedure Note       Name:  Shantanu Larsen   Age:  65 y.o.  :  1960                                          MRN:  [id number]         Date:  2023      Surgeon: Dada Bajwa):  Rachel Griffin MD    Procedure: Procedure(s):  COLONOSCOPY DIAGNOSTIC    Medications prior to admission:   Prior to Admission medications    Medication Sig Start Date End Date Taking? Authorizing Provider   alendronate (FOSAMAX) 70 MG tablet TAKE 1 TABLET BY MOUTH EVERY 7 DAYS WITH A FULL GLASS OF WATER, 30 MINUTES BEFORE EATING.  DO NOT LIE DOWN FOR AT LEAST 30 MINUTES 23   Patrick Richards MD   polyethylene glycol (MIRALAX) 17 g packet Take 17 g by mouth 2 times daily 23  STEWART Villasenor CNP   polyethylene glycol (MIRALAX) 17 g packet Take 17 g by mouth 2 times daily 4/3/23 5/3/23  STEWART Villasenor CNP   docusate sodium (COLACE) 100 MG capsule Take 1 capsule by mouth 2 times daily 4/3/23 5/3/23  STEWART Villasenor CNP   metoprolol succinate (TOPROL XL) 25 MG extended release tablet Take 1 tablet by mouth daily 3/31/23   Patrick Richards MD   olmesartan (BENICAR) 40 MG tablet Take 1 tablet by mouth daily 3/31/23   Patrick Richards MD   amLODIPine (NORVASC) 5 MG tablet Take 1 tablet by mouth in the morning and at bedtime 3/31/23   Patrick Richards MD   rOPINIRole (REQUIP) 0.5 MG tablet TAKE 1 TABLET BY MOUTH EVERY EVENING 3/16/23   Patrick Richards MD   nortriptyline (PAMELOR) 25 MG capsule Take 2 capsules by mouth nightly 3/14/23   Patrick Richards MD   levETIRAcetam (KEPPRA) 250 MG tablet TAKE 1 TABLET BY MOUTH TWO TIMES A DAY WITH THE 750MG 3/13/23   STEWART Rg CNP   tolvaptan [facility]) 15 MG TABS tablet Take 1 tablet by mouth See Admin Instructions Sundays and thursdays 3/1/23   Adeleye Melida [place], DO   eslicarbazepine acetate (APTIOM) 800 MG tablet Continue Aptiom 800 mg at bedtime until on Xcopri 100 mg dose and then start

## 2023-04-24 NOTE — PROGRESS NOTES
Returned to room D. Report received from [facility]. Alert and oriented. Respirations even and unlabored. Color pink. Abdomen soft and nontender. Beverage provided. Warm blankets given.  supportive at bedside. Call light in reach.

## 2023-04-24 NOTE — PROGRESS NOTES
1027: PT ALERT AND ORIENTED X 4. PRE-OP QUESTIONS ASKED AND ANSWERED APPROPRIATELY BY PT. NAME, , ARMBAND VERIFIED, PROCEDURE, ALLERGIES, IMPLANTS, PREP STATUS, LAST PO INTAKE, HISTORY, MEDS.

## 2023-04-24 NOTE — ANESTHESIA POSTPROCEDURE EVALUATION
Department of Anesthesiology  Postprocedure Note    Patient: Lesley Jones  MRN: [id number]  YOB: 1960  Date of evaluation: 4/24/2023      Procedure Summary     Date: 04/24/23 Room / Location: [facility] 12 04 / [facility]    Anesthesia Start: 6450 Anesthesia Stop: 3911    Procedure: COLONOSCOPY POLYPECTOMY SNARE/COLD BIOPSY Diagnosis:       History of colon polyps      Constipation, unspecified constipation type      (History of colon polyps [Z86.010])      (Constipation, unspecified constipation type [K59.00])    Surgeons: Ruby Arteaga MD Responsible Provider: STEWART Mcdaniel CRNA    Anesthesia Type: MAC ASA Status: 3          Anesthesia Type: No value filed.     Vianey Phase I:      Vianey Phase II:        Anesthesia Post Evaluation    Patient location during evaluation: bedside  Patient participation: complete - patient participated  Level of consciousness: awake  Pain score: 0  Airway patency: patent  Nausea & Vomiting: no nausea and no vomiting  Complications: no  Cardiovascular status: blood pressure returned to baseline and hemodynamically stable  Respiratory status: acceptable and room air  Hydration status: euvolemic

## 2023-04-24 NOTE — DISCHARGE INSTRUCTIONS
COLONOSCOPY    DR. Ribeiro    OFFICE NUMBER [id number]    FOLLOW UP APPOINTMENT: call office in one week    REPEAT PROCEDURE IN 5 years    TEST ORDERED: NONE     What to expect at home: Your Recovery   Your doctor will tell you when you can eat and do your other usual activities Your doctor will talk to you about when you will need your next colonoscopy. Your doctor can help you decide how often you need to be checked. This will depend on the results of your test and your risk for colorectal cancer. After the test, you may be bloated or have gas pains. You may need to pass gas. If a biopsy was done or a polyp was removed, you may have streaks of blood in your stool (feces) for a few days. This care sheet gives you a general idea about how long it will take for you to recover. But each person recovers at a different pace. Follow the steps below to get better as quickly as possible. How can you care for yourself at home? Activity  Rest when you feel tired. Diet  Follow your doctor's directions for eating. Unless your doctor has told you not to, drink plenty of fluids. This helps to replace the fluids that were lost during the colon prep. DO NOT DRINK ALCOHOL. Medicines  Your doctor will tell you if and when you can restart your medicines. He or she will also give you instructions about taking any new medicines. If you take blood thinners, such as warfarin (Coumadin), clopidogrel (Plavix), or aspirin, be sure to talk to your doctor. He or she will tell you if and when to start taking those medicines again. Make sure that you understand exactly what your doctor wants you to do. If polyps were removed or a biopsy was done during the test, your doctor may tell you not to take aspirin or other anti-inflammatory medicines for a few days. These include ibuprofen (Advil, Motrin) and naproxen (Aleve). Other instructions:Anesthesia  For your safety, do not drive or operate machinery for 24 hours.   Do not sign legal

## 2023-04-24 NOTE — INTERVAL H&P NOTE
Update History & Physical    The patient's History and Physical of April 3, 2023 was reviewed with the patient and I examined the patient. There was no change. The surgical site was confirmed by the patient and me. Plan: The risks, benefits, expected outcome, and alternative to the recommended procedure have been discussed with the patient. Patient understands and wants to proceed with the procedure.      Electronically signed by Brandy Mcguire MD on 4/24/2023 at 10:00 AM

## 2023-04-25 NOTE — RESULT ENCOUNTER NOTE
Please inform the patient of colonoscopy polyp showing tubular adenoma in the descending colon and hyperplastic in the sigmoid. Repeat colonoscopy in 7 years.

## 2023-04-26 ENCOUNTER — TELEPHONE (OUTPATIENT)
Dept: GASTROENTEROLOGY | Age: 63
End: 2023-04-26

## 2023-04-26 NOTE — TELEPHONE ENCOUNTER
Patient returned phone call. Informed patient of the results of recent colonoscopy. Patient expressed understanding.

## 2023-04-26 NOTE — RESULT ENCOUNTER NOTE
LVM TO CALL THE OFFICE FOR PATHOLOGY RESULTS.    1 TUBULAR ADENOMA  1 HYPERPLASTIC    REPEAT C-SCOPE IN 7 YRS

## 2023-05-04 RX ORDER — MAGNESIUM OXIDE 400 MG/1
TABLET ORAL
Qty: 30 TABLET | Refills: 5 | Status: SHIPPED | OUTPATIENT
Start: 2023-05-04

## 2023-05-10 DIAGNOSIS — F33.1 MODERATE EPISODE OF RECURRENT MAJOR DEPRESSIVE DISORDER (HCC): ICD-10-CM

## 2023-05-10 RX ORDER — ARIPIPRAZOLE 5 MG/1
TABLET ORAL
Qty: 28 TABLET | Refills: 5 | Status: SHIPPED | OUTPATIENT
Start: 2023-05-10

## 2023-05-16 ENCOUNTER — NURSE ONLY (OUTPATIENT)
Dept: INTERNAL MEDICINE CLINIC | Age: 63
End: 2023-05-16
Payer: MEDICARE

## 2023-05-16 DIAGNOSIS — I10 ESSENTIAL HYPERTENSION: ICD-10-CM

## 2023-05-16 DIAGNOSIS — R73.03 PREDIABETES: ICD-10-CM

## 2023-05-16 DIAGNOSIS — E22.2 SIADH (SYNDROME OF INAPPROPRIATE ADH PRODUCTION) (HCC): ICD-10-CM

## 2023-05-16 DIAGNOSIS — E78.5 DYSLIPIDEMIA: ICD-10-CM

## 2023-05-16 LAB
ALBUMIN SERPL-MCNC: 4.2 G/DL (ref 3.4–5)
ALBUMIN/GLOB SERPL: 1.6 {RATIO} (ref 1.1–2.2)
ALP SERPL-CCNC: 141 U/L (ref 40–129)
ALT SERPL-CCNC: 9 U/L (ref 10–40)
ANION GAP SERPL CALCULATED.3IONS-SCNC: 11 MMOL/L (ref 3–16)
AST SERPL-CCNC: 12 U/L (ref 15–37)
BILIRUB SERPL-MCNC: <0.2 MG/DL (ref 0–1)
BUN SERPL-MCNC: 10 MG/DL (ref 7–20)
CALCIUM SERPL-MCNC: 9.6 MG/DL (ref 8.3–10.6)
CHLORIDE SERPL-SCNC: 99 MMOL/L (ref 99–110)
CHOLEST SERPL-MCNC: 189 MG/DL (ref 0–199)
CO2 SERPL-SCNC: 25 MMOL/L (ref 21–32)
CREAT SERPL-MCNC: 1 MG/DL (ref 0.6–1.2)
GFR SERPLBLD CREATININE-BSD FMLA CKD-EPI: >60 ML/MIN/{1.73_M2}
GLUCOSE SERPL-MCNC: 92 MG/DL (ref 70–99)
HDLC SERPL-MCNC: 61 MG/DL (ref 40–60)
LDL CHOLESTEROL CALCULATED: 104 MG/DL
POTASSIUM SERPL-SCNC: 4.2 MMOL/L (ref 3.5–5.1)
PROT SERPL-MCNC: 6.9 G/DL (ref 6.4–8.2)
SODIUM SERPL-SCNC: 135 MMOL/L (ref 136–145)
TRIGL SERPL-MCNC: 122 MG/DL (ref 0–150)
VLDLC SERPL CALC-MCNC: 24 MG/DL

## 2023-05-16 PROCEDURE — 36415 COLL VENOUS BLD VENIPUNCTURE: CPT | Performed by: INTERNAL MEDICINE

## 2023-05-17 ENCOUNTER — OFFICE VISIT (OUTPATIENT)
Dept: NEUROLOGY | Age: 63
End: 2023-05-17

## 2023-05-17 VITALS
HEIGHT: 61 IN | OXYGEN SATURATION: 96 % | SYSTOLIC BLOOD PRESSURE: 124 MMHG | WEIGHT: 168 LBS | BODY MASS INDEX: 31.72 KG/M2 | DIASTOLIC BLOOD PRESSURE: 88 MMHG | HEART RATE: 77 BPM

## 2023-05-17 DIAGNOSIS — G40.109 PARTIAL SYMPTOMATIC EPILEPSY WITH SIMPLE PARTIAL SEIZURES, NOT INTRACTABLE, WITHOUT STATUS EPILEPTICUS (HCC): Primary | ICD-10-CM

## 2023-05-17 LAB
BASOPHILS # BLD: 0 K/UL (ref 0–0.2)
BASOPHILS NFR BLD: 0.4 %
DEPRECATED RDW RBC AUTO: 13.6 % (ref 12.4–15.4)
EOSINOPHIL # BLD: 0 K/UL (ref 0–0.6)
EOSINOPHIL NFR BLD: 1 %
EST. AVERAGE GLUCOSE BLD GHB EST-MCNC: 116.9 MG/DL
HBA1C MFR BLD: 5.7 %
HCT VFR BLD AUTO: 38 % (ref 36–48)
HGB BLD-MCNC: 13.1 G/DL (ref 12–16)
LYMPHOCYTES # BLD: 2 K/UL (ref 1–5.1)
LYMPHOCYTES NFR BLD: 39.1 %
MCH RBC QN AUTO: 30.9 PG (ref 26–34)
MCHC RBC AUTO-ENTMCNC: 34.5 G/DL (ref 31–36)
MCV RBC AUTO: 89.5 FL (ref 80–100)
MONOCYTES # BLD: 0.4 K/UL (ref 0–1.3)
MONOCYTES NFR BLD: 7.3 %
NEUTROPHILS # BLD: 2.6 K/UL (ref 1.7–7.7)
NEUTROPHILS NFR BLD: 52.2 %
PLATELET # BLD AUTO: 259 K/UL (ref 135–450)
PMV BLD AUTO: 7.5 FL (ref 5–10.5)
RBC # BLD AUTO: 4.25 M/UL (ref 4–5.2)
WBC # BLD AUTO: 5 K/UL (ref 4–11)

## 2023-05-17 RX ORDER — CENOBAMATE 200 MG/1
200 TABLET, FILM COATED ORAL DAILY
Qty: 30 TABLET | Refills: 5 | Status: SHIPPED | OUTPATIENT
Start: 2023-05-17

## 2023-05-17 RX ORDER — LEVETIRACETAM 500 MG/1
1000 TABLET ORAL 2 TIMES DAILY
Qty: 120 TABLET | Refills: 5 | Status: SHIPPED | OUTPATIENT
Start: 2023-05-17

## 2023-05-17 NOTE — PROGRESS NOTES
5/17/23    Génesis Banks  1960    Chief Complaint   Patient presents with    Follow-up     Epilepsy       History of Present Illness  Génesis is a 59 y.o. female presenting today for follow-up of:  Seizure. In 1996 she had epilepsy surgery with removal of a portion of her left temporal lobe. Last visit she reported 20-25 breakthrough seizures. She was having difficulty managing her sodium levels and wanted to come off of Aptiom and try something else. She was started on Xcopri and weaned off of Aptiom. She is now on Xcopri 200 mg daily and Keppra 1000 mg twice daily. Today she tells me this is the best she has felt in awhile. Her sodium was checked yesterday and is at 135. She only reported 4 of her typical breakthrough seizures which is a big improvement from her last visit. Tried--Depakote, Dilantin, Trileptal, Topamax, Onfi, Tegretol, Aptiom   She has NOT tried the following antiepileptic medications:  --Vimpat, zonisamide,Fycompa  Current Outpatient Medications   Medication Sig Dispense Refill    levETIRAcetam (KEPPRA) 500 MG tablet Take 2 tablets by mouth 2 times daily 120 tablet 5    Cenobamate (XCOPRI) 200 MG TABS Take 200 mg by mouth daily Max Daily Amount: 200 mg 30 tablet 5    ARIPiprazole (ABILIFY) 5 MG tablet TAKE ONE TABLET BY MOUTH EVERY DAY 28 tablet 5    magnesium oxide (MAG-OX) 400 MG tablet TAKE ONE TABLET BY MOUTH EVERY DAY 30 tablet 5    alendronate (FOSAMAX) 70 MG tablet TAKE 1 TABLET BY MOUTH EVERY 7 DAYS WITH A FULL GLASS OF WATER, 30 MINUTES BEFORE EATING.  DO NOT LIE DOWN FOR AT LEAST 30 MINUTES 4 tablet 3    metoprolol succinate (TOPROL XL) 25 MG extended release tablet Take 1 tablet by mouth daily 90 tablet 1    olmesartan (BENICAR) 40 MG tablet Take 1 tablet by mouth daily 90 tablet 1    amLODIPine (NORVASC) 5 MG tablet Take 1 tablet by mouth in the morning and at bedtime 180 tablet 1    rOPINIRole (REQUIP) 0.5 MG tablet TAKE 1 TABLET BY MOUTH EVERY EVENING 90 tablet 1

## 2023-05-17 NOTE — PATIENT INSTRUCTIONS
Please contact our office around 8/15/23 to get your follow up appointment made for November 2023. Our scheduling phone number is [phone]. Thank you!

## 2023-05-18 NOTE — PROGRESS NOTES
Pulmonary and Critical Care  Progress Note      VITALS:  /65   Pulse 84   Temp 97.9 °F (36.6 °C) (Oral)   Resp 15   Ht 5' 1\" (1.549 m)   Wt 159 lb 13.3 oz (72.5 kg)   SpO2 99%   BMI 30.20 kg/m²     Subjective:   CHIEF COMPLAINT :AMS     HPI:                The patient is a 65 y.o. female is sitting in the chair. She is not confused. Her Sodium is at 128. She is not in acute resp distress. Objective:   PHYSICAL EXAM:    LUNGS:Occasional basal crackles  Abd-soft, BS+,NT  Ext- no pedal edema  CVS-s1s2, no murmurs      DATA:    CBC:  Recent Labs     08/05/22  1426 08/06/22  0615 08/08/22  0845   WBC 6.8 6.4 6.2   RBC 4.10* 3.72* 3.57*   HGB 11.8* 10.9* 10.3*   HCT 32.5* 30.5* 29.7*    291 301   MCV 79.3 82.0 83.2   MCH 28.8 29.3 28.9   MCHC 36.3* 35.7 34.7   RDW 12.3 12.3 12.6   SEGSPCT 65.6 59.7  --       BMP:  Recent Labs     08/06/22  0245 08/06/22  0615 08/06/22  1407 08/06/22  1847 08/07/22  1330 08/07/22  1545 08/08/22  0630   * 117*  117* 115*   < > 122* 126* 127*   K 2.7* 3.8 3.8  --   --   --  4.3   CL 77* 81* 80*  --   --   --  92*   CO2 25 25 21  --   --   --  23   BUN 12 12  --   --   --   --  45*   CREATININE 0.9 0.9  --   --   --   --  0.7   CALCIUM 8.8 8.7  --   --   --   --  9.4   GLUCOSE 88 94  --   --   --   --  105*    < > = values in this interval not displayed. ABG:  No results for input(s): PH, PO2ART, VQI3YKA, HCO3, BEART, O2SAT in the last 72 hours.   BNP  No results found for: BNP   D-Dimer:  Lab Results   Component Value Date    DDIMER 213 02/04/2013      Radiology: None      Assessment/Plan     Patient Active Problem List    Diagnosis Date Noted    Hyponatremia 08/05/2022     Priority: Medium    KYLE (obstructive sleep apnea) 04/12/2022    Ex-smoker 04/12/2022    [facility] 04/12/2022    Primary insomnia 01/06/2022    Centrilobular emphysema ([facility].) 10/07/2021    Overactive bladder 09/09/2021    Hypoxia 08/01/2021 abdominal pain and distention

## 2023-05-25 ENCOUNTER — OFFICE VISIT (OUTPATIENT)
Dept: INTERNAL MEDICINE CLINIC | Age: 63
End: 2023-05-25
Payer: MEDICARE

## 2023-05-25 VITALS
OXYGEN SATURATION: 99 % | HEART RATE: 76 BPM | DIASTOLIC BLOOD PRESSURE: 88 MMHG | SYSTOLIC BLOOD PRESSURE: 130 MMHG | RESPIRATION RATE: 18 BRPM | WEIGHT: 166 LBS | BODY MASS INDEX: 31.34 KG/M2 | HEIGHT: 61 IN

## 2023-05-25 DIAGNOSIS — M81.0 AGE-RELATED OSTEOPOROSIS WITHOUT CURRENT PATHOLOGICAL FRACTURE: ICD-10-CM

## 2023-05-25 DIAGNOSIS — G40.319 INTRACTABLE GENERALIZED IDIOPATHIC EPILEPSY WITHOUT STATUS EPILEPTICUS (HCC): ICD-10-CM

## 2023-05-25 DIAGNOSIS — I10 ESSENTIAL HYPERTENSION: ICD-10-CM

## 2023-05-25 DIAGNOSIS — G25.81 RESTLESS LEGS: Primary | ICD-10-CM

## 2023-05-25 DIAGNOSIS — R73.03 PREDIABETES: ICD-10-CM

## 2023-05-25 DIAGNOSIS — E22.2 SIADH (SYNDROME OF INAPPROPRIATE ADH PRODUCTION) (HCC): ICD-10-CM

## 2023-05-25 DIAGNOSIS — E78.5 DYSLIPIDEMIA: ICD-10-CM

## 2023-05-25 PROCEDURE — G8427 DOCREV CUR MEDS BY ELIG CLIN: HCPCS | Performed by: INTERNAL MEDICINE

## 2023-05-25 PROCEDURE — 3075F SYST BP GE 130 - 139MM HG: CPT | Performed by: INTERNAL MEDICINE

## 2023-05-25 PROCEDURE — G8417 CALC BMI ABV UP PARAM F/U: HCPCS | Performed by: INTERNAL MEDICINE

## 2023-05-25 PROCEDURE — 4004F PT TOBACCO SCREEN RCVD TLK: CPT | Performed by: INTERNAL MEDICINE

## 2023-05-25 PROCEDURE — 3017F COLORECTAL CA SCREEN DOC REV: CPT | Performed by: INTERNAL MEDICINE

## 2023-05-25 PROCEDURE — 99214 OFFICE O/P EST MOD 30 MIN: CPT | Performed by: INTERNAL MEDICINE

## 2023-05-25 PROCEDURE — 3079F DIAST BP 80-89 MM HG: CPT | Performed by: INTERNAL MEDICINE

## 2023-05-25 RX ORDER — ROPINIROLE 0.5 MG/1
TABLET, FILM COATED ORAL
Qty: 180 TABLET | Refills: 1 | Status: SHIPPED | OUTPATIENT
Start: 2023-05-25

## 2023-05-25 NOTE — PROGRESS NOTES
5/25/23    Génesis Banks  1960    Chief Complaint   Patient presents with    Follow-up       History of Present Illness:  Génesis Barragan is a 59 y.o. pleasant lady presenting today with a chief complaint of hyponatremia, HTN, HL, prediabetes, epilepsy, RLS. She has a past medical history significant for:  Hyponatremia, on Tolvaptan 15mg BIW   HTN, on Olmesartan 40mg QD, Amlodipine 5mg BID, Metoprolol succinate 25mg QD   HL (,  on 5/16/2023), not on statin  Prediabetes (HbA1C 5.7% on 5/16/2023), not on meds   HFpEF (G1DD, EF 55-60% on TTE 12/1/2020)  Epilepsy (s/p L anterior temporal lobectomy in 1996), on Xcopri 200mg QD, Keppra 1000mg BID  Depression, on Nortriptyline 50mg QHS   Anxiety, on Abilify 5mg QD   Insomnia, on Hydroxyzine 25-50mg QHS PRN   GERD, on Prilosec 40mg QD   COPD, on Albuterol PRN, Breztri  Chronic hypoxemic respiratory failure, off 3L O2   Osteoporosis (DEXA 10/4/2021 with osteopenia), on Fosamax 70mg weekly  Vertigo  Vitamin D deficiency (level 31 on 3/9/2021), not on supplementation   RLS, on Ropinirole 0.5mg QHS   Urinary incontinence/overactive bladder, on Oxybutynin-XL 10mg QD  Obesity (BMI 31)   S/p partial hysterectomy (cervix status unknown)  S/p L carpal tunnel release (7/30/2020)   Polysubstance abuse (marijuana, PCP, cocaine)   Current smoker  Marijuana use     # Patient was admitted Aug 2022 for symptomatic SIADH. Na was 111. CT chest neg for malignancy. She is s/p hypertonic saline and is now on sodium tabs. Also with K 2.5 and MARY Cr 1.2. Now not taking K-dur. Prior to this admission patient was having some electrolyte disturbances. She did not follow through with close follow up and labs. Is seeing Dr. Hipolito Rice. Off HCTZ. Limiting fluid intake to < 2L per day. Adjusting AEDs through Neurology. BP at home has been 120's/80's. BP today 130/88. # Patient was diagnosed with epilepsy at age 25. Used to see Epilepsy specialist at [facility] in [place].

## 2023-05-25 NOTE — PATIENT INSTRUCTIONS
Fasting for a blood test: taking the right steps before testing helps ensure your results will be accurate. Why do I need to fast before my blood test?  If your healthcare provider has told you to fast before a blood test, it means you should not eat or drink anything, except water, for several hours before your test. When you eat and drink normally, those foods and beverages are absorbed into your bloodstream. That could affect the results of certain types of blood tests. What types of blood tests require fasting? The most common tests that require fasting are:  Glucose tests, which measure your blood sugar. Lipid tests, which measure cholesterol and triglycerides. You do not need to be fasting for HbA1C test.     How long do I have to fast before the test?  You usually need to fast for 8-12 hours before the test. Most tests that require fasting are scheduled for early in the morning. That way, most of your fasting time will be overnight. Can I drink anything besides water during a fast?  No. Juice, coffee, soda, and other beverages can get in your bloodstream and affect your results. In addition, you should not:  Chew gum   Smoke   Exercise  These activities can also affect your results. But you can drink water. It's actually encouraged that you drink 2 glasses of water before any blood test. It helps keep more fluid in your veins, which can make it easier to draw blood. If you are dehydrated, your blood draw experience may be unpleasant. Can I continue taking medicine during a fast?  Most of the time it's OK to take your usual medicines with water, unless otherwise specified by your healthcare provider. You may need to avoid certain medicines that you normally take with food.      What if I make a mistake and have something to eat or drink besides water during my fast?  Tell your healthcare provider before your test. Your test will most likely have to be re-scheduled for another time when you No

## 2023-05-26 ENCOUNTER — TELEMEDICINE (OUTPATIENT)
Dept: INTERNAL MEDICINE CLINIC | Age: 63
End: 2023-05-26
Payer: MEDICARE

## 2023-05-26 DIAGNOSIS — Z00.00 MEDICARE ANNUAL WELLNESS VISIT, SUBSEQUENT: Primary | ICD-10-CM

## 2023-05-26 PROCEDURE — G0439 PPPS, SUBSEQ VISIT: HCPCS | Performed by: INTERNAL MEDICINE

## 2023-05-26 PROCEDURE — 3017F COLORECTAL CA SCREEN DOC REV: CPT | Performed by: INTERNAL MEDICINE

## 2023-05-26 ASSESSMENT — LIFESTYLE VARIABLES
HOW OFTEN DO YOU HAVE A DRINK CONTAINING ALCOHOL: NEVER
HOW MANY STANDARD DRINKS CONTAINING ALCOHOL DO YOU HAVE ON A TYPICAL DAY: PATIENT DOES NOT DRINK

## 2023-05-26 ASSESSMENT — PATIENT HEALTH QUESTIONNAIRE - PHQ9
SUM OF ALL RESPONSES TO PHQ9 QUESTIONS 1 & 2: 6
SUM OF ALL RESPONSES TO PHQ QUESTIONS 1-9: 14
3. TROUBLE FALLING OR STAYING ASLEEP: 3
SUM OF ALL RESPONSES TO PHQ QUESTIONS 1-9: 14
SUM OF ALL RESPONSES TO PHQ QUESTIONS 1-9: 14
5. POOR APPETITE OR OVEREATING: 0
10. IF YOU CHECKED OFF ANY PROBLEMS, HOW DIFFICULT HAVE THESE PROBLEMS MADE IT FOR YOU TO DO YOUR WORK, TAKE CARE OF THINGS AT HOME, OR GET ALONG WITH OTHER PEOPLE: 0
4. FEELING TIRED OR HAVING LITTLE ENERGY: 3
8. MOVING OR SPEAKING SO SLOWLY THAT OTHER PEOPLE COULD HAVE NOTICED. OR THE OPPOSITE, BEING SO FIGETY OR RESTLESS THAT YOU HAVE BEEN MOVING AROUND A LOT MORE THAN USUAL: 2
SUM OF ALL RESPONSES TO PHQ QUESTIONS 1-9: 14
9. THOUGHTS THAT YOU WOULD BE BETTER OFF DEAD, OR OF HURTING YOURSELF: 0
6. FEELING BAD ABOUT YOURSELF - OR THAT YOU ARE A FAILURE OR HAVE LET YOURSELF OR YOUR FAMILY DOWN: 0
2. FEELING DOWN, DEPRESSED OR HOPELESS: 3
1. LITTLE INTEREST OR PLEASURE IN DOING THINGS: 3
7. TROUBLE CONCENTRATING ON THINGS, SUCH AS READING THE NEWSPAPER OR WATCHING TELEVISION: 0

## 2023-05-26 NOTE — PATIENT INSTRUCTIONS
Personalized Preventive Plan for Génesis Gentile - 5/26/2023  Medicare offers a range of preventive health benefits. Some of the tests and screenings are paid in full while other may be subject to a deductible, co-insurance, and/or copay. Some of these benefits include a comprehensive review of your medical history including lifestyle, illnesses that may run in your family, and various assessments and screenings as appropriate. After reviewing your medical record and screening and assessments performed today your provider may have ordered immunizations, labs, imaging, and/or referrals for you. A list of these orders (if applicable) as well as your Preventive Care list are included within your After Visit Summary for your review. Other Preventive Recommendations:    A preventive eye exam performed by an eye specialist is recommended every 1-2 years to screen for glaucoma; cataracts, macular degeneration, and other eye disorders. A preventive dental visit is recommended every 6 months. Try to get at least 150 minutes of exercise per week or 10,000 steps per day on a pedometer . Order or download the FREE \"Exercise & Physical Activity: Your Everyday Guide\" from [employer]. Call 4-914.843.3231 or search [employer] online. You need 7354-9685 mg of calcium and 5539-9937 IU of vitamin D per day. It is possible to meet your calcium requirement with diet alone, but a vitamin D supplement is usually necessary to meet this goal.  When exposed to the sun, use a sunscreen that protects against both UVA and UVB radiation with an SPF of 30 or greater. Reapply every 2 to 3 hours or after sweating, drying off with a towel, or swimming. Always wear a seat belt when traveling in a car. Always wear a helmet when riding a bicycle or motorcycle.

## 2023-05-26 NOTE — PROGRESS NOTES
Medicare Annual Wellness Visit    Génesis Rush is here for Medicare AWV    Assessment & Plan   Medicare annual wellness visit, subsequent      Recommendations for Preventive Services Due: see orders and patient instructions/AVS.  Recommended screening schedule for the next 5-10 years is provided to the patient in written form: see Patient Instructions/AVS.     No follow-ups on file. Subjective       Patient's complete Health Risk Assessment and screening values have been reviewed and are found in Flowsheets. The following problems were reviewed today and where indicated follow up appointments were made and/or referrals ordered. Positive Risk Factor Screenings with Interventions:    Fall Risk:  Do you feel unsteady or are you worried about falling? : (!) yes (unsteady, would use her walker prn)  2 or more falls in past year?: no  Fall with injury in past year?: no     Interventions:    Patient comments: states she does feel unsteady at times and would use her walker if needed. Patient declines any further evaluation or treatment    Cognitive: Words recalled: 2 Words Recalled           Total Score Interpretation: Abnormal Mini-Cog      Interventions:  Patient declines any further evaluation or treatment    Depression:  PHQ-2 Score: 6  PHQ-9 Total Score: 14    Interpretation:   1-4 = minimal  5-9 = mild  10-14 = moderate  15-19 = moderately severe  20-27 = severe  Interventions:  LPN INTERVENTION GUIDE: SCORE 5-14 = MODERATE DEPRESSION: FOLLOW UP IN 1 WEEK  Patient denies suicidal ideation or intent  She would like a Psychiatrist she can talk to. Patient states she was given a number to call by her PCP previously, but no longer has the information. Per PCP, left message with patient for two different places for her to call to get set-up: [facility] for [facility] -[phone], and [address] [place] [address] [phone].      Drug Use:    DAST-10 Score: 2     Interpretation:  1-2: Low level - Monitor,

## 2023-06-06 RX ORDER — OXYBUTYNIN CHLORIDE 10 MG/1
TABLET, EXTENDED RELEASE ORAL
Qty: 28 TABLET | Refills: 5 | Status: SHIPPED | OUTPATIENT
Start: 2023-06-06

## 2023-06-27 ENCOUNTER — HOSPITAL ENCOUNTER (OUTPATIENT)
Age: 63
Discharge: HOME | End: 2023-06-27
Payer: MEDICARE

## 2023-06-27 LAB
ALBUMIN SERPL-MCNC: 4.2 GM/DL (ref 3.4–5)
ALP BLD-CCNC: 110 IU/L (ref 40–129)
ALT SERPL-CCNC: 9 U/L (ref 10–40)
ANION GAP SERPL CALCULATED.3IONS-SCNC: 9 MMOL/L (ref 4–16)
AST SERPL-CCNC: 11 IU/L (ref 15–37)
BILIRUB SERPL-MCNC: 0.4 MG/DL (ref 0–1)
BUN SERPL-MCNC: 14 MG/DL (ref 6–23)
CALCIUM SERPL-MCNC: 9.5 MG/DL (ref 8.3–10.6)
CHLORIDE BLD-SCNC: 103 MMOL/L (ref 99–110)
CO2: 26 MMOL/L (ref 21–32)
CREAT SERPL-MCNC: 1 MG/DL (ref 0.6–1.1)
GFR SERPL CREATININE-BSD FRML MDRD: >60 ML/MIN/1.73M2
GLUCOSE SERPL-MCNC: 127 MG/DL (ref 70–99)
POTASSIUM SERPL-SCNC: 4.2 MMOL/L (ref 3.5–5.1)
SODIUM BLD-SCNC: 138 MMOL/L (ref 135–145)
TOTAL PROTEIN: 7.3 GM/DL (ref 6.4–8.2)

## 2023-06-27 PROCEDURE — 36415 COLL VENOUS BLD VENIPUNCTURE: CPT

## 2023-06-27 PROCEDURE — 80053 COMPREHEN METABOLIC PANEL: CPT

## 2023-07-13 ENCOUNTER — HOSPITAL ENCOUNTER (EMERGENCY)
Age: 63
Discharge: ANOTHER HOSPITAL | End: 2023-07-13
Attending: EMERGENCY MEDICINE
Payer: MEDICARE

## 2023-07-13 ENCOUNTER — APPOINTMENT (OUTPATIENT)
Dept: GENERAL RADIOLOGY | Age: 63
End: 2023-07-13
Payer: MEDICARE

## 2023-07-13 ENCOUNTER — APPOINTMENT (OUTPATIENT)
Dept: CT IMAGING | Age: 63
End: 2023-07-13
Attending: EMERGENCY MEDICINE
Payer: MEDICARE

## 2023-07-13 VITALS
RESPIRATION RATE: 18 BRPM | TEMPERATURE: 97.8 F | DIASTOLIC BLOOD PRESSURE: 93 MMHG | SYSTOLIC BLOOD PRESSURE: 128 MMHG | HEIGHT: 61 IN | BODY MASS INDEX: 30.96 KG/M2 | HEART RATE: 72 BPM | OXYGEN SATURATION: 97 % | WEIGHT: 164 LBS

## 2023-07-13 DIAGNOSIS — R41.82 ALTERED MENTAL STATUS, UNSPECIFIED ALTERED MENTAL STATUS TYPE: Primary | ICD-10-CM

## 2023-07-13 DIAGNOSIS — D49.6 NEOPLASM OF BRAIN CAUSING MASS EFFECT ON ADJACENT STRUCTURES (HCC): ICD-10-CM

## 2023-07-13 LAB
ALBUMIN SERPL-MCNC: 4.1 GM/DL (ref 3.4–5)
ALCOHOL SCREEN SERUM: <0.01 %WT/VOL
ALP BLD-CCNC: 103 IU/L (ref 40–129)
ALT SERPL-CCNC: 10 U/L (ref 10–40)
AMMONIA: 23 UMOL/L (ref 11–51)
ANION GAP SERPL CALCULATED.3IONS-SCNC: 9 MMOL/L (ref 4–16)
AST SERPL-CCNC: 12 IU/L (ref 15–37)
BASOPHILS ABSOLUTE: 0.1 K/CU MM
BASOPHILS RELATIVE PERCENT: 1 % (ref 0–1)
BILIRUB SERPL-MCNC: 0.2 MG/DL (ref 0–1)
BUN SERPL-MCNC: 12 MG/DL (ref 6–23)
CALCIUM SERPL-MCNC: 9 MG/DL (ref 8.3–10.6)
CHLORIDE BLD-SCNC: 106 MMOL/L (ref 99–110)
CO2: 26 MMOL/L (ref 21–32)
CREAT SERPL-MCNC: 0.9 MG/DL (ref 0.6–1.1)
DIFFERENTIAL TYPE: ABNORMAL
EOSINOPHILS ABSOLUTE: 0.1 K/CU MM
EOSINOPHILS RELATIVE PERCENT: 0.8 % (ref 0–3)
GFR SERPL CREATININE-BSD FRML MDRD: >60 ML/MIN/1.73M2
GLUCOSE SERPL-MCNC: 112 MG/DL (ref 70–99)
HCT VFR BLD CALC: 39 % (ref 37–47)
HEMOGLOBIN: 12.7 GM/DL (ref 12.5–16)
IMMATURE NEUTROPHIL %: 0.3 % (ref 0–0.43)
LYMPHOCYTES ABSOLUTE: 1.8 K/CU MM
LYMPHOCYTES RELATIVE PERCENT: 28 % (ref 24–44)
MAGNESIUM: 2.1 MG/DL (ref 1.8–2.4)
MCH RBC QN AUTO: 29.9 PG (ref 27–31)
MCHC RBC AUTO-ENTMCNC: 32.6 % (ref 32–36)
MCV RBC AUTO: 91.8 FL (ref 78–100)
MONOCYTES ABSOLUTE: 0.5 K/CU MM
MONOCYTES RELATIVE PERCENT: 7.2 % (ref 0–4)
PDW BLD-RTO: 12.8 % (ref 11.7–14.9)
PLATELET # BLD: 257 K/CU MM (ref 140–440)
PMV BLD AUTO: 9.2 FL (ref 7.5–11.1)
POTASSIUM SERPL-SCNC: 3.8 MMOL/L (ref 3.5–5.1)
RBC # BLD: 4.25 M/CU MM (ref 4.2–5.4)
SEGMENTED NEUTROPHILS ABSOLUTE COUNT: 4 K/CU MM
SEGMENTED NEUTROPHILS RELATIVE PERCENT: 62.7 % (ref 36–66)
SODIUM BLD-SCNC: 141 MMOL/L (ref 135–145)
TOTAL IMMATURE NEUTOROPHIL: 0.02 K/CU MM
TOTAL PROTEIN: 7 GM/DL (ref 6.4–8.2)
WBC # BLD: 6.3 K/CU MM (ref 4–10.5)

## 2023-07-13 PROCEDURE — 85025 COMPLETE CBC W/AUTO DIFF WBC: CPT

## 2023-07-13 PROCEDURE — 71046 X-RAY EXAM CHEST 2 VIEWS: CPT

## 2023-07-13 PROCEDURE — 96365 THER/PROPH/DIAG IV INF INIT: CPT

## 2023-07-13 PROCEDURE — 6360000002 HC RX W HCPCS: Performed by: EMERGENCY MEDICINE

## 2023-07-13 PROCEDURE — 80177 DRUG SCRN QUAN LEVETIRACETAM: CPT

## 2023-07-13 PROCEDURE — 96375 TX/PRO/DX INJ NEW DRUG ADDON: CPT

## 2023-07-13 PROCEDURE — 99285 EMERGENCY DEPT VISIT HI MDM: CPT

## 2023-07-13 PROCEDURE — 83735 ASSAY OF MAGNESIUM: CPT

## 2023-07-13 PROCEDURE — 82140 ASSAY OF AMMONIA: CPT

## 2023-07-13 PROCEDURE — 93005 ELECTROCARDIOGRAM TRACING: CPT | Performed by: EMERGENCY MEDICINE

## 2023-07-13 PROCEDURE — 70450 CT HEAD/BRAIN W/O DYE: CPT

## 2023-07-13 PROCEDURE — 80053 COMPREHEN METABOLIC PANEL: CPT

## 2023-07-13 PROCEDURE — G0480 DRUG TEST DEF 1-7 CLASSES: HCPCS

## 2023-07-13 RX ORDER — DEXAMETHASONE SODIUM PHOSPHATE 10 MG/ML
10 INJECTION, SOLUTION INTRAMUSCULAR; INTRAVENOUS ONCE
Status: DISCONTINUED | OUTPATIENT
Start: 2023-07-13 | End: 2023-07-13

## 2023-07-13 RX ORDER — LEVETIRACETAM 10 MG/ML
1000 INJECTION INTRAVASCULAR ONCE
Status: COMPLETED | OUTPATIENT
Start: 2023-07-13 | End: 2023-07-13

## 2023-07-13 RX ORDER — ONDANSETRON 2 MG/ML
4 INJECTION INTRAMUSCULAR; INTRAVENOUS ONCE
Status: COMPLETED | OUTPATIENT
Start: 2023-07-13 | End: 2023-07-13

## 2023-07-13 RX ADMIN — DEXAMETHASONE SODIUM PHOSPHATE 10 MG: 10 INJECTION, SOLUTION INTRAMUSCULAR; INTRAVENOUS at 12:51

## 2023-07-13 RX ADMIN — ONDANSETRON 4 MG: 2 INJECTION INTRAMUSCULAR; INTRAVENOUS at 11:39

## 2023-07-13 RX ADMIN — LEVETIRACETAM 1000 MG: 10 INJECTION, SOLUTION INTRAVENOUS at 11:19

## 2023-07-13 ASSESSMENT — PAIN - FUNCTIONAL ASSESSMENT
PAIN_FUNCTIONAL_ASSESSMENT: NONE - DENIES PAIN
PAIN_FUNCTIONAL_ASSESSMENT: NONE - DENIES PAIN

## 2023-07-13 NOTE — ED PROVIDER NOTES
Emergency [facility] DEPARTMENT    Patient: Caleb Bennett  MRN: [id number]  : 1960  Date of Evaluation: 2023  ED Provider: Rebekah Butler MD    Chief Complaint       Chief Complaint   Patient presents with    Altered Mental Status     Pt.'s boyfriend states pt has had some confusion for 5 day. Pt not oriented to time or date. Denies any pain     Juan Pablo Mathews is a 59 y.o. female who presents to the emergency department patient was brought in by her boyfriend who insisted that she get evaluated. He stated for last week she has been acting confused and odd behavior compared to her usual.  The patient states she does not know what he is talking about. The boyfriend stated that last night the patient was cooking at 10 PM and turning on the wrong burners on the stove as well as forgetting to turn the stove on. She usually cooks dinner at 6 PM.  She did cook dinner at 6 PM and cooked it again at 10 PM.  The patient also forgot if she quit smoking 2 years ago and started back smoking cigarettes. She has been forgetting where she put things. She has not been febrile. She has not experienced a recent injury. Patient does not use alcohol or illicit drugs. Patient denies any new numbness, tingling, or weakness. ROS:     At least 10 systems reviewed and otherwise acutely negative except as in the [address] Past History     Past Medical History:   Diagnosis Date    COPD (chronic obstructive pulmonary disease) ([facility])     Epilepsy ([address] [facility])     last seizure three days    Full dentures     upper plate only    H/O dizziness     \"take Meclazine at least twice a day for this\"    H/O Doppler JERMAINE's ultrasound 2020     No evidence of significant occlusive arterial disease of lower EXT. H/O echocardiogram 2020    EF 55-60%, Normal study. History of drug abuse ([address] [facility])     History of nuclear stress test 2020    Normal study.     History of UTI

## 2023-07-14 LAB
EKG ATRIAL RATE: 70 BPM
EKG DIAGNOSIS: NORMAL
EKG P AXIS: 69 DEGREES
EKG P-R INTERVAL: 150 MS
EKG Q-T INTERVAL: 420 MS
EKG QRS DURATION: 72 MS
EKG QTC CALCULATION (BAZETT): 453 MS
EKG R AXIS: 45 DEGREES
EKG T AXIS: 78 DEGREES
EKG VENTRICULAR RATE: 70 BPM
LEVETIRACETAM SERPL-MCNC: 23 UG/ML (ref 10–40)
LEVETIRACETAM SERPL-MCNC: <2 UG/ML (ref 10–40)

## 2023-07-14 PROCEDURE — 93010 ELECTROCARDIOGRAM REPORT: CPT | Performed by: INTERNAL MEDICINE

## 2023-08-10 ENCOUNTER — TELEPHONE (OUTPATIENT)
Dept: INTERNAL MEDICINE CLINIC | Age: 63
End: 2023-08-10

## 2023-08-10 NOTE — TELEPHONE ENCOUNTER
Care coordinator Hernan Santillan [phone])  would like to know if you want to follow this patient and sign for home care orders. Home care was ordered by [facility]. Patient has a follow up with you on 8/15. Please advise.

## 2023-08-15 ENCOUNTER — HOSPITAL ENCOUNTER (EMERGENCY)
Age: 63
Discharge: ANOTHER HOSPITAL | End: 2023-08-15
Attending: EMERGENCY MEDICINE
Payer: MEDICARE

## 2023-08-15 ENCOUNTER — OFFICE VISIT (OUTPATIENT)
Dept: INTERNAL MEDICINE CLINIC | Age: 63
End: 2023-08-15
Payer: MEDICARE

## 2023-08-15 ENCOUNTER — APPOINTMENT (OUTPATIENT)
Dept: CT IMAGING | Age: 63
End: 2023-08-15
Attending: EMERGENCY MEDICINE
Payer: MEDICARE

## 2023-08-15 VITALS
DIASTOLIC BLOOD PRESSURE: 80 MMHG | SYSTOLIC BLOOD PRESSURE: 110 MMHG | WEIGHT: 164 LBS | HEART RATE: 90 BPM | HEIGHT: 61 IN | RESPIRATION RATE: 16 BRPM | OXYGEN SATURATION: 98 % | BODY MASS INDEX: 30.96 KG/M2

## 2023-08-15 VITALS
TEMPERATURE: 98.4 F | BODY MASS INDEX: 30.96 KG/M2 | DIASTOLIC BLOOD PRESSURE: 85 MMHG | RESPIRATION RATE: 22 BRPM | OXYGEN SATURATION: 97 % | SYSTOLIC BLOOD PRESSURE: 143 MMHG | HEART RATE: 78 BPM | WEIGHT: 164 LBS | HEIGHT: 61 IN

## 2023-08-15 DIAGNOSIS — C71.9 GLIOBLASTOMA (HCC): ICD-10-CM

## 2023-08-15 DIAGNOSIS — R40.1 STUPOR: Primary | ICD-10-CM

## 2023-08-15 DIAGNOSIS — R41.0 DISORIENTATION: Primary | ICD-10-CM

## 2023-08-15 LAB
ALBUMIN SERPL-MCNC: 3.6 GM/DL (ref 3.4–5)
ALP BLD-CCNC: 62 IU/L (ref 40–129)
ALT SERPL-CCNC: 17 U/L (ref 10–40)
ANION GAP SERPL CALCULATED.3IONS-SCNC: 11 MMOL/L (ref 4–16)
AST SERPL-CCNC: 15 IU/L (ref 15–37)
BASOPHILS ABSOLUTE: 0 K/CU MM
BASOPHILS RELATIVE PERCENT: 0.2 % (ref 0–1)
BILIRUB SERPL-MCNC: 1 MG/DL (ref 0–1)
BILIRUBIN URINE: NEGATIVE MG/DL
BLOOD, URINE: NEGATIVE
BUN SERPL-MCNC: 12 MG/DL (ref 6–23)
CALCIUM SERPL-MCNC: 8.9 MG/DL (ref 8.3–10.6)
CHLORIDE BLD-SCNC: 107 MMOL/L (ref 99–110)
CLARITY: CLEAR
CO2: 24 MMOL/L (ref 21–32)
COLOR: YELLOW
COMMENT UA: NORMAL
CREAT SERPL-MCNC: 0.7 MG/DL (ref 0.6–1.1)
DIFFERENTIAL TYPE: ABNORMAL
EOSINOPHILS ABSOLUTE: 0 K/CU MM
EOSINOPHILS RELATIVE PERCENT: 0.4 % (ref 0–3)
GFR SERPL CREATININE-BSD FRML MDRD: >60 ML/MIN/1.73M2
GLUCOSE SERPL-MCNC: 94 MG/DL (ref 70–99)
GLUCOSE, URINE: NEGATIVE MG/DL
HCT VFR BLD CALC: 40.3 % (ref 37–47)
HEMOGLOBIN: 12.9 GM/DL (ref 12.5–16)
IMMATURE NEUTROPHIL %: 0.4 % (ref 0–0.43)
KETONES, URINE: NEGATIVE MG/DL
LEUKOCYTE ESTERASE, URINE: NEGATIVE
LYMPHOCYTES ABSOLUTE: 1.6 K/CU MM
LYMPHOCYTES RELATIVE PERCENT: 19.9 % (ref 24–44)
MCH RBC QN AUTO: 30.4 PG (ref 27–31)
MCHC RBC AUTO-ENTMCNC: 32 % (ref 32–36)
MCV RBC AUTO: 94.8 FL (ref 78–100)
MONOCYTES ABSOLUTE: 0.7 K/CU MM
MONOCYTES RELATIVE PERCENT: 8.3 % (ref 0–4)
NITRITE URINE, QUANTITATIVE: NEGATIVE
PDW BLD-RTO: 13.9 % (ref 11.7–14.9)
PH, URINE: 7.5 (ref 5–8)
PLATELET # BLD: 156 K/CU MM (ref 140–440)
PMV BLD AUTO: 9.3 FL (ref 7.5–11.1)
POTASSIUM SERPL-SCNC: 3.8 MMOL/L (ref 3.5–5.1)
PROTEIN UA: NEGATIVE MG/DL
RBC # BLD: 4.25 M/CU MM (ref 4.2–5.4)
SEGMENTED NEUTROPHILS ABSOLUTE COUNT: 5.8 K/CU MM
SEGMENTED NEUTROPHILS RELATIVE PERCENT: 70.8 % (ref 36–66)
SODIUM BLD-SCNC: 142 MMOL/L (ref 135–145)
SPECIFIC GRAVITY UA: <1.005 (ref 1–1.03)
TOTAL IMMATURE NEUTOROPHIL: 0.03 K/CU MM
TOTAL PROTEIN: 6.4 GM/DL (ref 6.4–8.2)
TROPONIN T: <0.01 NG/ML
UROBILINOGEN, URINE: 0.2 MG/DL (ref 0.2–1)
WBC # BLD: 8.2 K/CU MM (ref 4–10.5)

## 2023-08-15 PROCEDURE — 3074F SYST BP LT 130 MM HG: CPT | Performed by: GENERAL PRACTICE

## 2023-08-15 PROCEDURE — 3079F DIAST BP 80-89 MM HG: CPT | Performed by: GENERAL PRACTICE

## 2023-08-15 PROCEDURE — 6360000004 HC RX CONTRAST MEDICATION: Performed by: EMERGENCY MEDICINE

## 2023-08-15 PROCEDURE — 85025 COMPLETE CBC W/AUTO DIFF WBC: CPT

## 2023-08-15 PROCEDURE — 99285 EMERGENCY DEPT VISIT HI MDM: CPT

## 2023-08-15 PROCEDURE — 70470 CT HEAD/BRAIN W/O & W/DYE: CPT

## 2023-08-15 PROCEDURE — 84484 ASSAY OF TROPONIN QUANT: CPT

## 2023-08-15 PROCEDURE — 99212 OFFICE O/P EST SF 10 MIN: CPT | Performed by: GENERAL PRACTICE

## 2023-08-15 PROCEDURE — 81003 URINALYSIS AUTO W/O SCOPE: CPT

## 2023-08-15 PROCEDURE — 80053 COMPREHEN METABOLIC PANEL: CPT

## 2023-08-15 PROCEDURE — 93005 ELECTROCARDIOGRAM TRACING: CPT | Performed by: EMERGENCY MEDICINE

## 2023-08-15 RX ORDER — LEVETIRACETAM 750 MG/1
TABLET ORAL
COMMUNITY
Start: 2023-08-09

## 2023-08-15 RX ORDER — ZOSTER VACCINE RECOMBINANT, ADJUVANTED 50 MCG/0.5
0.5 KIT INTRAMUSCULAR SEE ADMIN INSTRUCTIONS
Qty: 0.5 ML | Refills: 0 | Status: CANCELLED | OUTPATIENT
Start: 2023-08-15 | End: 2023-08-16

## 2023-08-15 RX ORDER — TRAZODONE HYDROCHLORIDE 50 MG/1
50 TABLET ORAL NIGHTLY
Qty: 30 TABLET | Refills: 1 | COMMUNITY
Start: 2023-08-09 | End: 2023-10-08

## 2023-08-15 RX ORDER — DOCUSATE SODIUM 100 MG/1
CAPSULE, LIQUID FILLED ORAL
COMMUNITY
Start: 2023-08-02

## 2023-08-15 RX ORDER — CEPHALEXIN 500 MG/1
CAPSULE ORAL
COMMUNITY
Start: 2023-08-10

## 2023-08-15 RX ORDER — DONEPEZIL HYDROCHLORIDE 5 MG/1
TABLET, FILM COATED ORAL
COMMUNITY
Start: 2023-08-09

## 2023-08-15 RX ORDER — ACETAMINOPHEN 325 MG/1
650 TABLET ORAL EVERY 4 HOURS PRN
COMMUNITY
Start: 2023-08-09

## 2023-08-15 RX ADMIN — IOPAMIDOL 100 ML: 755 INJECTION, SOLUTION INTRAVENOUS at 16:01

## 2023-08-15 NOTE — ED NOTES
No change in pt condition. Pt continues to rest and son remains at bedside.       Tawnya Flores RN  08/15/23 1958

## 2023-08-15 NOTE — ED NOTES
Report received by Olvin Garrett. Pt is resting in bed with eyes closed, easy respirations. SR are up x2, call light in reach. Son is at bedside. Son denies needs at this time. He is aware of transfer to [facility].       Rhina Tamez RN  08/15/23 1958

## 2023-08-15 NOTE — ED PROVIDER NOTES
Emergency Department Encounter  Location: [facility]    Patient: Merari Meredith  MRN: [id number]  : 1960  Date of evaluation: 8/15/2023  ED Provider: Flory Calvo DO    19:00p.m. Génesis Dixonguerline Alvarado was checked out to me by Dr. Rosanne Mojica. Please see his/her initial documentation for details of the patient's initial ED presentation, physical exam and completed studies. In brief, Merari Meredith is a 59 y.o. female that presented to the emergency department with complaints of altered mental status. The patient has a history of glioblastoma. This morning the patient had a significant change in her mental status. She was unable to walk or follow commands. She had a craniotomy on . CT scan today showed a new ring-enhancing lesion with questionable peripheral hemorrhages with no shift. The patient's neurosurgeon is at [facility] and we are awaiting for callback from [employer] at shift change.     I have reviewed and interpreted all of the currently available lab results and diagnostics from this visit:  Results for orders placed or performed during the hospital encounter of 08/15/23   CBC with Auto Differential   Result Value Ref Range    WBC 8.2 4.0 - 10.5 K/CU MM    RBC 4.25 4.2 - 5.4 M/CU MM    Hemoglobin 12.9 12.5 - 16.0 GM/DL    Hematocrit 40.3 37 - 47 %    MCV 94.8 78 - 100 FL    MCH 30.4 27 - 31 PG    MCHC 32.0 32.0 - 36.0 %    RDW 13.9 11.7 - 14.9 %    Platelets 295 334 - 082 K/CU MM    MPV 9.3 7.5 - 11.1 FL    Differential Type AUTOMATED DIFFERENTIAL     Segs Relative 70.8 (H) 36 - 66 %    Lymphocytes % 19.9 (L) 24 - 44 %    Monocytes % 8.3 (H) 0 - 4 %    Eosinophils % 0.4 0 - 3 %    Basophils % 0.2 0 - 1 %    Segs Absolute 5.8 K/CU MM    Lymphocytes Absolute 1.6 K/CU MM    Monocytes Absolute 0.7 K/CU MM    Eosinophils Absolute 0.0 K/CU MM    Basophils Absolute 0.0 K/CU MM    Immature Neutrophil % 0.4 0 - 0.43 %    Total Immature Neutrophil 0.03 K/CU MM   CMP   Result
DO Vides FACEP      Comment: Please note this report has been produced using speech recognition software and maycontain errors related to that system including errors in grammar, punctuation, and spelling, as well as words and phrases that may be inappropriate. If there are any questions or concerns please feel free to contact thedictating provider for clarification.

## 2023-08-15 NOTE — PROGRESS NOTES
Patient is here with her son. He says she has been out of it all day. He gave her her meds but after that she was having trouble getting dressed and following small commands.
epilepsy at age 25. Used to see Epilepsy specialist at [facility] in [facility]. Patient does not drive due to h/o seizures. Seeing Neurology Epilepsy locally. She is now weaned off Aptiom and is on [place]. Continues to be on Keppra. # Patient was on Abilify, Topamax, Venlafaxine, Cymbalta, and many other meds that she self-discontinued. She reports she is only depressed but does not have bipolar. Patient's mood continues to be down. Has tardive dyskinesia but not taking Benztropine. Has h/o non-compliance with pills. She is taking Nortriptyline only. Referred to Psychiatry. She is seeing Dr. Dulce Villanueva as well. # Patient having difficulty sleeping. Taking Hydroxyzine and Melatonin. Sleep study negative. # BLE pain. Normal peripheral angiogram (Feb 2023) through Cardiology. Has not had EMG done. Ropinirole has helped tremendously. # Quit smoking recently through Nicotine patch. # Seeing Urology for incontinence. She is on Oxybutynin. Had surgery done May 2022. # She has prediabetes and dyslipidemia. Not on meds. # Patient is not on vitamin D supplements despite low vitamin D level years ago. Taking Fosamax for OP. DEXA not in chart. # Patient is s/p L carpal tunnel release. # Was complaining of worsening exertional dyspnea. TTE at [facility] in 2019 unremarkable. Stress test unremarkable. PFT shows obstruction wo BD response. She has COPD. Following with Pulm for BD. She was on 3L O2 but recently during hospitalization Aug 2022 this was discontinued. CT lung screening with 4mm LN being followed. Sleep study with AHI 0.0   # Colonoscopy done in 2017 with tubular adenoma at [facility]. FIT test May 2022 unremarkable (done through home care/insurance?)   Colonoscopy done April 2023 showed 1 hyperplastic polyp and 1 tubular adenoma. 8/15/23:  Patient underwent neurosurgical removal of tumor 2 wks ago. Son presented from out of town and gave morning medication.  He feels he may have given an
1 pair

## 2023-08-16 LAB
EKG ATRIAL RATE: 77 BPM
EKG DIAGNOSIS: NORMAL
EKG P AXIS: 54 DEGREES
EKG P-R INTERVAL: 142 MS
EKG Q-T INTERVAL: 444 MS
EKG QRS DURATION: 74 MS
EKG QTC CALCULATION (BAZETT): 502 MS
EKG R AXIS: 7 DEGREES
EKG T AXIS: 128 DEGREES
EKG VENTRICULAR RATE: 77 BPM

## 2023-08-16 PROCEDURE — 93010 ELECTROCARDIOGRAM REPORT: CPT | Performed by: INTERNAL MEDICINE

## 2023-08-16 NOTE — ED NOTES
Report given to [address] [place] [address]. No change in pt condition.        Ann-Marie Vernon RN  08/15/23 2013

## 2023-08-16 NOTE — ED NOTES
Pt transferred to [facility] ED in stable condition by [facility] (SHELLEY).        Sharon Iglesias RN  08/15/23 2051

## 2023-08-17 ENCOUNTER — TELEPHONE (OUTPATIENT)
Dept: INTERNAL MEDICINE CLINIC | Age: 63
End: 2023-08-17

## 2023-08-17 NOTE — TELEPHONE ENCOUNTER
Care Transitions Initial Follow Up Call    Call within 2 business days of discharge: No     Patient: Nelson Koroma Patient : 1960 MRN: [id number]    [unfilled]    RARS: No data recorded     Spoke with: patient transferred to [facility]     [facility]    Non-face-to-face services provided:  Patient transferred to [facility] currently admitted    Follow Up  Future Appointments   Date Time Provider [address]   2023  9:15 AM SCHEDULE, [facility] URBANA IM [facility] IM [facility]   2023  9:00 AM Aleksandar Andersen MD [facility] U[facility] IM MMA   2023 10:00 AM Thelma Perez MD [address]   10/25/2023  9:00 AM Carlos Wolfe DO AFL ADL [facility]   2024 11:00 AM Srmx [facility]im [facility] L[facility] U[facility] IM MMA       Jenae Sam MA

## 2023-08-30 ENCOUNTER — TELEPHONE (OUTPATIENT)
Dept: INTERNAL MEDICINE CLINIC | Age: 63
End: 2023-08-30

## 2023-08-30 NOTE — TELEPHONE ENCOUNTER
Attempted to contact patient and reschedule the 9/11/2023 appointment due to the provider being out of the office. Unable to leave message because greeting on her phone just states that she is currently not taking calls at this time.

## 2023-09-15 ENCOUNTER — TELEPHONE (OUTPATIENT)
Dept: CARDIOLOGY CLINIC | Age: 63
End: 2023-09-15

## 2023-09-15 NOTE — TELEPHONE ENCOUNTER
Patients family called and patient is on Hospice care in a facility and will not be needing services.

## (undated) DEVICE — TRAY PREP DRY W/ PREM GLV 2 APPL 6 SPNG 2 UNDPD 1 OVERWRAP

## (undated) DEVICE — 4-PORT MANIFOLD: Brand: NEPTUNE 2

## (undated) DEVICE — CORD ES L10FT BLU MPLR FT SWCH DISP ACMI

## (undated) DEVICE — ENDOSCOPY KIT: Brand: MEDLINE INDUSTRIES, INC.

## (undated) DEVICE — ELECTRODE,RADIOTRANSLUCENT,FOAM,5PK: Brand: MEDLINE

## (undated) DEVICE — NEEDLE HYPO 25GA L1.5IN BLU POLYPR HUB S STL REG BVL STR

## (undated) DEVICE — GLOVE ORANGE PI 7 1/2   MSG9075

## (undated) DEVICE — BAG DRNGE 2000ML UROLOGY ANTI REFLX CHMBR SAMP PRT

## (undated) DEVICE — TOWEL,OR,DSP,ST,BLUE,STD,6/PK,12PK/CS: Brand: MEDLINE

## (undated) DEVICE — GLOVE ORANGE PI 8   MSG9080

## (undated) DEVICE — COTTON UNDERCAST PADDING,REGULAR FINISH: Brand: WEBRIL

## (undated) DEVICE — SOLUTION PREP POVIDONE IOD FOR SKIN MUCOUS MEM PRIOR TO

## (undated) DEVICE — SUTURE VCRL SZ 2-0 L18IN ABSRB UD CT-1 L36MM 1/2 CIR J839D

## (undated) DEVICE — DRAPE,U/ SHT,SPLIT,PLAS,STERIL: Brand: MEDLINE

## (undated) DEVICE — GLOVE SURG SZ 8 L12IN THK75MIL DK GRN LTX FREE

## (undated) DEVICE — CURITY NON-ADHERENT STRIPS: Brand: CURITY

## (undated) DEVICE — JELLY,LUBE,STERILE,FLIP TOP,TUBE,2-OZ: Brand: MEDLINE

## (undated) DEVICE — SYRINGE MED 10ML LUERLOCK TIP W/O SFTY DISP

## (undated) DEVICE — ELECTRODE ES AD CRDLSS PT RET REM POLYHESIVE

## (undated) DEVICE — STERILE LATEX POWDER-FREE SURGICAL GLOVESWITH NITRILE COATING: Brand: PROTEXIS

## (undated) DEVICE — COUNTER NDL 30 COUNT FOAM STRP SGL MAG

## (undated) DEVICE — SPONGE GZ W4XL8IN COT WVN 12 PLY

## (undated) DEVICE — TUBING, SUCTION, 1/4" X 10', STRAIGHT: Brand: MEDLINE

## (undated) DEVICE — DRAPE SURGICAL HAND PROX AURORA

## (undated) DEVICE — PACK,BASIC,IX: Brand: MEDLINE

## (undated) DEVICE — CUFF REPROCESS BP TOURN SING BLDR 2 PORT 4X18IN

## (undated) DEVICE — SNARE ENDOSCP L240CM SHTH DIA24MM LOOP W10MM POLYP RND REINF

## (undated) DEVICE — SUTURE ETHLN SZ 3-0 L30IN NONABSORBABLE BLK FS-1 L24MM 3/8 669H

## (undated) DEVICE — BLADE SURG NO15 C STL SHRP PREM

## (undated) DEVICE — HOOK LOCK LATEX FREE ELASTIC BANDAGE 3INX5YD

## (undated) DEVICE — BAG COLLECTION URO CATCHER

## (undated) DEVICE — ANESTHESIA CIRCUIT ADULT-LF: Brand: MEDLINE INDUSTRIES, INC.

## (undated) DEVICE — MARKER SURG SKIN UTIL REGULAR/FINE 2 TIP W/ RUL AND 9 LBL

## (undated) DEVICE — SYRINGE MED 20ML STD CLR PLAS LUERLOCK TIP N CTRL DISP

## (undated) DEVICE — BANDAGE,ELASTIC,ESMARK,STERILE,4"X9',LF: Brand: MEDLINE

## (undated) DEVICE — GOWN,SIRUS,FABRNF,RAGLAN,L,ST,30/CS: Brand: MEDLINE

## (undated) DEVICE — GAUZE,SPONGE,4"X4",16PLY,XRAY,STRL,LF: Brand: MEDLINE